# Patient Record
Sex: MALE | NOT HISPANIC OR LATINO | Employment: FULL TIME | URBAN - METROPOLITAN AREA
[De-identification: names, ages, dates, MRNs, and addresses within clinical notes are randomized per-mention and may not be internally consistent; named-entity substitution may affect disease eponyms.]

---

## 2017-01-09 ENCOUNTER — OFFICE VISIT - HEALTHEAST (OUTPATIENT)
Dept: BEHAVIORAL HEALTH | Facility: CLINIC | Age: 57
End: 2017-01-09

## 2017-01-09 DIAGNOSIS — F64.0 GENDER DYSPHORIA IN ADOLESCENT AND ADULT: ICD-10-CM

## 2017-01-20 ENCOUNTER — OFFICE VISIT - HEALTHEAST (OUTPATIENT)
Dept: BEHAVIORAL HEALTH | Facility: CLINIC | Age: 57
End: 2017-01-20

## 2017-01-20 DIAGNOSIS — F64.0 GENDER DYSPHORIA IN ADOLESCENT AND ADULT: ICD-10-CM

## 2017-01-30 ENCOUNTER — OFFICE VISIT - HEALTHEAST (OUTPATIENT)
Dept: BEHAVIORAL HEALTH | Facility: CLINIC | Age: 57
End: 2017-01-30

## 2017-01-30 DIAGNOSIS — F64.0 GENDER DYSPHORIA IN ADOLESCENT AND ADULT: ICD-10-CM

## 2017-02-15 ENCOUNTER — AMBULATORY - HEALTHEAST (OUTPATIENT)
Dept: BEHAVIORAL HEALTH | Facility: CLINIC | Age: 57
End: 2017-02-15

## 2017-02-15 ENCOUNTER — OFFICE VISIT - HEALTHEAST (OUTPATIENT)
Dept: BEHAVIORAL HEALTH | Facility: CLINIC | Age: 57
End: 2017-02-15

## 2017-02-15 DIAGNOSIS — F64.0 GENDER DYSPHORIA IN ADOLESCENT AND ADULT: ICD-10-CM

## 2017-02-23 ENCOUNTER — OFFICE VISIT - HEALTHEAST (OUTPATIENT)
Dept: BEHAVIORAL HEALTH | Facility: CLINIC | Age: 57
End: 2017-02-23

## 2017-02-23 DIAGNOSIS — F64.0 GENDER DYSPHORIA IN ADOLESCENT AND ADULT: ICD-10-CM

## 2017-03-02 ENCOUNTER — OFFICE VISIT - HEALTHEAST (OUTPATIENT)
Dept: BEHAVIORAL HEALTH | Facility: CLINIC | Age: 57
End: 2017-03-02

## 2017-03-02 DIAGNOSIS — F64.0 GENDER DYSPHORIA IN ADOLESCENT AND ADULT: ICD-10-CM

## 2017-03-21 ENCOUNTER — TRANSFERRED RECORDS (OUTPATIENT)
Dept: HEALTH INFORMATION MANAGEMENT | Facility: CLINIC | Age: 57
End: 2017-03-21

## 2017-04-03 ENCOUNTER — OFFICE VISIT - HEALTHEAST (OUTPATIENT)
Dept: BEHAVIORAL HEALTH | Facility: CLINIC | Age: 57
End: 2017-04-03

## 2017-04-03 DIAGNOSIS — F64.0 GENDER DYSPHORIA IN ADULT: ICD-10-CM

## 2017-04-12 ENCOUNTER — OFFICE VISIT - HEALTHEAST (OUTPATIENT)
Dept: BEHAVIORAL HEALTH | Facility: CLINIC | Age: 57
End: 2017-04-12

## 2017-04-12 DIAGNOSIS — F64.0 GENDER DYSPHORIA IN ADULT: ICD-10-CM

## 2017-04-20 ENCOUNTER — OFFICE VISIT - HEALTHEAST (OUTPATIENT)
Dept: BEHAVIORAL HEALTH | Facility: CLINIC | Age: 57
End: 2017-04-20

## 2017-04-20 DIAGNOSIS — F64.0 GENDER DYSPHORIA IN ADULT: ICD-10-CM

## 2017-05-01 ENCOUNTER — OFFICE VISIT - HEALTHEAST (OUTPATIENT)
Dept: BEHAVIORAL HEALTH | Facility: CLINIC | Age: 57
End: 2017-05-01

## 2017-05-01 DIAGNOSIS — F64.0 GENDER DYSPHORIA IN ADOLESCENT AND ADULT: ICD-10-CM

## 2017-05-18 ENCOUNTER — OFFICE VISIT (OUTPATIENT)
Dept: FAMILY MEDICINE | Facility: CLINIC | Age: 57
End: 2017-05-18

## 2017-05-18 VITALS
RESPIRATION RATE: 16 BRPM | OXYGEN SATURATION: 97 % | DIASTOLIC BLOOD PRESSURE: 85 MMHG | WEIGHT: 191 LBS | TEMPERATURE: 97.6 F | BODY MASS INDEX: 28.21 KG/M2 | HEART RATE: 67 BPM | SYSTOLIC BLOOD PRESSURE: 128 MMHG

## 2017-05-18 DIAGNOSIS — F64.0 GENDER DYSPHORIA IN ADULT: ICD-10-CM

## 2017-05-18 DIAGNOSIS — L65.9 HAIR LOSS: Primary | ICD-10-CM

## 2017-05-18 LAB
% GRANULOCYTES: 71.3 %G (ref 40–75)
ALBUMIN SERPL-MCNC: 4.6 MG/DL (ref 3.5–4.7)
ALP SERPL-CCNC: 56.4 U/L (ref 31.7–110.7)
ALT SERPL-CCNC: 19.5 U/L (ref 0–45)
AST SERPL-CCNC: 18.2 U/L (ref 0–55)
BILIRUB SERPL-MCNC: 0.6 MG/DL (ref 0.2–1.3)
BUN SERPL-MCNC: 22.5 MG/DL (ref 7–21)
CALCIUM SERPL-MCNC: 9.8 MG/DL (ref 8.5–10.1)
CHLORIDE SERPLBLD-SCNC: 104 MMOL/L (ref 98–110)
CHOLEST SERPL-MCNC: 192.2 MG/DL (ref 0–200)
CHOLEST/HDLC SERPL: 4.7 {RATIO} (ref 0–5)
CO2 SERPL-SCNC: 29.9 MMOL/L (ref 20–32)
CREAT SERPL-MCNC: 1 MG/DL (ref 0.7–1.3)
GFR SERPL CREATININE-BSD FRML MDRD: 81.9 ML/MIN/1.7 M2
GLUCOSE SERPL-MCNC: 108.6 MG'DL (ref 70–99)
GRANULOCYTES #: 4 K/UL (ref 1.6–8.3)
HCT VFR BLD AUTO: 50.4 % (ref 40–53)
HDLC SERPL-MCNC: 40.6 MG/DL
HEMOGLOBIN: 16.9 G/DL (ref 13.3–17.7)
LDLC SERPL CALC-MCNC: 133 MG/DL (ref 0–129)
LYMPHOCYTES # BLD AUTO: 1.2 K/UL (ref 0.8–5.3)
LYMPHOCYTES NFR BLD AUTO: 21.4 %L (ref 20–48)
MCH RBC QN AUTO: 31.2 PG (ref 26.5–35)
MCHC RBC AUTO-ENTMCNC: 33.5 G/DL (ref 32–36)
MCV RBC AUTO: 92.9 FL (ref 78–100)
MID #: 0.4 K/UL (ref 0–2.2)
MID %: 7.3 %M (ref 0–20)
PLATELET # BLD AUTO: 220 K/UL (ref 150–450)
POTASSIUM SERPL-SCNC: 4.1 MMOL/DL (ref 3.3–4.5)
PROT SERPL-MCNC: 7.3 G/DL (ref 6.8–8.8)
RBC # BLD AUTO: 5.42 M/UL (ref 4.4–5.9)
SODIUM SERPL-SCNC: 140.8 MMOL/L (ref 132.6–141.4)
TRIGL SERPL-MCNC: 91.7 MG/DL (ref 0–150)
VIT B12 SERPL-MCNC: 597 PG/ML (ref 193–986)
VLDL CHOLESTEROL: 18.3 MG/DL (ref 7–32)
WBC # BLD AUTO: 5.6 K/UL (ref 4–11)

## 2017-05-18 NOTE — LETTER
May 24, 2017      Cesar Stockton  [address] [place] [address]        Dear Cesar,    Thank you for getting your care at [facility]. Please see below for your test results.   - Normal Vitamin B12  - Slightly low Vitamin D  - Normal kidney, liver, electrolyte, blood cell count, cholesterol, and testosterone results    Resulted Orders   Vitamin B12   Result Value Ref Range    Vitamin B12 597 193 - 986 pg/mL   Vitamin D Level   Result Value Ref Range    Vitamin D Deficiency screening 26 20 - 75 ug/L      Comment:      Season, race, dietary intake, and treatment affect the concentration of   25-hydroxy-Vitamin D. Values may decrease during winter months and increase   during summer months. Values 20-29 ug/L may indicate Vitamin D insufficiency   and values <20 ug/L may indicate Vitamin D deficiency.   Vitamin D determination is routinely performed by an immunoassay specific for   25 hydroxyvitamin D3.  If an individual is on vitamin D2 (ergocalciferol)   supplementation, please specify 25 OH vitamin D2 and D3 level determination   by   LCMSMS test VITD23.     Comprehensive Metabolic Panel ([facility])   Result Value Ref Range    Albumin 4.6 3.5 - 4.7 mg/dL    Alkaline Phosphatase 56.4 31.7 - 110.7 U/L    ALT 19.5 0.0 - 45.0 U/L    AST 18.2 0.0 - 55.0 U/L    Bilirubin Total 0.6 0.2 - 1.3 mg/dL    Urea Nitrogen 22.5 (H) 7.0 - 21.0 mg/dL    Calcium 9.8 8.5 - 10.1 mg/dL    Chloride 104.0 98.0 - 110.0 mmol/L    Carbon Dioxide 29.9 20.0 - 32.0 mmol/L    Creatinine 1.0 0.7 - 1.3 mg/dL    Glucose 108.6 (H) 70.0 - 99.0 mg'dL    Potassium 4.1 3.3 - 4.5 mmol/dL    Sodium 140.8 132.6 - 141.4 mmol/L    Protein Total 7.3 6.8 - 8.8 g/dL    GFR Estimate 81.9 >60.0 mL/min/1.7 m2    GFR Estimate If Black >90 >60.0 mL/min/1.7 m2   Testosterone total   Result Value Ref Range    Testosterone Total 463 240 - 950 ng/dL      Comment:      This test was developed and its performance characteristics determined by the   [facility]  [facility],  Special Chemistry Laboratory. It has   not been cleared or approved by the FDA. The laboratory is regulated under   CLIA   as qualified to perform high-complexity testing. This test is used for   clinical   purposes. It should not be regarded as investigational or for research.     CBC with Diff Plt ([facility])   Result Value Ref Range    WBC 5.6 4.0 - 11.0 K/uL    Lymphocytes # 1.2 0.8 - 5.3 K/uL    % Lymphocytes 21.4 20.0 - 48.0 %L    Mid # 0.4 0.0 - 2.2 K/uL    Mid % 7.3 0.0 - 20.0 %M    GRANULOCYTES # 4.0 1.6 - 8.3 K/uL    % Granulocytes 71.3 40.0 - 75.0 %G    RBC 5.42 4.40 - 5.90 M/uL    Hemoglobin 16.9 13.3 - 17.7 g/dL    Hematocrit 50.4 40.0 - 53.0 %    MCV 92.9 78.0 - 100.0 fL    MCH 31.2 26.5 - 35.0 pg    MCHC 33.5 32.0 - 36.0 g/dL    Platelets 220.0 150.0 - 450.0 K/uL   Lipid Cascade ([facility])   Result Value Ref Range    Cholesterol 192.2 0.0 - 200.0 mg/dL    Cholesterol/HDL Ratio 4.7 0.0 - 5.0    HDL Cholesterol 40.6 >40.0 mg/dL    LDL Cholesterol Calculated 133 (H) 0 - 129 mg/dL    Triglycerides 91.7 0.0 - 150.0 mg/dL    VLDL Cholesterol 18.3 7.0 - 32.0 mg/dL     Sincerely,    Sury Pike MD

## 2017-05-18 NOTE — MR AVS SNAPSHOT
After Visit Summary   5/18/2017    Chris Stockton    MRN: [id number]           Patient Information     Date Of Birth          1960        Visit Information        Provider Department      5/18/2017 9:20 AM Sury Altman MD \[facility]\"" [facility]        Today's Diagnoses     Hair loss    -  1    Gender dysphoria in adult          Care Instructions    Here is the plan from today's visit    1. Hair loss  - Vitamin B12  - Vitamin D Level    2. Gender dysphoria in adult  - Comprehensive Metabolic Panel ([facility])  - Testosterone total  - CBC with Diff Plt ([facility])  - Lipid Cascade ([facility])      Please call or return to clinic if your symptoms don't go away.    Follow up plan  Please make a clinic appointment for follow up with me (SURY ALTMAN) to continue gender and hormone discussion and follow up hair loss    Thank you for coming to [facility] today.  Lab Testing:  **If you had lab testing today and your results are reassuring or normal they will be mailed to you or sent through [email or web address] within 7 days.   **If the lab tests need quick action we will call you with the results.  The phone number we will call with results is # [phone] (home) none (work). If this is not the best number please call our clinic and change the number.  Medication Refills:  If you need any refills please call your pharmacy and they will contact us.   If you need to  your refill at a new pharmacy, please contact the new pharmacy directly. The new pharmacy will help you get your medications transferred faster.   Scheduling:  If you have any concerns about today's visit or wish to schedule another appointment please call our office during normal business hours [phone] (8-5:00 M-F)  If a referral was made to a [facility] and you don't get a call from central scheduling please call [phone].  If a Mammogram was ordered for you at [facility] call  [phone] to schedule or change your appointment.  If you had an XRay/CT/Ultrasound/MRI ordered the number is [phone] to schedule or change your radiology appointment.   Medical Concerns:  If you have urgent medical concerns please call [phone] at any time of the day.          Follow-ups after your visit        Who to contact     Please call your clinic at [phone] to:    Ask questions about your health    Make or cancel appointments    Discuss your medicines    Learn about your test results    Speak to your doctor   If you have compliments or concerns about an experience at your clinic, or if you wish to file a complaint, please contact [facility] Patient Relations at [phone] or email us at [email or web address]         Additional Information About Your Visit        [employer] Information     [employer] is an electronic gateway that provides easy, online access to your medical records. With [employer], you can request a clinic appointment, read your test results, renew a prescription or communicate with your care team.     To sign up for [employer] visit the website at [email or web address]   You will be asked to enter the access code listed below, as well as some personal information. Please follow the directions to create your username and password.     Your access code is: [email or web address]  Expires: 2017 10:08 AM     Your access code will  in 90 days. If you need help or a new code, please contact your [facility] or call [phone] for assistance.        Care EveryWhere ID     This is your Care EveryWhere ID. This could be used by other organizations to access your [facility] medical records  [id number]        Your Vitals Were     Pulse Temperature Respirations Pulse Oximetry BMI (Body Mass Index)       67 97.6  F (36.4  C) (Oral) 16 97% 28.21 kg/m2        Blood Pressure from Last 3 Encounters:   17 128/85    06/03/16 (!) 133/97   11/24/14 137/88    Weight from Last 3 Encounters:   05/18/17 191 lb (86.6 kg)   06/03/16 190 lb (86.2 kg)   11/24/14 192 lb (87.1 kg)              We Performed the Following     CBC with Diff Plt ([facility])     Comprehensive Metabolic Panel ([facility])     Lipid Cascade ([facility])     Testosterone total     Vitamin B12     Vitamin D Level        Primary Care Provider Office Phone # Fax #    [facility] [phone] [address] [facility] [address]        Thank you!     Thank you for choosing [facility]  for your care. Our goal is always to provide you with excellent care. Hearing back from our patients is one way we can continue to improve our services. Please take a few minutes to complete the written survey that you may receive in the mail after your visit with us. Thank you!             Your Updated Medication List - Protect others around you: Learn how to safely use, store and throw away your medicines at www.disposemymeds.org.          This list is accurate as of: 5/18/17 10:08 AM.  Always use your most recent med list.                   Brand Name Dispense Instructions for use    ADDERALL PO      Take 10 mg by mouth daily       PRILOSEC PO      Take 20 mg by mouth every morning       WELLBUTRIN  MG 24 hr tablet   Generic drug:  buPROPion      Take 300 mg by mouth every morning

## 2017-05-18 NOTE — PATIENT INSTRUCTIONS
Here is the plan from today's visit    1. Hair loss  - Vitamin B12  - Vitamin D Level    2. Gender dysphoria in adult  - Comprehensive Metabolic Panel ([facility])  - Testosterone total  - CBC with Diff Plt ([facility])  - Lipid Cascade ([facility])      Please call or return to clinic if your symptoms don't go away.    Follow up plan  Please make a clinic appointment for follow up with me (HARRY ALTMAN) to continue gender and hormone discussion and follow up hair loss    Thank you for coming to [facility] today.  Lab Testing:  **If you had lab testing today and your results are reassuring or normal they will be mailed to you or sent through [employer] within 7 days.   **If the lab tests need quick action we will call you with the results.  The phone number we will call with results is # [phone] (home) none (work). If this is not the best number please call our clinic and change the number.  Medication Refills:  If you need any refills please call your pharmacy and they will contact us.   If you need to  your refill at a new pharmacy, please contact the new pharmacy directly. The new pharmacy will help you get your medications transferred faster.   Scheduling:  If you have any concerns about today's visit or wish to schedule another appointment please call our office during normal business hours [phone] (8-5:00 M-F)  If a referral was made to a [facility] and you don't get a call from central scheduling please call [phone].  If a Mammogram was ordered for you at [facility] call [phone] to schedule or change your appointment.  If you had an XRay/CT/Ultrasound/MRI ordered the number is [phone] to schedule or change your radiology appointment.   Medical Concerns:  If you have urgent medical concerns please call [phone] at any time of the day.

## 2017-05-18 NOTE — PROGRESS NOTES
"      HPI:       Chris Stockton is a 56 year old who presents for the following  Patient presents with:  Hair/Scalp Problem: Hair Thinning  Consult: HRT        Concern: Establish Care/ Hair Thinning   Description of the problem :Pt presents today with c/o hair thinning      When did it start?: 01/2017    Intensity: mild, not clumps falling out but during showers notices some thinner hair strands.     Progression of Symptoms:  worsening and notices that hair seems a little more thin     Therapies Tried Nothing    What has worked?  n/a               {+++++++  Additional Concerns  (FM):921614}  Problem, Medication and Allergy Lists were {Reviewed Lists:533309::\"reviewed and are current.\"}  Patient is {New/Established:410332::\"an established patient of this clinic.\"}         Review of Systems:   Review of Systems          Physical Exam:   Patient Vitals for the past 24 hrs:   BP Temp Temp src Pulse Resp SpO2 Weight   05/18/17 0923 128/85 97.6  F (36.4  C) Oral 67 16 97 % 191 lb (86.6 kg)     Body mass index is 28.21 kg/(m^2).  {SMI VITALS OPTIONS:889766::\"Vitals were reviewed and were normal\"}     Physical Exam  {  Detailed Ortho and mental status exam:994101}    Results:     {Result Choices:390394}  Assessment and Plan     {Assessment/Plan Pick List :614880}  There are no discontinued medications.  Options for treatment and follow-up care were reviewed with the patient. Chris Stockton  engaged in the decision making process and verbalized understanding of the options discussed and agreed with the final plan.    Sury Pike MD      "

## 2017-05-18 NOTE — PROGRESS NOTES
"  New Patient and Transgender History Intake:       HPI          Mookie is a 56 year old individual  who presents today for interest in feminizing hormone therapy to better align their body with their gender identity. Uses female pronouns when presenting as female, uses male pronouns \"when in disguise\".   Came to this clinic via referral from: sees therapist at [facility]    Hair thinning  - Jan 2017 hair started thinning on top and noticed hair in the shower. Knows someone with similar symptoms who was found to have a B12 deficinency and hair improved with treatment. Would be upset if they lost their \"naturally curly hair\".     - Attributes the hair loss to the recent inauguration. 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 Lots of home stress: adult children with medical problems, ex-wife going through cancer treatment and recently filed for divorce.     - No new hair products- uses standard shampoo and conditioner, no blow-dry or . Pt's father is 90 and \"has a good head of hair\". Maternal male relatives have had hair loss. Last haircut was 2013. No diet changes or medication changes.     - During neuropathy workup in June 2014: B12= 596 (nl 200-1100)    1-How do you identify? Transgender, MTF  2. What pronouns do you prefer?  She/Her/Hers/Herself   3-What age did you first feel your gender identity (internal sense of gender) did not match your physical body?      \"Trans part of me woke up in 2011 (49 yo)\"  4-Have you ever felt depressed or suicidal because your gender identity and body don't match?      YES   5-Have you legally changed your name? no   6-Have you ever seen a health care provider about being transgender?No  7-What hormones have you been on and for how long? (These can be treatments you were prescribed, that you got from a friend or bought without a prescription. Include any treatments you currently take.) None  8-If not on hormones, would you " like to be?   yes  9-Have you had any gender affirmation surgery? No  11-Do you want to have surgery now or in future? Undecided  12-Are you out at work or at school or at home?      Some people know. Out at home.  Not out at work.    ----------      Past Surgical History:   Procedure Laterality Date     APPENDECTOMY       COLONOSCOPY  12/9/2011    Procedure:COLONOSCOPY; COLONOSCOPY; Surgeon:MARILY MENA; Location: GI     ESOPHAGOSCOPY, GASTROSCOPY, DUODENOSCOPY (EGD), COMBINED  11/23/2012    Procedure: COMBINED ESOPHAGOSCOPY, GASTROSCOPY, DUODENOSCOPY (EGD), BIOPSY SINGLE OR MULTIPLE;;  Surgeon: Yehuda Tomlinson MD;  Location:  GI     VASECTOMY         Patient Active Problem List   Diagnosis     Shoulder impingement syndrome     Ulnar nerve injury     ADRIAN (generalized anxiety disorder)     Transvestic fetishism     Personality disorder     Past Medical History:   Diagnosis Date     Anxiety      Depressive disorder      Kidney stone     nov.       Current Outpatient Prescriptions   Medication Sig Dispense Refill     Amphetamine-Dextroamphetamine (ADDERALL PO) Take 10 mg by mouth daily       Omeprazole (PRILOSEC PO) Take 20 mg by mouth every morning       buPROPion (WELLBUTRIN XL) 150 MG 24 hr tablet Take 300 mg by mouth every morning          Family History   Problem Relation Age of Onset     Prostate Cancer Brother        Allergies   Allergen Reactions     Doxycycline Rash       History   Smoking Status     Never Smoker   Smokeless Tobacco     Not on file       Mental Health Assessment:  Chemical use history Unknown  Mental Health diagnosis  History: MDD, ADHD      [facility], gender therapist           Review of Systems:   CONSTITUTIONAL: NEGATIVE for fever, chills, change in weight  INTEGUMENTARY/SKIN: vitiligo, used Derm prescribed dream, avoiding using due to summer  EYES: NEGATIVE for vision changes or irritation  ENT/MOUTH: NEGATIVE for mouth and throat problems. + Tinnitus  RESP:  "NEGATIVE for significant cough or SOB  BREAST: NEGATIVE for masses, tenderness or discharge  CV: NEGATIVE for chest pain, palpitations or peripheral edema  GI: NEGATIVE for nausea, abdominal pain, + heartburn if \"poor eating\", or change in bowel habits  : NEGATIVE for frequency, dysuria, or hematuria  MUSCULOSKELETAL: NEGATIVE for significant arthralgias or myalgia  NEURO: NEGATIVE for weakness, dizziness or paresthesias  ENDOCRINE: hair thinning  HEME/ALLERGY: NEGATIVE for bleeding problems  PSYCHIATRIC: NEGATIVE for changes in mood or affect           Social History     Social History     Social History     Marital status:      Spouse name: N/A     Number of children: N/A     Years of education: N/A     Social History Main Topics     Smoking status: Never Smoker     Smokeless tobacco: None     Alcohol use Yes      Comment: rare     Drug use: No     Sexual activity: Not Asked     Other Topics Concern     None     Social History Narrative   Works at [employer], will be getting a 3 month paid leave coming up to do volunteer work for [employer]    Marital Status: In process of divorce           Physical Exam:     Vitals:    05/18/17 0923   BP: 128/85   Pulse: 67   Resp: 16   Temp: 97.6  F (36.4  C)   TempSrc: Oral   SpO2: 97%   Weight: 191 lb (86.6 kg)     BMI= Body mass index is 28.21 kg/(m^2).     Appearance: female appearance and dress  GENERAL:: healthy, alert, no distress  SKIN/HAIR: thinning dark grey hair with no bald patches  EYES: Eyes grossly normal to inspection, fundi benign and PERRL  HENT: ear canals normal, TM's normal, Nose normal, Mouth- no ulcers, no lesions  RESP: lungs clear to auscultation - no rales, no rhonchi, no wheezes  CV: regular rates and rhythm, normal S1 S2, no S3 or S4 and no murmur, no click or rub -  Affect: Appropriate/mood-congruent    Results for orders placed or performed in visit on 05/18/17   Vitamin B12   Result Value Ref Range    Vitamin B12 597 193 - 986 " pg/mL   Vitamin D Level   Result Value Ref Range    Vitamin D Deficiency screening 26 20 - 75 ug/L   Comprehensive Metabolic Panel ([facility])   Result Value Ref Range    Albumin 4.6 3.5 - 4.7 mg/dL    Alkaline Phosphatase 56.4 31.7 - 110.7 U/L    ALT 19.5 0.0 - 45.0 U/L    AST 18.2 0.0 - 55.0 U/L    Bilirubin Total 0.6 0.2 - 1.3 mg/dL    Urea Nitrogen 22.5 (H) 7.0 - 21.0 mg/dL    Calcium 9.8 8.5 - 10.1 mg/dL    Chloride 104.0 98.0 - 110.0 mmol/L    Carbon Dioxide 29.9 20.0 - 32.0 mmol/L    Creatinine 1.0 0.7 - 1.3 mg/dL    Glucose 108.6 (H) 70.0 - 99.0 mg'dL    Potassium 4.1 3.3 - 4.5 mmol/dL    Sodium 140.8 132.6 - 141.4 mmol/L    Protein Total 7.3 6.8 - 8.8 g/dL    GFR Estimate 81.9 >60.0 mL/min/1.7 m2    GFR Estimate If Black >90 >60.0 mL/min/1.7 m2   CBC with Diff Plt ([facility])   Result Value Ref Range    WBC 5.6 4.0 - 11.0 K/uL    Lymphocytes # 1.2 0.8 - 5.3 K/uL    % Lymphocytes 21.4 20.0 - 48.0 %L    Mid # 0.4 0.0 - 2.2 K/uL    Mid % 7.3 0.0 - 20.0 %M    GRANULOCYTES # 4.0 1.6 - 8.3 K/uL    % Granulocytes 71.3 40.0 - 75.0 %G    RBC 5.42 4.40 - 5.90 M/uL    Hemoglobin 16.9 13.3 - 17.7 g/dL    Hematocrit 50.4 40.0 - 53.0 %    MCV 92.9 78.0 - 100.0 fL    MCH 31.2 26.5 - 35.0 pg    MCHC 33.5 32.0 - 36.0 g/dL    Platelets 220.0 150.0 - 450.0 K/uL   Lipid Cascade ([facility])   Result Value Ref Range    Cholesterol 192.2 0.0 - 200.0 mg/dL    Cholesterol/HDL Ratio 4.7 0.0 - 5.0    HDL Cholesterol 40.6 >40.0 mg/dL    LDL Cholesterol Calculated 133 (H) 0 - 129 mg/dL    Triglycerides 91.7 0.0 - 150.0 mg/dL    VLDL Cholesterol 18.3 7.0 - 32.0 mg/dL         Assessment and Plan   Chris was seen today for hair/scalp problem and start HRT discussion    Diagnoses and all orders for this visit:    Hair loss  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 Other etiologies concerning primary follicle pathology  include alopecia areata (association with vitiligo, but  would expect to be more rapid with localized patching) or telogen effluvium (less common). Will obtain vitamin levels given patient's concern. Endorses no other thyroid symptoms, will defer checking TSH for now. May consider scalp biopsy at later point if testing yields no info and loss progresses. Finasteride may also help, especially if using as part of gender transformation regimen.   -     Vitamin B12  -     Vitamin D Level    Gender dysphoria in adult  So far Mookie seems well consolidated in her female gender identity and in a good mental state. Recommended completely separate appointment to address this issue in its entirety. Mookie was open to this and plans to transfer care to our facility. Will obtain relevant labs in anticipation of of next visit.  -     Comprehensive Metabolic Panel ([facility])  -     Testosterone total  -     CBC with Diff Plt ([facility])  -     Lipid Cascade ([facility])    I, Jefferson Cortes MS3, acted as scribe for Dr. Sury Pike MD. All aspects of the exam and documentation were approved by the attending physician.     The medical student acted as scribe and the encounter documented above was completely performed by myself and the documentation reflects the work I have performed today. Sury Pike MD     I spent 30 min face to face with the patient and >50% was spent counselling the patient about the above medical conditions, educating patient and discussing the recommendations and followup.      Sury Pike MD  [facility], [facility]

## 2017-05-19 LAB — DEPRECATED CALCIDIOL+CALCIFEROL SERPL-MC: 26 UG/L (ref 20–75)

## 2017-05-22 ENCOUNTER — OFFICE VISIT - HEALTHEAST (OUTPATIENT)
Dept: BEHAVIORAL HEALTH | Facility: CLINIC | Age: 57
End: 2017-05-22

## 2017-05-22 DIAGNOSIS — F64.0 GENDER DYSPHORIA IN ADOLESCENT AND ADULT: ICD-10-CM

## 2017-05-22 LAB — TESTOST SERPL-MCNC: 463 NG/DL (ref 240–950)

## 2017-05-25 ENCOUNTER — TELEPHONE (OUTPATIENT)
Dept: PHARMACY | Facility: CLINIC | Age: 57
End: 2017-05-25

## 2017-05-25 NOTE — TELEPHONE ENCOUNTER
[facility] phone call message- medication clarification/question:    Full Medication Name: spironolactone       Question: Patient was in recently in clinic and spoke with the provider about the above medication. He has decided that he would like to take the above medication after all and would like us to send in a prescription      Pharmacy confirmed as [facility]ACY #[id number] - [facility] [address]: Yes    OK to leave a message on voice mail? Yes    Primary language: English      needed? No    Call taken on May 25, 2017 at 2:35 PM by Julia Craig

## 2017-05-25 NOTE — TELEPHONE ENCOUNTER
Per chart review, patient saw PCP on 5/18. Message routed to PCP to prescribe medication if appropriate.    Gaby Phillips RN

## 2017-05-26 ENCOUNTER — MYC MEDICAL ADVICE (OUTPATIENT)
Dept: FAMILY MEDICINE | Facility: CLINIC | Age: 57
End: 2017-05-26

## 2017-05-26 DIAGNOSIS — L65.9 ALOPECIA: Primary | ICD-10-CM

## 2017-05-30 RX ORDER — FINASTERIDE 1 MG/1
1 TABLET, FILM COATED ORAL DAILY
Qty: 30 TABLET | Refills: 3 | Status: SHIPPED | OUTPATIENT
Start: 2017-05-30 | End: 2017-07-31

## 2017-07-06 ENCOUNTER — TRANSFERRED RECORDS (OUTPATIENT)
Dept: HEALTH INFORMATION MANAGEMENT | Facility: CLINIC | Age: 57
End: 2017-07-06

## 2017-07-20 ENCOUNTER — TRANSFERRED RECORDS (OUTPATIENT)
Dept: HEALTH INFORMATION MANAGEMENT | Facility: CLINIC | Age: 57
End: 2017-07-20

## 2017-07-31 ENCOUNTER — OFFICE VISIT (OUTPATIENT)
Dept: FAMILY MEDICINE | Facility: CLINIC | Age: 57
End: 2017-07-31

## 2017-07-31 VITALS
HEIGHT: 69 IN | BODY MASS INDEX: 27.37 KG/M2 | OXYGEN SATURATION: 98 % | SYSTOLIC BLOOD PRESSURE: 135 MMHG | HEART RATE: 55 BPM | WEIGHT: 184.8 LBS | RESPIRATION RATE: 16 BRPM | TEMPERATURE: 98.3 F | DIASTOLIC BLOOD PRESSURE: 79 MMHG

## 2017-07-31 DIAGNOSIS — L64.9 MALE PATTERN BALDNESS: ICD-10-CM

## 2017-07-31 DIAGNOSIS — F64.0 GENDER DYSPHORIA IN ADULT: ICD-10-CM

## 2017-07-31 DIAGNOSIS — K21.9 GASTROESOPHAGEAL REFLUX DISEASE WITHOUT ESOPHAGITIS: Primary | ICD-10-CM

## 2017-07-31 DIAGNOSIS — H90.0 CONDUCTIVE HEARING LOSS, BILATERAL: ICD-10-CM

## 2017-07-31 PROBLEM — F32.5 MAJOR DEPRESSIVE DISORDER IN REMISSION (H): Status: ACTIVE | Noted: 2017-07-31

## 2017-07-31 NOTE — PROGRESS NOTES
"       HPI     Mookie is a 57 year old individual that uses pronouns She/Her/Hers/Herself that presents today for follow up of:  feminizing hormone therapy. Gender identity: Female    Any special concerns today?  concerns about Hair loss    On hormones?  No    Questions about treatment and cost for hair loss.   Reading papers - better results with starting hormones v blockers first.   Saw  Dr. Maynro Baker - sees transgender patients.  She said they needed to start on hormones and possibly rogaine for now.     Wellbutrin - 1/1000 will experience hair loss on it. Had been on 150 for ever and increased to 300 at the time when hair loss seemed to start, that they recently decreased again.  Seeing a psychiatrist - Dr. Ana Cool.  Had a heart to heart on their being transgender and is quite unfamiliar.  Ok having them go elsewhere.  Seeing Dr. Plasencia for therapy but is out of network. Has decent transgender experience. So needing to find new psychiatrist.  Has done EMDR in the past and worked well. Is also expecting to divorce her wife post 30 years - has cancers.  Prior had seen Dr. Torres at [facility].    Is being treated for ADD by her psychiatrist.     Transgender - didn't figure out that they were \"a girl inside\" until 2011 and since then no longer suicidal and life is much better. 7/12/2011 - was when transitioned.     Out socially in Worship for many several years and out to family 2012 and wife 2012.  Doesn't do make up to pass.    Out to 6 people at work but not to all. Not to boss.    Out in climate group - in disguise in conservative spaces.   Not out to a conservative spiritual group she is in and really enjoys    Goes by girl pronouns.    Estrogen preference.   Thinking about bottom surgery  6 months left before wife can get insurance. Then will divorce.     Not worried about lack of erection.  Worried about lack of orgasm.     PMHx:   GERD - had stricture - ballooned. Had this twice. PPI started after.   Went " cold turkey off PPI - if eats properly and carefully is not OK but if not can have difficulty swallowing.       ---    Past Surgical History:   Procedure Laterality Date     APPENDECTOMY       COLONOSCOPY  12/9/2011    Procedure:COLONOSCOPY; COLONOSCOPY; Surgeon:MARILY MENA; Location: GI     ESOPHAGOSCOPY, GASTROSCOPY, DUODENOSCOPY (EGD), COMBINED  11/23/2012    Procedure: COMBINED ESOPHAGOSCOPY, GASTROSCOPY, DUODENOSCOPY (EGD), BIOPSY SINGLE OR MULTIPLE;;  Surgeon: Yehuda Tomlinson MD;  Location:  GI     VASECTOMY         Patient Active Problem List   Diagnosis     Shoulder impingement syndrome     Ulnar nerve injury     ADRIAN (generalized anxiety disorder)     Transvestic fetishism     Personality disorder       Current Outpatient Prescriptions   Medication Sig Dispense Refill     finasteride (PROPECIA) 1 MG tablet Take 1 tablet (1 mg) by mouth daily 30 tablet 3     Amphetamine-Dextroamphetamine (ADDERALL PO) Take 10 mg by mouth daily       Omeprazole (PRILOSEC PO) Take 20 mg by mouth every morning       buPROPion (WELLBUTRIN XL) 150 MG 24 hr tablet Take 300 mg by mouth every morning          History   Smoking Status     Unknown If Ever Smoked   Smokeless Tobacco     Not on file          Allergies   Allergen Reactions     Tetracycline Unknown     Doxycycline Rash       Problem, Medication and Allergy Lists were reviewed and are current..         Review of Systems:      General    Fat redistribution: no    Weight change: no HEENT    Voice change: no     Cardiovascular (CV)    Chest Pains: no    Shortness of breath: no Chest    Decreased exercise tolerance:  no    Breast changes/development: no     Gastrointestinal (GI)    Abdominal pain: no    Change in appetite: no Skin    Acne or oily skin: no  Change in hair: YES (hair loss)         Genitourinary ()    Abnormal vaginal bleeding: not applicable     Decreased spontaneous erections: not applicable    Change in libido: not applicable    New sexual  "partners: not applicable Musculoskeletal    Leg pain or swelling: not applicable     Psychiatric (Psych)  Depression: YES      Anxiety/Panic: YES        Mood:  \"good\"                    Physical Exam:   There were no vitals filed for this visit.  BMI= There is no height or weight on file to calculate BMI.   Wt Readings from Last 10 Encounters:   05/18/17 191 lb (86.6 kg)   06/03/16 190 lb (86.2 kg)   11/24/14 192 lb (87.1 kg)   02/14/13 194 lb (88 kg)   11/23/12 200 lb (90.7 kg)   11/15/12 201 lb (91.2 kg)   12/09/11 194 lb (88 kg)   11/23/11 200 lb (90.7 kg)     Appearance: Female appearance and dress    GENERAL:: healthy, alert and no distress  Affect: Appropriate/mood-congruent - wearing skirt and loose colorful blouse.   Scalp -typical male pattern baldness         Labs:   Results from last visit:  Office Visit on 05/18/2017   Component Date Value Ref Range Status     Vitamin B12 05/18/2017 597  193 - 986 pg/mL Final     Vitamin D Deficiency screening 05/18/2017 26  20 - 75 ug/L Final    Comment: Season, race, dietary intake, and treatment affect the concentration of   25-hydroxy-Vitamin D. Values may decrease during winter months and increase   during summer months. Values 20-29 ug/L may indicate Vitamin D insufficiency   and values <20 ug/L may indicate Vitamin D deficiency.   Vitamin D determination is routinely performed by an immunoassay specific for   25 hydroxyvitamin D3.  If an individual is on vitamin D2 (ergocalciferol)   supplementation, please specify 25 OH vitamin D2 and D3 level determination   by   LCMSMS test VITD23.       Albumin 05/18/2017 4.6  3.5 - 4.7 mg/dL Final     Alkaline Phosphatase 05/18/2017 56.4  31.7 - 110.7 U/L Final     ALT 05/18/2017 19.5  0.0 - 45.0 U/L Final     AST 05/18/2017 18.2  0.0 - 55.0 U/L Final     Bilirubin Total 05/18/2017 0.6  0.2 - 1.3 mg/dL Final     Urea Nitrogen 05/18/2017 22.5* 7.0 - 21.0 mg/dL Final     Calcium 05/18/2017 9.8  8.5 - 10.1 mg/dL Final     " Chloride 05/18/2017 104.0  98.0 - 110.0 mmol/L Final     Carbon Dioxide 05/18/2017 29.9  20.0 - 32.0 mmol/L Final     Creatinine 05/18/2017 1.0  0.7 - 1.3 mg/dL Final     Glucose 05/18/2017 108.6* 70.0 - 99.0 mg'dL Final     Potassium 05/18/2017 4.1  3.3 - 4.5 mmol/dL Final     Sodium 05/18/2017 140.8  132.6 - 141.4 mmol/L Final     Protein Total 05/18/2017 7.3  6.8 - 8.8 g/dL Final     GFR Estimate 05/18/2017 81.9  >60.0 mL/min/1.7 m2 Final     GFR Estimate If Black 05/18/2017 >90  >60.0 mL/min/1.7 m2 Final     Testosterone Total 05/18/2017 463  240 - 950 ng/dL Final    Comment: This test was developed and its performance characteristics determined by the   [facility],  [facility]. It has   not been cleared or approved by the FDA. The laboratory is regulated under   CLIA   as qualified to perform high-complexity testing. This test is used for   clinical   purposes. It should not be regarded as investigational or for research.       WBC 05/18/2017 5.6  4.0 - 11.0 K/uL Final     Lymphocytes # 05/18/2017 1.2  0.8 - 5.3 K/uL Final     % Lymphocytes 05/18/2017 21.4  20.0 - 48.0 %L Final     Mid # 05/18/2017 0.4  0.0 - 2.2 K/uL Final     Mid % 05/18/2017 7.3  0.0 - 20.0 %M Final     GRANULOCYTES # 05/18/2017 4.0  1.6 - 8.3 K/uL Final     % Granulocytes 05/18/2017 71.3  40.0 - 75.0 %G Final     RBC 05/18/2017 5.42  4.40 - 5.90 M/uL Final     Hemoglobin 05/18/2017 16.9  13.3 - 17.7 g/dL Final     Hematocrit 05/18/2017 50.4  40.0 - 53.0 % Final     MCV 05/18/2017 92.9  78.0 - 100.0 fL Final     MCH 05/18/2017 31.2  26.5 - 35.0 pg Final     MCHC 05/18/2017 33.5  32.0 - 36.0 g/dL Final     Platelets 05/18/2017 220.0  150.0 - 450.0 K/uL Final     Cholesterol 05/18/2017 192.2  0.0 - 200.0 mg/dL Final     Cholesterol/HDL Ratio 05/18/2017 4.7  0.0 - 5.0 Final     HDL Cholesterol 05/18/2017 40.6  >40.0 mg/dL Final     LDL Cholesterol Calculated 05/18/2017 133* 0 - 129 mg/dL Final      Triglycerides 05/18/2017 91.7  0.0 - 150.0 mg/dL Final     VLDL Cholesterol 05/18/2017 18.3  7.0 - 32.0 mg/dL Final       Assessment and Plan     There are no diagnoses linked to this encounter.    Chris was seen today for recheck and hair/scalp problem.    Diagnoses and all orders for this visit:    Gastroesophageal reflux disease without esophagitis - encouraged change to Ranitidine. Will do trial of as needed for now.  Aware that if has worse symptoms, will need to move to scheduled use.   -     ranitidine (ZANTAC) 150 MG tablet; Take 1 tablet (150 mg) by mouth 2 times daily    Conductive hearing loss, bilateral    Gender dysphoria in adult - very consistent.  Discovery late in life. Since then, MH has been markedly improved. Is out to many and living in identified gender.  Exploring when and how exactly to proceed.   Plan is to return in the future to start meds.  Will get back to her on Propecia and effect on orgasm and sexual functioning.  Also will get back to her on possibly psychiatrist who are more comfortable with transgender.     MDD - was seeing psychiatry and looking for new provider.  Is doing much better and now on lower dose of Wellbutrin.     Total time-  35 min with more than half spent counseling on meds, options, future psychiatric care etc.     Follow up:  Follow up in 1 month.  Results by quin  Questions were elicited and answered.     Yanna Santizo MD

## 2017-07-31 NOTE — Clinical Note
Questions!! 1. Do you know of psychiatrists who are transgender aware enough to not cause dysphoria.  She is looking for MDD and routine psych care 2. Propecia - do you know if it decreases orgasms? I didn't think of the fact that estrogen will do that... And she plans to start that too. I guess I need  3. Wanting to decrease hair loss AND have breasts so am leaning towards Propecia and Estrogen (and maybe progesterone).... Thanks! Charis

## 2017-07-31 NOTE — PATIENT INSTRUCTIONS
Here is the plan from today's visit    1. Gastroesophageal reflux disease without esophagitis  Zantac 150 mg twice a day as needed - would take this as scheduled for 3 - 4 days when having issues again.  If they keep recurring, then take this regularly.     2. Conductive hearing loss, bilateral      3. Gender dysphoria in adult  Will send you [employer] summaries of questions asked today    Please call or return to clinic if your symptoms don't go away.    Follow up plan  Physical    Thank you for coming to [facility] today.  Lab Testing:  **If you had lab testing today and your results are reassuring or normal they will be mailed to you or sent through [employer] within 7 days.   **If the lab tests need quick action we will call you with the results.  The phone number we will call with results is # [phone] (home) none (work). If this is not the best number please call our clinic and change the number.  Medication Refills:  If you need any refills please call your pharmacy and they will contact us.   If you need to  your refill at a new pharmacy, please contact the new pharmacy directly. The new pharmacy will help you get your medications transferred faster.   Scheduling:  If you have any concerns about today's visit or wish to schedule another appointment please call our office during normal business hours [phone] (8-5:00 M-F)  If a referral was made to a [facility] and you don't get a call from central scheduling please call [phone].  If a Mammogram was ordered for you at [facility] call [phone] to schedule or change your appointment.  If you had an XRay/CT/Ultrasound/MRI ordered the number is [phone] to schedule or change your radiology appointment.   Medical Concerns:  If you have urgent medical concerns please call [phone] at any time of the day.  If you have a medical emergency please call 442.

## 2017-07-31 NOTE — Clinical Note
Job Charlton Do you know of a psychiatrist who is more facile with transgender issues but not necessarily a transgender focused psychiatrist? Charis

## 2017-07-31 NOTE — MR AVS SNAPSHOT
After Visit Summary   7/31/2017    Chris Stockton    MRN: [id number]           Patient Information     Date Of Birth          1960        Visit Information        Provider Department      7/31/2017 4:00 PM Yanna Santizo MD [facility]        Today's Diagnoses     Gastroesophageal reflux disease without esophagitis    -  1    Conductive hearing loss, bilateral        Gender dysphoria in adult          Care Instructions    Here is the plan from today's visit    1. Gastroesophageal reflux disease without esophagitis  Zantac 150 mg twice a day as needed - would take this as scheduled for 3 - 4 days when having issues again.  If they keep recurring, then take this regularly.     2. Conductive hearing loss, bilateral      3. Gender dysphoria in adult  Will send you [facility] summaries of questions asked today    Please call or return to clinic if your symptoms don't go away.    Follow up plan  Physical    Thank you for coming to [facility] today.  Lab Testing:  **If you had lab testing today and your results are reassuring or normal they will be mailed to you or sent through [employer] within 7 days.   **If the lab tests need quick action we will call you with the results.  The phone number we will call with results is # [phone] (home) none (work). If this is not the best number please call our clinic and change the number.  Medication Refills:  If you need any refills please call your pharmacy and they will contact us.   If you need to  your refill at a new pharmacy, please contact the new pharmacy directly. The new pharmacy will help you get your medications transferred faster.   Scheduling:  If you have any concerns about today's visit or wish to schedule another appointment please call our office during normal business hours [phone] (8-5:00 M-F)  If a referral was made to a [facility] and you don't get a call from central scheduling  please call [phone].  If a Mammogram was ordered for you at [facility] call [phone] to schedule or change your appointment.  If you had an XRay/CT/Ultrasound/MRI ordered the number is [phone] to schedule or change your radiology appointment.   Medical Concerns:  If you have urgent medical concerns please call [phone] at any time of the day.  If you have a medical emergency please call 911.          Follow-ups after your visit        Who to contact     Please call your clinic at [phone] to:    Ask questions about your health    Make or cancel appointments    Discuss your medicines    Learn about your test results    Speak to your doctor   If you have compliments or concerns about an experience at your clinic, or if you wish to file a complaint, please contact [facility] Patient Relations at [phone] or email us at [email or web address]         Additional Information About Your Visit        [employer] Patient Care Information     [employer] gives you secure access to your electronic health record. If you see a primary care provider, you can also send messages to your care team and make appointments. If you have questions, please call your primary care clinic.  If you do not have a primary care provider, please call [phone] and they will assist you.      [employer] is an electronic gateway that provides easy, online access to your medical records. With [employer], you can request a clinic appointment, read your test results, renew a prescription or communicate with your care team.     To access your existing account, please contact your [facility] or call [phone] for assistance.        Care EveryWhere ID     This is your Care EveryWhere ID. This could be used by other organizations to access your [place] medical records  [id number]        Your Vitals Were     Pulse Temperature Respirations Height Pulse Oximetry BMI (Body Mass  "Index)    55 98.3  F (36.8  C) (Oral) 16 5' 9\" (175.3 cm) 98% 27.29 kg/m2       Blood Pressure from Last 3 Encounters:   07/31/17 135/79   05/18/17 128/85   06/03/16 (!) 133/97    Weight from Last 3 Encounters:   07/31/17 184 lb 12.8 oz (83.8 kg)   05/18/17 191 lb (86.6 kg)   06/03/16 190 lb (86.2 kg)              Today, you had the following     No orders found for display         Today's Medication Changes          These changes are accurate as of: 7/31/17  5:00 PM.  If you have any questions, ask your nurse or doctor.               Stop taking these medicines if you haven't already. Please contact your care team if you have questions.     finasteride 1 MG tablet   Commonly known as:  PROPECIA   Stopped by:  Yanna Santizo MD           PRILOSEC PO   Stopped by:  Yanna Santizo MD                    Primary Care Provider Office Phone # Fax #    Sury Billy Pike [phone]       [facility] [address] [facility] [address]        Equal Access to Services     [facility] AH: Hadii shauna hall hadasho Soyandelali, waaxda luqadaha, qaybta kaalmada adeegyada, sanjay joy ah. So [facility] [phone].    ATENCIÓN: Si habla español, tiene a tao disposición servicios gratuitos de asistencia lingüística. Emily al [phone].    We comply with applicable federal civil rights laws and [place] laws. We do not discriminate on the basis of race, color, national origin, age, disability sex, sexual orientation or gender identity.            Thank you!     Thank you for choosing [facility]  for your care. Our goal is always to provide you with excellent care. Hearing back from our patients is one way we can continue to improve our services. Please take a few minutes to complete the written survey that you may receive in the mail after your visit with us. Thank you!             Your Updated Medication List - Protect others around you: Learn how to safely use, store " and throw away your medicines at www.disposemymeds.org.          This list is accurate as of: 7/31/17  5:00 PM.  Always use your most recent med list.                   Brand Name Dispense Instructions for use Diagnosis    ADDERALL PO      Take 10 mg by mouth daily        WELLBUTRIN  MG 24 hr tablet   Generic drug:  buPROPion      Take 150 mg by mouth every morning

## 2017-09-22 ENCOUNTER — OFFICE VISIT (OUTPATIENT)
Dept: FAMILY MEDICINE | Facility: CLINIC | Age: 57
End: 2017-09-22

## 2017-09-22 VITALS
SYSTOLIC BLOOD PRESSURE: 114 MMHG | BODY MASS INDEX: 27.55 KG/M2 | DIASTOLIC BLOOD PRESSURE: 75 MMHG | RESPIRATION RATE: 18 BRPM | WEIGHT: 186 LBS | HEART RATE: 68 BPM | HEIGHT: 69 IN | TEMPERATURE: 99.2 F | OXYGEN SATURATION: 98 %

## 2017-09-22 DIAGNOSIS — F64.0 GENDER DYSPHORIA IN ADULT: Primary | ICD-10-CM

## 2017-09-22 LAB
FERRITIN SERPL-MCNC: 175 NG/ML (ref 26–388)
T4 FREE SERPL-MCNC: 0.79 NG/DL (ref 0.76–1.46)
TSH SERPL DL<=0.005 MIU/L-ACNC: 6.1 MU/L (ref 0.4–4)

## 2017-09-22 RX ORDER — FINASTERIDE 5 MG/1
5 TABLET, FILM COATED ORAL DAILY
Qty: 90 TABLET | Refills: 1 | Status: SHIPPED | OUTPATIENT
Start: 2017-09-22 | End: 2018-03-19

## 2017-09-22 NOTE — PATIENT INSTRUCTIONS
"      Informed Consent   Feminizing Medications      This form refers to the use of estrogen and/or androgen antagonists (sometimes called \"anti-androgens\" or \"androgen blockers\") by persons in the male-to-female spectrum who wish to become feminized to reduce gender dysphoria and facilitate a more feminine gender presentation. While there are risks associated with taking feminizing medications, when appropriately prescribed they can greatly improve mental health and quality of life.     Please seek another opinion if you want additional perspective on any aspect of your care.       Feminizing Effects     1. I understand that estrogen, androgen antagonists, or a combination of the two may be  prescribed to reduce male physical features and feminize my body.     2. I understand that the feminizing effects of estrogen and androgen antagonists can take several  months to a year to become noticeable, speed and degree of change is unpredictable.     3.  I understand that if I am taking estrogen I will probably develop breasts, and:        ? Breasts may take several years to develop to their full size.   ? Even if estrogen is stopped, the breast tissue that has developed will remain.   ? As soon as breasts start growing, it is recommended to have an annual breast exam.  ? There may be milky nipple discharge (galactorrhea). If you develop this, it is advised to check with a doctor to determine the cause.   ? It is not known if taking estrogen increases the risk of breast cancer.     4. I understand that the following changes are generally not permanent (that is, they will likely reverse if I stop taking feminizing medications):     ? Skin may become softer.   ? Muscle mass decreases and there may be a decrease in upper body strength.   ? Body hair growth may become less noticeable and grow more slowly,but won't stop.  ? Male pattern baldness may slow down, but will probably not stop completely, and hair that has already " "been lost will likely not grow back.   ? Fat may redistribute to a more feminine pattern (decreased in abdomen, increased on buttocks/hips/thighs - changing from \"apple shape\" to \"pear shape\").       5. I understand that taking feminizing medications will make my testicles produce less testosterone, which can affect my overall sexual function:    ? Fertility may be impaired, and I should consider sperm banking if I desire biological children.  ? Sperm may not mature, leading to reduced fertility. It is still possible to get someone pregnant however and contraception should be used if needed.  ? Testicles may shrink by 25-50%. Testicular examinations are still recommended.  ? The amount of fluid ejaculated may be reduced.   ? There is typically decrease in morning and spontaneous erections.   ? Erections may not be firm enough for penetrative sex.   ? Libido (sex drive) may decrease.     6. I understand that there are some aspects of my body that are not significantly changed by feminizing medications, though there may be other treatments that can be helpful.    ? Garner/facial hair may grow more slowly and be less noticeable, but will not go away.   ? Voice pitch will not rise and speech patterns will not become more feminine.   ? The laryngeal prominence (\"Sukhdev's apple\") will not shrink.      Risks of Feminizing Medications     7. I understand that the medical effects and safety of feminizing medications are not fully understood, and that there may be long-term risks that are not yet known.     8. I understand that I am strongly advised not to take more medication than I am prescribed, as this increases health risks. It won't help changes come faster.     9. I understand that feminizing medications can damage the liver. I have been advised that I should be monitored for possible liver damage as long as I am taking feminizing medications.     10. I understand that feminizing medications will result in changes that " will be noticeable by other people, and that some people in similar circumstances have experienced harassment, discrimination, and violence, while others have lost support of loved ones. I have been advised that referrals can be made for support/counselling if this would be helpful.     Medical Risks Associated with Estrogen     11. I understand that taking estrogen increases the risk of blood clots, which can result in:     ? Pulmonary embolism (blood clot to the lungs), which may cause death.  Stroke, which may cause permanent brain damage or death   ? Heart attack   ? Chronic leg vein problems     I understand that the risk of blood clots is much worse if I smoke cigarettes, especially over age 40. I understand that the danger is so high that I should stop smoking completely if I start taking estrogen. I can ask my doctor for advice on quitting.    12. I understand that taking estrogen can increase deposits of fat around my internal organs, which is associated with increased risk for diabetes and heart disease.     13. I understand that taking estrogen can cause increased blood pressure,  estrogen increases the risk of gallstones,  estrogen can cause headaches or migraines and can cause nausea and vomiting. I have been advised that if I develop these, it is recommended that I discuss this with my doctor.     14. I understand that it is not known if taking estrogen increases the risk of non-cancerous tumors of the pituitary gland. I have been informed that although this is typically not life-threatening, it can damage vision. I understand that this will be monitored.    15. I have been informed that I am more likely to have dangerous side effects from estrogen if I smoke, am overweight, am over 40 years old, or have a history of blood clots, high blood pressure, or a family history of breast cancer.     16. I have been informed that if I take too much estrogen, my body may convert it into testosterone, which may  slow or stop feminization.     Medical Risks Associated with Androgen Antagonists     17. I have been informed that spironolactone affects the balance of water and salts in the kidneys, and that this may:     ? Increase the amount of urine produced, and I may urinate more frequently   ? Reduce blood pressure   ? Rarely, cause high levels of potassium in the blood, and this will be monitored    18. I understand that some androgen antagonists make it more difficult to evaluate the results of PSA test. If I am over 50, I should have my prostate evaluated every year.     Prevention of Medical Complications     19. I agree to take feminizing medications as prescribed and to tell my care provider if I am not happy with the treatment or am experiencing any problems.     20. I understand that the right dose or type of medication prescribed for me may not be the same as for someone else.     21. I understand that physical examinations and blood tests are needed on a regular basis (monthly, at first) to check for negative side effects of feminizing medications.     22. I understand that feminization medications can interact with other medication (including other sources of hormones), dietary supplements, herbs, alcohol, and street drugs. I understand that being honest with my care provider about what else I am taking will help prevent medical complications that could be life-threatening. I have been informed that I will continue to get medical care no matter what information I share.     23. I understand that some medical conditions make it dangerous to take estrogen or androgen antagonists. I agree to be checked for risky conditions before the decision to start or continue feminizing medication is made.     24. I understand that I can choose to stop taking feminizing medication at any time, and that it is advised that I do this with the help of my doctor to make sure there are no negative reactions to stopping. I understand  that my doctor may suggest I reduce, switch or stop taking feminizing medication, if there are severe health risks that can't be controlled.    My signature below confirms that:     ? My doctor has talked with me about the benefits and risks of feminizing medication, the possible or likely consequences of hormone therapy, and potential treatment options.   ? I understand the risks that may be involved.   ? I understand that this form covers known effects and risks and that there may be long-term effects or risks that are not yet known.   ? I have had sufficient opportunity to discuss treatment options with my doctor. All of my questions have been answered to my satisfaction.   ? I believe I have adequate knowledge on which to base informed consent to the provision of feminizing medication.     Based on this:     _____ I wish to begin taking estrogen.     _____ I wish to begin taking androgen antagonists (e.g., Spironolactone).     _____ I do not wish to begin taking feminizing medication at this time.     Whatever your current decision is, please talk with your doctor any time you have questions, concerns, or want to re-evaluate your options.         ____________________________________ __________________   Patient Signature     Date         ____________________________________ __________________   Prescribing Clinician Signature    Date

## 2017-09-22 NOTE — PROGRESS NOTES
WERO Damico is a 57 year old  male  who presents:    Chief Complaint   Patient presents with     RECHECK     Follow up     As is older, and feeling like has lost 1/3 of hair since January. Thinks may have been precipitated by the elections. Brings gerald that have come out.  Notes that he is shedding and lots thinner hairs.     Spends time with transwomen of all ages.  Learns from them.     No difference in hair on the lower dose of wellbutrin.   Has not followed up with psychiatrists with more trangender experience. Considering getting rid of Wellbutrin.    Today is going to start the propecia - it is time to do this.  Lots of continued questions. Did look at the informed consent but did not bring. Wondered if needs endocrinologist.   Will then file for divorce.     Started the MInoxidil and using BID but finding that there was first some hair loss and so not using    Hair - oldest brother is very thin.  Parents though with a lot of hair.     Patient Active Problem List   Diagnosis     Shoulder impingement syndrome     Ulnar nerve injury     ADRIAN (generalized anxiety disorder)     Personality disorder     Gastroesophageal reflux disease without esophagitis     Major depressive disorder in remission     Conductive hearing loss, bilateral       Current Outpatient Prescriptions   Medication Sig Dispense Refill     ranitidine (ZANTAC) 150 MG tablet Take 1 tablet (150 mg) by mouth 2 times daily 60 tablet 3     Amphetamine-Dextroamphetamine (ADDERALL PO) Take 10 mg by mouth daily       buPROPion (WELLBUTRIN XL) 150 MG 24 hr tablet Take 150 mg by mouth every morning       minoxidil 5 % SOLN Apply topically twice daily. (Patient not taking: Reported on 9/22/2017) 60 mL 11          Allergies   Allergen Reactions     Tetracycline Unknown     Doxycycline Rash                Review of Systems:               Physical Exam:     Vitals:    09/22/17 1305   BP: 114/75   Pulse: 68   Resp: 18   Temp: 99.2  F (37.3  C)  "  [facility]   SpO2: 98%   Weight: 186 lb (84.4 kg)   Height: 5' 9\" (175.3 cm)     Body mass index is 27.47 kg/(m^2).  Vitals were reviewed and were normal  Shoulder length hair - thinning over the occiput, and over the temples. No scalp changes.  Pull test negative.     Office Visit on 05/18/2017   Component Date Value Ref Range Status     Vitamin B12 05/18/2017 597  193 - 986 pg/mL Final     Vitamin D Deficiency screening 05/18/2017 26  20 - 75 ug/L Final    Comment: Season, race, dietary intake, and treatment affect the concentration of   25-hydroxy-Vitamin D. Values may decrease during winter months and increase   during summer months. Values 20-29 ug/L may indicate Vitamin D insufficiency   and values <20 ug/L may indicate Vitamin D deficiency.   Vitamin D determination is routinely performed by an immunoassay specific for   25 hydroxyvitamin D3.  If an individual is on vitamin D2 (ergocalciferol)   supplementation, please specify 25 OH vitamin D2 and D3 level determination   by   LCMSMS test [id number].       Albumin 05/18/2017 4.6  3.5 - 4.7 mg/dL Final     Alkaline Phosphatase 05/18/2017 56.4  31.7 - 110.7 U/L Final     ALT 05/18/2017 19.5  0.0 - 45.0 U/L Final     AST 05/18/2017 18.2  0.0 - 55.0 U/L Final     Bilirubin Total 05/18/2017 0.6  0.2 - 1.3 mg/dL Final     Urea Nitrogen 05/18/2017 22.5* 7.0 - 21.0 mg/dL Final     Calcium 05/18/2017 9.8  8.5 - 10.1 mg/dL Final     Chloride 05/18/2017 104.0  98.0 - 110.0 mmol/L Final     Carbon Dioxide 05/18/2017 29.9  20.0 - 32.0 mmol/L Final     Creatinine 05/18/2017 1.0  0.7 - 1.3 mg/dL Final     Glucose 05/18/2017 108.6* 70.0 - 99.0 mg'dL Final     Potassium 05/18/2017 4.1  3.3 - 4.5 mmol/dL Final     Sodium 05/18/2017 140.8  132.6 - 141.4 mmol/L Final     Protein Total 05/18/2017 7.3  6.8 - 8.8 g/dL Final     GFR Estimate 05/18/2017 81.9  >60.0 mL/min/1.7 m2 Final     GFR Estimate If Black 05/18/2017 >90  >60.0 mL/min/1.7 m2 Final     Testosterone Total " 05/18/2017 463  240 - 950 ng/dL Final    Comment: This test was developed and its performance characteristics determined by the   [facility],  [facility]. It has   not been cleared or approved by the FDA. The laboratory is regulated under   CLIA   as qualified to perform high-complexity testing. This test is used for   clinical   purposes. It should not be regarded as investigational or for research.       WBC 05/18/2017 5.6  4.0 - 11.0 K/uL Final     Lymphocytes # 05/18/2017 1.2  0.8 - 5.3 K/uL Final     % Lymphocytes 05/18/2017 21.4  20.0 - 48.0 %L Final     Mid # 05/18/2017 0.4  0.0 - 2.2 K/uL Final     Mid % 05/18/2017 7.3  0.0 - 20.0 %M Final     GRANULOCYTES # 05/18/2017 4.0  1.6 - 8.3 K/uL Final     % Granulocytes 05/18/2017 71.3  40.0 - 75.0 %G Final     RBC 05/18/2017 5.42  4.40 - 5.90 M/uL Final     Hemoglobin 05/18/2017 16.9  13.3 - 17.7 g/dL Final     Hematocrit 05/18/2017 50.4  40.0 - 53.0 % Final     MCV 05/18/2017 92.9  78.0 - 100.0 fL Final     MCH 05/18/2017 31.2  26.5 - 35.0 pg Final     MCHC 05/18/2017 33.5  32.0 - 36.0 g/dL Final     Platelets 05/18/2017 220.0  150.0 - 450.0 K/uL Final     Cholesterol 05/18/2017 192.2  0.0 - 200.0 mg/dL Final     Cholesterol/HDL Ratio 05/18/2017 4.7  0.0 - 5.0 Final     HDL Cholesterol 05/18/2017 40.6  >40.0 mg/dL Final     LDL Cholesterol Calculated 05/18/2017 133* 0 - 129 mg/dL Final     Triglycerides 05/18/2017 91.7  0.0 - 150.0 mg/dL Final     VLDL Cholesterol 05/18/2017 18.3  7.0 - 32.0 mg/dL Final         Assessment and Plan     Chris was seen today for recheck.    Diagnoses and all orders for this visit:    Gender dysphoria in adult- is ready to move forward. Offered to have her see endocrine but chose to continue working with us directly.  Due to hair loss, will do TSH and also Ferritin. Start Proscar at 5 mg daily for androgen blocker and return in a month and plan to start on estrogen. Gave informed consent  packet again to bring back so can assure ok. No risk factors at this point. -     Cancel: TSH  -     finasteride (PROSCAR) 5 MG tablet; Take 1 tablet (5 mg) by mouth daily  -     TSH with free T4 reflex  -     Ferritin    TOtal time - 25 min with more than half spent counseling on med options, hair options.     There are no discontinued medications.    Options for treatment and follow-up care were reviewed with the patient . Chris Stockton  engaged in the decision making process and verbalized understanding of the options discussed and agreed with the final plan.    Yanna Santizo MD

## 2017-09-22 NOTE — MR AVS SNAPSHOT
"              After Visit Summary   9/22/2017    Chris Stockton    MRN: [id number]           Patient Information     Date Of Birth          1960        Visit Information        Provider Department      9/22/2017 12:50 PM Yanna Santizo MD [facility]        Today's Diagnoses     Gender dysphoria in adult    -  1      Care Instructions          Informed Consent   Feminizing Medications      This form refers to the use of estrogen and/or androgen antagonists (sometimes called \"anti-androgens\" or \"androgen blockers\") by persons in the male-to-female spectrum who wish to become feminized to reduce gender dysphoria and facilitate a more feminine gender presentation. While there are risks associated with taking feminizing medications, when appropriately prescribed they can greatly improve mental health and quality of life.     Please seek another opinion if you want additional perspective on any aspect of your care.       Feminizing Effects     1. I understand that estrogen, androgen antagonists, or a combination of the two may be  prescribed to reduce male physical features and feminize my body.     2. I understand that the feminizing effects of estrogen and androgen antagonists can take several  months to a year to become noticeable, speed and degree of change is unpredictable.     3.  I understand that if I am taking estrogen I will probably develop breasts, and:        ? Breasts may take several years to develop to their full size.   ? Even if estrogen is stopped, the breast tissue that has developed will remain.   ? As soon as breasts start growing, it is recommended to have an annual breast exam.  ? There may be milky nipple discharge (galactorrhea). If you develop this, it is advised to check with a doctor to determine the cause.   ? It is not known if taking estrogen increases the risk of breast cancer.     4. I understand that the following changes are generally not permanent (that is, " "they will likely reverse if I stop taking feminizing medications):     ? Skin may become softer.   ? Muscle mass decreases and there may be a decrease in upper body strength.   ? Body hair growth may become less noticeable and grow more slowly,but won't stop.  ? Male pattern baldness may slow down, but will probably not stop completely, and hair that has already been lost will likely not grow back.   ? Fat may redistribute to a more feminine pattern (decreased in abdomen, increased on buttocks/hips/thighs - changing from \"apple shape\" to \"pear shape\").       5. I understand that taking feminizing medications will make my testicles produce less testosterone, which can affect my overall sexual function:    ? Fertility may be impaired, and I should consider sperm banking if I desire biological children.  ? Sperm may not mature, leading to reduced fertility. It is still possible to get someone pregnant however and contraception should be used if needed.  ? Testicles may shrink by 25-50%. Testicular examinations are still recommended.  ? The amount of fluid ejaculated may be reduced.   ? There is typically decrease in morning and spontaneous erections.   ? Erections may not be firm enough for penetrative sex.   ? Libido (sex drive) may decrease.     6. I understand that there are some aspects of my body that are not significantly changed by feminizing medications, though there may be other treatments that can be helpful.    ? Garner/facial hair may grow more slowly and be less noticeable, but will not go away.   ? Voice pitch will not rise and speech patterns will not become more feminine.   ? The laryngeal prominence (\"Sukhdev's apple\") will not shrink.      Risks of Feminizing Medications     7. I understand that the medical effects and safety of feminizing medications are not fully understood, and that there may be long-term risks that are not yet known.     8. I understand that I am strongly advised not to take more " medication than I am prescribed, as this increases health risks. It won't help changes come faster.     9. I understand that feminizing medications can damage the liver. I have been advised that I should be monitored for possible liver damage as long as I am taking feminizing medications.     10. I understand that feminizing medications will result in changes that will be noticeable by other people, and that some people in similar circumstances have experienced harassment, discrimination, and violence, while others have lost support of loved ones. I have been advised that referrals can be made for support/counselling if this would be helpful.     Medical Risks Associated with Estrogen     11. I understand that taking estrogen increases the risk of blood clots, which can result in:     ? Pulmonary embolism (blood clot to the lungs), which may cause death.  Stroke, which may cause permanent brain damage or death   ? Heart attack   ? Chronic leg vein problems     I understand that the risk of blood clots is much worse if I smoke cigarettes, especially over age 40. I understand that the danger is so high that I should stop smoking completely if I start taking estrogen. I can ask my doctor for advice on quitting.    12. I understand that taking estrogen can increase deposits of fat around my internal organs, which is associated with increased risk for diabetes and heart disease.     13. I understand that taking estrogen can cause increased blood pressure,  estrogen increases the risk of gallstones,  estrogen can cause headaches or migraines and can cause nausea and vomiting. I have been advised that if I develop these, it is recommended that I discuss this with my doctor.     14. I understand that it is not known if taking estrogen increases the risk of non-cancerous tumors of the pituitary gland. I have been informed that although this is typically not life-threatening, it can damage vision. I understand that this will  be monitored.    15. I have been informed that I am more likely to have dangerous side effects from estrogen if I smoke, am overweight, am over 40 years old, or have a history of blood clots, high blood pressure, or a family history of breast cancer.     16. I have been informed that if I take too much estrogen, my body may convert it into testosterone, which may slow or stop feminization.     Medical Risks Associated with Androgen Antagonists     17. I have been informed that spironolactone affects the balance of water and salts in the kidneys, and that this may:     ? Increase the amount of urine produced, and I may urinate more frequently   ? Reduce blood pressure   ? Rarely, cause high levels of potassium in the blood, and this will be monitored    18. I understand that some androgen antagonists make it more difficult to evaluate the results of PSA test. If I am over 50, I should have my prostate evaluated every year.     Prevention of Medical Complications     19. I agree to take feminizing medications as prescribed and to tell my care provider if I am not happy with the treatment or am experiencing any problems.     20. I understand that the right dose or type of medication prescribed for me may not be the same as for someone else.     21. I understand that physical examinations and blood tests are needed on a regular basis (monthly, at first) to check for negative side effects of feminizing medications.     22. I understand that feminization medications can interact with other medication (including other sources of hormones), dietary supplements, herbs, alcohol, and street drugs. I understand that being honest with my care provider about what else I am taking will help prevent medical complications that could be life-threatening. I have been informed that I will continue to get medical care no matter what information I share.     23. I understand that some medical conditions make it dangerous to take estrogen  or androgen antagonists. I agree to be checked for risky conditions before the decision to start or continue feminizing medication is made.     24. I understand that I can choose to stop taking feminizing medication at any time, and that it is advised that I do this with the help of my doctor to make sure there are no negative reactions to stopping. I understand that my doctor may suggest I reduce, switch or stop taking feminizing medication, if there are severe health risks that can't be controlled.    My signature below confirms that:     ? My doctor has talked with me about the benefits and risks of feminizing medication, the possible or likely consequences of hormone therapy, and potential treatment options.   ? I understand the risks that may be involved.   ? I understand that this form covers known effects and risks and that there may be long-term effects or risks that are not yet known.   ? I have had sufficient opportunity to discuss treatment options with my doctor. All of my questions have been answered to my satisfaction.   ? I believe I have adequate knowledge on which to base informed consent to the provision of feminizing medication.     Based on this:     _____ I wish to begin taking estrogen.     _____ I wish to begin taking androgen antagonists (e.g., Spironolactone).     _____ I do not wish to begin taking feminizing medication at this time.     Whatever your current decision is, please talk with your doctor any time you have questions, concerns, or want to re-evaluate your options.         ____________________________________ __________________   Patient Signature     Date         ____________________________________ __________________   Prescribing Clinician Signature    Date              Follow-ups after your visit        Who to contact     Please call your clinic at [phone] to:    Ask questions about your health    Make or cancel appointments    Discuss your medicines    Learn about your  "test results    Speak to your doctor   If you have compliments or concerns about an experience at your clinic, or if you wish to file a complaint, please contact [facility] Patient Relations at [phone] or email us at [email or web address]         Additional Information About Your Visit        [employer] Information     [employer] gives you secure access to your electronic health record. If you see a primary care provider, you can also send messages to your care team and make appointments. If you have questions, please call your primary care clinic.  If you do not have a primary care provider, please call [phone] and they will assist you.      [employer] is an electronic gateway that provides easy, online access to your medical records. With [employer], you can request a clinic appointment, read your test results, renew a prescription or communicate with your care team.     To access your existing account, please contact your [facility] or call [phone] for assistance.        Care EveryWhere ID     This is your Care EveryWhere ID. This could be used by other organizations to access your [facility] medical records  [id number]        Your Vitals Were     Pulse Temperature Respirations Height Pulse Oximetry BMI (Body Mass Index)    68 99.2  F (37.3  C) (Oral) 18 5' 9\" (175.3 cm) 98% 27.47 kg/m2       Blood Pressure from Last 3 Encounters:   09/22/17 114/75   07/31/17 135/79   05/18/17 128/85    Weight from Last 3 Encounters:   09/22/17 186 lb (84.4 kg)   07/31/17 184 lb 12.8 oz (83.8 kg)   05/18/17 191 lb (86.6 kg)              We Performed the Following     TSH with free T4 reflex          Today's Medication Changes          These changes are accurate as of: 9/22/17  1:32 PM.  If you have any questions, ask your nurse or doctor.               Start taking these medicines.        Dose/Directions    finasteride 5 MG tablet   Commonly known as:  PROSCAR "   Used for:  Gender dysphoria in adult   Started by:  Yanna Santizo MD        Dose:  5 mg   Take 1 tablet (5 mg) by mouth daily   Quantity:  90 tablet   Refills:  1            Where to get your medicines      These medications were sent to [facility]/pharmacy #[id number] - [place], [place] - [address]  [address] [address]     Phone:  [phone]     finasteride 5 MG tablet                Primary Care Provider Office Phone # Fax #    Sury Billy Pike [phone]       [address] [facility] [address]        Equal Access to Services     [facility]: Hadii aad ku hadasho Soomaali, waaxda luqadaha, qaybta kaalmada adegangayada, sanjay joy . So [facility] [phone].    ATENCIÓN: Si habla español, tiene a tao disposición servicios gratuitos de asistencia lingüística. [facility] [phone].    We comply with applicable federal civil rights laws and [place] laws. We do not discriminate on the basis of race, color, national origin, age, disability sex, sexual orientation or gender identity.            Thank you!     Thank you for choosing [facility]  for your care. Our goal is always to provide you with excellent care. Hearing back from our patients is one way we can continue to improve our services. Please take a few minutes to complete the written survey that you may receive in the mail after your visit with us. Thank you!             Your Updated Medication List - Protect others around you: Learn how to safely use, store and throw away your medicines at www.disposemymeds.org.          This list is accurate as of: 9/22/17  1:32 PM.  Always use your most recent med list.                   Brand Name Dispense Instructions for use Diagnosis    ADDERALL PO      Take 10 mg by mouth daily        finasteride 5 MG tablet    PROSCAR    90 tablet    Take 1 tablet (5 mg) by mouth daily    Gender dysphoria in adult       minoxidil 5 % Soln     60 mL    Apply  topically twice daily.    Male pattern baldness       ranitidine 150 MG tablet    ZANTAC    60 tablet    Take 1 tablet (150 mg) by mouth 2 times daily    Gastroesophageal reflux disease without esophagitis       WELLBUTRIN  MG 24 hr tablet   Generic drug:  buPROPion      Take 150 mg by mouth every morning

## 2017-11-08 ENCOUNTER — OFFICE VISIT (OUTPATIENT)
Dept: FAMILY MEDICINE | Facility: CLINIC | Age: 57
End: 2017-11-08

## 2017-11-08 VITALS
DIASTOLIC BLOOD PRESSURE: 83 MMHG | TEMPERATURE: 98.5 F | RESPIRATION RATE: 18 BRPM | SYSTOLIC BLOOD PRESSURE: 120 MMHG | BODY MASS INDEX: 27.26 KG/M2 | HEART RATE: 62 BPM | WEIGHT: 184.6 LBS | OXYGEN SATURATION: 98 %

## 2017-11-08 DIAGNOSIS — R79.89 ELEVATED TSH: ICD-10-CM

## 2017-11-08 DIAGNOSIS — Z00.00 ROUTINE GENERAL MEDICAL EXAMINATION AT A HEALTH CARE FACILITY: ICD-10-CM

## 2017-11-08 DIAGNOSIS — F64.0 GENDER DYSPHORIA IN ADULT: Primary | ICD-10-CM

## 2017-11-08 PROBLEM — H90.3 SENSORINEURAL HEARING LOSS, ASYMMETRICAL: Status: ACTIVE | Noted: 2017-03-21

## 2017-11-08 RX ORDER — ESTRADIOL 0.05 MG/D
1 PATCH, EXTENDED RELEASE TRANSDERMAL
Qty: 24 PATCH | Refills: 1 | Status: SHIPPED | OUTPATIENT
Start: 2017-11-09 | End: 2018-02-01

## 2017-11-08 NOTE — PATIENT INSTRUCTIONS
Here is the plan from today's visit    1. Gender dysphoria in adult  Come back no later than 3 months from now.  Sooner is OK.    - estradiol (VIVELLE-DOT) 0.05 MG/24HR BIW patch; Place 1 patch onto the skin twice a week  Dispense: 24 patch; Refill: 1  - DERMATOLOGY REFERRAL - INTERNAL  [facility]: Dermatology [facility] [phone]   [email or web address]        Please call or return to clinic if your symptoms don't go away.    Follow up plan      Thank you for coming to [facility] today.  Lab Testing:  **If you had lab testing today and your results are reassuring or normal they will be mailed to you or sent through [employer] within 7 days.   **If the lab tests need quick action we will call you with the results.  The phone number we will call with results is # [phone] (home) none (work). If this is not the best number please call our clinic and change the number.  Medication Refills:  If you need any refills please call your pharmacy and they will contact us.   If you need to  your refill at a new pharmacy, please contact the new pharmacy directly. The new pharmacy will help you get your medications transferred faster.   Scheduling:  If you have any concerns about today's visit or wish to schedule another appointment please call our office during normal business hours [phone] (8-5:00 M-F)  If a referral was made to a [facility] and you don't get a call from central scheduling please call [phone].  If a Mammogram was ordered for you at [facility] call [phone] to schedule or change your appointment.  If you had an XRay/CT/Ultrasound/MRI ordered the number is [phone] to schedule or change your radiology appointment.   Medical Concerns:  If you have urgent medical concerns please call [phone] at any time of the day.  If you have a medical emergency please call [phone].      [facility]: Dermatology [facility]  [phone]   [email or web address]

## 2017-11-08 NOTE — PROGRESS NOTES
HPI:       Chris Stockton is a 57 year old who presents for the following  Patient presents with:  RECHECK: Follow up         Concern: Follow up,    1.HRT-   Things are all right   First day that took the Finasteride has a few hours of spaciness.  After that was fine.    Been more easily prone to crying (always has emotions close to the surface).   Ready for estrogen     Brought estrogen consent and questions about:  Liver - what to do there  Blood Clots - what about this.     Hair keeps thinning more.  Took the Rogaine more and then stopped.  Very anxious about her hair and wanting expert counseling.       Still not .  Working through those issues.    Fhx: not of blood clots or strokes.  + Gallstones. Prostate cancer for older brother (was 57 when diagnosed).   Mother on heart medication (90)    Has a urologist and has had kidney stones.     Has talked to a friend about telling her boss and has now done this.  They will support her with coming out and the communication plan.     Adherence and Exercise  Medication side effects: no  How often is a medication missed? Never      Problem, Medication and Allergy Lists were reviewed and are current.  Patient is an established patient of this clinic.         Review of Systems:   Review of Systems          Physical Exam:   Patient Vitals for the past 24 hrs:   BP Temp Temp src Pulse Resp SpO2 Weight   11/08/17 1430 120/83 98.5  F (36.9  C) Oral 62 18 98 % 184 lb 9.6 oz (83.7 kg)     Body mass index is 27.26 kg/(m^2).  Vitals were reviewed and were normal     Physical Exam   Dressed in skirt and blouse.        Results:       Assessment and Plan     Chris was seen today for recheck.    Diagnoses and all orders for this visit:    Gender dysphoria in adult - is excited to start Estrogen. Is aware of the risks and what to look for. Will try patch since is older. Travels a lot and aware that needs to be up and about every 2 hours and at higher risk for clots on the  meds.  To return to see me in 1 month to see how it is going.  REferral to derm since very worried about hair loss and is afraid that will lose more hair with minoxidil.  Wanting very specific directions on how to maximize hair retention.  -     estradiol (VIVELLE-DOT) 0.05 MG/24HR BIW patch; Place 1 patch onto the skin twice a week  -     DERMATOLOGY REFERRAL - INTERNAL    Routine general medical examination at a health care facility - TSH since had higher level last visit. PSA since family history and now on Propecia for 2 months.   -     Prostate spec antigen screen  -     TSH  TOtal time - 30 minutes with more than half spent counseling on options for HRT and follow up.     There are no discontinued medications.  Options for treatment and follow-up care were reviewed with the patient. Chris Stockton  engaged in the decision making process and verbalized understanding of the options discussed and agreed with the final plan.    Yanna Santizo MD

## 2017-11-08 NOTE — MR AVS SNAPSHOT
After Visit Summary   11/8/2017    Chris Stockton    MRN: [id number]           Patient Information     Date Of Birth          1960        Visit Information        Provider Department      11/8/2017 2:20 PM Yanna Santizo MD [facility]        Today's Diagnoses     Gender dysphoria in adult    -  1      Care Instructions    Here is the plan from today's visit    1. Gender dysphoria in adult  Come back no later than 3 months from now.  Sooner is OK.    - estradiol (VIVELLE-DOT) 0.05 MG/24HR BIW patch; Place 1 patch onto the skin twice a week  Dispense: 24 patch; Refill: 1  - DERMATOLOGY REFERRAL - INTERNAL  [facility]: Dermatology Clinic [facility] [phone]   [email or web address]        Please call or return to clinic if your symptoms don't go away.    Follow up plan      Thank you for coming to [facility] today.  Lab Testing:  **If you had lab testing today and your results are reassuring or normal they will be mailed to you or sent through nDreams within 7 days.   **If the lab tests need quick action we will call you with the results.  The phone number we will call with results is # [phone] (home) none (work). If this is not the best number please call our clinic and change the number.  Medication Refills:  If you need any refills please call your pharmacy and they will contact us.   If you need to  your refill at a new pharmacy, please contact the new pharmacy directly. The new pharmacy will help you get your medications transferred faster.   Scheduling:  If you have any concerns about today's visit or wish to schedule another appointment please call our office during normal business hours [phone] (8-5:00 M-F)  If a referral was made to a [facility] and you don't get a call from central scheduling please call [phone].  If a Mammogram was ordered for you at [facility] call  [phone] to schedule or change your appointment.  If you had an XRay/CT/Ultrasound/MRI ordered the number is [phone] to schedule or change your radiology appointment.   Medical Concerns:  If you have urgent medical concerns please call [phone] at any time of the day.  If you have a medical emergency please call 911.      [facility]: Dermatology [facility] [phone]   [email or web address]            Follow-ups after your visit        Additional Services     DERMATOLOGY REFERRAL - INTERNAL       Your provider has referred you to: [facility]: Dermatology [facility] [phone]   [email or web address]    Please be aware that coverage of these services is subject to the terms and limitations of your health insurance plan.  Call member services at your health plan with any benefit or coverage questions.      Please bring the following with you to your appointment:    (1) Any X-Rays, CTs or MRIs which have been performed.  Contact the facility where they were done to arrange for  prior to your scheduled appointment.    (2) List of current medications  (3) This referral request   (4) Any documents/labs given to you for this referral                  Who to contact     Please call your clinic at [phone] to:    Ask questions about your health    Make or cancel appointments    Discuss your medicines    Learn about your test results    Speak to your doctor   If you have compliments or concerns about an experience at your clinic, or if you wish to file a complaint, please contact [facility] Patient Relations at [phone] or email us at [email or web address]         Additional Information About Your Visit        Qirehart Information     [employer] gives you secure access to your electronic health record. If you see a primary care provider, you can also send messages to your care team and  make appointments. If you have questions, please call your primary care clinic.  If you do not have a primary care provider, please call [phone] and they will assist you.      [employer] is an electronic gateway that provides easy, online access to your medical records. With [employer], you can request a clinic appointment, read your test results, renew a prescription or communicate with your care team.     To access your existing account, please contact your [facility] or call [phone] for assistance.        Care EveryWhere ID     This is your Care EveryWhere ID. This could be used by other organizations to access your [place] medical records  [id number]        Your Vitals Were     Pulse Temperature Respirations Pulse Oximetry BMI (Body Mass Index)       62 98.5  F (36.9  C) (Oral) 18 98% 27.26 kg/m2        Blood Pressure from Last 3 Encounters:   11/08/17 120/83   09/22/17 114/75   07/31/17 135/79    Weight from Last 3 Encounters:   11/08/17 184 lb 9.6 oz (83.7 kg)   09/22/17 186 lb (84.4 kg)   07/31/17 184 lb 12.8 oz (83.8 kg)              We Performed the Following     DERMATOLOGY REFERRAL - INTERNAL          Today's Medication Changes          These changes are accurate as of: 11/8/17  3:26 PM.  If you have any questions, ask your nurse or doctor.               Start taking these medicines.        Dose/Directions    estradiol 0.05 MG/24HR BIW patch   Commonly known as:  VIVELLE-DOT   Used for:  Gender dysphoria in adult   Started by:  Yanna Santizo MD        Dose:  1 patch   Start taking on:  11/9/2017   Place 1 patch onto the skin twice a week   Quantity:  24 patch   Refills:  1            Where to get your medicines      These medications were sent to [facility]/pharmacy #[id number] - [place], [place] - [address]  [address] [address]     Phone:  [phone]     estradiol 0.05 MG/24HR BIW patch                Primary Care Provider Office Phone # Fax #    Suryeze Cervantes  MD Shahzad [phone]       [address] [facility] [address]        Equal Access to Services     JANNETAJ ASHUTOSH : Hadii shauna hall andre Leone, terrance hurley, radha kakj aranda, sanjay hampton. So [facility] [phone].    ATENCIÓN: Si habla español, tiene a tao disposición servicios gratuitos de asistencia lingüística. Llame al [phone].    We comply with applicable federal civil rights laws and [place] laws. We do not discriminate on the basis of race, color, national origin, age, disability, sex, sexual orientation, or gender identity.            Thank you!     Thank you for choosing [facility]  for your care. Our goal is always to provide you with excellent care. Hearing back from our patients is one way we can continue to improve our services. Please take a few minutes to complete the written survey that you may receive in the mail after your visit with us. Thank you!             Your Updated Medication List - Protect others around you: Learn how to safely use, store and throw away your medicines at www.disposemymeds.org.          This list is accurate as of: 11/8/17  3:26 PM.  Always use your most recent med list.                   Brand Name Dispense Instructions for use Diagnosis    ADDERALL PO      Take 10 mg by mouth daily        estradiol 0.05 MG/24HR BIW patch   Start taking on:  11/9/2017    VIVELLE-DOT    24 patch    Place 1 patch onto the skin twice a week    Gender dysphoria in adult       finasteride 5 MG tablet    PROSCAR    90 tablet    Take 1 tablet (5 mg) by mouth daily    Gender dysphoria in adult       minoxidil 5 % Soln     60 mL    Apply topically twice daily.    Male pattern baldness       ranitidine 150 MG tablet    ZANTAC    60 tablet    Take 1 tablet (150 mg) by mouth 2 times daily    Gastroesophageal reflux disease without esophagitis       WELLBUTRIN  MG 24 hr tablet   Generic drug:  buPROPion       Take 150 mg by mouth every morning

## 2017-11-09 LAB
PSA SERPL-ACNC: 0.68 UG/L (ref 0–4)
T4 FREE SERPL-MCNC: 0.78 NG/DL (ref 0.76–1.46)
TSH SERPL DL<=0.005 MIU/L-ACNC: 6.05 MU/L (ref 0.4–4)

## 2018-01-12 ENCOUNTER — MEDICAL CORRESPONDENCE (OUTPATIENT)
Dept: HEALTH INFORMATION MANAGEMENT | Facility: CLINIC | Age: 58
End: 2018-01-12

## 2018-01-24 ENCOUNTER — MEDICAL CORRESPONDENCE (OUTPATIENT)
Dept: HEALTH INFORMATION MANAGEMENT | Facility: CLINIC | Age: 58
End: 2018-01-24

## 2018-02-01 ENCOUNTER — MYC MEDICAL ADVICE (OUTPATIENT)
Dept: FAMILY MEDICINE | Facility: CLINIC | Age: 58
End: 2018-02-01

## 2018-02-01 DIAGNOSIS — F64.0 GENDER DYSPHORIA IN ADULT: ICD-10-CM

## 2018-02-01 RX ORDER — ESTRADIOL 0.05 MG/D
1 PATCH, EXTENDED RELEASE TRANSDERMAL
Qty: 24 PATCH | Refills: 1 | Status: SHIPPED | OUTPATIENT
Start: 2018-02-01 | End: 2018-02-13 | Stop reason: DRUGHIGH

## 2018-02-08 ENCOUNTER — OFFICE VISIT (OUTPATIENT)
Dept: FAMILY MEDICINE | Facility: CLINIC | Age: 58
End: 2018-02-08
Payer: COMMERCIAL

## 2018-02-08 VITALS
BODY MASS INDEX: 28.32 KG/M2 | RESPIRATION RATE: 18 BRPM | DIASTOLIC BLOOD PRESSURE: 88 MMHG | SYSTOLIC BLOOD PRESSURE: 131 MMHG | TEMPERATURE: 97.8 F | WEIGHT: 191.8 LBS | HEART RATE: 58 BPM | OXYGEN SATURATION: 98 %

## 2018-02-08 DIAGNOSIS — Z00.00 ROUTINE GENERAL MEDICAL EXAMINATION AT A HEALTH CARE FACILITY: ICD-10-CM

## 2018-02-08 DIAGNOSIS — F64.0 GENDER DYSPHORIA IN ADULT: Primary | ICD-10-CM

## 2018-02-08 LAB
ALBUMIN SERPL-MCNC: 4.7 MG/DL (ref 3.5–4.7)
ALP SERPL-CCNC: 51.6 U/L (ref 31.7–110.7)
ALT SERPL-CCNC: 23.1 U/L (ref 0–45)
AST SERPL-CCNC: 14.8 U/L (ref 0–55)
BILIRUB SERPL-MCNC: 0.9 MG/DL (ref 0.2–1.3)
BUN SERPL-MCNC: 19.4 MG/DL (ref 7–21)
CALCIUM SERPL-MCNC: 10 MG/DL (ref 8.5–10.1)
CHLORIDE SERPLBLD-SCNC: 101.8 MMOL/L (ref 98–110)
CHOLEST SERPL-MCNC: 222.6 MG/DL (ref 0–200)
CHOLEST/HDLC SERPL: 5.8 {RATIO} (ref 0–5)
CO2 SERPL-SCNC: 26.1 MMOL/L (ref 20–32)
CREAT SERPL-MCNC: 0.9 MG/DL (ref 0.7–1.3)
GFR SERPL CREATININE-BSD FRML MDRD: >90 ML/MIN/1.7 M2
GLUCOSE SERPL-MCNC: 107.3 MG'DL (ref 70–99)
GLUCOSE SERPL-MCNC: 95 MG'DL (ref 70–99)
HCV AB SERPL QL IA: NONREACTIVE
HDLC SERPL-MCNC: 38.7 MG/DL
HEMOGLOBIN: 16.8 G/DL (ref 13.3–17.7)
LDLC SERPL CALC-MCNC: 150 MG/DL (ref 0–129)
POTASSIUM SERPL-SCNC: 3.6 MMOL/DL (ref 3.3–4.5)
PROT SERPL-MCNC: 7.6 G/DL (ref 6.8–8.8)
SODIUM SERPL-SCNC: 139.1 MMOL/L (ref 132.6–141.4)
TRIGL SERPL-MCNC: 167.6 MG/DL (ref 0–150)
TSH SERPL DL<=0.005 MIU/L-ACNC: 9.81 MU/L (ref 0.4–4)
VLDL CHOLESTEROL: 33.5 MG/DL (ref 7–32)

## 2018-02-08 NOTE — PROGRESS NOTES
WERO Damico is a 57 year old individual that uses pronouns She/Her/Hers/Herself that presents today for follow up of:  feminizing hormone therapy. Gender identity: Female    Any special concerns today? Had severe SI a few weeks after starting estrogen  - actually heard a voice in her head. For 3 days. Never had this happen before. Has not had this recur. Describes using a variety coping skills learned in therapy to acknowledge the change in SI. She continues activities that give her pleasure (music) and fulfillment (climate change). Concerning divorce and selling house,  now that knows what wants is harder to watch things not go as well as hoping. Is seeing Dr. Welch at [facility]. Has seen the psychiatrist several times and that is working well and have not recommended med changes at this time.     She has also read a article about how biotin can affect thyroid lab test.     Derm appointment for hair loss.     Would like more information about checking estrogen levels for individuals on the patch.     On hormones?  YES, 1 patch twice a week.     Due for labs?  Yes      +++ Refills of meds needed?  Non  ---    Past Surgical History:   Procedure Laterality Date     APPENDECTOMY       COLONOSCOPY  12/9/2011    Procedure:COLONOSCOPY; COLONOSCOPY; Surgeon:MARILY MENA; Location: GI     ESOPHAGOSCOPY, GASTROSCOPY, DUODENOSCOPY (EGD), COMBINED  11/23/2012    Procedure: COMBINED ESOPHAGOSCOPY, GASTROSCOPY, DUODENOSCOPY (EGD), BIOPSY SINGLE OR MULTIPLE;;  Surgeon: Yehuda Tomlinson MD;  Location:  GI     VASECTOMY         Patient Active Problem List   Diagnosis     Shoulder impingement syndrome     Ulnar nerve injury     ADRIAN (generalized anxiety disorder)     Personality disorder     Gastroesophageal reflux disease without esophagitis     Major depressive disorder in remission     Conductive hearing loss, bilateral     Gender dysphoria in adult     Family history of prostate cancer     Kidney stone     Esophageal  stricture     Sensorineural hearing loss, asymmetrical     Tinnitus     Vitiligo       Current Outpatient Prescriptions   Medication Sig Dispense Refill     estradiol (VIVELLE-DOT) 0.05 MG/24HR BIW patch Place 1 patch onto the skin twice a week 24 patch 1     finasteride (PROSCAR) 5 MG tablet Take 1 tablet (5 mg) by mouth daily 90 tablet 1     ranitidine (ZANTAC) 150 MG tablet Take 1 tablet (150 mg) by mouth 2 times daily 60 tablet 3     Amphetamine-Dextroamphetamine (ADDERALL PO) Take 10 mg by mouth daily       buPROPion (WELLBUTRIN XL) 150 MG 24 hr tablet Take 150 mg by mouth every morning       omeprazole (PRILOSEC) 20 MG CR capsule Take 20 mg by mouth daily  4     minoxidil 5 % SOLN Apply topically twice daily. (Patient not taking: Reported on 9/22/2017) 60 mL 11       History   Smoking Status     Unknown If Ever Smoked   Smokeless Tobacco     Never Used          Allergies   Allergen Reactions     Tetracycline Unknown     Doxycycline Rash       Problem, Medication and Allergy Lists were reviewed and are current..         Review of Systems:      General    Fat redistribution: Non noticed    Weight change: YES HEENT    Voice change: no     Cardiovascular (CV)    Chest Pains: no    Shortness of breath: no Chest    Decreased exercise tolerance:  no    Breast changes/development: no     Gastrointestinal (GI)    Abdominal pain: no    Change in appetite: no Skin    Acne or oily skin: no    Change in hair: no     Genitourinary ()    Abnormal vaginal bleeding: not applicable     Decreased spontaneous erections: no    Change in libido: no    New sexual partners: no Musculoskeletal    Leg pain or swelling: no     Psychiatric (Psych)    Depression: YES    Anxiety/Panic: YES    Mood:  ambitious                    Physical Exam:     Vitals:    02/08/18 0833   BP: 131/88   Pulse: 58   Resp: 18   Temp: 97.8  F (36.6  C)   TempSrc: Oral   SpO2: 98%   Weight: 191 lb 12.8 oz (87 kg)     BMI= Body mass index is 28.32 kg/(m^2).    Wt Readings from Last 10 Encounters:   02/08/18 191 lb 12.8 oz (87 kg)   11/08/17 184 lb 9.6 oz (83.7 kg)   09/22/17 186 lb (84.4 kg)   07/31/17 184 lb 12.8 oz (83.8 kg)   05/18/17 191 lb (86.6 kg)   06/03/16 190 lb (86.2 kg)   11/24/14 192 lb (87.1 kg)   02/14/13 194 lb (88 kg)   11/23/12 200 lb (90.7 kg)   11/15/12 201 lb (91.2 kg)     Appearance: Male appearance and dress    GENERAL:: healthy, alert and no distress  RESP: lungs clear to auscultation - no rales, no rhonchi, no wheezes  CV: regular rates and rhythm, normal S1 S2, no S3 or S4 and no murmur, no click or rub -  MS: extremities normal- no gross deformities noted, no edema  Dressed in dress, hair long  Affect: Appropriate/mood-congruent           Labs:       Assessment and Plan   Cesar is a 57 year individual seen today for HRT.     1. Gender dysphoria in adult - going well.  Primary questions were about the rapidity of change due type of estrogen and also whether she can do estrogen testing - has friends that have done so. At return visit will review that the values for estrogen testing are so wide and variable that only useful to not give too much.   - Testosterone Total  - Comprehensive Metabolic Panel  - Hemoglobin (HGB)  - Lipid Cascade  - Glucose     2. Routine general medical examination at a health care facility - will return for a CPE  - TSH  - Hepatitis C antibody       Contraception:  abstinence    Follow up:  Follow up in 3 months.      [facility]  Questions were elicited and answered.     Yanna Santizo MD

## 2018-02-08 NOTE — PATIENT INSTRUCTIONS
Your provider has referred you to: [facility]: Dermatology Clinic - [place] [phone]   [email or web address]    Here is the plan from today's visit    1. Gender dysphoria in adult  - Testosterone Total  - Comprehensive Metabolic Panel  - Hemoglobin (HGB)  - Lipid Cascade  - Glucose    2. Routine general medical examination at a health care facility  - TSH  - Hepatitis C antibody      Please call or return to clinic if your symptoms don't go away.    Follow up plan  In 3 months    Thank you for coming to [facility] today.  Lab Testing:  **If you had lab testing today and your results are reassuring or normal they will be mailed to you or sent through Veotag within 7 days.   **If the lab tests need quick action we will call you with the results.  The phone number we will call with results is # [phone] (home) none (work). If this is not the best number please call our clinic and change the number.  Medication Refills:  If you need any refills please call your pharmacy and they will contact us.   If you need to  your refill at a new pharmacy, please contact the new pharmacy directly. The new pharmacy will help you get your medications transferred faster.   Scheduling:  If you have any concerns about today's visit or wish to schedule another appointment please call our office during normal business hours [phone] (8-5:00 M-F)  If a referral was made to a [facility] and you don't get a call from central scheduling please call [phone].  If a Mammogram was ordered for you at [facility] call [phone] to schedule or change your appointment.  If you had an XRay/CT/Ultrasound/MRI ordered the number is [phone] to schedule or change your radiology appointment.   Medical Concerns:  If you have urgent medical concerns please call [phone] at any time of the day.  If you have a medical emergency please call [phone].

## 2018-02-08 NOTE — NURSING NOTE
"Injectable Influenza Immunization Documentation    1.  Has the patient received the information for the injectable influenza vaccine? YES     2. Is the patient 6 months of age or older? YES     3. Does the patient have any of the following contraindications?         Severe allergy to eggs?  No     Severe allergic reaction to previous influenza vaccines? No   Severe allergy to latex?  No       History of Guillain-Resaca syndrome? No     Currently have a temperature greater than 100.4F? No        4.  Severely egg allergic patients should have flu vaccine eligibility assessed by an MD, RN, or pharmacist, and those who received flu vaccine should be observed for 15 min by an MD, RN, Pharmacist, Medical Technician, or member of clinic staff.\": YES    5. Latex-allergic patients should be given latex-free influenza vaccine Yes. Please reference the Vaccine latex table to determine if your clinic s product is latex-containing.       Vaccination given by SMA Tobin          "

## 2018-02-08 NOTE — MR AVS SNAPSHOT
After Visit Summary   2/8/2018    Chris Stockton    MRN: [id number]           Patient Information     Date Of Birth          1960        Visit Information        Provider Department      2/8/2018 8:20 AM Yanna Santizo MD [facility]        Today's Diagnoses     Gender dysphoria in adult    -  1    Routine general medical examination at a health care facility          Care Instructions    Your provider has referred you to: [facility]: Dermatology Clinic - [place] [phone]   [email or web address]    Here is the plan from today's visit    1. Gender dysphoria in adult  - Testosterone Total  - Comprehensive Metabolic Panel  - Hemoglobin (HGB)  - Lipid Cascade  - Glucose    2. Routine general medical examination at a health care facility  - TSH  - Hepatitis C antibody      Please call or return to clinic if your symptoms don't go away.    Follow up plan  In 3 months    Thank you for coming to [facility] today.  Lab Testing:  **If you had lab testing today and your results are reassuring or normal they will be mailed to you or sent through QPD within 7 days.   **If the lab tests need quick action we will call you with the results.  The phone number we will call with results is # [phone] (home) none (work). If this is not the best number please call our clinic and change the number.  Medication Refills:  If you need any refills please call your pharmacy and they will contact us.   If you need to  your refill at a new pharmacy, please contact the new pharmacy directly. The new pharmacy will help you get your medications transferred faster.   Scheduling:  If you have any concerns about today's visit or wish to schedule another appointment please call our office during normal business hours [phone] (8-5:00 M-F)  If a referral was made to a [facility] and you don't get a call from central scheduling  please call [phone].  If a Mammogram was ordered for you at [facility] call [phone] to schedule or change your appointment.  If you had an XRay/CT/Ultrasound/MRI ordered the number is [phone] to schedule or change your radiology appointment.   Medical Concerns:  If you have urgent medical concerns please call [phone] at any time of the day.  If you have a medical emergency please call [phone].          Follow-ups after your visit        Who to contact     Please call your clinic at [phone] to:    Ask questions about your health    Make or cancel appointments    Discuss your medicines    Learn about your test results    Speak to your doctor   If you have compliments or concerns about an experience at your clinic, or if you wish to file a complaint, please contact [facility] Patient Relations at [phone] or email us at [email or web address]         Additional Information About Your Visit        UPR-[employer] Information     [employer] gives you secure access to your electronic health record. If you see a primary care provider, you can also send messages to your care team and make appointments. If you have questions, please call your primary care clinic.  If you do not have a primary care provider, please call [phone] and they will assist you.      [employer] is an electronic gateway that provides easy, online access to your medical records. With [employer], you can request a clinic appointment, read your test results, renew a prescription or communicate with your care team.     To access your existing account, please contact your [facility] or call [phone] for assistance.        Care EveryWhere ID     This is your Care EveryWhere ID. This could be used by other organizations to access your [place] medical records  [id number]        Your Vitals Were     Pulse Temperature Respirations Pulse Oximetry BMI (Body Mass Index)        58 97.8  F (36.6  C) (Oral) 18 98% 28.32 kg/m2        Blood Pressure from Last 3 Encounters:   02/08/18 131/88   11/08/17 120/83   09/22/17 114/75    Weight from Last 3 Encounters:   02/08/18 191 lb 12.8 oz (87 kg)   11/08/17 184 lb 9.6 oz (83.7 kg)   09/22/17 186 lb (84.4 kg)              We Performed the Following     Comprehensive Metabolic Panel     Glucose     Hemoglobin (HGB)     Hepatitis C antibody     Lipid Cascade     Testosterone Total     TSH        Primary Care Provider Office Phone # Fax #    Yanna Santizo [phone]       [address] [address]        Equal Access to Services     CLAUDIA BOYKIN : Hadii shauna baileyo Sulema, waaxda luqadaha, qaybta kaalmada adeegyaleonardo, sanjay joy . So [facility] [phone].    ATENCIÓN: Si habla español, tiene a tao disposición servicios gratuitos de asistencia lingüística. [facility] [phone].    We comply with applicable federal civil rights laws and [place] laws. We do not discriminate on the basis of race, color, national origin, age, disability, sex, sexual orientation, or gender identity.            Thank you!     Thank you for choosing \[facility]\"" [facility]  for your care. Our goal is always to provide you with excellent care. Hearing back from our patients is one way we can continue to improve our services. Please take a few minutes to complete the written survey that you may receive in the mail after your visit with us. Thank you!             Your Updated Medication List - Protect others around you: Learn how to safely use, store and throw away your medicines at www.disposemymeds.org.          This list is accurate as of 2/8/18  9:25 AM.  Always use your most recent med list.                   Brand Name Dispense Instructions for use Diagnosis    ADDERALL PO      Take 10 mg by mouth daily        estradiol 0.05 MG/24HR BIW patch    VIVELLE-DOT    24 patch    Place 1 patch  onto the skin twice a week    Gender dysphoria in adult       finasteride 5 MG tablet    PROSCAR    90 tablet    Take 1 tablet (5 mg) by mouth daily    Gender dysphoria in adult       minoxidil 5 % Soln     60 mL    Apply topically twice daily.    Male pattern baldness       omeprazole 20 MG CR capsule    priLOSEC     Take 20 mg by mouth daily        ranitidine 150 MG tablet    ZANTAC    60 tablet    Take 1 tablet (150 mg) by mouth 2 times daily    Gastroesophageal reflux disease without esophagitis       WELLBUTRIN  MG 24 hr tablet   Generic drug:  buPROPion      Take 150 mg by mouth every morning

## 2018-02-10 LAB — TESTOST SERPL-MCNC: 417 NG/DL (ref 240–950)

## 2018-02-12 ENCOUNTER — MYC MEDICAL ADVICE (OUTPATIENT)
Dept: FAMILY MEDICINE | Facility: CLINIC | Age: 58
End: 2018-02-12

## 2018-02-12 DIAGNOSIS — E03.9 ACQUIRED HYPOTHYROIDISM: Primary | ICD-10-CM

## 2018-02-13 DIAGNOSIS — F64.0 GENDER DYSPHORIA IN ADULT: Primary | ICD-10-CM

## 2018-02-13 RX ORDER — ESTRADIOL 0.1 MG/D
1 FILM, EXTENDED RELEASE TRANSDERMAL
Qty: 24 PATCH | Refills: 1 | Status: SHIPPED | OUTPATIENT
Start: 2018-02-15 | End: 2019-11-11

## 2018-02-16 RX ORDER — LEVOTHYROXINE SODIUM 50 UG/1
50 TABLET ORAL DAILY
Qty: 90 TABLET | Refills: 1 | Status: SHIPPED | OUTPATIENT
Start: 2018-02-16 | End: 2018-04-01 | Stop reason: DRUGHIGH

## 2018-03-18 ENCOUNTER — TRANSFERRED RECORDS (OUTPATIENT)
Dept: HEALTH INFORMATION MANAGEMENT | Facility: CLINIC | Age: 58
End: 2018-03-18

## 2018-03-19 DIAGNOSIS — F64.0 GENDER DYSPHORIA IN ADULT: ICD-10-CM

## 2018-03-19 RX ORDER — FINASTERIDE 5 MG/1
5 TABLET, FILM COATED ORAL DAILY
Qty: 90 TABLET | Refills: 1 | Status: SHIPPED | OUTPATIENT
Start: 2018-03-19 | End: 2018-03-23

## 2018-03-19 NOTE — TELEPHONE ENCOUNTER
Request for medication refill:    Date of last visit at clinic: 02/08/2018    Please complete refill if appropriate and CLOSE ENCOUNTER.    Closing the encounter signifies the refill is complete.    If refill has been denied, please complete the smart phrase .smirefuse and route it to the [facility] RN TRIAGE pool to inform the patient and the pharmacy.    Sushila Guerrero MA

## 2018-03-22 ENCOUNTER — MYC MEDICAL ADVICE (OUTPATIENT)
Dept: FAMILY MEDICINE | Facility: CLINIC | Age: 58
End: 2018-03-22

## 2018-03-22 DIAGNOSIS — F64.0 GENDER DYSPHORIA IN ADULT: ICD-10-CM

## 2018-03-23 ENCOUNTER — TELEPHONE (OUTPATIENT)
Dept: FAMILY MEDICINE | Facility: CLINIC | Age: 58
End: 2018-03-23

## 2018-03-23 DIAGNOSIS — R79.89 ELEVATED TSH: Primary | ICD-10-CM

## 2018-03-23 PROBLEM — E03.9 HYPOTHYROIDISM: Status: ACTIVE | Noted: 2018-03-23

## 2018-03-23 RX ORDER — FINASTERIDE 5 MG/1
5 TABLET, FILM COATED ORAL DAILY
Qty: 90 TABLET | Refills: 1 | Status: SHIPPED | OUTPATIENT
Start: 2018-03-23 | End: 2018-03-23

## 2018-03-23 NOTE — TELEPHONE ENCOUNTER
[facility] phone call message- patient requesting a refill:    Full Medication Name: finasteride (PROSCAR) 5 MG tablet    Dose:     Pharmacy confirmed as   [id number] - [place], [place] - [id number] [facility] [address]  Phone: [phone] Fax: [phone]  : Yes    Additional Comments: None     OK to leave a message on voice mail? Yes    Primary language: English      needed? No    Call taken on March 23, 2018 at 9:06 AM by Isabel Shaikh

## 2018-03-30 DIAGNOSIS — R79.89 ELEVATED TSH: ICD-10-CM

## 2018-03-30 LAB — TSH SERPL DL<=0.005 MIU/L-ACNC: 5.75 MU/L (ref 0.4–4)

## 2018-04-01 DIAGNOSIS — E03.9 ACQUIRED HYPOTHYROIDISM: Primary | ICD-10-CM

## 2018-04-01 RX ORDER — LEVOTHYROXINE SODIUM 75 UG/1
75 TABLET ORAL DAILY
Qty: 90 TABLET | Refills: 3 | Status: SHIPPED | OUTPATIENT
Start: 2018-04-01 | End: 2019-05-15

## 2018-04-09 ENCOUNTER — OFFICE VISIT (OUTPATIENT)
Dept: FAMILY MEDICINE | Facility: CLINIC | Age: 58
End: 2018-04-09
Payer: COMMERCIAL

## 2018-04-09 VITALS
DIASTOLIC BLOOD PRESSURE: 72 MMHG | OXYGEN SATURATION: 99 % | WEIGHT: 192 LBS | SYSTOLIC BLOOD PRESSURE: 131 MMHG | HEIGHT: 69 IN | HEART RATE: 67 BPM | RESPIRATION RATE: 16 BRPM | TEMPERATURE: 98.1 F | BODY MASS INDEX: 28.44 KG/M2

## 2018-04-09 DIAGNOSIS — Z00.00 ROUTINE GENERAL MEDICAL EXAMINATION AT A HEALTH CARE FACILITY: Primary | ICD-10-CM

## 2018-04-09 DIAGNOSIS — E78.5 HYPERLIPIDEMIA, UNSPECIFIED HYPERLIPIDEMIA TYPE: ICD-10-CM

## 2018-04-09 DIAGNOSIS — E03.4 HYPOTHYROIDISM DUE TO ACQUIRED ATROPHY OF THYROID: ICD-10-CM

## 2018-04-09 RX ORDER — DEXTROAMPHETAMINE SACCHARATE, AMPHETAMINE ASPARTATE MONOHYDRATE, DEXTROAMPHETAMINE SULFATE AND AMPHETAMINE SULFATE 2.5; 2.5; 2.5; 2.5 MG/1; MG/1; MG/1; MG/1
CAPSULE, EXTENDED RELEASE ORAL
Refills: 0 | COMMUNITY
Start: 2018-03-28 | End: 2020-01-03

## 2018-04-09 RX ORDER — SPIRONOLACTONE 25 MG/1
25 TABLET ORAL DAILY
COMMUNITY
Start: 2018-02-20 | End: 2023-01-31

## 2018-04-09 NOTE — MR AVS SNAPSHOT
After Visit Summary   4/9/2018    Chris Stockton    MRN: [id number]           Patient Information     Date Of Birth          1960        Visit Information        Provider Department      4/9/2018 1:40 PM Yanna Santizo MD [facility]        Today's Diagnoses     Routine general medical examination at a health care facility    -  1    Hypothyroidism due to acquired atrophy of thyroid        Hyperlipidemia, unspecified hyperlipidemia type          Care Instructions    Here is the plan from today's visit    1. Routine general medical examination at a health care facility  Meditation   Non-aerobic exercise, weights, pilates - 5 minutes every day  Aerobic - 20 minutes three times a week - nordic track.  Or bicycle.   Increase salads - lunches or dinners that are salads.  3 per week.   End of vending machines.    - Lipid [place] ([facility]); Future  - Glucose Fasting ([facility]); Future    2. Hypothyroidism due to acquired atrophy of thyroid  Return for this fasting  - TSH; Future    3. Hyperlipidemia, unspecified hyperlipidemia type  Return for this fasting    Form - will fax.     Please call or return to clinic if your symptoms don't go away.    Follow up plan  As needed     Thank you for coming to [facility] today.  Lab Testing:  **If you had lab testing today and your results are reassuring or normal they will be mailed to you or sent through [employer] within 7 days.   **If the lab tests need quick action we will call you with the results.  The phone number we will call with results is # [phone] (home) none (work). If this is not the best number please call our clinic and change the number.  Medication Refills:  If you need any refills please call your pharmacy and they will contact us.   If you need to  your refill at a new pharmacy, please contact the new pharmacy directly. The new pharmacy will help you get your medications transferred faster.   Scheduling:  If  you have any concerns about today's visit or wish to schedule another appointment please call our office during normal business hours [phone] (8-5:00 M-F)  If a referral was made to a [facility] and you don't get a call from central scheduling please call [phone].  If a Mammogram was ordered for you at [facility] call [phone] to schedule or change your appointment.  If you had an XRay/CT/Ultrasound/MRI ordered the number is [phone] to schedule or change your radiology appointment.   Medical Concerns:  If you have urgent medical concerns please call [phone] at any time of the day.  If you have a medical emergency please call 112.          Preventive Health Recommendations  Male Ages 50 - 64    Yearly exam:             See your health care provider every year in order to  o   Review health changes.   o   Discuss preventive care.    o   Review your medicines if your doctor has prescribed any.     Have a cholesterol test every 5 years, or more frequently if you are at risk for high cholesterol/heart disease.     Have a diabetes test (fasting glucose) every three years. If you are at risk for diabetes, you should have this test more often.     Have a colonoscopy at age 50, or have a yearly FIT test (stool test). These exams will check for colon cancer.      Talk with your health care provider about whether or not a prostate cancer screening test (PSA) is right for you.    You should be tested each year for STDs (sexually transmitted diseases), if you re at risk.     Shots: Get a flu shot each year. Get a tetanus shot every 10 years.     Nutrition:    Eat at least 5 servings of fruits and vegetables daily.     Eat whole-grain bread, whole-wheat pasta and brown rice instead of white grains and rice.     Talk to your provider about Calcium and Vitamin D.     Lifestyle    Exercise for at least 150 minutes a week (30 minutes a day, 5 days a week). This will help you  control your weight and prevent disease.     Limit alcohol to one drink per day.     No smoking.     Wear sunscreen to prevent skin cancer.     See your dentist every six months for an exam and cleaning.     See your eye doctor every 1 to 2 years.            Follow-ups after your visit        Your next 10 appointments already scheduled     Jun 26, 2018  3:30 PM CDT   (Arrive by 3:15 PM)   JANET AGUDELO with Marti Lopez MD   [facility] ([facility])    [address] [place] [id number] [phone]              Future tests that were ordered for you today     Open Future Orders        Priority Expected Expires Ordered    Lipid [place] ([facility]) Routine  4/9/2019 4/9/2018    Glucose Fasting ([facility]) Routine  4/9/2019 4/9/2018    TSH Routine  4/9/2019 4/9/2018            Who to contact     Please call your clinic at [phone] to:    Ask questions about your health    Make or cancel appointments    Discuss your medicines    Learn about your test results    Speak to your doctor            Additional Information About Your Visit        [employer] Information     [employer] gives you secure access to your electronic health record. If you see a primary care provider, you can also send messages to your care team and make appointments. If you have questions, please call your primary care clinic.  If you do not have a primary care provider, please call [phone] and they will assist you.      [employer] is an electronic gateway that provides easy, online access to your medical records. With [employer], you can request a clinic appointment, read your test results, renew a prescription or communicate with your care team.     To access your existing account, please contact your [facility] or call [phone] for assistance.        Care EveryWhere ID     This is your Care EveryWhere ID. This could be used by other organizations  "to access your [facility] medical records  [id number]        Your Vitals Were     Pulse Temperature Respirations Height Pulse Oximetry BMI (Body Mass Index)    67 98.1  F (36.7  C) (Oral) 16 5' 9\" (175.3 cm) 99% 28.35 kg/m2       Blood Pressure from Last 3 Encounters:   04/09/18 131/72   02/08/18 131/88   11/08/17 120/83    Weight from Last 3 Encounters:   04/09/18 192 lb (87.1 kg)   02/08/18 191 lb 12.8 oz (87 kg)   11/08/17 184 lb 9.6 oz (83.7 kg)                 Today's Medication Changes          These changes are accurate as of 4/9/18  2:41 PM.  If you have any questions, ask your nurse or doctor.               These medicines have changed or have updated prescriptions.        Dose/Directions    amphetamine-dextroamphetamine 10 MG per 24 hr capsule   Commonly known as:  ADDERALL XR   This may have changed:  Another medication with the same name was removed. Continue taking this medication, and follow the directions you see here.   Changed by:  Yanna Santizo MD        TAKE 1 CAPSULE BY MOUTH ONCE DAILY.   Refills:  0         Stop taking these medicines if you haven't already. Please contact your care team if you have questions.     omeprazole 20 MG CR capsule   Commonly known as:  priLOSEC   Stopped by:  Yanna Santizo MD                    Primary Care Provider Office Phone # Fax #    Yanna Santizo [phone]       [address] [address]        Equal Access to Services     [facility] Hospital[facility]MAN[facility]LE : Hadii shauna ku hadasho Soomaali, waaxda luqadaha, qaybta kaalmada adeegyada, waxay han joy . So [facility] [phone].    ATENCIÓN: Si habla español, tiene a tao disposición servicios gratuitos de asistencia lingüística. Llame al [phone].    We comply with applicable federal civil rights laws and [place] laws. We do not discriminate on the basis of race, color, national origin, age, disability, sex, sexual orientation, or gender identity.          "   Thank you!     Thank you for choosing [facility]  for your care. Our goal is always to provide you with excellent care. Hearing back from our patients is one way we can continue to improve our services. Please take a few minutes to complete the written survey that you may receive in the mail after your visit with us. Thank you!             Your Updated Medication List - Protect others around you: Learn how to safely use, store and throw away your medicines at www.disposemymeds.org.          This list is accurate as of 4/9/18  2:41 PM.  Always use your most recent med list.                   Brand Name Dispense Instructions for use Diagnosis    amphetamine-dextroamphetamine 10 MG per 24 hr capsule    ADDERALL XR     TAKE 1 CAPSULE BY MOUTH ONCE DAILY.        estradiol 0.1 MG/24HR BIW patch    VIVELLE-DOT    24 patch    Place 1 patch onto the skin twice a week    Gender dysphoria in adult       finasteride 5 MG tablet    PROSCAR    90 tablet    Take 1 tablet (5 mg) by mouth daily    Gender dysphoria in adult       levothyroxine 75 MCG tablet    SYNTHROID/LEVOTHROID    90 tablet    Take 1 tablet (75 mcg) by mouth daily    Acquired hypothyroidism       minoxidil 5 % Soln     60 mL    Apply topically twice daily.    Male pattern baldness       ranitidine 150 MG tablet    ZANTAC    60 tablet    Take 1 tablet (150 mg) by mouth 2 times daily    Gastroesophageal reflux disease without esophagitis       spironolactone 25 MG tablet    ALDACTONE     Take 25 mg by mouth        WELLBUTRIN  MG 24 hr tablet   Generic drug:  buPROPion      Take 150 mg by mouth every morning

## 2018-04-09 NOTE — PATIENT INSTRUCTIONS
Here is the plan from today's visit    1. Routine general medical examination at a health care facility  Meditation   Non-aerobic exercise, weights, pilates - 5 minutes every day  Aerobic - 20 minutes three times a week - nordic track.  Or bicycle.   Increase salads - lunches or dinners that are salads.  3 per week.   End of vending machines.    - Lipid [place] ([facility]); Future  - Glucose Fasting ([facility] [facility]); Future    2. Hypothyroidism due to acquired atrophy of thyroid  Return for this fasting  - TSH; Future    3. Hyperlipidemia, unspecified hyperlipidemia type  Return for this fasting    Form - will fax.     Please call or return to clinic if your symptoms don't go away.    Follow up plan  As needed     Thank you for coming to [facility] today.  Lab Testing:  **If you had lab testing today and your results are reassuring or normal they will be mailed to you or sent through [employer] within 7 days.   **If the lab tests need quick action we will call you with the results.  The phone number we will call with results is # [phone] (home) none (work). If this is not the best number please call our clinic and change the number.  Medication Refills:  If you need any refills please call your pharmacy and they will contact us.   If you need to  your refill at a new pharmacy, please contact the new pharmacy directly. The new pharmacy will help you get your medications transferred faster.   Scheduling:  If you have any concerns about today's visit or wish to schedule another appointment please call our office during normal business hours [phone] (8-5:00 M-F)  If a referral was made to a [facility] and you don't get a call from central scheduling please call [phone].  If a Mammogram was ordered for you at [facility] call [phone] to schedule or change your appointment.  If you had an XRay/CT/Ultrasound/MRI ordered the number is [phone] to schedule or change  your radiology appointment.   Medical Concerns:  If you have urgent medical concerns please call [phone] at any time of the day.  If you have a medical emergency please call [phone].          Preventive Health Recommendations  Male Ages 50 - 64    Yearly exam:             See your health care provider every year in order to  o   Review health changes.   o   Discuss preventive care.    o   Review your medicines if your doctor has prescribed any.     Have a cholesterol test every 5 years, or more frequently if you are at risk for high cholesterol/heart disease.     Have a diabetes test (fasting glucose) every three years. If you are at risk for diabetes, you should have this test more often.     Have a colonoscopy at age 50, or have a yearly FIT test (stool test). These exams will check for colon cancer.      Talk with your health care provider about whether or not a prostate cancer screening test (PSA) is right for you.    You should be tested each year for STDs (sexually transmitted diseases), if you re at risk.     Shots: Get a flu shot each year. Get a tetanus shot every 10 years.     Nutrition:    Eat at least 5 servings of fruits and vegetables daily.     Eat whole-grain bread, whole-wheat pasta and brown rice instead of white grains and rice.     Talk to your provider about Calcium and Vitamin D.     Lifestyle    Exercise for at least 150 minutes a week (30 minutes a day, 5 days a week). This will help you control your weight and prevent disease.     Limit alcohol to one drink per day.     No smoking.     Wear sunscreen to prevent skin cancer.     See your dentist every six months for an exam and cleaning.     See your eye doctor every 1 to 2 years.

## 2018-04-09 NOTE — PROGRESS NOTES
Male Physical Note          HPI         Concerns today: Ongoing Issues    Needing to get to bed sooner.  Not been a priority.   Looking forward to bicycle season - exercise is lower than usual.   HRT making it harder to take weight off  Stress has been making it easier to eat more.      Synthroid - on 75 mcg.  The MDD is a bit less. Was doing Biotin at the time of the first TSH test which was the highest. Biotin - had not had any for 3 weeks.  The more recent levles of TSH was not on BIotin.     Zantac - prn.  Better when not eating too much.     No substance use   Non smoker  Exercise - almost nothing - sometimes does free weights and sometimes yoga.  Feeling overwhelmed with everyting else.   Bike - 2 - 3 times a week in the summer only   Divorce by 8/2018 - has been planning a divorce now for awhile but is struggling to actually follow through on this.     Pursuing care around ADD.  Started wait list for Dr. Gleason -   Sees Michael Dia at [facility] for MH.  Working well.  Connected right away.     [facility] - not interested in figuring it out right now.  Spouse at this point. After divorce would like to think about this.     Saw Derm more than 5 years ago.      The 10-year ASCVD risk score (Thomas DC Jr, et al., 2013) is: 10.3%    Values used to calculate the score:      Age: 57 years      Sex: Male      Is Non- : No      Diabetic: No      Tobacco smoker: No      Systolic Blood Pressure: 131 mmHg      Is BP treated: No      HDL Cholesterol: 38.7 mg/dL      Total Cholesterol: 222.6 mg/dL    Weight was 185 and now up to 192    Fhx: brother with melanoma. Has more but will send me note.  Brother with prostate cancer - oldest brother.         Patient Active Problem List   Diagnosis     Shoulder impingement syndrome     Ulnar nerve injury     ADRIAN (generalized anxiety disorder)     Personality disorder     Gastroesophageal reflux disease without esophagitis     Major depressive disorder in  remission     Conductive hearing loss, bilateral     Gender dysphoria in adult     Family history of prostate cancer     Kidney stone     Esophageal stricture     Sensorineural hearing loss, asymmetrical     Tinnitus     Vitiligo     Hypothyroidism       Past Medical History:   Diagnosis Date     Anxiety      Depressive disorder      Esophageal stricture      Kidney stone     nov.       Previous Medical Care        Family History   Problem Relation Age of Onset     Prostate Cancer Brother      Heart Surgery Father      Prostate Cancer Father      Neuropathy Father      HEART DISEASE Mother               Review of Systems:     Review of Systems:  CONSTITUTIONAL: NEGATIVE for fever, chills, change in weight  INTEGUMENTARY/SKIN: NEGATIVE for worrisome rashes, moles or lesions  EYES: NEGATIVE for vision changes or irritation  ENT/MOUTH: NEGATIVE for ear, mouth and throat problems  RESP: NEGATIVE for significant cough or SOB  BREAST: NEGATIVE for masses, tenderness or discharge  CV: NEGATIVE for chest pain, palpitations or peripheral edema  GI: NEGATIVE for nausea, abdominal pain, heartburn, or change in bowel habits  : NEGATIVE for frequency, dysuria, or hematuria  MUSCULOSKELETAL: NEGATIVE for significant arthralgias or myalgia  NEURO: NEGATIVE for weakness, dizziness or paresthesias  ENDOCRINE: NEGATIVE for temperature intolerance, skin/hair changes  HEME/ALLERGY: NEGATIVE for bleeding problems  PSYCHIATRIC: NEGATIVE for changes in mood or affect  Sleep:   Do you snore most or the night (as reported by a family member)? ?  Do you feel sleepy or extremely tired during most of the day? Yes             Social History     Social History     Social History     Marital status:      Spouse name: N/A     Number of children: 2     Years of education: N/A     Occupational History     Computer Planner      Social History Main Topics     Smoking status: Unknown If Ever Smoked     Smokeless tobacco: Never Used     Alcohol  "use Yes      Comment: rare     Drug use: No     Sexual activity: Not on file     Other Topics Concern     Not on file     Social History Narrative       Marital Status:  Who lives in your household? Myself, My Wife and oldest son    Has anyone hurt you physically, for example by pushing, hitting, slapping or kicking you or forcing you to have sex? Denies  Do you feel threatened or controlled by a partner, ex-partner or anyone in your life? Denies    Sexual Health     Sexual concerns: No   STI History: Neg      Recommended Screening     Cholesterol Level (>46 yo or at risk):  Recommended and patient accepted testing.  Colon CA Screening (>50-75 ):  Testing not indicated              Physical Exam:     Vitals: /72  Pulse 67  Temp 98.1  F (36.7  C) (Oral)  Resp 16  Ht 5' 9\" (175.3 cm)  Wt 192 lb (87.1 kg)  SpO2 99%  BMI 28.35 kg/m2  BMI= Body mass index is 28.35 kg/(m^2).  GENERAL: healthy, alert and no distress  EYES: Eyes grossly normal to inspection, extraocular movements - intact, and PERRL  HENT: ear canals- normal; TMs- normal; bilateral hearing aides. Nose- normal; Mouth- no ulcers, no lesions  NECK: no tenderness, no adenopathy, no asymmetry, no masses, no stiffness; thyroid- normal to palpation  RESP: lungs clear to auscultation - no rales, no rhonchi, no wheezes  BREAST: no masses, no tenderness, no nipple discharge, no palpable axillary masses or adenopathy  CV: regular rates and rhythm, normal S1 S2, no S3 or S4 and no murmur, no click or rub -  ABDOMEN: soft, no tenderness, no  hepatosplenomegaly, no masses, normal bowel sounds  MS: extremities- no gross deformities noted, no edema  SKIN: has several darker nevi on her back.  Middle of the back is one that is a bit larger and recommended it be removed.  BACK: no CVA tenderness, no paralumbar tenderness  PSYCH: Alert and oriented times 3; speech- coherent , normal rate and volume; able to articulate logical thoughts, able to abstract " reason, no tangential thoughts, no hallucinations or delusions, affect- normal  LYMPHATICS: ant. cervical- normal, post. cervical- normal, axillary- normal, supraclavicular- normal, inguinal- normal    Assessment and Plan      Chris was seen today for physical and forms.    Diagnoses and all orders for this visit:    Routine general medical examination at a health care facility - is up to date on general preventive testing. PSA done in the last year.  On Finasteride.  Reviewed that ASCVD risk is up and should consider statin. Rather try to work on diet and exercise. Committed (see AVS) to several interventions. To see me back in a few months to recheck lipids  -     Lipid [place] ([facility]); Future  -     Glucose Fasting ([facility]); Future  - tsh     Melanocytic nevus - on mid back with +FHx.  Recommended she have us remove this or see derm.  Will get back to me on this.       HRT - is now seeing Dr. Pierre for this care.  Not addressed today.       Options for treatment and follow-up care were reviewed with the patient. Chris Stockton and/or guardian engaged in the decision making process and verbalized understanding of the options discussed and agreed with the final plan.    YOU Cortez MD

## 2018-04-10 PROBLEM — D22.5 MELANOCYTIC NEVUS OF TRUNK: Status: ACTIVE | Noted: 2018-04-10

## 2018-04-13 DIAGNOSIS — E03.4 HYPOTHYROIDISM DUE TO ACQUIRED ATROPHY OF THYROID: ICD-10-CM

## 2018-04-13 DIAGNOSIS — F64.0 GENDER DYSPHORIA IN ADULT: ICD-10-CM

## 2018-04-13 DIAGNOSIS — Z00.00 ROUTINE GENERAL MEDICAL EXAMINATION AT A HEALTH CARE FACILITY: ICD-10-CM

## 2018-04-13 LAB
CHOLEST SERPL-MCNC: 242.1 MG/DL (ref 0–200)
CHOLEST/HDLC SERPL: 5.1 {RATIO} (ref 0–5)
GLUCOSE P FAST SERPL-MCNC: 100 MG/DL (ref 51–110)
HDLC SERPL-MCNC: 47.5 MG/DL
LDLC SERPL CALC-MCNC: 163 MG/DL (ref 0–129)
TRIGL SERPL-MCNC: 160 MG/DL (ref 0–150)
TSH SERPL DL<=0.005 MIU/L-ACNC: 2.55 MU/L (ref 0.4–4)
VLDL CHOLESTEROL: 32 MG/DL (ref 7–32)

## 2018-04-13 RX ORDER — FINASTERIDE 5 MG/1
5 TABLET, FILM COATED ORAL DAILY
Qty: 90 TABLET | Refills: 1 | Status: SHIPPED | OUTPATIENT
Start: 2018-04-13 | End: 2019-11-11

## 2018-04-13 NOTE — TELEPHONE ENCOUNTER
Request for medication refill:    Date of last visit at clinic: 4/9/18    Please complete refill if appropriate and CLOSE ENCOUNTER.    Closing the encounter signifies the refill is complete.    If refill has been denied, please complete the smart phrase .smirefuse and route it to the [facility] RN TRIAGE pool to inform the patient and the pharmacy.    Isabel Shaikh

## 2018-04-16 ENCOUNTER — MYC MEDICAL ADVICE (OUTPATIENT)
Dept: FAMILY MEDICINE | Facility: CLINIC | Age: 58
End: 2018-04-16

## 2018-06-19 ENCOUNTER — PRE VISIT (OUTPATIENT)
Dept: DERMATOLOGY | Facility: CLINIC | Age: 58
End: 2018-06-19

## 2018-06-19 NOTE — TELEPHONE ENCOUNTER
Dermatology Pre-visit Call:    Reason for visit : Hair loss/ full body skin check     Any personal history of skin cancers: No    Was the patient referred: Yes    If the patient was referred, are records obtained: No. (If no, then obtain records).    Has the patient seen a dermatologist in the past: Yes. (If yes, obtain records)    Patient Reminders Given:  --Please, make sure you bring an updated list of your medications.   --Plan on being in our facility for approximately one hour, this includes the registration process, office visit, education and check-out process.  If you are having a procedure, more time may be required.     --If you are having a procedure, please, present 15 minutes early.  --Location reviewed.   --If you need to cancel or reschedule, call XXXX  --We look forward to seeing you in Dermatology Clinic.

## 2018-06-26 ENCOUNTER — OFFICE VISIT (OUTPATIENT)
Dept: DERMATOLOGY | Facility: CLINIC | Age: 58
End: 2018-06-26
Payer: COMMERCIAL

## 2018-06-26 VITALS — SYSTOLIC BLOOD PRESSURE: 126 MMHG | DIASTOLIC BLOOD PRESSURE: 74 MMHG | HEART RATE: 72 BPM

## 2018-06-26 DIAGNOSIS — L21.9 DERMATITIS, SEBORRHEIC: ICD-10-CM

## 2018-06-26 DIAGNOSIS — L65.9 LOSS OF HAIR: ICD-10-CM

## 2018-06-26 DIAGNOSIS — L80 VITILIGO: ICD-10-CM

## 2018-06-26 DIAGNOSIS — L21.9 DERMATITIS, SEBORRHEIC: Primary | ICD-10-CM

## 2018-06-26 LAB
BASOPHILS # BLD AUTO: 0.1 10E9/L (ref 0–0.2)
BASOPHILS NFR BLD AUTO: 0.7 %
DIFFERENTIAL METHOD BLD: NORMAL
EOSINOPHIL # BLD AUTO: 0.1 10E9/L (ref 0–0.7)
EOSINOPHIL NFR BLD AUTO: 0.7 %
ERYTHROCYTE [DISTWIDTH] IN BLOOD BY AUTOMATED COUNT: 12.4 % (ref 10–15)
FERRITIN SERPL-MCNC: 182 NG/ML (ref 26–388)
HCT VFR BLD AUTO: 50 % (ref 40–53)
HGB BLD-MCNC: 17.2 G/DL (ref 13.3–17.7)
IMM GRANULOCYTES # BLD: 0.1 10E9/L (ref 0–0.4)
IMM GRANULOCYTES NFR BLD: 0.9 %
IRON SATN MFR SERPL: 25 % (ref 15–46)
IRON SERPL-MCNC: 88 UG/DL (ref 35–180)
LYMPHOCYTES # BLD AUTO: 2 10E9/L (ref 0.8–5.3)
LYMPHOCYTES NFR BLD AUTO: 24.1 %
MCH RBC QN AUTO: 29.9 PG (ref 26.5–33)
MCHC RBC AUTO-ENTMCNC: 34.4 G/DL (ref 31.5–36.5)
MCV RBC AUTO: 87 FL (ref 78–100)
MONOCYTES # BLD AUTO: 0.5 10E9/L (ref 0–1.3)
MONOCYTES NFR BLD AUTO: 5.7 %
NEUTROPHILS # BLD AUTO: 5.5 10E9/L (ref 1.6–8.3)
NEUTROPHILS NFR BLD AUTO: 67.9 %
NRBC # BLD AUTO: 0 10*3/UL
NRBC BLD AUTO-RTO: 0 /100
PLATELET # BLD AUTO: 242 10E9/L (ref 150–450)
RBC # BLD AUTO: 5.76 10E12/L (ref 4.4–5.9)
TIBC SERPL-MCNC: 359 UG/DL (ref 240–430)
TSH SERPL DL<=0.005 MIU/L-ACNC: 1.27 MU/L (ref 0.4–4)
WBC # BLD AUTO: 8.1 10E9/L (ref 4–11)

## 2018-06-26 RX ORDER — KETOCONAZOLE 20 MG/ML
SHAMPOO TOPICAL
Qty: 120 ML | Refills: 11 | Status: SHIPPED | OUTPATIENT
Start: 2018-06-26 | End: 2019-11-11

## 2018-06-26 ASSESSMENT — PAIN SCALES - GENERAL: PAINLEVEL: NO PAIN (0)

## 2018-06-26 NOTE — NURSING NOTE
Dermatology Rooming Note    Chris Stockton's goals for this visit include:   Chief Complaint   Patient presents with     Hair Loss     Chris is visiting to discuss hair loss.     Mindy Scherer LPN

## 2018-06-26 NOTE — LETTER
6/26/2018       RE: Chris Stockton  [address] [place] [address]     Dear Colleague,    Thank you for referring your patient, Chris Stockton, to the [facility] DERMATOLOGY at [facility]. Please see a copy of my visit note below.    [facility] Dermatology Note      Dermatology Problem List:  1.Nonscarring alopecia - Clinically c/w male pattern hair loss with resolving TE   -Labs 6/25: CBC with diff, iron studies, vitamin D, testosterone, DHEA-S, TSH   -Finasteride 5mg QD, Spironolactone 25mg QD, Estradiol patch 0.1mg/24h  -Rogaine 5% foam QD   -LLLT discussed 6/25, handout provided  -Vitamin D 3000IU QD    Encounter Date: Jun 26, 2018    CC:  Chief Complaint   Patient presents with     Hair Loss     Chris is visiting to discuss hair loss.         History of Present Illness:  Mookie Stockton is a 57 year old transgender female who presents in consultation from Dr. 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 Mookie reports that the hair loss first really became noticeable in January 2017 with a rapid increase in hair shedding and increased thinness on the top of the scalp. Dr. Pierre at [facility] is helping her transition from male to female, and is who started her on finasteride 5mg QD in 9/2017. Also tried using minoxidil 5% foam for 6 weeks in fall 2017 but stopped due to increased shedding. She was also started on estradiol patches in fall 2017, and has most recently started on spironolactone 25mg QD. Currently is shampooing with a natural OTC shampoo and occasionally conditioner every other day, not using any other products on the scalp. Denies itching, pain, burning, or tingling of the scalp.     She does report that hair loss has improved somewhat in the past few months and is also noting decreased shedding. Eyebrows, eyelashes, and nails have been stable without changes. Body hair has  increased slightly in the past few months.     She also has vitiligo on the face, hands, feet, extremities that has been present for many years. Currently is not using any treatments although tried protopic briefly but stopped due to a burning sensation with use. Recently started synthroid (~4 mo ago) for hypothyroidism, is not sure if this is related to autoimmune. Denies every having small patches of hair loss on the scalp or body.     Past Medical History:   Patient Active Problem List   Diagnosis     Shoulder impingement syndrome     Ulnar nerve injury     ADRIAN (generalized anxiety disorder)     Personality disorder     Gastroesophageal reflux disease without esophagitis     Major depressive disorder in remission     Conductive hearing loss, bilateral     Gender dysphoria in adult     Family history of prostate cancer     Kidney stone     Esophageal stricture     Sensorineural hearing loss, asymmetrical     Tinnitus     Vitiligo     Hypothyroidism     Melanocytic nevus of trunk     Past Medical History:   Diagnosis Date     Anxiety      Depressive disorder      Esophageal stricture      Kidney stone     nov.     Past Surgical History:   Procedure Laterality Date     APPENDECTOMY       COLONOSCOPY  12/9/2011    Procedure:COLONOSCOPY; COLONOSCOPY; Surgeon:MARILY MENA; Location: GI     ESOPHAGOSCOPY, GASTROSCOPY, DUODENOSCOPY (EGD), COMBINED  11/23/2012    Procedure: COMBINED ESOPHAGOSCOPY, GASTROSCOPY, DUODENOSCOPY (EGD), BIOPSY SINGLE OR MULTIPLE;;  Surgeon: Yehuda Tomlinson MD;  Location:  GI     VASECTOMY         Social History:  The patient is a  at [employer] and is also very involved in climate change advocacy.     Family History:  No hair loss in mother or father, older brother has MPHL.     Medications:  Current Outpatient Prescriptions   Medication Sig Dispense Refill     amphetamine-dextroamphetamine (ADDERALL XR) 10 MG per 24 hr capsule TAKE 1 CAPSULE BY MOUTH ONCE DAILY.  0      buPROPion (WELLBUTRIN XL) 150 MG 24 hr tablet Take 150 mg by mouth every morning       estradiol (VIVELLE-DOT) 0.1 MG/24HR BIW patch Place 1 patch onto the skin twice a week 24 patch 1     finasteride (PROSCAR) 5 MG tablet Take 1 tablet (5 mg) by mouth daily 90 tablet 1     levothyroxine (SYNTHROID/LEVOTHROID) 75 MCG tablet Take 1 tablet (75 mcg) by mouth daily 90 tablet 3     ranitidine (ZANTAC) 150 MG tablet Take 1 tablet (150 mg) by mouth 2 times daily 60 tablet 3     spironolactone (ALDACTONE) 25 MG tablet Take 25 mg by mouth       minoxidil 5 % SOLN Apply topically twice daily. (Patient not taking: Reported on 9/22/2017) 60 mL 11     Allergies   Allergen Reactions     Tetracycline Unknown     Doxycycline Rash         Review of Systems:  -As per HPI  -Skin: As above in HPI. No additional skin concerns.    Physical exam:  Vitals: /74  Pulse 72  GEN: This is a well developed, well-nourished female in no acute distress, in a pleasant mood.    SKIN: Focused examination of the scalp, face, extremities was performed.  -Mild diffuse thinning of scalp hair with decreased density in vertex region and temporal recession   -Anterior part width 1.1  -Robust regrowth layers on part width examination:   1st: 1-2cm   2nd: 3-4cm   3rd: 8cm   -Mild diffuse scalp scale and patchy pinkness   -Eyebrows and eyelashes are WNL   -Fingernails without pitting, ridging, or discoloration   -Hypopigmented patches involving bilateral hands, feet, and face   -3x2.5cm pink to tan lichenified plaque on L anterior ankle   -No other lesions of concern on areas examined.     Impression/Plan:  1. Nonscarring alopecia - Clinically c/w resolving telogen effluvium with component of male pattern hair loss. Discussed that hormonal therapies that we would recommend are already being utilized. There is great regrowth on exam today, discussed adding back rogaine vs low level laser therapy, which she expresses interest in. Will check labs today  to r/o nutritional component, as well as adrenal hormones and vitamin D. Discussed starting ketoconazole shampoo to improve scalp health, as scalp scale is seen today.     Continue finasteride and spironolactone per Dr. Pierre    Start ketoconazole 2% shampoo three times per week     Resume rogaine 5% foam QD, alternatively may start PBM instead    LLLT discussed, handout provided    Labs today: CBC with diff, iron studies, vitamin D, DHEA-S, testosterone    Photographs taken for future reference     2. Vitiligo - Previously tried protopic, may be interested in pursuing this again vs discussion of other therapies however not extensively discussed today. We will refer her to Dr. Won Ramirez today, as Dr. Ramirez is very interested in and excellent with caring for transitioning patients, she is interested in being scheduled with Dr. Ramirez. Faculty in continuity clinic will be Marti Lopez MD      CC Keith Pierre, Lamonte Baker and Won Yañez on close of this encounter.  Follow-up in 3 months, earlier for new or changing lesions.     Staff Involved:  Scribed by Judy Larios, MS4 for Dr. Lopez.        I agree with the PFSH and ROS as completed by the Medical Student. The remainder of the encounter was performed by me and scribed by the Medical Student. The scribed note accurately reflects my personal services and the medical decisions made by me.      Marti Lopez MD  Professor and Chair  Department of Dermatology  [facility]

## 2018-06-26 NOTE — MR AVS SNAPSHOT
After Visit Summary   6/26/2018    Chris Stockton    MRN: [id number]           Patient Information     Date Of Birth          1960        Visit Information        Provider Department      6/26/2018 3:30 PM Marti Lopez MD [facility] Dermatology        Today's Diagnoses     Dermatitis, seborrheic    -  1    Loss of hair          Care Instructions    Photobiomodulation (Low Level Laser (Light) Therapy)      What is Photobiomodulation?     Photobiomodulation, also referred to as low level laser therapy,  is an emerging treatment for hair thinning in men and women, also known as pattern hair loss. The devices use light to stimulate the hair follicle.  The exact mechanism is still unknown but there is evidence for improvement with hair growth and density.      What types of devices are available?    Each patient has many different options devices depending on preference for shape, frequency of use and price point.      There is no study comparing these devices. However, most research available is on the Hairmax devices.    Below is a sample of some devices currently available. All are FDA cleared for androgenetic alopecia.    In general, the more laser lights the more expensive the device. However, this does not necessarily mean the device works better.      HairMax Lasercomb and Band   Shape Comb or Band   Escalera Comb ($199-$399), Band ($499-$799)   Contact Information [email or web address]  [phone]  [phone]              LaserCap Pro Capillus 82 iGrow Theradome   Shape Hat Baseball Cap Helmet Helmet   Escalera $3,000 $799 $549 $895   Contact Information [place]  [phone]  [email or web address]  [phone]  [email or web address]  [phone]  [email or web address]   [phone]         If you plan to buy a hair max device, please consider using the following coupon code as this will give you a discount and also result in a donation  from hair max to our medical students.     e      Last update:4/2/2018      -Start ketoconazole shampoo three times per week, massage this into the scalp and leave in for 2-3 minutes before lathering and rinsing  -Consider low level laser therapy vs rogaine, either of these may be helpful  -If you would like to start the rogaine foam again, use on the entire top of the scalp and try to get through a canister per month   -Continue finasteride and spironolactone per Dr. Pierre  -We will check labs today and follow up with the results via a letter in the mail           Follow-ups after your visit        Follow-up notes from your care team     Return in about 3 months (around 9/26/2018).      Your next 10 appointments already scheduled     Jun 26, 2018  6:15 PM CDT   LAB with  LAB   [facility] ([facility])    [address] [place] [id number] [phone]           Please do not eat 10-12 hours before your appointment if you are coming in fasting for labs on lipids, cholesterol, or glucose (sugar). This does not apply to pregnant women. Water, hot tea and black coffee (with nothing added) are okay. Do not drink other fluids, diet soda or chew gum.            Sep 20, 2018  9:40 AM CDT   (Arrive by 9:25 AM)   RETURN HAIRLOSS with Alex Ramirez MD   [facility] ([facility])    [address] [place] [id number] [phone]              Future tests that were ordered for you today     Open Future Orders        Priority Expected Expires Ordered    Iron and iron binding capacity Routine  6/26/2019 6/26/2018    Vitamin D Deficiency Routine  6/26/2019 6/26/2018    Testosterone Free and Total Routine  6/26/2019 6/26/2018    TSH with free T4 reflex Routine  6/26/2019 6/26/2018    CBC with platelets differential Routine  6/26/2019 6/26/2018    DHEA sulfate Routine  6/26/2019 6/26/2018    Ferritin Routine   6/26/2019 6/26/2018            Who to contact     Please call your clinic at [phone] to:    Ask questions about your health    Make or cancel appointments    Discuss your medicines    Learn about your test results    Speak to your doctor            Additional Information About Your Visit        [employer] Information     [employer] gives you secure access to your electronic health record. If you see a primary care provider, you can also send messages to your care team and make appointments. If you have questions, please call your primary care clinic.  If you do not have a primary care provider, please call [phone] and they will assist you.      [employer] is an electronic gateway that provides easy, online access to your medical records. With [employer], you can request a clinic appointment, read your test results, renew a prescription or communicate with your care team.     To access your existing account, please contact your [facility] or call [phone] for assistance.        Care EveryWhere ID     This is your Care EveryWhere ID. This could be used by other organizations to access your [facility] medical records  [id number]        Your Vitals Were     Pulse                   72            Blood Pressure from Last 3 Encounters:   06/26/18 126/74   04/09/18 131/72   02/08/18 131/88    Weight from Last 3 Encounters:   04/09/18 87.1 kg (192 lb)   02/08/18 87 kg (191 lb 12.8 oz)   11/08/17 83.7 kg (184 lb 9.6 oz)                 Today's Medication Changes          These changes are accurate as of 6/26/18  5:57 PM.  If you have any questions, ask your nurse or doctor.               Start taking these medicines.        Dose/Directions    ketoconazole 2 % shampoo   Commonly known as:  NIZORAL   Used for:  Dermatitis, seborrheic   Started by:  Marti Lopez MD        Use three times per week   Quantity:  120 mL   Refills:  11            Where to get your medicines      These  medications were sent to [facility] [id number] [facility] - [place], [place] - [address]  [address], [place] [address]     Phone:  [phone]     ketoconazole 2 % shampoo                Primary Care Provider Office Phone # Fax #    Yanna Santizo [phone]       [address]   [facility] [address]        Equal Access to Services     CLAUDIA BOYKIN : Hadii aad ku hadasho Soomaali, waaxda luqadaha, qaybta kaalmada adeegyada, waxay idiin hayaan adeeg khcharly laprem . So [facility] [phone].    ATENCIÓN: Si habla espav, tiene a tao disposición servicios gratuitos de asistencia lingüística. [facility] [phone].    We comply with applicable federal civil rights laws and [place] laws. We do not discriminate on the basis of race, color, national origin, age, disability, sex, sexual orientation, or gender identity.            Thank you!     Thank you for choosing [facility] DERMATOLOGY  for your care. Our goal is always to provide you with excellent care. Hearing back from our patients is one way we can continue to improve our services. Please take a few minutes to complete the written survey that you may receive in the mail after your visit with us. Thank you!             Your Updated Medication List - Protect others around you: Learn how to safely use, store and throw away your medicines at www.disposemymeds.org.          This list is accurate as of 6/26/18  5:57 PM.  Always use your most recent med list.                   Brand Name Dispense Instructions for use Diagnosis    amphetamine-dextroamphetamine 10 MG per 24 hr capsule    ADDERALL XR     TAKE 1 CAPSULE BY MOUTH ONCE DAILY.        estradiol 0.1 MG/24HR BIW patch    VIVELLE-DOT    24 patch    Place 1 patch onto the skin twice a week    Gender dysphoria in adult       finasteride 5 MG tablet    PROSCAR    90 tablet    Take 1 tablet (5 mg) by mouth daily    Gender dysphoria in adult       ketoconazole 2 % shampoo    NIZORAL    120 mL    Use  three times per week    Dermatitis, seborrheic       levothyroxine 75 MCG tablet    SYNTHROID/LEVOTHROID    90 tablet    Take 1 tablet (75 mcg) by mouth daily    Acquired hypothyroidism       minoxidil 5 % Soln     60 mL    Apply topically twice daily.    Male pattern baldness       ranitidine 150 MG tablet    ZANTAC    60 tablet    Take 1 tablet (150 mg) by mouth 2 times daily    Gastroesophageal reflux disease without esophagitis       spironolactone 25 MG tablet    ALDACTONE     Take 25 mg by mouth        WELLBUTRIN  MG 24 hr tablet   Generic drug:  buPROPion      Take 150 mg by mouth every morning

## 2018-06-26 NOTE — PROGRESS NOTES
[facility] Dermatology Note      Dermatology Problem List:  1.Nonscarring alopecia - Clinically c/w male pattern hair loss with resolving TE   -Labs 6/25: CBC with diff, iron studies, vitamin D, testosterone, DHEA-S, TSH   -Finasteride 5mg QD, Spironolactone 25mg QD, Estradiol patch 0.1mg/24h  -Rogaine 5% foam QD   -LLLT discussed 6/25, handout provided  -Vitamin D 3000IU QD    Encounter Date: Jun 26, 2018    CC:  Chief Complaint   Patient presents with     Hair Loss     Chris is visiting to discuss hair loss.         History of Present Illness:  Mookie Stockton is a 57 year old transgender female who presents in consultation from Dr. 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 Mookie reports that the hair loss first really became noticeable in January 2017 with a rapid increase in hair shedding and increased thinness on the top of the scalp. Dr. Pierre at [facility] is helping her transition from male to female, and is who started her on finasteride 5mg QD in 9/2017. Also tried using minoxidil 5% foam for 6 weeks in fall 2017 but stopped due to increased shedding. She was also started on estradiol patches in fall 2017, and has most recently started on spironolactone 25mg QD. Currently is shampooing with a natural OTC shampoo and occasionally conditioner every other day, not using any other products on the scalp. Denies itching, pain, burning, or tingling of the scalp.     She does report that hair loss has improved somewhat in the past few months and is also noting decreased shedding. Eyebrows, eyelashes, and nails have been stable without changes. Body hair has increased slightly in the past few months.     She also has vitiligo on the face, hands, feet, extremities that has been present for many years. Currently is not using any treatments although tried protopic briefly but stopped due to a burning sensation with use. Recently  started synthroid (~4 mo ago) for hypothyroidism, is not sure if this is related to autoimmune. Denies every having small patches of hair loss on the scalp or body.     Past Medical History:   Patient Active Problem List   Diagnosis     Shoulder impingement syndrome     Ulnar nerve injury     ADRIAN (generalized anxiety disorder)     Personality disorder     Gastroesophageal reflux disease without esophagitis     Major depressive disorder in remission     Conductive hearing loss, bilateral     Gender dysphoria in adult     Family history of prostate cancer     Kidney stone     Esophageal stricture     Sensorineural hearing loss, asymmetrical     Tinnitus     Vitiligo     Hypothyroidism     Melanocytic nevus of trunk     Past Medical History:   Diagnosis Date     Anxiety      Depressive disorder      Esophageal stricture      Kidney stone     nov.     Past Surgical History:   Procedure Laterality Date     APPENDECTOMY       COLONOSCOPY  12/9/2011    Procedure:COLONOSCOPY; COLONOSCOPY; Surgeon:MARILY MENA; Location: GI     ESOPHAGOSCOPY, GASTROSCOPY, DUODENOSCOPY (EGD), COMBINED  11/23/2012    Procedure: COMBINED ESOPHAGOSCOPY, GASTROSCOPY, DUODENOSCOPY (EGD), BIOPSY SINGLE OR MULTIPLE;;  Surgeon: Yehuda Tomlinson MD;  Location:  GI     VASECTOMY         Social History:  The patient is a  at [employer] and is also very involved in climate change advocacy.     Family History:  No hair loss in mother or father, older brother has MPHL.     Medications:  Current Outpatient Prescriptions   Medication Sig Dispense Refill     amphetamine-dextroamphetamine (ADDERALL XR) 10 MG per 24 hr capsule TAKE 1 CAPSULE BY MOUTH ONCE DAILY.  0     buPROPion (WELLBUTRIN XL) 150 MG 24 hr tablet Take 150 mg by mouth every morning       estradiol (VIVELLE-DOT) 0.1 MG/24HR BIW patch Place 1 patch onto the skin twice a week 24 patch 1     finasteride (PROSCAR) 5 MG tablet Take 1 tablet (5 mg) by mouth daily 90 tablet 1      levothyroxine (SYNTHROID/LEVOTHROID) 75 MCG tablet Take 1 tablet (75 mcg) by mouth daily 90 tablet 3     ranitidine (ZANTAC) 150 MG tablet Take 1 tablet (150 mg) by mouth 2 times daily 60 tablet 3     spironolactone (ALDACTONE) 25 MG tablet Take 25 mg by mouth       minoxidil 5 % SOLN Apply topically twice daily. (Patient not taking: Reported on 9/22/2017) 60 mL 11     Allergies   Allergen Reactions     Tetracycline Unknown     Doxycycline Rash         Review of Systems:  -As per HPI  -Skin: As above in HPI. No additional skin concerns.    Physical exam:  Vitals: /74  Pulse 72  GEN: This is a well developed, well-nourished female in no acute distress, in a pleasant mood.    SKIN: Focused examination of the scalp, face, extremities was performed.  -Mild diffuse thinning of scalp hair with decreased density in vertex region and temporal recession   -Anterior part width 1.1  -Robust regrowth layers on part width examination:   1st: 1-2cm   2nd: 3-4cm   3rd: 8cm   -Mild diffuse scalp scale and patchy pinkness   -Eyebrows and eyelashes are WNL   -Fingernails without pitting, ridging, or discoloration   -Hypopigmented patches involving bilateral hands, feet, and face   -3x2.5cm pink to tan lichenified plaque on L anterior ankle   -No other lesions of concern on areas examined.     Impression/Plan:  1. Nonscarring alopecia - Clinically c/w resolving telogen effluvium with component of male pattern hair loss. Discussed that hormonal therapies that we would recommend are already being utilized. There is great regrowth on exam today, discussed adding back rogaine vs low level laser therapy, which she expresses interest in. Will check labs today to r/o nutritional component, as well as adrenal hormones and vitamin D. Discussed starting ketoconazole shampoo to improve scalp health, as scalp scale is seen today.     Continue finasteride and spironolactone per Dr. Pierre    Start ketoconazole 2% shampoo three times per  week     Resume rogaine 5% foam QD, alternatively may start PBM instead    LLLT discussed, handout provided    Labs today: CBC with diff, iron studies, vitamin D, DHEA-S, testosterone    Photographs taken for future reference     2. Vitiligo - Previously tried protopic, may be interested in pursuing this again vs discussion of other therapies however not extensively discussed today. We will refer her to Dr. Won Ramirez today, as Dr. Ramirez is very interested in and excellent with caring for transitioning patients, she is interested in being scheduled with Dr. Ramirez. Faculty in continuity clinic will be Marti Lopez MD      CC Lamonte Estevez and Won Yañez on close of this encounter.  Follow-up in 3 months, earlier for new or changing lesions.     Staff Involved:  Scribed by Judy Larios, MS4 for Dr. Lopez.        I agree with the PFSH and ROS as completed by the Medical Student. The remainder of the encounter was performed by me and scribed by the Medical Student. The scribed note accurately reflects my personal services and the medical decisions made by me.      Marti Lopez MD  Professor and Chair  Department of Dermatology  [facility]

## 2018-06-26 NOTE — PATIENT INSTRUCTIONS
Photobiomodulation (Low Level Laser (Light) Therapy)      What is Photobiomodulation?     Photobiomodulation, also referred to as low level laser therapy,  is an emerging treatment for hair thinning in men and women, also known as pattern hair loss. The devices use light to stimulate the hair follicle.  The exact mechanism is still unknown but there is evidence for improvement with hair growth and density.      What types of devices are available?    Each patient has many different options devices depending on preference for shape, frequency of use and price point.      There is no study comparing these devices. However, most research available is on the Hairmax devices.    Below is a sample of some devices currently available. All are FDA cleared for androgenetic alopecia.    In general, the more laser lights the more expensive the device. However, this does not necessarily mean the device works better.      [employer] Lasercomb and Band   Shape Comb or Band   Escalera Comb ($199-$399), Band ($499-$799)   Contact Information [email or web address]  [phone]  [phone]              LaserZairge Pro Capillus 82 iGrow Theradome   Shape Hat Baseball Cap Helmet Helmet   Escalera $3,000 $799 $549 $895   Contact Information [employer]  [phone]  [email or web address]  [phone]  [email or web address]  [phone]  [email or web address]   [phone]         If you plan to buy a hair max device, please consider using the following coupon code as this will give you a discount and also result in a donation from [employer] to our medical students.     e      Last update:4/2/2018      -Start ketoconazole shampoo three times per week, massage this into the scalp and leave in for 2-3 minutes before lathering and rinsing  -Consider low level laser therapy vs rogaine, either of these may be helpful  -If you would like to start the rogaine foam again, use on the entire top of the scalp and try  to get through a canister per month   -Continue finasteride and spironolactone per Dr. Pierre  -We will check labs today and follow up with the results via a letter in the mail

## 2018-06-27 LAB
DEPRECATED CALCIDIOL+CALCIFEROL SERPL-MC: 34 UG/L (ref 20–75)
DHEA-S SERPL-MCNC: 235 UG/DL (ref 80–560)

## 2018-06-28 LAB
SHBG SERPL-SCNC: 66 NMOL/L (ref 11–80)
TESTOST FREE SERPL-MCNC: 4.21 NG/DL (ref 4.7–24.4)
TESTOST SERPL-MCNC: 342 NG/DL (ref 240–950)

## 2019-05-14 DIAGNOSIS — E03.9 ACQUIRED HYPOTHYROIDISM: ICD-10-CM

## 2019-05-14 NOTE — TELEPHONE ENCOUNTER

## 2019-05-15 RX ORDER — LEVOTHYROXINE SODIUM 75 UG/1
75 TABLET ORAL DAILY
Qty: 90 TABLET | Refills: 3 | Status: SHIPPED | OUTPATIENT
Start: 2019-05-15 | End: 2019-11-11

## 2019-09-30 ENCOUNTER — HEALTH MAINTENANCE LETTER (OUTPATIENT)
Age: 59
End: 2019-09-30

## 2019-11-11 ENCOUNTER — OFFICE VISIT (OUTPATIENT)
Dept: FAMILY MEDICINE | Facility: CLINIC | Age: 59
End: 2019-11-11
Payer: COMMERCIAL

## 2019-11-11 VITALS
BODY MASS INDEX: 31.37 KG/M2 | RESPIRATION RATE: 16 BRPM | SYSTOLIC BLOOD PRESSURE: 127 MMHG | HEART RATE: 55 BPM | HEIGHT: 68 IN | OXYGEN SATURATION: 97 % | DIASTOLIC BLOOD PRESSURE: 82 MMHG | WEIGHT: 207 LBS | TEMPERATURE: 98.2 F

## 2019-11-11 DIAGNOSIS — E03.9 ACQUIRED HYPOTHYROIDISM: ICD-10-CM

## 2019-11-11 DIAGNOSIS — L60.0 INGROWN TOENAIL OF RIGHT FOOT: ICD-10-CM

## 2019-11-11 DIAGNOSIS — E03.4 HYPOTHYROIDISM DUE TO ACQUIRED ATROPHY OF THYROID: Primary | ICD-10-CM

## 2019-11-11 DIAGNOSIS — R06.83 SNORING: ICD-10-CM

## 2019-11-11 DIAGNOSIS — F64.0 GENDER DYSPHORIA IN ADULT: ICD-10-CM

## 2019-11-11 DIAGNOSIS — R53.83 FATIGUE, UNSPECIFIED TYPE: ICD-10-CM

## 2019-11-11 DIAGNOSIS — Z00.00 ENCOUNTER FOR PREVENTIVE CARE: ICD-10-CM

## 2019-11-11 LAB — PSA SERPL-ACNC: 0.84 UG/L (ref 0–4)

## 2019-11-11 RX ORDER — LITHIUM CARBONATE 300 MG/1
600 TABLET, FILM COATED, EXTENDED RELEASE ORAL 2 TIMES DAILY
COMMUNITY
End: 2020-01-03

## 2019-11-11 RX ORDER — LEVOTHYROXINE SODIUM 75 UG/1
75 TABLET ORAL DAILY
Qty: 90 TABLET | Refills: 3 | Status: SHIPPED | OUTPATIENT
Start: 2019-11-11 | End: 2020-12-16

## 2019-11-11 RX ORDER — ESTRADIOL 0.1 MG/D
FILM, EXTENDED RELEASE TRANSDERMAL
Qty: 24 PATCH | Refills: 1 | COMMUNITY
Start: 2019-11-11 | End: 2023-01-31

## 2019-11-11 ASSESSMENT — PATIENT HEALTH QUESTIONNAIRE - PHQ9: SUM OF ALL RESPONSES TO PHQ QUESTIONS 1-9: 8

## 2019-11-11 ASSESSMENT — MIFFLIN-ST. JEOR: SCORE: 1723.45

## 2019-11-11 NOTE — PROGRESS NOTES
"       HPI       Mookie is a 59 year old  male  who presents for the new concern(s) of:    Chief Complaint   Patient presents with     RECHECK     thyroid- no concerns      Sleep Study     discuss starting a sleep study. Unable to sleep through the night. Mouth breathing per pt. Per pt his dentist recommended this.      Refill Request     levothyroxine 75mcg tab     Very happy with her transition: now \"7 years old and 2 years of puberty\".   Cared by Dr. Pierre  Is now on 3 patches two times a week and siddhartha 25 mg once a day.  Dosing has her numbers where need to be.  Is talking about facial feminization surgery and will be getting nasal septal work.      MH: is now on lithium.  Helping the MDD and suicidal thoughts which have been nasty.  Notes that he is safe. Fantastic therapist and getting psych care at [facility].  Will need new psychiatrist there.     Sleep - having issues, suggested that needs a sleep study.  Used to snore a lot.  Tired during the day , will fall asleep reading and watching a machine. Also waking up and trouble sleeping. Also some incontinence at times.     Toenail that was an issue and saw podiatry.  The nail was cut off at an angle and not growing back well.      Last TSH was nl 2/2019 at 2.69.    Prevention:   Father had bladder cancer and brother had prostate cancer, other brother with melanoma. Mother is Ok except some cardiac issues (both are alive and in their early 90s).  Non -smoker.     The 10-year ASCVD risk score (Thomas PAULO Jr., et al., 2013) is: 10.5%    Values used to calculate the score:      Age: 59 years      Sex: Male      Is Non- : No      Diabetic: No      Tobacco smoker: No      Systolic Blood Pressure: 127 mmHg      Is BP treated: No      HDL Cholesterol: 47.5 mg/dL      Total Cholesterol: 242.1 mg/dL      SHx: still  and still living together. Leading to a lot of mental health issues. Are working on finding new houses.       Patient Active Problem " List   Diagnosis     Shoulder impingement syndrome     Ulnar nerve injury     ADRIAN (generalized anxiety disorder)     Personality disorder (H)     Gastroesophageal reflux disease without esophagitis     Major depressive disorder in remission (H)     Conductive hearing loss, bilateral     Gender dysphoria in adult     Family history of prostate cancer     Kidney stone     Esophageal stricture     Sensorineural hearing loss, asymmetrical     Tinnitus     Vitiligo     Hypothyroidism     Melanocytic nevus of trunk       Current Outpatient Medications   Medication Sig Dispense Refill     amphetamine-dextroamphetamine (ADDERALL XR) 10 MG per 24 hr capsule TAKE 1 CAPSULE BY MOUTH ONCE DAILY.  0     buPROPion (WELLBUTRIN XL) 150 MG 24 hr tablet Take 150 mg by mouth every morning       estradiol (VIVELLE-DOT) 0.1 MG/24HR BIW patch Place 1 patch onto the skin twice a week 24 patch 1     levothyroxine (SYNTHROID/LEVOTHROID) 75 MCG tablet Take 1 tablet (75 mcg) by mouth daily 90 tablet 3     lithium ER (LITHOBID/ESKALITH CR) 300 MG CR tablet Take 600 mg by mouth 2 times daily       ranitidine (ZANTAC) 150 MG tablet Take 1 tablet (150 mg) by mouth 2 times daily 60 tablet 3     finasteride (PROSCAR) 5 MG tablet Take 1 tablet (5 mg) by mouth daily (Patient not taking: Reported on 11/11/2019) 90 tablet 1     ketoconazole (NIZORAL) 2 % shampoo Use three times per week (Patient not taking: Reported on 11/11/2019) 120 mL 11     minoxidil 5 % SOLN Apply topically twice daily. (Patient not taking: Reported on 9/22/2017) 60 mL 11     spironolactone (ALDACTONE) 25 MG tablet Take 25 mg by mouth            Allergies   Allergen Reactions     Finasteride      Mental health problems     Tetracycline Unknown     Doxycycline Rash                Review of Systems:     Denies CP, SOB or edema            Physical Exam:     Vitals:    11/11/19 1016   BP: 127/82   Pulse: 55   Resp: 16   Temp: 98.2  F (36.8  C)   TempSrc: Oral   SpO2: 97%   Weight:  "93.9 kg (207 lb)   Height: 1.727 m (5' 8\")     Body mass index is 31.47 kg/m .  Vitals were reviewed and were normal  GENERAL: healthy, alert, well nourished, well hydrated, no distress  MS: extremities- no gross deformities noted, no edema  Both feet - first toe with some medial nail changes, whiteness. On the right has some slight ingrown component but no paronychia.   No results found for any visits on 11/11/19.       Assessment and Plan     Chris was seen today for recheck, sleep study and refill request.    Diagnoses and all orders for this visit:    Hypothyroidism due to acquired atrophy of thyroid - no TSH recently. No need for testing    Toeneail changes - will try to grow out the nail - to use taping or dental floss to help.     Gender dysphoria in adult  - cared for by Dr. Pierre and doing well. Labs OK    Encounter for preventive care - reviewed preventive testing - family history and longlived parents. Odds are low with the Estrogen that it is going to be high.    Has had some urinary incontinence that we will discuss at next visit.   Will return for prevention visit too - need to discuss statin since at 10% risk.   -     Prostate spec antigen screen    Acquired hypothyroidism  -     levothyroxine (SYNTHROID/LEVOTHROID) 75 MCG tablet; Take 1 tablet (75 mcg) by mouth daily    Snoring - reviewed options and decided that he would speak to the sleep specialist first to determine what work up he might need.   -     SLEEP EVALUATION & MANAGEMENT REFERRAL - [facility] [phone] (Age 15 and up); Future    Fatigue, unspecified type  -     SLEEP EVALUATION & MANAGEMENT REFERRAL - [facility] [phone] (Age 15 and up); Future        Medications Discontinued During This Encounter   Medication Reason     finasteride (PROSCAR) 5 MG tablet      ketoconazole (NIZORAL) 2 % shampoo      estradiol (VIVELLE-DOT) 0.1 MG/24HR BIW patch      levothyroxine " (SYNTHROID/LEVOTHROID) 75 MCG tablet Reorder       Total time: 30 minutes with more than half spent counseling on his toenails, his sleep and some prevention.          Options for treatment and follow-up care were reviewed with the patient . Chris Stockton  engaged in the decision making process and verbalized understanding of the options discussed and agreed with the final plan.    Yanna Santizo MD

## 2019-11-11 NOTE — PATIENT INSTRUCTIONS
Here is the plan from today's visit    1. Hypothyroidism due to acquired atrophy of thyroid  Refilled and doing well    2. Gender dysphoria in adult  I just updated our meds  - estradiol (VIVELLE-DOT) 0.1 MG/24HR bi-weekly patch; Place 3 patches onto the skin twice a week  Dispense: 24 patch; Refill: 1    3. Encounter for preventive care  today  - Prostate spec antigen screen    4. Acquired hypothyroidism  Refilled the thyroids  - levothyroxine (SYNTHROID/LEVOTHROID) 75 MCG tablet; Take 1 tablet (75 mcg) by mouth daily  Dispense: 90 tablet; Refill: 3    5. Snoring  Have written for sleep study  - SLEEP EVALUATION & MANAGEMENT REFERRAL - [facility] [phone] (Age 15 and up); Future    6. Fatigue, unspecified type    - SLEEP EVALUATION & MANAGEMENT REFERRAL - [facility] [phone] (Age 15 and up); Future      Please call or return to clinic if your symptoms don't go away.    Follow up plan  See me for the other issues.     Thank you for coming to [facility] today.  Lab Testing:  **If you had lab testing today and your results are reassuring or normal they will be mailed to you or sent through [employer] within 7 days.   **If the lab tests need quick action we will call you with the results.  The phone number we will call with results is # [phone] (home) none (work). If this is not the best number please call our clinic and change the number.  Medication Refills:  If you need any refills please call your pharmacy and they will contact us.   If you need to  your refill at a new pharmacy, please contact the new pharmacy directly. The new pharmacy will help you get your medications transferred faster.   Scheduling:  If you have any concerns about today's visit or wish to schedule another appointment please call our office during normal business hours [phone] (8-5:00 M-F)  If a referral was made to a [facility]  and you don't get a call from central scheduling please call [phone].  If a Mammogram was ordered for you at [facility] call [phone] to schedule or change your appointment.  If you had an XRay/CT/Ultrasound/MRI ordered the number is [phone] to schedule or change your radiology appointment.   Medical Concerns:  If you have urgent medical concerns please call [phone] at any time of the day.    Yanna Santizo MD

## 2019-11-12 ENCOUNTER — PRE VISIT (OUTPATIENT)
Dept: SLEEP MEDICINE | Facility: CLINIC | Age: 59
End: 2019-11-12

## 2019-11-12 NOTE — TELEPHONE ENCOUNTER
"  1.  Reason for the visit:  Sleep - having issues, suggested that needs a sleep study.  Used to snore a lot.  Tired during the day , will fall asleep reading and watching a machine. Also waking up and trouble sleeping. Also some incontinence at times  2.  Referring provider and clinic name:  [facility]  3.  Previous Sleep Doctor or Pulmonlogist (clinic name)?  None noted  4.  Records, Procedures, Imaging, and Labs (see below)  No records to obtain        All NOTES from previous office visits that pertain to why they are being seen in the Sleep Center    Previous Sleep Studies, Chest CT, Echos and reports that pertain to why they are seeing Sleep Center    All Sleep records that have been done in the last 2 years that pertain to why they are seeing Sleep Center            Are they being seen for continuation of care for Cpap/Bipap/Avap/Trilogy/Dental Device? none    If yes to above Who and Where was Device issued/currently getting supplies from? na    Are you currently on \"Supplemental Oxygen\" during the day or night?   na                                                                                                                                                      Please remind pt to bring Cpap machine and ask to arrive 15 minutes early to appointment due traffic and congestion                                                 5. Pt Sleep Center Packet received Message left asking pt to arrive 30 minutes early to appointment if no packet received.        Yes: \"please make sure that you bring this to your appointment completed, either the doctor will not see you until this completed or you may be asked to reschedule your appointment.\"     No: mail or email to the pt and explain, \"please make sure that you bring this to your appointment completed, either the doctor will not see you until this completed or you may be asked to reschedule your appointment.\"     ~If pt coming early to " "fill packet out, ask that they come 30 minutes prior to their appointment~     6. Has the pt's medication list been updated and preferred pharmacy added?     7. Has the allergy list been reviewed?    \"Thank you for choosing [facility] and we look forward to seeing you at your upcoming appointment\"     "

## 2019-11-14 ENCOUNTER — OFFICE VISIT (OUTPATIENT)
Dept: SLEEP MEDICINE | Facility: CLINIC | Age: 59
End: 2019-11-14
Payer: COMMERCIAL

## 2019-11-14 VITALS
WEIGHT: 209.1 LBS | OXYGEN SATURATION: 97 % | HEIGHT: 69 IN | DIASTOLIC BLOOD PRESSURE: 85 MMHG | SYSTOLIC BLOOD PRESSURE: 124 MMHG | BODY MASS INDEX: 30.97 KG/M2 | RESPIRATION RATE: 16 BRPM | HEART RATE: 62 BPM

## 2019-11-14 DIAGNOSIS — R53.83 FATIGUE, UNSPECIFIED TYPE: ICD-10-CM

## 2019-11-14 DIAGNOSIS — F45.8 BRUXISM: ICD-10-CM

## 2019-11-14 DIAGNOSIS — R06.83 SNORING: Primary | ICD-10-CM

## 2019-11-14 DIAGNOSIS — G47.21 CIRCADIAN RHYTHM SLEEP DISORDER, DELAYED SLEEP PHASE TYPE: ICD-10-CM

## 2019-11-14 DIAGNOSIS — E66.811 OBESITY (BMI 30.0-34.9): ICD-10-CM

## 2019-11-14 DIAGNOSIS — G47.00 INSOMNIA, UNSPECIFIED TYPE: ICD-10-CM

## 2019-11-14 PROCEDURE — 99202 OFFICE O/P NEW SF 15 MIN: CPT | Performed by: NURSE PRACTITIONER

## 2019-11-14 RX ORDER — ZOLPIDEM TARTRATE 10 MG/1
TABLET ORAL
Qty: 1 TABLET | Refills: 0 | Status: SHIPPED | OUTPATIENT
Start: 2019-11-14 | End: 2019-12-23

## 2019-11-14 ASSESSMENT — MIFFLIN-ST. JEOR: SCORE: 1740.91

## 2019-11-14 NOTE — PATIENT INSTRUCTIONS
"MY TREATMENT INFORMATION FOR SLEEP APNEA-  Chris Stockton    DOCTOR : SAMMIE Tamayo CNP  SLEEP CENTER :      MY CONTACT NUMBER: [phone]  Day of study: 8A rise  If you need to cancel your sleep study, please call as soon as possible.  If you were prescribed a sleep aid, bring that to the sleep lab.    No driving if sleepy    Use saline spray product (ocean complete or arm and hammer are easy to use) in shower where nose naturally opens up. Use another time of day with continue congestion over the sink.    Am I having a sleep study at a sleep center?  Make sure you have an appointment for the study before you leave!    Am I having a home sleep study?  Watch this video:  [email or web address]  Please verify your insurance coverage with your insurance carrier    Frequently asked questions:  1. What is Obstructive Sleep Apnea (LEILA)? LEILA is the most common type of sleep apnea. Apnea means, \"without breath.\"  Apnea is most often caused by narrowing or collapse of the upper airway as muscles relax during sleep.   Almost everyone has occasional apneas. Most people with sleep apnea have had brief interruptions at night frequently for many years.  The severity of sleep apnea is related to how frequent and severe the events are.   2. What are the consequences of LEILA? Symptoms include: feeling sleepy during the day, snoring loudly, gasping or stopping of breathing, trouble sleeping, and occasionally morning headaches or heartburn at night.  Sleepiness can be serious and even increase the risk of falling asleep while driving. Other health consequences may include development of high blood pressure and other cardiovascular disease in persons who are susceptible. Untreated LEILA  can contribute to heart disease, stroke and diabetes.   3. What are the treatment options? In most situations, sleep apnea is a lifelong disease that must be managed with daily " therapy. Medications are not effective for sleep apnea and surgery is generally not considered until other therapies have been tried. Your treatment is your choice . Continuous Positive Airway (CPAP) works right away and is the therapy that is effective in nearly everyone. An oral device to hold your jaw forward is usually the next most reliable option. Other options include postioning devices (to keep you off your back), weight loss, and surgery including a tongue pacing device. There is more detail about some of these options below.    Important tips for using CPAP and similar devices   Know your equipment:  CPAP is continuous positive airway pressure that prevents obstructive sleep apnea by keeping the throat from collapsing while you are sleeping. In most cases, the device is  smart  and can slowly self-adjusts if your throat collapses and keeps a record every day of how well you are treated-this information is available to you and your care team.  BPAP is bilevel positive airway pressure that keeps your throat open and also assists each breath with a pressure boost to maintain adequate breathing.  Special kinds of BPAP are used in patients who have inadequate breathing from lung or heart disease. In most cases, the device is  smart  and can slowly self-adjusts to assist breathing. Like CPAP, the device keeps a record of how well you are treated.  Your mask is your connection to the device. You get to choose what feels most comfortable and the staff will help to make sure if fits. Here: are some examples of the different masks that are available:       Key points to remember on your journey with sleep apnea:  1. Sleep study.  PAP devices often need to be adjusted during a sleep study to show that they are effective and adjusted right.  2. Good tips to remember: Try wearing just the mask during a quiet time during the day so your body adapts to wearing it. A humidifier is recommended for comfort in most cases to  prevent drying of your nose and throat. Allergy medication from your provider may help you if you are having nasal congestion.  3. Getting settled-in. It takes more than one night for most of us to get used to wearing a mask. Try wearing just the mask during a quiet time during the day so your body adapts to wearing it. A humidifier is recommended for comfort in most cases. Our team will work with you carefully on the first day and will be in contact within 4 days and again at 2 and 4 weeks for advice and remote device adjustments. Your therapy is evaluated by the device each day.   4. Use it every night. The more you are able to sleep naturally for 7-8 hours, the more likely you will have good sleep and to prevent health risks or symptoms from sleep apnea. Even if you use it 4 hours it helps. Occasionally all of us are unable to use a medical therapy, in sleep apnea, it is not dangerous to miss one night.   5. Communicate. Call our skilled team on the number provided on the first day if your visit for problems that make it difficult to wear the device. Over 2 out of 3 patients can learn to wear the device long-term with help from our team. Remember to call our team or your sleep providers if you are unable to wear the device as we may have other solutions for those who cannot adapt to mask CPAP therapy. It is recommended that you sleep your sleep provider within the first 3 months and yearly after that if you are not having problems.   6. Use it for your health. We encourage use of CPAP masks during daytime quiet periods to allow your face and brain to adapt to the sensation of CPAP so that it will be a more natural sensation to awaken to at night or during naps. This can be very useful during the first few weeks or months of adapting to CPAP though it does not help medically to wear CPAP during wakefulness and  should not be used as a strategy just to meet guidelines.  7. Take care of your equipment. Make sure you  clean your mask and tubing using directions every day and that your filter and mask are replaced as recommended or if they are not working.     BESIDES CPAP, WHAT OTHER THERAPIES ARE THERE?  Positioning Device  Positioning devices are generally used when sleep apnea is mild and only occurs on your back.This example shows a pillow that straps around the waist. It may be appropriate for those whose sleep study shows milder sleep apnea that occurs primarily when lying flat on one's back. Preliminary studies have shown benefit but effectiveness at home may need to be verified by a home sleep test. These devices are generally not covered by medical insurance.    For less expensive options:           [employer] or [employer] for slumber bump pillow, or backpack w/ chest strap stuffed w/ pillow;  or t shirt w/2 pockets, sew in tennis balls  Positioning Device  Positioning devices are generally used when sleep apnea is mild and only occurs on your back.This example shows a pillow that straps around the waist. It may be appropriate for those whose sleep study shows milder sleep apnea that occurs primarily when lying flat on one's back. Preliminary studies have shown benefit but effectiveness at home may need to be verified by a home sleep test. These devices are generally not covered by medical insurance.  Examples of devices that maintain sleeping on the back to prevent snoring and mild sleep apnea.    Belt type body positioner  Http://[employer]/    Electronic reminder  Http://nightshifttherapy.com/  Http://[email or web address].com.au/      Oral Appliance  What is oral appliance therapy?  An oral appliance device fits on your teeth at night like a retainer used after having braces. The device is made by a specialized dentist and requires several visits over 1-2 months before a manufactured device is made to fit your teeth and is adjusted to prevent your sleep apnea. Once an oral device is working properly, snoring should be improved. A  home sleep test may be recommended at that time if to determine whether the sleep apnea is adequately treated.       Some things to remember:  -Oral devices are often, but not always, covered by your medical insurance. Be sure to check with your insurance provider.   -If you are referred for oral therapy, you will be given a list of specialized dentists to consider or you may choose to visit the Web site of the American Academy of Dental Sleep Medicine  -Oral devices are less likely to work if you have severe sleep apnea or are extremely overweight.     More detailed information  An oral appliance is a small acrylic device that fits over the upper and lower teeth  (similar to a retainer or a mouth guard). This device slightly moves jaw forward, which moves the base of the tongue forward, opens the airway, improves breathing for effective treat snoring and obstructive sleep apnea in perhaps 7 out of 10 people .  The best working devices are custom-made by a dental device  after a mold is made of the teeth 1, 2, 3.  When is an oral appliance indicated?  Oral appliance therapy is recommended as a first-line treatment for patients with primary snoring, mild sleep apnea, and for patients with moderate sleep apnea who prefer appliance therapy to use of CPAP4, 5. Severity of sleep apnea is determined by sleep testing and is based on the number of respiratory events per hour of sleep.   How successful is oral appliance therapy?  The success rate of oral appliance therapy in patients with mild sleep apnea is 75-80% while in patients with moderate sleep apnea it is 50-70%. The chance of success in patients with severe sleep apnea is 40-50%. The research also shows that oral appliances have a beneficial effect on the cardiovascular health of LEILA patients at the same magnitude as CPAP therapy7.  Oral appliances should be a second-line treatment in cases of severe sleep apnea, but if not completely successful then a  combination therapy utilizing CPAP plus oral appliance therapy may be effective. Oral appliances tend to be effective in a broad range of patients although studies show that the patients who have the highest success are females, younger patients, those with milder disease, and less severe obesity. 3, 6.   Finding a dentist that practices dental sleep medicine  Specific training is available through the American Academy of Dental Sleep Medicine for dentists interested in working in the field of sleep. To find a dentist who is educated in the field of sleep and the use of oral appliances, near you, visit the Web site of the American Academy of Dental Sleep Medicine.    References  1. Asa et al. Objectively measured vs self-reported compliance during oral appliance therapy for sleep-disordered breathing. Chest 2013; 144(5): 0132-6352.  2. Estephania et al. Objective measurement of compliance during oral appliance therapy for sleep-disordered breathing. Thorax 2013; 68(1): 91-96.  3. Saritha, et al. Mandibular advancement devices in 620 men and women with LEILA and snoring: tolerability and predictors of treatment success. Chest 2004; 125: 7805-3072.  4. Taylor, et al. Oral appliances for snoring and LEILA: a review. Sleep 2006; 29: 244-262.  5. Justin, et al. Oral appliance treatment for LEILA: an update. J Clin Sleep Med 2014; 10(2): 215-227.  6. Goldie et al. Predictors of OSAH treatment outcome. J Dent Res 2007; 86: 7406-2895.      Weight Loss:15-30#    Weight loss is a long-term strategy that may improve sleep apnea in some patients.    Weight management is a personal decision and the decision should be based on your interest and the potential benefits.  If you are interested in exploring weight loss strategies, the following discussion covers the impact on weight loss on sleep apnea and the approaches that may be successful.    Being overweight does not necessarily mean you will have health  consequences.  Those who have BMI over 35 or over 27 with existing medical conditions carries greater risk.   Weight loss decreases severity of sleep apnea in most people with obesity. For those with mild obesity who have developed snoring with weight gain, even 15-30 pound weight loss can improve and occasionally eliminate sleep apnea.  Structured and life-long dietary and health habits are necessary to lose weight and keep healthier weight levels.     Though there may be significant health benefits from weight loss, long-term weight loss is very difficult to achieve- studies show success with dietary management in less than 10% of people. In addition, substantial weight loss may require years of dietary control and may be difficult if patients have severe obesity. In these cases, surgical management may be considered.  Finally, older individuals who have tolerated obesity without health complications may be less likely to benefit from weight loss strategies.      [unfilled]    Surgery:    Surgery for obstructive sleep apnea is considered generally only when other therapies fail to work. Surgery may be discussed with you if you are having a difficult time tolerating CPAP and or when there is an abnormal structure that requires surgical correction.  Nose and throat surgeries often enlarge the airway to prevent collapse.  Most of these surgeries create pain for 1-2 weeks and up to half of the most common surgeries are not effective throughout life.  You should carefully discuss the benefits and drawbacks to surgery with your sleep provider and surgeon to determine if it is the best solution for you.   More information  Surgery for LEILA is directed at areas that are responsible for narrowing or complete obstruction of the airway during sleep.  There are a wide range of procedures available to enlarge and/or stabilize the airway to prevent blockage of breathing in the three major areas where it can occur: the palate,  tongue, and nasal regions.  Successful surgical treatment depends on the accurate identification of the factors responsible for obstructive sleep apnea in each person.  A personalized approach is required because there is no single treatment that works well for everyone.  Because of anatomic variation, consultation with an examination by a sleep surgeon is a critical first step in determining what surgical options are best for each patient.  In some cases, examination during sedation may be recommended in order to guide the selection of procedures.  Patients will be counseled about risks and benefits as well as the typical recovery course after surgery. Surgery is typically not a cure for a person s LEILA.  However, surgery will often significantly improve one s LEILA severity (termed  success rate ).  Even in the absence of a cure, surgery will decrease the cardiovascular risk associated with OSA7; improve overall quality of life8 (sleepiness, functionality, sleep quality, etc).      Palate Procedures:  Patients with LEILA often have narrowing of their airway in the region of their tonsils and uvula.  The goals of palate procedures are to widen the airway in this region as well as to help the tissues resist collapse.  Modern palate procedure techniques focus on tissue conservation and soft tissue rearrangement, rather than tissue removal.  Often the uvula is preserved in this procedure. Residual sleep apnea is common in patient after pharyngoplasty with an average reduction in sleep apnea events of 33%2.      Tongue Procedures:  ExamWhile patients are awake, the muscles that surround the throat are active and keep this region open for breathing. These muscles relax during sleep, allowing the tongue and other structures to collapse and block breathing.  There are several different tongue procedures available.  Selection of a tongue base procedure depends on characteristics seen on physical exam.  Generally, procedures are  aimed at removing bulky tissues in this area or preventing the back of the tongue from falling back during sleep.  Success rates for tongue surgery range from 50-62%3.    Hypoglossal Nerve Stimulation:  Hypoglossal nerve stimulation has recently received approval from the [place] Food and Drug Administration for the treatment of obstructive sleep apnea.  This is based on research showing that the system was safe and effective in treating sleep apnea6.  Results showed that the median AHI score decreased 68%, from 29.3 to 9.0. This therapy uses an implant system that senses breathing patterns and delivers mild stimulation to airway muscles, which keeps the airway open during sleep.  The system consists of three fully implanted components: a small generator (similar in size to a pacemaker), a breathing sensor, and a stimulation lead.  Using a small handheld remote, a patient turns the therapy on before bed and off upon awakening.    Candidates for this device must be greater than 22 years of age, have moderate to severe LEILA (AHI between 20-65), BMI less than 32, have tried CPAP/oral appliance without success, and have appropriate upper airway anatomy (determined by a sleep endoscopy performed by Dr. Bella).    Hypoglossal Nerve Stimulation Pathway:    The sleep surgeon s office will work with the patient through the insurance prior-authorization process (including communications and appeals).    Nasal Procedures:  Nasal obstruction can interfere with nasal breathing during the day and night.  Studies have shown that relief of nasal obstruction can improve the ability of some patients to tolerate positive airway pressure therapy for obstructive sleep apnea1.  Treatment options include medications such as nasal saline, topical corticosteroid and antihistamine sprays, and oral medications such as antihistamines or decongestants. Non-surgical treatments can include external nasal dilators for selected patients. If  these are not successful by themselves, surgery can improve the nasal airway either alone or in combination with these other options.      Combination Procedures:  Combination of surgical procedures and other treatments may be recommended, particularly if patients have more than one area of narrowing or persistent positional disease.  The success rate of combination surgery ranges from 66-80%2,3.    References  1. Kirt NIETO. The Role of the Nose in Snoring and Obstructive Sleep Apnoea: An Update.  Eur Arch Otorhinolaryngol. 2011; 268: 1365-73.  2.  Fermín SM; Jeramy JA; Cam JR; Pallanch JF; Cristi MB; Devang SG; Apollo ALMONTE. Surgical modifications of the upper airway for obstructive sleep apnea in adults: a systematic review and meta-analysis. SLEEP 2010;33(10):0276-1459. Dipak KANG. Hypopharyngeal surgery in obstructive sleep apnea: an evidence-based medicine review.  Arch Otolaryngol Head Neck Surg. 2006 Feb;132(2):206-13.  3. Albin YH1, Bertrand Y, Kain LUCY. The efficacy of anatomically based multilevel surgery for obstructive sleep apnea. Otolaryngol Head Neck Surg. 2003 Oct;129(4):327-35.  4. Dipak KANG, Goldberg A. Hypopharyngeal Surgery in Obstructive Sleep Apnea: An Evidence-Based Medicine Review. Arch Otolaryngol Head Neck Surg. 2006 Feb;132(2):206-13.  5. Jordan PJ et al. Upper-Airway Stimulation for Obstructive Sleep Apnea.  N Engl J Med. 2014 Jan 9;370(2):139-49.  6. Priyanka Y et al. Increased Incidence of Cardiovascular Disease in Middle-aged Men with Obstructive Sleep Apnea. Am J Respir Crit Care Med; 2002 166: 159-165  7. Plaza EM et al. Studying Life Effects and Effectiveness of Palatopharyngoplasty (SLEEP) study: Subjective Outcomes of Isolated Uvulopalatopharyngoplasty. Otolaryngol Head Neck Surg. 2011; 144: 623-631.              Your BMI is Body mass index is 31.33 kg/m .  Weight management is a personal decision.  If you are interested in exploring weight loss strategies, the following discussion  covers the approaches that may be successful. Body mass index (BMI) is one way to tell whether you are at a healthy weight, overweight, or obese. It measures your weight in relation to your height.  A BMI of 18.5 to 24.9 is in the healthy range. A person with a BMI of 25 to 29.9 is considered overweight, and someone with a BMI of 30 or greater is considered obese. More than two-thirds of American adults are considered overweight or obese.  Being overweight or obese increases the risk for further weight gain. Excess weight may lead to heart disease and diabetes.  Creating and following plans for healthy eating and physical activity may help you improve your health.  Weight control is part of healthy lifestyle and includes exercise, emotional health, and healthy eating habits. Careful eating habits lifelong are the mainstay of weight control. Though there are significant health benefits from weight loss, long-term weight loss with diet alone may be very difficult to achieve- studies show long-term success with dietary management in less than 10% of people. Attaining a healthy weight may be especially difficult to achieve in those with severe obesity. In some cases, medications, devices and surgical management might be considered.  What can you do?  If you are overweight or obese and are interested in methods for weight loss, you should discuss this with your provider.     Consider reducing daily calorie intake by 500 calories.     Keep a food journal.     Avoiding skipping meals, consider cutting portions instead.    Diet combined with exercise helps maintain muscle while optimizing fat loss. Strength training is particularly important for building and maintaining muscle mass. Exercise helps reduce stress, increase energy, and improves fitness. Increasing exercise without diet control, however, may not burn enough calories to loose weight.       Start walking three days a week 10-20 minutes at a time    Work towards  walking thirty minutes five days a week     Eventually, increase the speed of your walking for 1-2 minutes at time    In addition, we recommend that you review healthy lifestyles and methods for weight loss available through the National Institutes of Health patient information sites:  http://win.niddk.nih.gov/publications/index.htm    And look into health and wellness programs that may be available through your health insurance provider, employer, local community center, or whit club.    Weight management plan: Patient was referred to their PCP to discuss a diet and exercise plan.
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
## 2019-11-20 ENCOUNTER — OFFICE VISIT (OUTPATIENT)
Dept: FAMILY MEDICINE | Facility: CLINIC | Age: 59
End: 2019-11-20
Payer: COMMERCIAL

## 2019-11-20 VITALS
RESPIRATION RATE: 16 BRPM | TEMPERATURE: 98.2 F | HEART RATE: 59 BPM | WEIGHT: 211 LBS | DIASTOLIC BLOOD PRESSURE: 80 MMHG | OXYGEN SATURATION: 95 % | SYSTOLIC BLOOD PRESSURE: 119 MMHG | BODY MASS INDEX: 31.98 KG/M2 | HEIGHT: 68 IN

## 2019-11-20 DIAGNOSIS — R30.0 DYSURIA: Primary | ICD-10-CM

## 2019-11-20 DIAGNOSIS — Z00.00 ENCOUNTER FOR PREVENTIVE CARE: ICD-10-CM

## 2019-11-20 DIAGNOSIS — N39.498 OTHER URINARY INCONTINENCE: ICD-10-CM

## 2019-11-20 LAB
BILIRUBIN UR: NEGATIVE MG/DL
BLOOD UR: NEGATIVE MG/DL
GLUCOSE URINE: NEGATIVE
KETONES UR QL: NEGATIVE MG/DL
LEUKOCYTE ESTERASE UR: NEGATIVE
NITRITE UR QL STRIP: NEGATIVE MG/DL
PH UR STRIP: 7 [PH] (ref 4.5–8)
PROTEIN UR: NEGATIVE MG/DL
SP GR UR STRIP: 1.02 (ref 1–1.03)
UROBILINOGEN UR STRIP-ACNC: NORMAL E.U./DL

## 2019-11-20 ASSESSMENT — MIFFLIN-ST. JEOR: SCORE: 1744.91

## 2019-11-20 NOTE — PROGRESS NOTES
WERO Damico is a 59 year old  male  who presents for follow up of concern(s) listed below:    Chief Complaint   Patient presents with     Incontinence     Been going on for the last year, happens off and on       The 10-year ASCVD risk score (Thomas BATES Jr., et al., 2013) is: 9.4%    Values used to calculate the score:      Age: 59 years      Sex: Male      Is Non- : No      Diabetic: No      Tobacco smoker: No      Systolic Blood Pressure: 119 mmHg      Is BP treated: No      HDL Cholesterol: 47.5 mg/dL      Total Cholesterol: 242.1 mg/dL      Urinary: For the last year, throughout the day, she will notice a moment when she looses a bit of urine. Has to urinate post 6 hours at night.  Couple days a week and several times a day. Not all the time.  Sometimes feels urgency and sometimes feels like there might be some burning, or an infection.   Denies numbness in the genital area, no back pain. Can start and stop her stream. No back pain or radicular symptoms.  Recent PSA nl.     Bowel - finds that she is not as clean. Post BM thinks wipes well and then later in the day finds that there is some slight soiling - for awhile now.  Are generally well formed. Does not alternate between diarrhea and constipation.     A week ago found that had what felt like a bladder infection. Had a sharp penile pain, kept her up at night. Similar to the pain had when had the kidney stone.  Took about 3 days to get better.  No frequency.  No blood.  No fevers. No back pain.  No N/V.       2013: had two kidney stone. At that time had a scope into her bladder that was negative.  Thinks happened due to eating tons of almonds.  Turned out to have oxalate stones.     Has gained weight back. Has had lots os stress and limited exercise.         Patient Active Problem List   Diagnosis     Shoulder impingement syndrome     Ulnar nerve injury     ADRIAN (generalized anxiety disorder)     Personality disorder (H)      "Gastroesophageal reflux disease without esophagitis     Major depressive disorder in remission (H)     Conductive hearing loss, bilateral     Gender dysphoria in adult     Family history of prostate cancer     Kidney stone     Esophageal stricture     Sensorineural hearing loss, asymmetrical     Tinnitus     Vitiligo     Hypothyroidism     Melanocytic nevus of trunk       Current Outpatient Medications   Medication Sig Dispense Refill     amphetamine-dextroamphetamine (ADDERALL XR) 10 MG per 24 hr capsule TAKE 1 CAPSULE BY MOUTH ONCE DAILY.  0     buPROPion (WELLBUTRIN XL) 150 MG 24 hr tablet Take 150 mg by mouth every morning       estradiol (VIVELLE-DOT) 0.1 MG/24HR bi-weekly patch Place 3 patches onto the skin twice a week 24 patch 1     levothyroxine (SYNTHROID/LEVOTHROID) 75 MCG tablet Take 1 tablet (75 mcg) by mouth daily 90 tablet 3     lithium ER (LITHOBID/ESKALITH CR) 300 MG CR tablet Take 600 mg by mouth 2 times daily       ranitidine (ZANTAC) 150 MG tablet Take 1 tablet (150 mg) by mouth 2 times daily 60 tablet 3     zolpidem (AMBIEN) 10 MG tablet Take tablet by mouth 15 minutes prior to sleep, for Sleep Study 1 tablet 0     minoxidil 5 % SOLN Apply topically twice daily. (Patient not taking: Reported on 9/22/2017) 60 mL 11     spironolactone (ALDACTONE) 25 MG tablet Take 25 mg by mouth            Allergies   Allergen Reactions     Finasteride      Mental health problems     Tetracycline Unknown     Doxycycline Rash     No results found for any visits on 11/20/19.             Review of Systems:                 Physical Exam:     Vitals:    11/20/19 1616   BP: 119/80   BP Location: Left arm   Patient Position: Sitting   Cuff Size: Adult Regular   Pulse: 59   Resp: 16   Temp: 98.2  F (36.8  C)   TempSrc: Oral   SpO2: 95%   Weight: 95.7 kg (211 lb)   Height: 1.733 m (5' 8.21\")     Body mass index is 31.89 kg/m .  Vitals were reviewed and were normal  Rectal - nl perianal sensation, nl tone.     No results " found for any visits on 11/20/19.       Assessment and Plan     Chris was seen today for incontinence.    Diagnoses and all orders for this visit:    Dysuria - symptoms resolved but checked UA just in case (negative).  No hematuria.  ? If passed a small stone.   -     Urinalysis, Micro If (UA) ([facility])  -     Urine Culture Aerobic Bacterial    Encounter for preventive care - reviewed her ASCVD risk and the recommendation to be on a statin. She will think about it and is working on losing weight and getting back in shape. Plan to revisit at the next visit.     Other urinary incontinence - no concern for neurologic compromise.  Will work on Kegel's and get back to me if any worsening.         There are no discontinued medications.    Total time: 25 minutes with more than half spent counseling on her ASCVD risk and plans for intermittent urinary leaking.        Options for treatment and follow-up care were reviewed with the patient . Chris Stockton  engaged in the decision making process and verbalized understanding of the options discussed and agreed with the final plan.    Yanna Santizo MD

## 2019-11-20 NOTE — PATIENT INSTRUCTIONS
Here is the plan from today's visit    1. Dysuria - your urine looks normal so not worried about infection.   - Urinalysis, Micro If (UA) ([facility])  - Urine Culture Aerobic Bacterial    2. Leakage - likely from some loss of function. Work on Kegels all the time.      3. Heart disease risk - keep working on exercise, eating right and losing the extra pounds.     Please call or return to clinic if your symptoms don't go away.    Follow up plan    Thank you for coming to [facility] today.  Lab Testing:  **If you had lab testing today and your results are reassuring or normal they will be mailed to you or sent through [facility] within 7 days.   **If the lab tests need quick action we will call you with the results.  The phone number we will call with results is # [phone] (home) none (work). If this is not the best number please call our clinic and change the number.  Medication Refills:  If you need any refills please call your pharmacy and they will contact us.   If you need to  your refill at a new pharmacy, please contact the new pharmacy directly. The new pharmacy will help you get your medications transferred faster.   Scheduling:  If you have any concerns about today's visit or wish to schedule another appointment please call our office during normal business hours [phone] (8-5:00 M-F)  If a referral was made to a [facility] and you don't get a call from central scheduling please call [phone].  If a Mammogram was ordered for you at [facility] call [phone] to schedule or change your appointment.  If you had an XRay/CT/Ultrasound/MRI ordered the number is [phone] to schedule or change your radiology appointment.   Medical Concerns:  If you have urgent medical concerns please call [phone] at any time of the day.    Yanna Santizo MD

## 2019-11-21 LAB
BACTERIA SPEC CULT: NORMAL
Lab: NORMAL
SPECIMEN SOURCE: NORMAL

## 2019-12-04 ENCOUNTER — DOCUMENTATION ONLY (OUTPATIENT)
Dept: SLEEP MEDICINE | Facility: CLINIC | Age: 59
End: 2019-12-04

## 2019-12-04 NOTE — PROGRESS NOTES
"Received following email. Peer to Peer or HST. Patient currently scheduled for in lab on Sunday December 8    Good Afternoon,  For the sleep study scheduled on 12/04/19 for Pt:   Chris Stockton \"Mookie\"  Male, 59 yrs, 1960 MRN:   [id number]     The PA has been denied due to lack of co-morbid conditions. A P2P can be completed by calling [phone] ref # [id number]  OR HST completion  Thank you,    Raquel Mcnamara  Financial Securing Representative-Sleep Medicine  "

## 2019-12-05 ENCOUNTER — TELEPHONE (OUTPATIENT)
Dept: SLEEP MEDICINE | Facility: CLINIC | Age: 59
End: 2019-12-05

## 2019-12-05 NOTE — TELEPHONE ENCOUNTER
Signs and symptoms: Snoring, waking up gasping arousals, nocturia, with difficulty returning to sleep.  Comorbidities include major depression, personality disorder, general anxiety disorder, gender dysphoria.    Reviewed sensitivity of hst with pt, and current frequency of difficulty with prolonged periods of no sleep.  Recent difficulties of prolonged periods of time without sleep either at the onset of the sleep or with a wake-up giving a significantly irregular total sleep time.  This may make an HST less sensitive, in his case depending on the night.  Nights of difficulty are not reliable.    Preference would be polysomnogram to give us an accurate depiction of severity of underlying disease    Will work towards getting a peer to peer review.

## 2019-12-09 ENCOUNTER — TELEPHONE (OUTPATIENT)
Dept: SLEEP MEDICINE | Facility: CLINIC | Age: 59
End: 2019-12-09

## 2019-12-11 ENCOUNTER — THERAPY VISIT (OUTPATIENT)
Dept: SLEEP MEDICINE | Facility: CLINIC | Age: 59
End: 2019-12-11
Payer: COMMERCIAL

## 2019-12-11 DIAGNOSIS — R06.83 SNORING: ICD-10-CM

## 2019-12-11 DIAGNOSIS — G47.00 INSOMNIA, UNSPECIFIED TYPE: ICD-10-CM

## 2019-12-11 DIAGNOSIS — R53.83 FATIGUE, UNSPECIFIED TYPE: ICD-10-CM

## 2019-12-11 DIAGNOSIS — F45.8 BRUXISM: ICD-10-CM

## 2019-12-11 PROCEDURE — 95810 POLYSOM 6/> YRS 4/> PARAM: CPT | Performed by: INTERNAL MEDICINE

## 2019-12-13 NOTE — PROCEDURES
" SLEEP STUDY INTERPRETATION  DIAGNOSTIC POLYSOMNOGRAPHY REPORT      Patient: MATTHIEU ETIENNE  YOB: 1960  Study Date: 12/11/2019  MRN: [id number]  Referring Provider: ERICA Verde MD  Ordering Provider: SAMMIE Harman CNP    Indications for Polysomnography: The patient is a 59 y old Male who is 5' 9\" and weighs 209.0 lbs. His BMI is 31.3, Charlton sleepiness scale 8 and neck circumference is 42 cm. Relevant medical history includes depression and anxiety. A diagnostic polysomnogram was performed to evaluate for sleep apnea.    Polysomnogram Data: A full night polysomnogram recorded the standard physiologic parameters including EEG, EOG, EMG, ECG, nasal and oral airflow. Respiratory parameters of chest and abdominal movements were recorded with respiratory inductance plethysmography. Oxygen saturation was recorded by pulse oximetry. Hypopnea scoring rule used: 1B 4%.    Sleep Architecture: Decreased sleep efficiency with increased wake after sleep onset and increased arousal index.  The total recording time of the polysomnogram was 405.8 minutes. The total sleep time was 338.5 minutes. Sleep latency was normal at 12.0 minutes with the use of a sleep aid (zolpidem 10 mg). REM latency was 101.5 minutes. Arousal index was increased at 37.9 arousals per hour. Sleep efficiency was decreased at 83.4%. Wake after sleep onset was 54.0 minutes. The patient spent 11.2% of total sleep time in Stage N1, 57.9% in Stage N2, 16.1% in Stage N3, and 14.8% in REM. Time in REM supine was 17.5 minutes.    Respiration: Overall mild, supine predominant, obstructive sleep apnea.    Events ? The polysomnogram revealed a presence of 16 obstructive, 4 central, and 0 mixed apneas resulting in an apnea index of 3.5 events per hour. There were 25 obstructive hypopneas and 0 central hypopneas resulting in an obstructive hypopnea index of 4.4 and central hypopnea index of 0 events per hour. The combined apnea/hypopnea index was 8.0 events " per hour (central apnea/hypopnea index was 0.7 events per hour). The REM AHI was 19.2 events per hour. The supine AHI was 33.5 events per hour. The RERA index was 11.7 events per hour.  The RDI was 19.7 events per hour.    Snoring - was reported as loud supine.    Respiratory rate and pattern - was notable for normal respiratory rate and pattern.    Sustained Sleep Associated Hypoventilation - Transcutaneous carbon dioxide monitoring was not used, however significant hypoventilation was not suggested by oximetry..    Sleep Associated Hypoxemia - (Greater than 5 minutes O2 sat at or below 88%) was not present. Baseline oxygen saturation was 93.6%. Lowest oxygen saturation was 83.2%. Time spent less than or equal to 88% was 1.0 minutes. Time spent less than or equal to 89% was 2.2 minutes.    Movement Activity: Frequent periodic limb movements most of which were not associated with arousals.    Periodic Limb Activity - There were 145 PLMs during the entire study. The PLM index was 25.7 movements per hour. The PLM Arousal Index was 2.5 per hour.    REM EMG Activity - Excessive transient/sustained muscle activity was not present.    Nocturnal Behavior - Abnormal sleep related behaviors were not noted.    Bruxism - None apparent.      Cardiac Summary: Normal sinus rhythm and rates.  The average pulse rate was 66.5 bpm. The minimum pulse rate was 52.9 bpm while the maximum pulse rate was 91.0 bpm.  Arrhythmias were not noted.        Assessment:     Overall mild, supine predominant, obstructive sleep apnea with AHI 8.0 and RDI 19.7 events per hour.    Supine AHI 33.5 RDI 64.7; lateral AHI 0.2 RDI 6.2    Decreased sleep efficiency with increased wake after sleep onset and increased arousal index.    Frequent periodic limb movements most of which were not associated with arousals.    Normal sinus rhythm and rates.    Recommendations:    Treatment options for mild sleep apnea if clinically indicated include:  o Restrict sleep  to lateral sleep positions.   o Empirically initiated with Auto?titrating PAP therapy with a range of 5 to 15 cmH2O. Recommend clinical follow up with sleep management team.  o Patient may be a candidate for dental appliance through referral to Sleep Dentistry for the treatment of obstructive sleep apnea.     Advice regarding the risks of drowsy driving.    Suggest optimizing sleep schedule and avoiding sleep deprivation.    Weight management (BMI > 30).    Pharmacologic therapy should be used for management of restless legs syndrome only if present and clinically indicated and not based on the presence of periodic limb movements alone.    Diagnostic Codes:   Obstructive Sleep Apnea G47.33       _____________________________________   Electronically Signed By: Flor Cedillo MD 12/13/2019

## 2019-12-16 LAB — SLPCOMP: NORMAL

## 2019-12-23 ENCOUNTER — VIRTUAL VISIT (OUTPATIENT)
Dept: SLEEP MEDICINE | Facility: CLINIC | Age: 59
End: 2019-12-23
Payer: COMMERCIAL

## 2019-12-23 DIAGNOSIS — G47.33 OSA (OBSTRUCTIVE SLEEP APNEA): Primary | ICD-10-CM

## 2019-12-23 DIAGNOSIS — E66.811 OBESITY (BMI 30.0-34.9): ICD-10-CM

## 2019-12-23 DIAGNOSIS — J31.0 RHINITIS, UNSPECIFIED TYPE: ICD-10-CM

## 2019-12-23 DIAGNOSIS — F45.8 BRUXISM: ICD-10-CM

## 2019-12-23 PROCEDURE — 98967 PH1 ASSMT&MGMT NQHP 11-20: CPT | Performed by: NURSE PRACTITIONER

## 2019-12-23 NOTE — PROGRESS NOTES
"CC:    This is a virtual visit to discuss treatment options.                                                                                                                                                                                                              HPI: It is my impression that Mookie, a 59-year old genetically identified male with gender dysphoria and recently placed on estradiol, continues to report sleep fragmentation at the end of Greg' nights rest despite better control of depression/anxiety. Sleep has been difficult to initiate at times although that seems to be improved but sleep maintenance especially towards the end of the nights rest continues to be difficult for Mookie.  Reports bruxism.   Patient: MATTHIEU ETIENNE  YOB: 1960  Study Date: 12/11/2019  Indications for Polysomnography: The patient is a 59 y old Male who is 5' 9\" and weighs 209.0 lbs. His BMI is 31.3, Tippo sleepiness scale 8 and neck circumference is 42 cm. Relevant medical history includes depression and anxiety. A diagnostic polysomnogram was performed to evaluate for sleep apnea.    Polysomnogram Data: A full night polysomnogram recorded the standard physiologic parameters including EEG, EOG, EMG, ECG, nasal and oral airflow. Respiratory parameters of chest and abdominal movements were recorded with respiratory inductance plethysmography. Oxygen saturation was recorded by pulse oximetry. Hypopnea scoring rule used: 1B 4%.    Sleep Architecture: Decreased sleep efficiency with increased wake after sleep onset and increased arousal index.  The total recording time of the polysomnogram was 405.8 minutes. The total sleep time was 338.5 minutes. Sleep latency was normal at 12.0 minutes with the use of a sleep aid (zolpidem 10 mg). REM latency was 101.5 minutes. Arousal index was increased at 37.9 arousals per hour. Sleep efficiency was decreased at 83.4%. Wake after sleep onset was 54.0 minutes. The patient spent " 11.2% of total sleep time in Stage N1, 57.9% in Stage N2, 16.1% in Stage N3, and 14.8% in REM. Time in REM supine was 17.5 minutes.    Respiration: Overall mild, supine predominant, obstructive sleep apnea.    Events ? The polysomnogram revealed a presence of 16 obstructive, 4 central, and 0 mixed apneas resulting in an apnea index of 3.5 events per hour. There were 25 obstructive hypopneas and 0 central hypopneas resulting in an obstructive hypopnea index of 4.4 and central hypopnea index of 0 events per hour. The combined apnea/hypopnea index was 8.0 events per hour (central apnea/hypopnea index was 0.7 events per hour). The REM AHI was 19.2 events per hour. The supine AHI was 33.5 events per hour. The RERA index was 11.7 events per hour.  The RDI was 19.7 events per hour.    Snoring - was reported as loud supine.    Respiratory rate and pattern - was notable for normal respiratory rate and pattern.    Sustained Sleep Associated Hypoventilation - Transcutaneous carbon dioxide monitoring was not used, however significant hypoventilation was not suggested by oximetry..    Sleep Associated Hypoxemia - (Greater than 5 minutes O2 sat at or below 88%) was not present. Baseline oxygen saturation was 93.6%. Lowest oxygen saturation was 83.2%. Time spent less than or equal to 88% was 1.0 minutes. Time spent less than or equal to 89% was 2.2 minutes.    Movement Activity: Frequent periodic limb movements most of which were not associated with arousals.    Periodic Limb Activity - There were 145 PLMs during the entire study. The PLM index was 25.7 movements per hour. The PLM Arousal Index was 2.5 per hour.    REM EMG Activity - Excessive transient/sustained muscle activity was not present.    Nocturnal Behavior - Abnormal sleep related behaviors were not noted.    Bruxism - None apparent.  Cardiac Summary: Normal sinus rhythm and rates.    Assessment:     Overall mild, supine predominant, obstructive sleep apnea with AHI 8.0  and RDI 19.7 events per hour.    Supine AHI 33.5 RDI 64.7; lateral AHI 0.2 RDI 6.2    Decreased sleep efficiency with increased wake after sleep onset and increased arousal index.    Frequent periodic limb movements most of which were not associated with arousals.    Normal sinus rhythm and rates.    Recommendations:    Treatment options for mild sleep apnea if clinically indicated include:  o Restrict sleep to lateral sleep positions.   o Empirically initiated with Auto?titrating PAP therapy with a range of 5 to 15 cmH2O. Recommend clinical follow up with sleep management team.  o Patient may be a candidate for dental appliance through referral to Sleep Dentistry for the treatment of obstructive sleep apnea.     Advice regarding the risks of drowsy driving.    Suggest optimizing sleep schedule and avoiding sleep deprivation.    Weight management (BMI > 30).    Pharmacologic therapy should be used for management of restless legs syndrome only if present and clinically indicated and not based on the presence of periodic limb movements alone.    Recommendations discussed: Positional therapy, CPAP therapy, dental appliance, weight loss.  Treating rhinitis as she has a number of snoring events    Preferences: Dental referral, patient most interested in female providers.  Is undergoing some facial surgery to feminize neck and face.  Suggest he talk with his surgeon as well and be thorough with his first consultation for possible dental appliance.  In the meantime he should use a positional device to reduce the amount of time he sleeps on his back.  I have also recommended that he use saline for his congested nose, and work on weight loss.      Assessment plan: Mild obstructive sleep apnea primarily positional, bruxism, obesity,        1.  LEILA: Prefers a dental referral as above.  Will suggest perhaps consultation only with first visit.  I will see him back in clinic in about 3 months time.  Prior to a dental appliance he  is to use positional therapy.  2.  Rhinitis: Saline  3.  Obesity: Work on weight loss measures  4.  Bruxism: Dental referral    Time spent with patient is 15 minutes with a scheduled virtual visit, of which >50% was spent in counseling, education and coordination of care.    The above note was dictated using voice recognition software. Although reviewed after completion, some word and grammatical error may remain . Please contact the author for any clarifications.

## 2019-12-26 NOTE — PATIENT INSTRUCTIONS
Mingo: Dental referral coming your way.  The group with Valery Dempsey, I believe called [facility] was 1 of the 2 I was speaking with you about.  I will put the referral in for you to have consultation only on first visit.  We spoke of another female however I do not have that person's name, you should be able to find the individual we spoke of by looking through the referral list.  Pick out who you wish to see and call back our  at [phone] and we will send the appropriate information to that sleep dentist.    In the meantime     Use saline spray product (ocean complete or arm and hammer are easy to use) in shower where nose naturally opens up. Use another time of day with continue congestion over the sink.    Mingo, consider an experiment of developing and utilizing a positional device.  Initially you will need to get use to changing positions from 1 side to the next by rolling over the belly.  After this becomes more natural which can take a couple of weeks then be aware of whether your sleep seems more continuous, less wake ups for bathroom use, and less snoring.  I will place the dental appliance referral as well, this may take some time for you to have your initial appointment, before an appliance is available.    Positioning Device  Positioning devices are generally used when sleep apnea is mild and only occurs on your back.This example shows a pillow that straps around the waist. It may be appropriate for those whose sleep study shows milder sleep apnea that occurs primarily when lying flat on one's back. Preliminary studies have shown benefit but effectiveness at home may need to be verified by a home sleep test. These devices are generally not covered by medical insurance.    For less expensive items consider the following:           LTG Exam Prep Platform or [employer] for slumber bump pillow, or backpack w/ chest strap stuffed with a pillow;  or t shirt (not too loose) with 2 pockets,  sew in tennis balls and wear the tennis ball side on your back.  Here is the positional device info:             Go online at [employer] or [employer] for the slumber bump pillow, runs about 70-$80, some patients had told me that it runs small in size.    Or: these items are more expensive, but also more durable (copy web site into your browser to see and learn more about each device:    Belt type body positioner  Http://[employer]Ecochlor/    Electronic reminder  Http://nightshifttherapy.com/  Http://www.buzzpod.com.au/    Feel free to ask me any questions you may have.    SAMMIE Wetzel, CNP-BC  Sleep Medicine   Reach me via my chart, or [phone]

## 2020-01-03 ENCOUNTER — OFFICE VISIT (OUTPATIENT)
Dept: FAMILY MEDICINE | Facility: CLINIC | Age: 60
End: 2020-01-03
Payer: COMMERCIAL

## 2020-01-03 VITALS
HEIGHT: 68 IN | HEART RATE: 51 BPM | DIASTOLIC BLOOD PRESSURE: 89 MMHG | SYSTOLIC BLOOD PRESSURE: 130 MMHG | BODY MASS INDEX: 31.95 KG/M2 | TEMPERATURE: 97.3 F | RESPIRATION RATE: 16 BRPM | WEIGHT: 210.8 LBS | OXYGEN SATURATION: 96 %

## 2020-01-03 DIAGNOSIS — R42 VERTIGO: ICD-10-CM

## 2020-01-03 DIAGNOSIS — Z23 NEED FOR PROPHYLACTIC VACCINATION AND INOCULATION AGAINST INFLUENZA: ICD-10-CM

## 2020-01-03 DIAGNOSIS — F90.9 ATTENTION DEFICIT HYPERACTIVITY DISORDER (ADHD), UNSPECIFIED ADHD TYPE: ICD-10-CM

## 2020-01-03 DIAGNOSIS — H55.00 NYSTAGMUS: ICD-10-CM

## 2020-01-03 DIAGNOSIS — G47.33 MILD OBSTRUCTIVE SLEEP APNEA: ICD-10-CM

## 2020-01-03 DIAGNOSIS — H81.13 BENIGN PAROXYSMAL POSITIONAL VERTIGO, BILATERAL: Primary | ICD-10-CM

## 2020-01-03 RX ORDER — GUANFACINE 1 MG/1
1 TABLET ORAL AT BEDTIME
Qty: 30 TABLET | Refills: 1 | Status: CANCELLED | OUTPATIENT
Start: 2020-01-03

## 2020-01-03 ASSESSMENT — MIFFLIN-ST. JEOR: SCORE: 1744

## 2020-01-03 NOTE — PROGRESS NOTES
"       WERO       Mookie is a 59 year old transgender female  who presents for the new concern(s) of:    Chief Complaint   Patient presents with     Dizziness     1 episode of vertigo, on 12-26-19 and would also like to discuss medications     Nystagmus Evaluation     11-3-19, 11-19-19 and 11-27-19     Statin - are you ok taking? ASCVD risk at 11/20/19 visit was 9.4% and statin recommended at that time, she said she would consider. Diet and exercise has not been going super well, combined with estrogen patches. Will consider.    Follow up on sleep: did sleep study, which showed apneic events while on her back, none on her side. Plans on using dental appliance once the referrals start happening. Wants to talk with that provider with facial feminization surgery and how that would change the apnea/management.     Vertigo: history of bilateral sensorineural hearing loss, tinnitus. Had one episode of vertigo while she was lifting boxes. Lasted for about a few seconds then resolved and felt like \"sense of up and down had shifted\". Afterwards had a longer episode of dizziness that felt the world felt like after you spin around fast. This ended up resolving as well. Later that evening, she had a headache that lasted a couple of hours. Has had dizziness since, for a couple of seconds at a time about 2-6 times a day, nearly daily, but not necessarily every day. Describes episodes as mild.     Nystagmus events: notices 3-4 beats of horizontal nystagmus. Happened on Nov 3, 11, 27th and some in October. Had started Lithium prior to nystagmus, but also had some episodes in February/March of last year. Have since stopped from stopping Lithium. No vertigo, headaches, or other symptoms associated with this. Is concerned about the risk of these episodes continuing after lithium.     Mental health: end of November started tapering lithium, is no longer taking at this time. Circumstances are changing and she is moving into a new room " "with some pets. Feels good today. Bonilla was tough, and work is stressful. Wishes to discuss guanfacine today as Dr. Antonio started it. Discussed that it is a BP medication, so may lower her BP.    Not taking lithium or adderall.      Patient Active Problem List   Diagnosis     Shoulder impingement syndrome     Ulnar nerve injury     ADRIAN (generalized anxiety disorder)     Personality disorder (H)     Gastroesophageal reflux disease without esophagitis     Major depressive disorder in remission (H)     Conductive hearing loss, bilateral     Gender dysphoria in adult     Family history of prostate cancer     Kidney stone     Esophageal stricture     Sensorineural hearing loss, asymmetrical     Tinnitus     Vitiligo     Hypothyroidism     Melanocytic nevus of trunk     Mild obstructive sleep apnea       Current Outpatient Medications   Medication Sig Dispense Refill     buPROPion (WELLBUTRIN XL) 150 MG 24 hr tablet Take 150 mg by mouth every morning       estradiol (VIVELLE-DOT) 0.1 MG/24HR bi-weekly patch Place 3 patches onto the skin twice a week 24 patch 1     levothyroxine (SYNTHROID/LEVOTHROID) 75 MCG tablet Take 1 tablet (75 mcg) by mouth daily 90 tablet 3     amphetamine-dextroamphetamine (ADDERALL XR) 10 MG per 24 hr capsule TAKE 1 CAPSULE BY MOUTH ONCE DAILY.  0     lithium ER (LITHOBID/ESKALITH CR) 300 MG CR tablet Take 600 mg by mouth 2 times daily       minoxidil 5 % SOLN Apply topically twice daily. (Patient not taking: Reported on 9/22/2017) 60 mL 11     ranitidine (ZANTAC) 150 MG tablet Take 1 tablet (150 mg) by mouth 2 times daily (Patient not taking: Reported on 1/3/2020) 60 tablet 3     spironolactone (ALDACTONE) 25 MG tablet Take 25 mg by mouth            Allergies   Allergen Reactions     Finasteride      Mental health problems     Tetracycline Unknown     Doxycycline Rash       {Result Choices:723859}         Review of Systems:     {ROS normal:822310::\"CONSTITUTIONAL: no fatigue, no unexpected " "change in weight\",\"SKIN: no worrisome rashes, no worrisome moles, no worrisome lesions\",\"EYES: no acute vision problems or changes\",\"ENT: no ear problems, no mouth problems, no throat problems\",\"RESP: no significant cough, no shortness of breath\",\"CV: no chest pain, no palpitations, no new or worsening peripheral edema\",\"GI: no nausea, no vomiting, no constipation, no diarrhea\"}            Physical Exam:     Vitals:    01/03/20 1010   BP: 130/89   BP Location: Left arm   Patient Position: Sitting   Cuff Size: Adult Regular   Pulse: 51   Resp: 16   Temp: 97.3  F (36.3  C)   TempSrc: Oral   SpO2: 96%   Weight: 95.6 kg (210 lb 12.8 oz)   Height: 1.733 m (5' 8.21\")     Body mass index is 31.86 kg/m .  Vitals were reviewed and were normal  GENERAL: healthy, alert, well nourished, well hydrated, no distress  RESP: lungs clear to auscultation - no rales, no rhonchi, no wheezes  CV: regular rates and rhythm, normal S1 S2, no S3 or S4 and no murmur, no click or rub -  PSYCH: Alert and oriented times 3; speech- coherent , normal rate and volume; able to articulate logical thoughts, able to abstract reason, no tangential thoughts, no hallucinations or delusions, affect- normal    No results found for any visits on 01/03/20.       Assessment and Plan   Mookie is a pleasant 59 year old transgender female who presents with new episodes of nystagmus and vertigo for the past couple of months.     {Diag Picklist:739595}    There are no discontinued medications.    Total time: *** minutes with more than half spent counseling on ***     Preceptor Attestation:  {Attestation choices 2:[id number]::\" Patient seen, evaluated and discussed with the resident.\"} I have verified the content of the note, which accurately reflects my assessment of the patient and the plan of care.   Supervising Physician:  Yanna Santizo MD.         Options for treatment and follow-up care were reviewed with the patient . Chris Stockton  engaged in the decision " making process and verbalized understanding of the options discussed and agreed with the final plan.    Yanna Santizo MD

## 2020-01-03 NOTE — PATIENT INSTRUCTIONS
Here is the plan from today's visit    1. Attention deficit hyperactivity disorder (ADHD), unspecified ADHD type  Ok to try the new med    2. Vertigo and nystagmus - recommend you see   - OTOLARYNGOLOGY REFERRAL - INTERNAL    5. Nystagmus  - OTOLARYNGOLOGY REFERRAL - INTERNAL      Please call or return to clinic if your symptoms don't go away.    Follow up plan  3 - 4 months.     Thank you for coming to [facility] today.  Lab Testing:  **If you had lab testing today and your results are reassuring or normal they will be mailed to you or sent through [employer] within 7 days.   **If the lab tests need quick action we will call you with the results.  The phone number we will call with results is # [phone] (home) none (work). If this is not the best number please call our clinic and change the number.  Medication Refills:  If you need any refills please call your pharmacy and they will contact us.   If you need to  your refill at a new pharmacy, please contact the new pharmacy directly. The new pharmacy will help you get your medications transferred faster.   Scheduling:  If you have any concerns about today's visit or wish to schedule another appointment please call our office during normal business hours [phone] (8-5:00 M-F)  If a referral was made to a [facility] and you don't get a call from central scheduling please call [phone].  If a Mammogram was ordered for you at [facility] call [phone] to schedule or change your appointment.  If you had an XRay/CT/Ultrasound/MRI ordered the number is [phone] to schedule or change your radiology appointment.   Medical Concerns:  If you have urgent medical concerns please call [phone] at any time of the day.    Yanna Santizo MD

## 2020-01-05 NOTE — PROGRESS NOTES
"       WERO Damico is a 59 year old transgender female  who presents for the new concern(s) of:    Chief Complaint   Patient presents with     Dizziness     1 episode of vertigo, on 12-26-19 and would also like to discuss medications     Nystagmus Evaluation     11-3-19, 11-19-19 and 11-27-19     Vertigo: history of bilateral sensorineural hearing loss, tinnitus. She recently had one episode of vertigo while she was lifting boxes that lasted for about a few seconds then resolved and felt like \"sense of up and down had shifted\". Afterwards had a longer episode of dizziness that felt the world felt like after you spin around fast. This ended up resolving as well. Later that evening, she had a headache that lasted a couple of hours that was poorly localized and dull. Has had dizziness since, for a couple of seconds at a time about 2-6 times a day, nearly daily, but not necessarily every day. Describes episodes as mild. No dysarthria, no dysmetria, no continued headaches.    Nystagmus events: Occasionally notices 3-4 beats of horizontal nystagmus. Happened on Nov 3, 11, 27th and some in October. She had some episodes in February/March of last year, but they seem to have occurred more frequently since starting Lithium. The episodes stopped when she stopped taking Lithium. No vertigo, headaches, or other symptoms associated with this. Is concerned about the risk of these episodes continuing after lithium.     Statin: we discussed taking a statin at the last visit, she is not ready to start at this point. ASCVD risk at 11/20/19 visit was 9.4% and statin recommended at that time, she said she would consider. Diet and exercise has not been going super well, combined with estrogen patches has been gaining a little more weight.    Follow up on sleep: did sleep study, which showed apneic events while on her back, none on her side. Plans on using dental appliance once the referrals start happening. Wants to talk with that " provider regarding future facial feminization surgery and how that would change the apnea/management.     Mental health: She started tapering lithium at the end of November, is no longer taking at this time. Circumstances are changing and she is moving into a new room with some pets and out of her former spouse's home. Feels good today. [place] was tough, and work is stressful. Wishes to discuss guanfacine today as Dr. Antonio started it. Discussed that it is a BP medication, so may lower her BP.    Medications: Not taking lithium or adderall. She plans on starting the guanfacine soon.      Patient Active Problem List   Diagnosis     Shoulder impingement syndrome     Ulnar nerve injury     ADRIAN (generalized anxiety disorder)     Personality disorder (H)     Gastroesophageal reflux disease without esophagitis     Major depressive disorder in remission (H)     Conductive hearing loss, bilateral     Gender dysphoria in adult     Family history of prostate cancer     Kidney stone     Esophageal stricture     Sensorineural hearing loss, asymmetrical     Tinnitus     Vitiligo     Hypothyroidism     Melanocytic nevus of trunk     Mild obstructive sleep apnea       Current Outpatient Medications   Medication Sig Dispense Refill     buPROPion (WELLBUTRIN XL) 150 MG 24 hr tablet Take 150 mg by mouth every morning       estradiol (VIVELLE-DOT) 0.1 MG/24HR bi-weekly patch Place 3 patches onto the skin twice a week 24 patch 1     levothyroxine (SYNTHROID/LEVOTHROID) 75 MCG tablet Take 1 tablet (75 mcg) by mouth daily 90 tablet 3     amphetamine-dextroamphetamine (ADDERALL XR) 10 MG per 24 hr capsule TAKE 1 CAPSULE BY MOUTH ONCE DAILY.  0     lithium ER (LITHOBID/ESKALITH CR) 300 MG CR tablet Take 600 mg by mouth 2 times daily       minoxidil 5 % SOLN Apply topically twice daily. (Patient not taking: Reported on 9/22/2017) 60 mL 11     ranitidine (ZANTAC) 150 MG tablet Take 1 tablet (150 mg) by mouth 2 times daily (Patient not  "taking: Reported on 1/3/2020) 60 tablet 3     spironolactone (ALDACTONE) 25 MG tablet Take 25 mg by mouth            Allergies   Allergen Reactions     Finasteride      Mental health problems     Tetracycline Unknown     Doxycycline Rash        Results from the last 24 hoursNo results found for this or any previous visit (from the past 24 hour(s)).         Review of Systems:     CONSTITUTIONAL: no fatigue, no unexpected change in weight  SKIN: no worrisome rashes, no worrisome moles, no worrisome lesions, concern about scalp irritation  EYES: no acute vision problems or changes  ENT: no ear problems, no mouth problems, no throat problems  RESP: no significant cough, no shortness of breath  CV: no chest pain, no palpitations, no new or worsening peripheral edema  GI: no nausea, no vomiting, no constipation, no diarrhea            Physical Exam:     Vitals:    01/03/20 1010   BP: 130/89   BP Location: Left arm   Patient Position: Sitting   Cuff Size: Adult Regular   Pulse: 51   Resp: 16   Temp: 97.3  F (36.3  C)   TempSrc: Oral   SpO2: 96%   Weight: 95.6 kg (210 lb 12.8 oz)   Height: 1.733 m (5' 8.21\")     Body mass index is 31.86 kg/m .  Vitals were reviewed and were normal  GENERAL: healthy, alert, well nourished, well hydrated, no distress  RESP: lungs clear to auscultation - no rales, no rhonchi, no wheezes  CV: regular rates and rhythm, normal S1 S2, no S3 or S4 and no murmur, no click or rub  PSYCH: Alert and oriented times 3; speech- coherent , normal rate and volume; able to articulate logical thoughts, able to abstract reason, no tangential thoughts, no hallucinations or delusions, affect- normal  NEURO: [place] Manuveur does not elicit nystagmus or vertigo. No notable nystagmus with horizontal smooth pursuit. EOM intact and PERRL.    No results found for any visits on 01/03/20.       Assessment and Plan   Mookie is a pleasant 59 year old transgender female who presents with new episodes of nystagmus and " vertigo for the past couple of months.     1. Vertigo  2. Nystagmus  It is very likely that Mookie's vertigo episode and nystagmus are related. Differential for vertigo includes stroke, benign paroxysmal positional vertigo, or central cause for vertigo. As the headache following the vertigo was poorly localized, dull, mild, and she had no other episodes of vertigo or vision changes, is healthy, and has limited risk factors, a stroke is very unlikely. Although [place] is negative in the office, it is not out of the question that this may be BPPV or another peripheral vertigo. Especially likely due to history of bilateral sensorineural hearing loss as well as PMH of tinnitus. Plan to refer to ENT to evaluate anatomic explanation for vertigo and nystagmus. Unlikely the nystagmus was solely a drug reaction to the lithium as these episodes have happened prior to starting lithium. Likely, this is connected with the source of vertigo, and an ENT referral may also help find a reason for this as well.   - OTOLARYNGOLOGY REFERRAL - INTERNAL    3. Mild obstructive sleep apnea  F/U with referrals for mouth guard as needed.    4. Attention deficit hyperactivity disorder (ADHD), unspecified ADHD type   - Plan to start guanfacine, look for signs of low BP like orthostatic hypotension, etc.    5. Need for prophylactic vaccination and inoculation against influenza  - Fluzone quad, preserve-free/prefilled  0.5ml, 6+ months [55794]    There are no discontinued medications.    Total time: 25 minutes with more than half spent counseling on his vertigo, ADD and sleep apnea     I have reviewed this patient with the attending physician, Dr. Santizo.  Ese Vanessa, MS3  [employer]    Preceptor Attestation:  I was present with the medical student who participated in the service and in the documentation of this note. I have verified the history and personally performed the physical exam and medical decision making.  I have verified the content of the note, which accurately reflects my assessment of the patient and the plan of care.   Supervising Physician:  Yanna Santizo MD.    Options for treatment and follow-up care were reviewed with the patient . Chris Stockton  engaged in the decision making process and verbalized understanding of the options discussed and agreed with the final plan.    Yanna Santizo MD

## 2020-01-10 ENCOUNTER — TELEPHONE (OUTPATIENT)
Dept: OTOLARYNGOLOGY | Facility: CLINIC | Age: 60
End: 2020-01-10

## 2020-01-10 NOTE — TELEPHONE ENCOUNTER
1. Have you noticed any changes in hearing? Yes  2. Do you have ringing, buzzing, or other sounds in your ears or head, this is also referred to as Tinnitus? Yes  3. When and where was your last hearing test? [facility] 2018?  4. Do you feel lightheaded or foggy? Sometimes: sometimes  5. Do you have a spinning sensation? Yes  6. Is there any specific position that can bring on dizziness? Laying down and moving head back and forth  7. Does looking up cause dizziness? No  8. Does getting in and our of bed cause dizziness? Yes  9. Does turning over in bed increase or cause dizziness? Yes  10. Does bending over cause dizziness? Yes  11. Is there anything that you can do to prevent the dizziness? Moving slowly   12. Has the dizziness gotten better with time? No  13. Have you seen Physical Therapy for dizziness? (Please indicate clinic and as much of the location as possible): No  14. Are you being referred to a specific physician? No  15. Have you been evaluated/treated for your dizziness at any other location?  (If yes,obtian as much clinic/provider/locaiton as possible) No. (If yes answer the following questions:)   Have you seen any ENT, Neurology, or other providers for these symptoms?             No   Have you had any balance or Audiology testing? No Have you had an MRI or CT scan of your head or neck? Yes, If yes, where? MRI- [facility]?  if yes, who?     Would you like to receive your Release of Information by mail or e-mail?  e-mail

## 2020-01-13 NOTE — TELEPHONE ENCOUNTER
FUTURE VISIT INFORMATION      FUTURE VISIT INFORMATION:    Date: 3/24/2020    Time: 1PM    Location: [facility]  REFERRAL INFORMATION:    Referring provider:  Yanna Santizo MD    Referring providers clinic:  [facility]     Reason for visit/diagnosis  Vertigo     RECORDS REQUESTED FROM:       Clinic name Comments Records Status Imaging Status   [facility]  1/3/2020 notes with Dr Sukhdev Abdi Audiology   4/15/19 - 10/23/14 notes  3/21/17 audiogram sent to scan 1/15/2020 [facility]     [facility] (noted in 3/21/17 audio notes)    Need ANGELA - emailed to patient on 1/13/2020 1/13/2020 5:42PM sent a fax to [facility] for audiograms, email sent to patient for ANGELA- Amay   *most recent evaluation which was conducted on September 26, 2014  1/15/2020 11:18AM received only 1 audiogram from [facility] (3/21/17 audiogram) and sent to scan. Went through all the audiology notes in [facility], missing 11/29/18 audiogram, sent a fax back to [facility] for audiogram  - Amay   1/24/20 11:59AM have not received email back from patient, called patient, she declined signed a release and is upset with the process, wanting to cancel appt - Amay

## 2020-01-24 NOTE — TELEPHONE ENCOUNTER
"I called patient to check on the release form that was emailed to her. We have not heard back form her and would need patient to sign the release so that we can obtain her records, notified patient that the process was addressed to her at the time of scheduling. Patient stated she received the release but did not want to sign it blank, I then responded to the patient that he can fill out the form with [facility] info and send that back to me, whatever works for her that she is most comfortable with. Patient became very upset because he did not want us to review any of her records or request any of her records. I did let patient know that it is ok if she does not want us to collect her records, and that we can review what we already have (Lizeth maguire). Patient became more upset and wanted her appointment canceled. I notified patient that we will have someone cancel her appointment, so patient is aware. Patient then proceeded to tell me about a study he found that might link to her Vertigo and wanted me to relay to the team that the \"process\" we have it not something patients should have to go through and then hung up on me. A message will be sent to the team about canceling her appointment - Amay   "

## 2020-03-24 ENCOUNTER — PRE VISIT (OUTPATIENT)
Dept: OTOLARYNGOLOGY | Facility: CLINIC | Age: 60
End: 2020-03-24

## 2020-06-24 ENCOUNTER — HOSPITAL ENCOUNTER (INPATIENT)
Facility: CLINIC | Age: 60
LOS: 12 days | Discharge: HOME | DRG: 885 | End: 2020-07-07
Attending: EMERGENCY MEDICINE | Admitting: PSYCHIATRY & NEUROLOGY
Payer: COMMERCIAL

## 2020-06-24 DIAGNOSIS — F31.81 BIPOLAR 2 DISORDER (H): Primary | ICD-10-CM

## 2020-06-24 DIAGNOSIS — F64.9 GENDER IDENTITY DISORDER: ICD-10-CM

## 2020-06-24 DIAGNOSIS — E56.9 VITAMIN DEFICIENCY: ICD-10-CM

## 2020-06-24 DIAGNOSIS — Z03.818 ENCNTR FOR OBS FOR SUSP EXPSR TO OTH BIOLG AGENTS RULED OUT: ICD-10-CM

## 2020-06-24 DIAGNOSIS — R45.851 SUICIDE IDEATION: ICD-10-CM

## 2020-06-24 DIAGNOSIS — F33.9 RECURRENT MAJOR DEPRESSIVE DISORDER, REMISSION STATUS UNSPECIFIED (H): ICD-10-CM

## 2020-06-24 DIAGNOSIS — F90.8 ATTENTION DEFICIT HYPERACTIVITY DISORDER (ADHD), OTHER TYPE: ICD-10-CM

## 2020-06-24 DIAGNOSIS — F32.A DEPRESSION, UNSPECIFIED DEPRESSION TYPE: ICD-10-CM

## 2020-06-24 PROCEDURE — 99285 EMERGENCY DEPT VISIT HI MDM: CPT | Mod: 25 | Performed by: EMERGENCY MEDICINE

## 2020-06-24 PROCEDURE — 90791 PSYCH DIAGNOSTIC EVALUATION: CPT

## 2020-06-24 PROCEDURE — 99285 EMERGENCY DEPT VISIT HI MDM: CPT | Mod: Z6 | Performed by: EMERGENCY MEDICINE

## 2020-06-25 PROBLEM — R45.851 SUICIDAL IDEATIONS: Status: ACTIVE | Noted: 2020-06-25

## 2020-06-25 LAB
ALBUMIN SERPL-MCNC: 4.1 G/DL (ref 3.4–5)
ALBUMIN UR-MCNC: 10 MG/DL
ALP SERPL-CCNC: 51 U/L (ref 40–150)
ALT SERPL W P-5'-P-CCNC: 20 U/L (ref 0–70)
AMPHETAMINES UR QL SCN: NEGATIVE
ANION GAP SERPL CALCULATED.3IONS-SCNC: 8 MMOL/L (ref 3–14)
APPEARANCE UR: CLEAR
AST SERPL W P-5'-P-CCNC: 12 U/L (ref 0–45)
BARBITURATES UR QL: NEGATIVE
BASOPHILS # BLD AUTO: 0.1 10E9/L (ref 0–0.2)
BASOPHILS NFR BLD AUTO: 0.7 %
BENZODIAZ UR QL: NEGATIVE
BILIRUB SERPL-MCNC: 0.8 MG/DL (ref 0.2–1.3)
BILIRUB UR QL STRIP: NEGATIVE
BUN SERPL-MCNC: 15 MG/DL (ref 7–30)
CALCIUM SERPL-MCNC: 8.5 MG/DL (ref 8.5–10.1)
CANNABINOIDS UR QL SCN: NEGATIVE
CHLORIDE SERPL-SCNC: 107 MMOL/L (ref 94–109)
CO2 SERPL-SCNC: 23 MMOL/L (ref 20–32)
COCAINE UR QL: NEGATIVE
COLOR UR AUTO: YELLOW
CREAT SERPL-MCNC: 0.76 MG/DL (ref 0.66–1.25)
DIFFERENTIAL METHOD BLD: ABNORMAL
EOSINOPHIL # BLD AUTO: 0 10E9/L (ref 0–0.7)
EOSINOPHIL NFR BLD AUTO: 0.1 %
ERYTHROCYTE [DISTWIDTH] IN BLOOD BY AUTOMATED COUNT: 12.6 % (ref 10–15)
ERYTHROCYTE [DISTWIDTH] IN BLOOD BY AUTOMATED COUNT: 12.7 % (ref 10–15)
ETHANOL UR QL SCN: NEGATIVE
GFR SERPL CREATININE-BSD FRML MDRD: >90 ML/MIN/{1.73_M2}
GLUCOSE SERPL-MCNC: 117 MG/DL (ref 70–99)
GLUCOSE UR STRIP-MCNC: NEGATIVE MG/DL
HCT VFR BLD AUTO: 48.1 % (ref 40–53)
HCT VFR BLD AUTO: 48.6 % (ref 40–53)
HGB BLD-MCNC: 16.4 G/DL (ref 13.3–17.7)
HGB BLD-MCNC: 16.5 G/DL (ref 13.3–17.7)
HGB UR QL STRIP: NEGATIVE
HYALINE CASTS #/AREA URNS LPF: 4 /LPF (ref 0–2)
IMM GRANULOCYTES # BLD: 0.1 10E9/L (ref 0–0.4)
IMM GRANULOCYTES NFR BLD: 0.8 %
KETONES UR STRIP-MCNC: 5 MG/DL
LEUKOCYTE ESTERASE UR QL STRIP: NEGATIVE
LYMPHOCYTES # BLD AUTO: 1.4 10E9/L (ref 0.8–5.3)
LYMPHOCYTES NFR BLD AUTO: 9.1 %
MCH RBC QN AUTO: 30.2 PG (ref 26.5–33)
MCH RBC QN AUTO: 30.3 PG (ref 26.5–33)
MCHC RBC AUTO-ENTMCNC: 34 G/DL (ref 31.5–36.5)
MCHC RBC AUTO-ENTMCNC: 34.1 G/DL (ref 31.5–36.5)
MCV RBC AUTO: 89 FL (ref 78–100)
MCV RBC AUTO: 89 FL (ref 78–100)
MONOCYTES # BLD AUTO: 0.8 10E9/L (ref 0–1.3)
MONOCYTES NFR BLD AUTO: 5.1 %
MUCOUS THREADS #/AREA URNS LPF: PRESENT /LPF
NEUTROPHILS # BLD AUTO: 13 10E9/L (ref 1.6–8.3)
NEUTROPHILS NFR BLD AUTO: 84.2 %
NITRATE UR QL: NEGATIVE
NRBC # BLD AUTO: 0 10*3/UL
NRBC BLD AUTO-RTO: 0 /100
OPIATES UR QL SCN: NEGATIVE
PH UR STRIP: 5 PH (ref 5–7)
PLATELET # BLD AUTO: 246 10E9/L (ref 150–450)
PLATELET # BLD AUTO: 263 10E9/L (ref 150–450)
POTASSIUM SERPL-SCNC: 3.7 MMOL/L (ref 3.4–5.3)
PROT SERPL-MCNC: 7.6 G/DL (ref 6.8–8.8)
RBC # BLD AUTO: 5.42 10E12/L (ref 4.4–5.9)
RBC # BLD AUTO: 5.46 10E12/L (ref 4.4–5.9)
RBC #/AREA URNS AUTO: 1 /HPF (ref 0–2)
SARS-COV-2 PCR COMMENT: NORMAL
SARS-COV-2 RNA SPEC QL NAA+PROBE: NEGATIVE
SARS-COV-2 RNA SPEC QL NAA+PROBE: NORMAL
SODIUM SERPL-SCNC: 138 MMOL/L (ref 133–144)
SOURCE: ABNORMAL
SP GR UR STRIP: 1.02 (ref 1–1.03)
SPECIMEN SOURCE: NORMAL
SPECIMEN SOURCE: NORMAL
SQUAMOUS #/AREA URNS AUTO: 1 /HPF (ref 0–1)
UROBILINOGEN UR STRIP-MCNC: 2 MG/DL (ref 0–2)
WBC # BLD AUTO: 13.1 10E9/L (ref 4–11)
WBC # BLD AUTO: 15.4 10E9/L (ref 4–11)
WBC #/AREA URNS AUTO: <1 /HPF (ref 0–5)

## 2020-06-25 PROCEDURE — 25000132 ZZH RX MED GY IP 250 OP 250 PS 637: Performed by: NURSE PRACTITIONER

## 2020-06-25 PROCEDURE — 25000132 ZZH RX MED GY IP 250 OP 250 PS 637: Performed by: EMERGENCY MEDICINE

## 2020-06-25 PROCEDURE — C9803 HOPD COVID-19 SPEC COLLECT: HCPCS | Performed by: EMERGENCY MEDICINE

## 2020-06-25 PROCEDURE — 99207 ZZC CONSULT E&M CHANGED TO SUBSEQUENT LEVEL: CPT | Performed by: PHYSICIAN ASSISTANT

## 2020-06-25 PROCEDURE — 81001 URINALYSIS AUTO W/SCOPE: CPT | Performed by: NURSE PRACTITIONER

## 2020-06-25 PROCEDURE — 36415 COLL VENOUS BLD VENIPUNCTURE: CPT | Performed by: PHYSICIAN ASSISTANT

## 2020-06-25 PROCEDURE — 80320 DRUG SCREEN QUANTALCOHOLS: CPT | Performed by: FAMILY MEDICINE

## 2020-06-25 PROCEDURE — 85027 COMPLETE CBC AUTOMATED: CPT | Performed by: PHYSICIAN ASSISTANT

## 2020-06-25 PROCEDURE — 85025 COMPLETE CBC W/AUTO DIFF WBC: CPT | Performed by: EMERGENCY MEDICINE

## 2020-06-25 PROCEDURE — 12400002 ZZH R&B MH SENIOR/ADOLESCENT

## 2020-06-25 PROCEDURE — H2032 ACTIVITY THERAPY, PER 15 MIN: HCPCS

## 2020-06-25 PROCEDURE — 99223 1ST HOSP IP/OBS HIGH 75: CPT | Mod: AI | Performed by: NURSE PRACTITIONER

## 2020-06-25 PROCEDURE — 36415 COLL VENOUS BLD VENIPUNCTURE: CPT | Performed by: EMERGENCY MEDICINE

## 2020-06-25 PROCEDURE — 80307 DRUG TEST PRSMV CHEM ANLYZR: CPT | Performed by: FAMILY MEDICINE

## 2020-06-25 PROCEDURE — 25000132 ZZH RX MED GY IP 250 OP 250 PS 637: Performed by: PHYSICIAN ASSISTANT

## 2020-06-25 PROCEDURE — 99232 SBSQ HOSP IP/OBS MODERATE 35: CPT | Performed by: PHYSICIAN ASSISTANT

## 2020-06-25 PROCEDURE — 80053 COMPREHEN METABOLIC PANEL: CPT | Performed by: EMERGENCY MEDICINE

## 2020-06-25 PROCEDURE — 25000132 ZZH RX MED GY IP 250 OP 250 PS 637: Performed by: PSYCHIATRY & NEUROLOGY

## 2020-06-25 RX ORDER — SPIRONOLACTONE 25 MG/1
25 TABLET ORAL DAILY
Status: DISCONTINUED | OUTPATIENT
Start: 2020-06-26 | End: 2020-07-07 | Stop reason: HOSPADM

## 2020-06-25 RX ORDER — DULOXETIN HYDROCHLORIDE 20 MG/1
20 CAPSULE, DELAYED RELEASE ORAL DAILY
Status: DISCONTINUED | OUTPATIENT
Start: 2020-06-26 | End: 2020-06-29

## 2020-06-25 RX ORDER — GUANFACINE 2 MG/1
2 TABLET, EXTENDED RELEASE ORAL AT BEDTIME
Status: DISCONTINUED | OUTPATIENT
Start: 2020-06-25 | End: 2020-07-07 | Stop reason: HOSPADM

## 2020-06-25 RX ORDER — TRAZODONE HYDROCHLORIDE 50 MG/1
50 TABLET, FILM COATED ORAL
Status: DISCONTINUED | OUTPATIENT
Start: 2020-06-25 | End: 2020-07-07 | Stop reason: HOSPADM

## 2020-06-25 RX ORDER — ESTRADIOL 0.1 MG/D
1 FILM, EXTENDED RELEASE TRANSDERMAL
Status: DISCONTINUED | OUTPATIENT
Start: 2020-06-28 | End: 2020-06-25

## 2020-06-25 RX ORDER — CALCIUM CARBONATE/VITAMIN D3 500-10/5ML
LIQUID (ML) ORAL AT BEDTIME
COMMUNITY
End: 2020-12-22

## 2020-06-25 RX ORDER — ALUMINA, MAGNESIA, AND SIMETHICONE 2400; 2400; 240 MG/30ML; MG/30ML; MG/30ML
30 SUSPENSION ORAL EVERY 4 HOURS PRN
Status: DISCONTINUED | OUTPATIENT
Start: 2020-06-25 | End: 2020-07-07 | Stop reason: HOSPADM

## 2020-06-25 RX ORDER — SPIRONOLACTONE 25 MG/1
25 TABLET ORAL
Status: DISCONTINUED | OUTPATIENT
Start: 2020-06-25 | End: 2020-06-25

## 2020-06-25 RX ORDER — ESTRADIOL 0.1 MG/D
2 FILM, EXTENDED RELEASE TRANSDERMAL
Status: DISCONTINUED | OUTPATIENT
Start: 2020-06-26 | End: 2020-06-25

## 2020-06-25 RX ORDER — BUPROPION HYDROCHLORIDE 150 MG/1
150 TABLET ORAL EVERY MORNING
Status: DISCONTINUED | OUTPATIENT
Start: 2020-06-25 | End: 2020-07-07 | Stop reason: HOSPADM

## 2020-06-25 RX ORDER — OLANZAPINE 10 MG/2ML
10 INJECTION, POWDER, FOR SOLUTION INTRAMUSCULAR
Status: DISCONTINUED | OUTPATIENT
Start: 2020-06-25 | End: 2020-07-01

## 2020-06-25 RX ORDER — ACETAMINOPHEN 325 MG/1
650 TABLET ORAL EVERY 4 HOURS PRN
Status: DISCONTINUED | OUTPATIENT
Start: 2020-06-25 | End: 2020-07-07 | Stop reason: HOSPADM

## 2020-06-25 RX ORDER — GABAPENTIN 100 MG/1
200-300 CAPSULE ORAL 3 TIMES DAILY PRN
Status: DISCONTINUED | OUTPATIENT
Start: 2020-06-25 | End: 2020-06-29

## 2020-06-25 RX ORDER — ESTRADIOL 0.1 MG/D
2 FILM, EXTENDED RELEASE TRANSDERMAL
Status: DISCONTINUED | OUTPATIENT
Start: 2020-06-26 | End: 2020-07-07 | Stop reason: HOSPADM

## 2020-06-25 RX ORDER — ESTRADIOL 0.1 MG/D
1 FILM, EXTENDED RELEASE TRANSDERMAL
Status: DISCONTINUED | OUTPATIENT
Start: 2020-06-25 | End: 2020-06-25

## 2020-06-25 RX ORDER — ESTRADIOL 0.1 MG/D
3 FILM, EXTENDED RELEASE TRANSDERMAL
Status: DISCONTINUED | OUTPATIENT
Start: 2020-06-25 | End: 2020-06-25

## 2020-06-25 RX ORDER — HYDROXYZINE HYDROCHLORIDE 25 MG/1
25 TABLET, FILM COATED ORAL EVERY 4 HOURS PRN
Status: DISCONTINUED | OUTPATIENT
Start: 2020-06-25 | End: 2020-07-07 | Stop reason: HOSPADM

## 2020-06-25 RX ORDER — OLANZAPINE 10 MG/1
10 TABLET ORAL
Status: DISCONTINUED | OUTPATIENT
Start: 2020-06-25 | End: 2020-07-01

## 2020-06-25 RX ORDER — ESTRADIOL 0.1 MG/D
1 FILM, EXTENDED RELEASE TRANSDERMAL
Status: DISCONTINUED | OUTPATIENT
Start: 2020-06-25 | End: 2020-07-07 | Stop reason: HOSPADM

## 2020-06-25 RX ORDER — IBUPROFEN 600 MG/1
600 TABLET, FILM COATED ORAL ONCE
Status: COMPLETED | OUTPATIENT
Start: 2020-06-25 | End: 2020-06-25

## 2020-06-25 RX ORDER — BISACODYL 10 MG
10 SUPPOSITORY, RECTAL RECTAL DAILY PRN
Status: DISCONTINUED | OUTPATIENT
Start: 2020-06-25 | End: 2020-07-07 | Stop reason: HOSPADM

## 2020-06-25 RX ORDER — MAGNESIUM OXIDE 400 MG/1
400 TABLET ORAL AT BEDTIME
Status: DISCONTINUED | OUTPATIENT
Start: 2020-06-25 | End: 2020-06-30

## 2020-06-25 RX ORDER — GUANFACINE 1 MG/1
2 TABLET, EXTENDED RELEASE ORAL AT BEDTIME
Status: ON HOLD | COMMUNITY
End: 2020-07-07

## 2020-06-25 RX ORDER — LEVOTHYROXINE SODIUM 75 UG/1
75 TABLET ORAL DAILY
Status: DISCONTINUED | OUTPATIENT
Start: 2020-06-25 | End: 2020-07-07 | Stop reason: HOSPADM

## 2020-06-25 RX ADMIN — TRAZODONE HYDROCHLORIDE 50 MG: 50 TABLET ORAL at 21:32

## 2020-06-25 RX ADMIN — BUPROPION HYDROCHLORIDE 150 MG: 150 TABLET, EXTENDED RELEASE ORAL at 08:17

## 2020-06-25 RX ADMIN — SPIRONOLACTONE 25 MG: 25 TABLET ORAL at 08:17

## 2020-06-25 RX ADMIN — LEVOTHYROXINE SODIUM 75 MCG: 75 TABLET ORAL at 08:17

## 2020-06-25 RX ADMIN — GUANFACINE 2 MG: 2 TABLET, EXTENDED RELEASE ORAL at 21:33

## 2020-06-25 RX ADMIN — IBUPROFEN 600 MG: 600 TABLET ORAL at 02:37

## 2020-06-25 RX ADMIN — ESTRADIOL 1 PATCH: 0.1 PATCH, EXTENDED RELEASE TRANSDERMAL at 11:18

## 2020-06-25 RX ADMIN — Medication 400 MG: at 21:33

## 2020-06-25 ASSESSMENT — ACTIVITIES OF DAILY LIVING (ADL)
COGNITION: 0 - NO COGNITION ISSUES REPORTED
ORAL_HYGIENE: INDEPENDENT
HYGIENE/GROOMING: INDEPENDENT
BATHING: 0-->INDEPENDENT
SWALLOWING: 0-->SWALLOWS FOODS/LIQUIDS WITHOUT DIFFICULTY
FALL_HISTORY_WITHIN_LAST_SIX_MONTHS: NO
TRANSFERRING: 0-->INDEPENDENT
LAUNDRY: UNABLE TO COMPLETE
RETIRED_EATING: 0-->INDEPENDENT
HYGIENE/GROOMING: INDEPENDENT
LAUNDRY: WITH SUPERVISION
ORAL_HYGIENE: INDEPENDENT
HYGIENE/GROOMING: INDEPENDENT
DRESS: 0-->INDEPENDENT
RETIRED_COMMUNICATION: 0-->UNDERSTANDS/COMMUNICATES WITHOUT DIFFICULTY
DRESS: SCRUBS (BEHAVIORAL HEALTH);INDEPENDENT
DRESS: INDEPENDENT
TOILETING: 0-->INDEPENDENT
DRESS: INDEPENDENT
AMBULATION: 0-->INDEPENDENT
ORAL_HYGIENE: INDEPENDENT

## 2020-06-25 ASSESSMENT — MIFFLIN-ST. JEOR: SCORE: 1734.79

## 2020-06-25 NOTE — PROGRESS NOTES
Pt has estradiol patches that dagoberto Sloan instructions for the frequency. See IM's note 6/25/2020.     Pt also knows how to apply and rotate his 3 patches as well.

## 2020-06-25 NOTE — PLAN OF CARE
Problem: General Plan of Care (Inpatient Behavioral)  Goal: Individualization/Patient Specific Goal (IP Behavioral)  Description: The patient and/or their representative will achieve their patient-specific goals related to the plan of care.    The patient-specific goals include:  Outcome: No Change  Flowsheets (Taken 6/25/2020 4743)  Areas of Vulnerability: ADD, divorce process, debt  Patient Strengths:   Steady employment   Adherent to medication regime   Has vocational interests i.e., hobbies    The patient completed the reasons for admit and goals for discharge in the personal plan of care.   Reasons for admit:  1)  Depression  2)  Anxiety  3)  Suicidal ideation  4)  ADHD    Goals for discharge:  1)  Coping skills  2)  Mood stability  3)  Medication management
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
## 2020-06-25 NOTE — PLAN OF CARE
Problem: Depressive Symptoms  Goal: Depressive Symptoms  Description: Signs and symptoms of listed problems will be absent or manageable.  Outcome: No Change  Flowsheets (Taken 6/25/2020 1122)  Depressive Symptoms Assessed: all  NURSING ASSESSMENT    MENTAL HEALTH  Pt denies hallucinations/HI.  Pt does not have suicidal dreams anymore since ceasing Effexor, but the SI thoughts are present with no plan or intent.  Pt endorses anxiety and feelings of depression.    Appetite = eating and tolerating meals    Sleep = <2 hrs; pt admitted to unit around 0430am    ADLs = independent    Medication side effects  Pt reported taking bupropion for 10 years until he had a recent medication change. Pt started effexor last Friday 6/19/20 and stopped on Sunday 6/21/20 due to having vivid dreams of killing self and feeling suicidal.     Pt is also concerned about the drug interaction between effexor and ibuprofen, stating he took ibuprofen this morning. Pt thinks effexor may still be in his body and interact with ibuprofen. RN reviewed drug interactions between effexor and ibuprofen from [employer] and found it to increase risk of bleeding. Pt does not have any bleeding concerns. Pt felt reassured.    Pt concerned with writing date on transdermal estrogen patch, RN reassured that based on evidence-based research, there is zero to minimal effect from using a Sharpie marker onto the patch. Pt requested using a fine-point Sharpie next time.    VITAL SIGNS     06/25/20 0808   Vital Signs   Temp 98  F (36.7  C)   Temp src Tympanic   Resp 18   Pulse 66   Pulse/Heart Rate Source Monitor   BP (!) 148/97   Sitting Orthostatic BP   Sitting Orthostatic /97   Sitting Orthostatic Pulse 66 bpm   Standing Orthostatic BP   Standing Orthostatic /102   Standing Orthostatic Pulse 65 bpm   Oxygen Therapy   SpO2 99 %   O2 Device None (Room air)     RECOMMENDATION  Continue with plan of care. RN requested  per pt's request.
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
## 2020-06-25 NOTE — PROGRESS NOTES
20 [id number]   Patient Belongings   Did you bring any home meds/supplements to the hospital?  No   Patient Belongings remains with patient;locker   Patient Belongings Remaining with Patient clothing;hearing aid(s);glasses   Patient Belongings Put in Hospital Secure Location (Security or Locker, etc.) cell phone/electronics;clothing;shoes   Belongings Search Yes   Clothing Search Yes   Second Staff Angelica A     Kept with Pt:  Hearing Aids (2), bra, underwear, pair of glasses    In Locker:  Mask, pairs of socks, pair of shoes, pair of black pants, pink button up shirt    Green Bag containin cell phones with cases, pens, assorted change, lanyard, pink journal, bubbles (2), 's license, insurance card, USB, phone  with two cables, hearing aid remote, hearing aid batteries, stickers, chain   o Pink bag containing: electric razor    A               Admission:  I am responsible for any personal items that are not sent to the safe or pharmacy.  [facility] is not responsible for loss, theft or damage of any property in my possession.    Signature:  _________________________________ Date: _______  Time: _____                                              Staff Signature:  ____________________________ Date: ________  Time: _____      2nd Staff person, if patient is unable/unwilling to sign:    Signature: ________________________________ Date: ________  Time: _____     Discharge:  [facility] has returned all of my personal belongings:    Signature: _________________________________ Date: ________  Time: _____                                          Staff Signature:  ____________________________ Date: _____  Time: _____
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
## 2020-06-25 NOTE — PHARMACY-ADMISSION MEDICATION HISTORY
"  Admission Medication History Completed by Pharmacy    See [facility] Admission Navigator for allergy information, preferred outpatient pharmacy, prior to admission medications and immunization status.     Medication History Sources:     Patient, SureScripts fill history, CareEverywhere    Changes made to PTA medication list (reason):    Added: Intuniv, magnesium    Deleted: minoxidil topical solution (old record from 2017)    Changed: Spironolactone BID changed to daily, per patient report                    Estradiol patch, clarified sig to match use    Additional Information:    Liz recently trialled Effexor XR mid-June 2020 but experienced severe suicidal feelings after 1 dose.    Patient was taking a \"serotonin support mix\" vitamin in the past, but has not taken \"for a while.\"    Prior to Admission medications    Medication Sig Last Dose Taking? Auth Provider   buPROPion (WELLBUTRIN XL) 150 MG 24 hr tablet Take 150 mg by mouth every morning 6/24/2020 at Unknown time Yes Reported, Patient   estradiol (VIVELLE-DOT) 0.1 MG/24HR bi-weekly patch Place onto the skin every 72 hours Patch placement rotation every 3 days: 1 patch, 2 patch, no patch, then repeat 6/23/2020 Yes Yanna Santizo MD   guanFACINE (INTUNIV) 1 MG TB24 24 hr tablet Take 2 mg by mouth At Bedtime 6/23/2020 at Unknown time Yes Unknown, Entered By History   levothyroxine (SYNTHROID/LEVOTHROID) 75 MCG tablet Take 1 tablet (75 mcg) by mouth daily 6/24/2020 at Unknown time Yes Yanna Santizo MD   magnesium oxide 400 MG CAPS Take by mouth At Bedtime 6/23/2020 Yes Unknown, Entered By History   spironolactone (ALDACTONE) 25 MG tablet Take 25 mg by mouth daily  6/24/2020 at Unknown time Yes Reported, Patient       Date completed: 06/25/20    Medication history completed by: Zachariah Quinn [facility]            "

## 2020-06-25 NOTE — PROGRESS NOTES
"   06/25/20 1500   Music Therapy   Type of Participation Music therapy group   Response Participates independently   Hours 2   Liz participated in 2/2 Music Therapy groups offered today.  The theme of the first session was \"Accentuating the Positive\".  Mary sheets were handed out to patients while a classic song with the same title was played.  Patients discussed what they liked about the song, and what is currently positive in their lives. Then other songs were chosen with the same theme of an upbeat outlook.  Liz chose music with themes of love, peace, and hope for the earth and the world.      For the second session, patients were given the choice of forming a Amazing [employer] choir (instrumental music performance), or continuing the therapeutic music listening intervention.  The patients all asked to continue therapeutic group music listening in the afternoon session.  Goals of self-expression, validation, social cohesion and reduction of depressive symptoms were met.     Liz enjoyed sitting by the window during groups and getting some sunlight.  She also was concerned about the levels of high fructose corn syrup in the food, and brought this up to Music Therapist who validated her concerns which appeared to calm Liz.  At the end of group Liz requested when MT would be happening again.  "
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
## 2020-06-25 NOTE — ED NOTES
"ED to Behavioral Floor Handoff    SITUATION  Chris Stockton is a 59 year old adult who speaks English and lives in a home with other. The patient arrived in the ED by private car from work with a complaint of Mental Health Problem and Suicidal (No plan, thoughts only. Talked with therapist today. )  .The patient's current symptoms started/worsened- Pt stated \"I have suicidal thoughts off and on for a long time.\"  In the ED, pt was diagnosed with   Final diagnoses:   Suicide ideation   Depression, unspecified depression type        Initial vitals were: BP: (!) 150/92  Pulse: 82  Temp: 97.5  F (36.4  C)  Resp: 16  SpO2: 97 %   --------  Is the patient diabetic? No   If yes, last blood glucose? --     If yes, was this treated in the ED? --  --------  Is the patient inebriated (ETOH) No or Impaired on other substances? No  MSSA done? N/A  Last MSSA score: --    Were withdrawal symptoms treated? No  Does the patient have a seizure history? No. If yes, date of most recent seizure--  --------  Is the patient patient experiencing suicidal ideation? reports occasional suicidal thoughts representing feeling that life is not worth feeling    Homicidal ideation? denies current or recent homicidal ideation or behaviors.    Self-injurious behavior/urges? denies current or recent self injurious behavior or ideation.  ------  Was pt aggressive in the ED No  Was a code called No  Is the pt now cooperative? Yes  -------  Meds given in ED: Medications - No data to display   Family present during ED course? No  Family currently present? No    BACKGROUND  Does the patient have a cognitive impairment or developmental disability? No  Allergies:   Allergies   Allergen Reactions     Finasteride      Mental health problems     Tetracycline Unknown     Doxycycline Rash   .   Social demographics are   Social History     Socioeconomic History     Marital status:      Spouse name: None     Number of children: 2     Years of education: " None     Highest education level: None   Occupational History     Occupation: Computer Planner   Social Needs     Financial resource strain: None     Food insecurity     Worry: None     Inability: None     Transportation needs     Medical: None     Non-medical: None   Tobacco Use     Smoking status: Never Smoker     Smokeless tobacco: Never Used   Substance and Sexual Activity     Alcohol use: Yes     Comment: rare     Drug use: No     Sexual activity: Not Currently   Lifestyle     Physical activity     Days per week: None     Minutes per session: None     Stress: None   Relationships     Social connections     Talks on phone: None     Gets together: None     Attends Anglican service: None     Active member of club or organization: None     Attends meetings of clubs or organizations: None     Relationship status: None     Intimate partner violence     Fear of current or ex partner: None     Emotionally abused: None     Physically abused: None     Forced sexual activity: None   Other Topics Concern     Parent/sibling w/ CABG, MI or angioplasty before 65F 55M? Not Asked   Social History Narrative     None        ASSESSMENT  Labs results Labs Ordered and Resulted from Time of ED Arrival Up to the Time of Departure from the ED - No data to display   Imaging Studies: No results found for this or any previous visit (from the past 24 hour(s)).   Most recent vital signs BP (!) 150/92   Pulse 82   Temp 97.5  F (36.4  C) (Oral)   Resp 16   SpO2 97%    Abnormal labs/tests/findings requiring intervention:---   Pain control: pt had none  Nausea control: pt had none    RECOMMENDATION  Are any infection precautions needed (MRSA, VRE, etc.)? No If yes, what infection? --  ---  Does the patient have mobility issues? independently. If yes, what device does the pt use? ---  ---  Is patient on 72 hour hold or commitment? No If on 72 hour hold, have hold and rights been given to patient? N/A  Are admitting orders written if after  10 p.m. ?No  Tasks needing to be completed:---     Gillian Guan RN   [facility]--    [phone]   [phone]

## 2020-06-25 NOTE — PLAN OF CARE
Acosta COCHRAN CNP  Date: 06/25/20  Time: 10:54 AM      Problem: General Plan of Care (Inpatient Behavioral)  Goal: Team Discussion  Description: Team Plan:  Outcome: No Change  Note: BEHAVIORAL TEAM DISCUSSION    Participants: Acosta COCHRAN DNP, Nadine Wray CTC, Vero Bajwa RN  Progress: New admit  Anticipated length of stay: 5-7 days  Continued Stay Criteria/Rationale: Suicidal ideation with a plan  Medical/Physical: No acute medical concerns  Precautions:   Behavioral Orders   Procedures     Code 1 - Restrict to Unit     Routine Programming     As clinically indicated     Single Room     Status 15     Every 15 minutes.     Suicide precautions     Patients on Suicide Precautions should have a Combination Diet ordered that includes a Diet selection(s) AND a Behavioral Tray selection for Safe Tray - with utensils, or Safe Tray - NO utensils       Plan: Psychiatric Assessment. Medication Management. Therapeutic Mileu. Individual and group support.   Rationale for change in precautions or plan: Initial plan

## 2020-06-25 NOTE — ED PROVIDER NOTES
"ED Provider Note  [facility]      History     Chief Complaint   Patient presents with     Mental Health Problem     Suicidal     No plan, thoughts only. Talked with therapist today.      HPI  \"Liz\" Chris Stockton is a 59 year old adult who has a past medical history of anxiety, depression who presents to the emergency department for mental health evaluation.  Patient here with suicidal ideation and a plan to end her life.  Patient biologically male however transgendered to female, currently receiving hormonal treatment at [facility].  Also follows with a psychiatrist and therapist who she meets with 1-2 times per week.  Patient reports main stressors include moving out of her home which she shared with her wife of many years and currently in the process of a divorce since January.  Patient currently lives near  in [place] close to where Akil Magallanes was killed.  Patient reports increased anxiety, worsening depression, and not feeling safe at home.  Patient with suicide ideation and thoughts of taking [place] and [place].  No history of hospitalizations in the past.  Denies any delusions, hallucinations, no homicidal ideations.  Denies any tobacco, drug, substance abuse.  Patient with no acute medical complaints.  Patient does report an episode of vomiting earlier today when she was extremely aches upset and crying.  No recent illnesses.    Past Medical History  Past Medical History:   Diagnosis Date     Anxiety      Depressive disorder      Esophageal stricture      Kidney stone     nov.     Past Surgical History:   Procedure Laterality Date     APPENDECTOMY       COLONOSCOPY  12/9/2011    Procedure:COLONOSCOPY; COLONOSCOPY; Surgeon:MARILY MENA; Location: GI     ESOPHAGOSCOPY, GASTROSCOPY, DUODENOSCOPY (EGD), COMBINED  11/23/2012    Procedure: COMBINED ESOPHAGOSCOPY, GASTROSCOPY, DUODENOSCOPY (EGD), BIOPSY SINGLE OR MULTIPLE;;  Surgeon: Yehuda Tomlinson MD; "  Location: [facility]     VASECTOMY       buPROPion (WELLBUTRIN XL) 150 MG 24 hr tablet  levothyroxine (SYNTHROID/LEVOTHROID) 75 MCG tablet  spironolactone (ALDACTONE) 25 MG tablet  estradiol (VIVELLE-DOT) 0.1 MG/24HR bi-weekly patch  minoxidil 5 % SOLN      Allergies   Allergen Reactions     Finasteride      Mental health problems     Tetracycline Unknown     Doxycycline Rash     Past medical history, past surgical history, medications, and allergies were reviewed with the patient. Additional pertinent items: None    Family History  Family History   Problem Relation Age of Onset     Prostate Cancer Brother      Melanoma Brother      Heart Surgery Father      Prostate Cancer Father      Neuropathy Father      Heart Disease Mother      Family history was reviewed with the patient. Additional pertinent items: None    Social History  Social History     Tobacco Use     Smoking status: Never Smoker     Smokeless tobacco: Never Used   Substance Use Topics     Alcohol use: Yes     Comment: rare     Drug use: No      Social history was reviewed with the patient. Additional pertinent items: None    Review of Systems  A complete review of systems was performed with pertinent positives and negatives noted in the HPI, and all other systems negative.    Physical Exam   BP: (!) 150/92  Pulse: 82  Temp: 97.5  F (36.4  C)  Resp: 16  SpO2: 97 %  Physical Exam  General: Afebrile, no acute distress   HEENT: Normocephalic, atraumatic, conjunctivae normal. MMM  Neck: non-tender, supple  Cardio: regular rate.   Resp: Normal work of breathing, no respiratory distress  Chest/Back: no visual signs of trauma  Abdomen: soft, no tenderness  Neuro: alert and fully oriented. CN II-XII grossly intact. Grossly normal strength and sensation in all extremities.   MSK: no deformities. Normal range of motion  Integumentary/Skin: no rash visualized, normal color  Psych: normal affect, normal behavior    ED Course      Procedures  Results for orders placed  "or performed during the hospital encounter of 06/24/20   CBC with platelets differential     Status: Abnormal   Result Value Ref Range    WBC 15.4 (H) 4.0 - 11.0 10e9/L    RBC Count 5.42 4.4 - 5.9 10e12/L    Hemoglobin 16.4 13.3 - 17.7 g/dL    Hematocrit 48.1 40.0 - 53.0 %    MCV 89 78 - 100 fl    MCH 30.3 26.5 - 33.0 pg    MCHC 34.1 31.5 - 36.5 g/dL    RDW 12.6 10.0 - 15.0 %    Platelet Count 246 150 - 450 10e9/L    Diff Method Automated Method     % Neutrophils 84.2 %    % Lymphocytes 9.1 %    % Monocytes 5.1 %    % Eosinophils 0.1 %    % Basophils 0.7 %    % Immature Granulocytes 0.8 %    Nucleated RBCs 0 0 /100    Absolute Neutrophil 13.0 (H) 1.6 - 8.3 10e9/L    Absolute Lymphocytes 1.4 0.8 - 5.3 10e9/L    Absolute Monocytes 0.8 0.0 - 1.3 10e9/L    Absolute Eosinophils 0.0 0.0 - 0.7 10e9/L    Absolute Basophils 0.1 0.0 - 0.2 10e9/L    Abs Immature Granulocytes 0.1 0 - 0.4 10e9/L    Absolute Nucleated RBC 0.0    Comprehensive metabolic panel     Status: Abnormal   Result Value Ref Range    Sodium 138 133 - 144 mmol/L    Potassium 3.7 3.4 - 5.3 mmol/L    Chloride 107 94 - 109 mmol/L    Carbon Dioxide 23 20 - 32 mmol/L    Anion Gap 8 3 - 14 mmol/L    Glucose 117 (H) 70 - 99 mg/dL    Urea Nitrogen 15 7 - 30 mg/dL    Creatinine 0.76 0.66 - 1.25 mg/dL    GFR Estimate >90 >60 mL/min/[1.73_m2]    GFR Estimate If Black >90 >60 mL/min/[1.73_m2]    Calcium 8.5 8.5 - 10.1 mg/dL    Bilirubin Total 0.8 0.2 - 1.3 mg/dL    Albumin 4.1 3.4 - 5.0 g/dL    Protein Total 7.6 6.8 - 8.8 g/dL    Alkaline Phosphatase 51 40 - 150 U/L    ALT 20 0 - 70 U/L    AST 12 0 - 45 U/L     Medications   ibuprofen (ADVIL/MOTRIN) tablet 600 mg (600 mg Oral Given 6/25/20 0237)        Assessments & Plan (with Medical Decision Making)   \"Liz\" Chris Stockton is a 59 year old adult who has a past medical history of anxiety, depression who presents to the emergency department for mental health evaluation.  Upon arrival patient is well-appearing, " afebrile, no distress.  Patient here with increasing anxiety, worsening depression, suicidal ideation with a plan.  Behavioral health  eval the patient as well as myself at this time recommend inpatient hospitalization for stabilization.  Plan for voluntary admission.  Patient is agreeable to the plan.    I have reviewed the nursing notes. I have reviewed the findings, diagnosis, plan and need for follow up with the patient.    New Prescriptions    No medications on file       Final diagnoses:   Suicide ideation   Depression, unspecified depression type       --  Adilene Hilton MD   Emergency Medicine   [facility], EMERGENCY DEPARTMENT  6/24/2020     Adilene Hilton MD  06/25/20 0252

## 2020-06-26 LAB
DEPRECATED CALCIDIOL+CALCIFEROL SERPL-MC: 26 UG/L (ref 20–75)
ERYTHROCYTE [DISTWIDTH] IN BLOOD BY AUTOMATED COUNT: 13 % (ref 10–15)
FOLATE SERPL-MCNC: 15.6 NG/ML
HCT VFR BLD AUTO: 48.3 % (ref 40–53)
HGB BLD-MCNC: 16.7 G/DL (ref 13.3–17.7)
MCH RBC QN AUTO: 31.5 PG (ref 26.5–33)
MCHC RBC AUTO-ENTMCNC: 34.6 G/DL (ref 31.5–36.5)
MCV RBC AUTO: 91 FL (ref 78–100)
PLATELET # BLD AUTO: 240 10E9/L (ref 150–450)
RBC # BLD AUTO: 5.31 10E12/L (ref 4.4–5.9)
T4 FREE SERPL-MCNC: 1.09 NG/DL (ref 0.76–1.46)
TSH SERPL DL<=0.005 MIU/L-ACNC: 4.55 MU/L (ref 0.4–4)
VIT B12 SERPL-MCNC: 1214 PG/ML (ref 193–986)
WBC # BLD AUTO: 8.1 10E9/L (ref 4–11)

## 2020-06-26 PROCEDURE — 25000132 ZZH RX MED GY IP 250 OP 250 PS 637: Performed by: NURSE PRACTITIONER

## 2020-06-26 PROCEDURE — 36415 COLL VENOUS BLD VENIPUNCTURE: CPT | Performed by: PSYCHIATRY & NEUROLOGY

## 2020-06-26 PROCEDURE — 85027 COMPLETE CBC AUTOMATED: CPT | Performed by: PSYCHIATRY & NEUROLOGY

## 2020-06-26 PROCEDURE — 99233 SBSQ HOSP IP/OBS HIGH 50: CPT | Mod: 95 | Performed by: NURSE PRACTITIONER

## 2020-06-26 PROCEDURE — 83036 HEMOGLOBIN GLYCOSYLATED A1C: CPT | Performed by: PSYCHIATRY & NEUROLOGY

## 2020-06-26 PROCEDURE — 82746 ASSAY OF FOLIC ACID SERUM: CPT | Performed by: PSYCHIATRY & NEUROLOGY

## 2020-06-26 PROCEDURE — 25000132 ZZH RX MED GY IP 250 OP 250 PS 637: Performed by: PHYSICIAN ASSISTANT

## 2020-06-26 PROCEDURE — 82306 VITAMIN D 25 HYDROXY: CPT | Performed by: PSYCHIATRY & NEUROLOGY

## 2020-06-26 PROCEDURE — 84443 ASSAY THYROID STIM HORMONE: CPT | Performed by: PSYCHIATRY & NEUROLOGY

## 2020-06-26 PROCEDURE — 25000132 ZZH RX MED GY IP 250 OP 250 PS 637: Performed by: PSYCHIATRY & NEUROLOGY

## 2020-06-26 PROCEDURE — 82607 VITAMIN B-12: CPT | Performed by: PSYCHIATRY & NEUROLOGY

## 2020-06-26 PROCEDURE — H2032 ACTIVITY THERAPY, PER 15 MIN: HCPCS

## 2020-06-26 PROCEDURE — 84439 ASSAY OF FREE THYROXINE: CPT | Performed by: PSYCHIATRY & NEUROLOGY

## 2020-06-26 PROCEDURE — G0177 OPPS/PHP; TRAIN & EDUC SERV: HCPCS

## 2020-06-26 PROCEDURE — 12400002 ZZH R&B MH SENIOR/ADOLESCENT

## 2020-06-26 RX ADMIN — DULOXETINE 20 MG: 20 CAPSULE, DELAYED RELEASE ORAL at 08:46

## 2020-06-26 RX ADMIN — SPIRONOLACTONE 25 MG: 25 TABLET ORAL at 08:46

## 2020-06-26 RX ADMIN — Medication 400 MG: at 21:22

## 2020-06-26 RX ADMIN — ESTRADIOL 2 PATCH: 0.1 PATCH TRANSDERMAL at 08:47

## 2020-06-26 RX ADMIN — BUPROPION HYDROCHLORIDE 150 MG: 150 TABLET, EXTENDED RELEASE ORAL at 08:46

## 2020-06-26 RX ADMIN — TRAZODONE HYDROCHLORIDE 50 MG: 50 TABLET ORAL at 21:22

## 2020-06-26 RX ADMIN — GUANFACINE 2 MG: 2 TABLET, EXTENDED RELEASE ORAL at 21:22

## 2020-06-26 RX ADMIN — LEVOTHYROXINE SODIUM 75 MCG: 75 TABLET ORAL at 08:46

## 2020-06-26 NOTE — PROGRESS NOTES
Patient attended a 60 minute psychoeducation group on the Cognitive Model. The relationship between thoughts, emotions and behaviors was discussed. Participants discussed several scenarios and ways to change irrational thoughts into positive and rational thoughts. They also discussed the new emotion and behavior that came from positive, rational thoughts. Patient was an active and insightful participant.

## 2020-06-26 NOTE — PROGRESS NOTES
"INITIAL OT ASSESSMENT       06/26/20 1500   General Information   Date Initially Attended OT 06/26/20   Clinical Impression   Affect Appropriate to situation   Orientation Oriented to person, place and time   Appearance and ADLs General cleanliness observed in most areas   Attention to Internal Stimuli No observed signs   Interaction Skills Initiates appropriately with staff;Initiates appropriately with peers   Ability to Communicate Needs Independent   Verbal Content Articulate;Clear;Appropriate to topic   Ability to Maintain Boundaries Maintains appropriate physical boundaries;Maintains appropriate verbal boundaries   Participation Initiates participation   Concentration Concentrates 50 minutes   Ability to Concentrate Without difficulty   Follows and Comprehends Directions Independently follows multi-step directions   Memory Delayed and immediate recall intact   Organization Independently organizes all tasks   Decision Making Independent   Planning and Problem Solving Independently plans ahead   Ability to Apply and Learn Concepts Applies within group structure   Frustrations / Stress Tolerance Independently identifies sources of frustration/stress;Independently identifies skills    Level of Insight Insightful into needs, issues, goals   Self Esteem Can identify positives;Takes risks with support and encouragement;Accepts positive feedback   Social Supports Identifies utilizing supports     Pt attended 2 out of 2 OT groups offered. Pt actively participated in occupational therapy clinic. Pt was able to ask for assistance as needed, and independently initiated a multi-step creative expression task. Pt demonstrated excellent focus, sequencing, planning ahead, and problem solving. Very organized, thorough, and detail-oriented in her task approach. She shared that she \"came out as transgender 9 years ago, so I feel like a 8 year old at summer camp!\" Enthusiastic and appreciative of group. Social with peers and writer " throughout. Calm, pleasant, cooperative, and engaged. Affect appeared to brighten in interactions. She was given a self-assessment form, though was pulled out of group before completing it, and requested to finish filling it out on Monday.

## 2020-06-26 NOTE — PLAN OF CARE
Pt is very expressive with her feelings.  Pt was tearful when reading some e-mails she has received in response to one she sent giving her a lot of support.  Pt feels like she has a good support group.  Pt states she had an episode of nystagmus last evening but this was not the first time.  She has had episodes of this when on lithium and also when not on lithium but it has been a year since last episode.  Pt states she was up 48 hours straight and may be caused from being tired and trying to color and focus.  Pt talked a lot about transitioning from male to female over the last 9 years and how she didn't know she was silva until about 9 years ago.  Pt states some responsibility to the stress she may have caused on her wife of 40 years that lead to her having cancer.  Pt attended all OT groups.

## 2020-06-26 NOTE — PROGRESS NOTES
Work Completed: Met with pt for one to one therapy for 45 minutes.    Discharge plan or goal: Discharge when mental health stabilizes                Barriers to discharge: None identified at this time.

## 2020-06-26 NOTE — PROGRESS NOTES
"[facility], [place]   Psychiatric Progress Note        Interim History:   From H&P: Admitted with depression, anxiety, and suicidal ideation with a plan to jump off a bridge.    The patient's care was discussed with the treatment team during the daily team meeting and/or staff's chart notes were reviewed.  Staff report patient has been calm, pleasant, cooperative. Patient is attending groups and participating appropriately. Patient is eating well and taking medications as prescribed.  She is bright and social.  Slept all night.    Met with patient.  She is smiling.  States that she slept really well last night.  Feels tired today.  Reports improvement in anxiety and depression because \"the things that trigger me are not near me\".  The patient is referring to her housing and problems with her roommates.  The patient's goal for today is to call her wife and help her with her taxes.  She is not looking forward to it stating that that she is not the most pleasant person to talk to.  I discussed that the medications.  Patient is aware that she is starting Cymbalta today and is hoping that this will not cause the nightmares she experienced with the Effexor.  If this occurs over the weekend, will discontinue the medications and likely start topiramate sometimes next week.    Telemedicine Visit: The patient's condition can be safely assessed and treated via synchronous audio and visual telemedicine encounter.       Start time: 1125  Stop time: 1135     Reason for Telemedicine Visit: Covid-19     Originating Site (Patient Location): Station [facility]     Distant Site (Provider Location) home office     Consent:  The patient/guardian has verbally consented to: the potential risks and benefits of telemedicine (video visit) versus in person care; bill my insurance or make self-payment for services provided; and responsibility for payment of non-covered services.      Mode of Communication:  Video " Conference via Skype     As the provider I attest to compliance with applicable laws and regulations related to telemedicine.          Medications:       buPROPion  150 mg Oral QAM     DULoxetine  20 mg Oral Daily     estradiol  1 patch Transdermal Q3 Days     estradiol  2 patch Transdermal Q3 Days     estradiol biweekly   Transdermal Q8H     guanFACINE  2 mg Oral At Bedtime     levothyroxine  75 mcg Oral Daily     magnesium oxide  400 mg Oral At Bedtime     spironolactone  25 mg Oral Daily          Allergies:     Allergies   Allergen Reactions     Finasteride      Mental health problems     Tetracycline Unknown     Doxycycline Rash          Labs:     Recent Results (from the past 672 hour(s))   Asymptomatic COVID-19 Virus (Coronavirus) by PCR    Collection Time: 06/25/20 12:51 AM    Specimen: Nasopharyngeal   Result Value Ref Range    COVID-19 Virus PCR to [facility] - Source Nasopharyngeal     COVID-19 Virus PCR to [facility] - Result       Test received-See reflex to IDDL test SARS CoV2 (COVID-19) Virus RT-PCR   SARS-CoV-2 COVID-19 Virus (Coronavirus) RT-PCR Nasopharyngeal    Collection Time: 06/25/20 12:51 AM    Specimen: Nasopharyngeal   Result Value Ref Range    SARS-CoV-2 Virus Specimen Source Nasopharyngeal     SARS-CoV-2 PCR Result NEGATIVE     SARS-CoV-2 PCR Comment       Testing was performed using the Simplexa COVID-19 Direct Assay on the [employer] Liaison MDX   instrument. Additional information about this Emergency Use Authorization (EUA) assay can   be found via the Lab Guide.     CBC with platelets differential    Collection Time: 06/25/20  1:17 AM   Result Value Ref Range    WBC 15.4 (H) 4.0 - 11.0 10e9/L    RBC Count 5.42 4.4 - 5.9 10e12/L    Hemoglobin 16.4 13.3 - 17.7 g/dL    Hematocrit 48.1 40.0 - 53.0 %    MCV 89 78 - 100 fl    MCH 30.3 26.5 - 33.0 pg    MCHC 34.1 31.5 - 36.5 g/dL    RDW 12.6 10.0 - 15.0 %    Platelet Count 246 150 - 450 10e9/L    Diff Method Automated Method     % Neutrophils 84.2 %     % Lymphocytes 9.1 %    % Monocytes 5.1 %    % Eosinophils 0.1 %    % Basophils 0.7 %    % Immature Granulocytes 0.8 %    Nucleated RBCs 0 0 /100    Absolute Neutrophil 13.0 (H) 1.6 - 8.3 10e9/L    Absolute Lymphocytes 1.4 0.8 - 5.3 10e9/L    Absolute Monocytes 0.8 0.0 - 1.3 10e9/L    Absolute Eosinophils 0.0 0.0 - 0.7 10e9/L    Absolute Basophils 0.1 0.0 - 0.2 10e9/L    Abs Immature Granulocytes 0.1 0 - 0.4 10e9/L    Absolute Nucleated RBC 0.0    Comprehensive metabolic panel    Collection Time: 06/25/20  1:17 AM   Result Value Ref Range    Sodium 138 133 - 144 mmol/L    Potassium 3.7 3.4 - 5.3 mmol/L    Chloride 107 94 - 109 mmol/L    Carbon Dioxide 23 20 - 32 mmol/L    Anion Gap 8 3 - 14 mmol/L    Glucose 117 (H) 70 - 99 mg/dL    Urea Nitrogen 15 7 - 30 mg/dL    Creatinine 0.76 0.66 - 1.25 mg/dL    GFR Estimate >90 >60 mL/min/[1.73_m2]    GFR Estimate If Black >90 >60 mL/min/[1.73_m2]    Calcium 8.5 8.5 - 10.1 mg/dL    Bilirubin Total 0.8 0.2 - 1.3 mg/dL    Albumin 4.1 3.4 - 5.0 g/dL    Protein Total 7.6 6.8 - 8.8 g/dL    Alkaline Phosphatase 51 40 - 150 U/L    ALT 20 0 - 70 U/L    AST 12 0 - 45 U/L   Drug abuse screen 6 urine (tox)    Collection Time: 06/25/20  1:46 AM   Result Value Ref Range    Amphetamine Qual Urine Negative NEG^Negative    Barbiturates Qual Urine Negative NEG^Negative    Benzodiazepine Qual Urine Negative NEG^Negative    Cannabinoids Qual Urine Negative NEG^Negative    Cocaine Qual Urine Negative NEG^Negative    Ethanol Qual Urine Negative NEG^Negative    Opiates Qualitative Urine Negative NEG^Negative   CBC with platelets    Collection Time: 06/25/20  8:19 AM   Result Value Ref Range    WBC 13.1 (H) 4.0 - 11.0 10e9/L    RBC Count 5.46 4.4 - 5.9 10e12/L    Hemoglobin 16.5 13.3 - 17.7 g/dL    Hematocrit 48.6 40.0 - 53.0 %    MCV 89 78 - 100 fl    MCH 30.2 26.5 - 33.0 pg    MCHC 34.0 31.5 - 36.5 g/dL    RDW 12.7 10.0 - 15.0 %    Platelet Count 263 150 - 450 10e9/L   UA with Microscopic     Collection Time: 06/25/20  8:15 PM   Result Value Ref Range    Color Urine Yellow     Appearance Urine Clear     Glucose Urine Negative NEG^Negative mg/dL    Bilirubin Urine Negative NEG^Negative    Ketones Urine 5 (A) NEG^Negative mg/dL    Specific Gravity Urine 1.025 1.003 - 1.035    Blood Urine Negative NEG^Negative    pH Urine 5.0 5.0 - 7.0 pH    Protein Albumin Urine 10 (A) NEG^Negative mg/dL    Urobilinogen mg/dL 2.0 0.0 - 2.0 mg/dL    Nitrite Urine Negative NEG^Negative    Leukocyte Esterase Urine Negative NEG^Negative    Source Urine     WBC Urine <1 0 - 5 /HPF    RBC Urine 1 0 - 2 /HPF    Squamous Epithelial /HPF Urine 1 0 - 1 /HPF    Mucous Urine Present (A) NEG^Negative /LPF    Hyaline Casts 4 (H) 0 - 2 /LPF   TSH with free T4 reflex and/or T3 as indicated    Collection Time: 06/26/20  7:32 AM   Result Value Ref Range    TSH 4.55 (H) 0.40 - 4.00 mU/L   CBC with platelets    Collection Time: 06/26/20  7:32 AM   Result Value Ref Range    WBC 8.1 4.0 - 11.0 10e9/L    RBC Count 5.31 4.4 - 5.9 10e12/L    Hemoglobin 16.7 13.3 - 17.7 g/dL    Hematocrit 48.3 40.0 - 53.0 %    MCV 91 78 - 100 fl    MCH 31.5 26.5 - 33.0 pg    MCHC 34.6 31.5 - 36.5 g/dL    RDW 13.0 10.0 - 15.0 %    Platelet Count 240 150 - 450 10e9/L   T4 free    Collection Time: 06/26/20  7:32 AM   Result Value Ref Range    T4 Free 1.09 0.76 - 1.46 ng/dL            Psychiatric Examination:   Temp: 98.1  F (36.7  C) Temp src: Oral BP: 133/85 Pulse: 59   Resp: 16        Weight is 206 lbs 0 oz  Body mass index is 30.42 kg/m .    The patient is a very pleasant,  female who is clean, and dressed in hospital scrubs.  She is calm, pleasant, cooperative.  Eye contact is good, mood is depressed and anxious, affect is full range, speech is clear and coherent, psychomotor behavior is negative for agitation retardation, thought process is logical and goal oriented, no loose associations, thought content is negative for suicidal homicidal ideation,  "paranoia, delusions, auditory visual hallucinations, insight and judgment are intact, she is oriented to self, date, place, situation, attention span and concentration are intact, recent remote memory are intact, she has no problems expressing herself, fund of knowledge is adequate for the level of education and training.         Precautions:     Behavioral Orders   Procedures     Code 1 - Restrict to Unit     Routine Programming     As clinically indicated     Single Room     Status 15     Every 15 minutes.     Suicide precautions     Patients on Suicide Precautions should have a Combination Diet ordered that includes a Diet selection(s) AND a Behavioral Tray selection for Safe Tray - with utensils, or Safe Tray - NO utensils            DIagnoses:   1.  Major depressive disorder, recurrent, severe, without psychosis, with anxious distress  2.  Cluster B traits  3.  Gender dysphoria  4.  ADHD, per history  5.  Medical problems include obstructive sleep apnea, hypothyroidism, leukocytosis, hearing loss, hyperlipidemia, hypertension.         Plan:   The patient is a very pleasant,  transgender male to female individual who was admitted with increased depression, anxiety, and suicidal ideation with a drive her car off a bridge.  Currently denies suicidal ideation.  The goal for the hospitalization is \"to go back to the real world\".  Discussed medication changes.  The patient does not want to retry lithium as she worries about visual problems that she had in the past, she does not want to increase the dose of the Wellbutrin.  Considering the suicidal ideation with Effexor, antidepressants needs to be prescribed at lower doses and increase slowly.  Medication changes will include the following:     --Start Cymbalta 20 mg every morning.    --Continue Wellbutrin 150 mg every morning.    --Continue guanfacine 2 mg at bedtime.    --Continue magnesium 400 mg at bedtime.  -- Medications will include hydroxyzine, " Zyprexa, gabapentin, and trazodone.    --Blood work was reviewed.    --Internal medicine follow-up for medical problems.    --The patient was consulted on nature of illness and treatment options.   --Care was coordinated with the treatment      Disposition Plan   Reason for ongoing admission: is unable to care for self due to depression and anxiety  Disposition: TBD  Estimated length of stay: 5 to 7 days  Legal Status: Voluntary  Discharge will be granted once symptoms improved.    Acosta COCHRAN CNP  Date: 06/26/20  Time: 4:05 PM    More than 30 minutes were spent for assessment, documentation, and coordination of care.       This note was created with the help of Dragon dictation system. All grammatical/typing errors or context distortion are unintentional and inherent to software.

## 2020-06-26 NOTE — PROGRESS NOTES
SPIRITUAL HEALTH SERVICES    [facility] ([facility] Unit 3BW      REFERRAL SOURCE: patient/family request at admission for chaplaincy support    Brief consultation with pt's nurse who agreed patient would like a visit but tomorrow/Sunday would be better due to dinner hour on the unit.     PLAN: Refer to on-call  for follow up tomorrow or I will follow up on Sunday.                                                                                                                            Dixie Russo MDiv, [facility]  Lead , Adult Mental Health and Addiction  Pager [phone]

## 2020-06-26 NOTE — PROGRESS NOTES
Brief Medicine Note    Contacted by nursing regarding nystagmus that occurred last night. Patient had reportedly been awake for 48 hours and trying to focus on coloring. As of today this has resolved with no complaints including headache, dizziness, weakness, vision changes. Patient has reportedly had nystagmus in the past that was attributed to lithium use (no longer on this). Please call IM to eval if recurrent nystagmus, and/or if new headache, dizziness, or other neurological symptoms.     Britni Mcghee PA-C  Hospitalist Service  Pager [phone]

## 2020-06-26 NOTE — PROGRESS NOTES
"Patient has been visible in the milieu all shift. She denies SI and SIB. She rates depression a 3-4 and anxiety 7. Her affect is full range. Her mood is calm. She denies hallucinations and racing thoughts. She describes her mood as \"really distressed and in full despair to hopeful\". She endorses feeling safe and \"greatful\" to be here. She states her coping skills are biking, swimming, building sand castles and being kind and happy. She states she did not sleep well last night due to crying and coming here at 5 am. Her appetite is \"too good\". She attended and participated in group. She took a shower and stated \"it was amazing\". She states she is looking forward to getting better and moving on. UA was collected. See results.  "

## 2020-06-26 NOTE — PLAN OF CARE
Acosta COCHRAN CNP  Date: 06/30/20  Time: 3:17 PM      Problem: General Plan of Care (Inpatient Behavioral)  Goal: Team Discussion  Description: Team Plan:  Note: BEHAVIORAL TEAM DISCUSSION    Participants: SAMMIE Osorio, SHANNON, Kusum RN, Yanelis Tay, [facility]  Progress: Some improvement  Anticipated length of stay: 5-7 days  Continued Stay Criteria/Rationale:Depression  Medical/Physical: See medical notes  Precautions:   Behavioral Orders   Procedures     Code 1 - Restrict to Unit     Routine Programming     As clinically indicated     Single Room     Status 15     Every 15 minutes.     Suicide precautions     Patients on Suicide Precautions should have a Combination Diet ordered that includes a Diet selection(s) AND a Behavioral Tray selection for Safe Tray - with utensils, or Safe Tray - NO utensils       Plan: Pt will discharge home once her mental health stabilizes.  Rationale for change in precautions or plan: N/A

## 2020-06-27 PROCEDURE — 25000132 ZZH RX MED GY IP 250 OP 250 PS 637: Performed by: PHYSICIAN ASSISTANT

## 2020-06-27 PROCEDURE — 12400002 ZZH R&B MH SENIOR/ADOLESCENT

## 2020-06-27 PROCEDURE — 25000132 ZZH RX MED GY IP 250 OP 250 PS 637: Performed by: PSYCHIATRY & NEUROLOGY

## 2020-06-27 PROCEDURE — H2032 ACTIVITY THERAPY, PER 15 MIN: HCPCS

## 2020-06-27 PROCEDURE — 25000132 ZZH RX MED GY IP 250 OP 250 PS 637: Performed by: NURSE PRACTITIONER

## 2020-06-27 RX ADMIN — TRAZODONE HYDROCHLORIDE 50 MG: 50 TABLET ORAL at 21:33

## 2020-06-27 RX ADMIN — DULOXETINE 20 MG: 20 CAPSULE, DELAYED RELEASE ORAL at 08:36

## 2020-06-27 RX ADMIN — TRAZODONE HYDROCHLORIDE 50 MG: 50 TABLET ORAL at 03:21

## 2020-06-27 RX ADMIN — BUPROPION HYDROCHLORIDE 150 MG: 150 TABLET, EXTENDED RELEASE ORAL at 08:36

## 2020-06-27 RX ADMIN — LEVOTHYROXINE SODIUM 75 MCG: 75 TABLET ORAL at 08:36

## 2020-06-27 RX ADMIN — GUANFACINE 2 MG: 2 TABLET, EXTENDED RELEASE ORAL at 21:33

## 2020-06-27 RX ADMIN — SPIRONOLACTONE 25 MG: 25 TABLET ORAL at 08:36

## 2020-06-27 ASSESSMENT — ACTIVITIES OF DAILY LIVING (ADL)
DRESS: INDEPENDENT
HYGIENE/GROOMING: INDEPENDENT
ORAL_HYGIENE: INDEPENDENT
HYGIENE/GROOMING: INDEPENDENT
DRESS: INDEPENDENT
LAUNDRY: WITH SUPERVISION
ORAL_HYGIENE: INDEPENDENT
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
## 2020-06-27 NOTE — PROGRESS NOTES
06/26/20 2200   Art Therapy   Type of Intervention structured groups   Response participates with  encouragement   Hours 1   Treatment Detail    (Art Therapy)   Art Therapy Goal-to cope, express, contribute, regulate and sublimate emotions through the creative arts process and Art Therapy directives within a group setting.     Outcome- pt was focused on a colored pencil very detailed landscape. It was a metaphor for growth through challenges. She was pleasant, social, cooperative and engaged. She was open about her work, he transitioning 9 years ago etc.

## 2020-06-27 NOTE — PROGRESS NOTES
Pt has been out in the milieu and social with peers. Pt has been journaling and lets staff know needs. Pt would like to have her Name on her menu and dietary was notified. Pt talked about the change in medications and trouble with diarrhea, wondering about the Magnesium dosage. Encouraged pt to refuse the magnesium tonight and writer would notify IM in am. Pt sitting in lounge putting together puzzle. Denies suicidal ideation or self injury. Medication compliant.

## 2020-06-27 NOTE — PROGRESS NOTES
Patient reporting difficulty sleeping at 0315.  PRN trazodone 50 mg administered.  Patient returned to sleep. Patient slept for 7 hrs.

## 2020-06-27 NOTE — PLAN OF CARE
Pt had a good evening and was out on unit most of the evening engaging with peers and coloring.  Pt is very pleasant and expressive.  Pt showered this evening and feels like she is improving.

## 2020-06-28 PROCEDURE — 12400002 ZZH R&B MH SENIOR/ADOLESCENT

## 2020-06-28 PROCEDURE — 25000132 ZZH RX MED GY IP 250 OP 250 PS 637: Performed by: NURSE PRACTITIONER

## 2020-06-28 PROCEDURE — 25000132 ZZH RX MED GY IP 250 OP 250 PS 637: Performed by: PHYSICIAN ASSISTANT

## 2020-06-28 PROCEDURE — 25000132 ZZH RX MED GY IP 250 OP 250 PS 637: Performed by: PSYCHIATRY & NEUROLOGY

## 2020-06-28 RX ORDER — LOPERAMIDE HCL 2 MG
2 CAPSULE ORAL 4 TIMES DAILY PRN
Status: DISCONTINUED | OUTPATIENT
Start: 2020-06-28 | End: 2020-07-07 | Stop reason: HOSPADM

## 2020-06-28 RX ADMIN — DULOXETINE 20 MG: 20 CAPSULE, DELAYED RELEASE ORAL at 08:55

## 2020-06-28 RX ADMIN — GUANFACINE 2 MG: 2 TABLET, EXTENDED RELEASE ORAL at 21:22

## 2020-06-28 RX ADMIN — BUPROPION HYDROCHLORIDE 150 MG: 150 TABLET, EXTENDED RELEASE ORAL at 08:33

## 2020-06-28 RX ADMIN — ESTRADIOL 1 PATCH: 0.1 PATCH, EXTENDED RELEASE TRANSDERMAL at 08:38

## 2020-06-28 RX ADMIN — SPIRONOLACTONE 25 MG: 25 TABLET ORAL at 08:39

## 2020-06-28 RX ADMIN — Medication 400 MG: at 21:22

## 2020-06-28 RX ADMIN — TRAZODONE HYDROCHLORIDE 50 MG: 50 TABLET ORAL at 21:22

## 2020-06-28 RX ADMIN — LEVOTHYROXINE SODIUM 75 MCG: 75 TABLET ORAL at 08:33

## 2020-06-28 ASSESSMENT — MIFFLIN-ST. JEOR: SCORE: 1733.88

## 2020-06-28 NOTE — PROGRESS NOTES
Pt has had many questions answered today that she was concerned about 2 involving dietary. Writer spent 1 hour talking with pt about medications and emotions. Pleasant and social with peers. Talked about making a college involving pride. IM called in response to pt's concern about the magnesium and diarrhea. IM does not want to discontinue at this time. Pt was encouraged to keep diet in mind. Pt also mentioned that her feet were cold and that she needed to wear 2 pair of socks. Writer encouraged pt to let staff know if she had any numbness or change in color, or other concerns. Pt attended group. Medication complaint.
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
## 2020-06-28 NOTE — PROGRESS NOTES
"   06/27/20 2000   Behavioral Health   Hallucinations denies / not responding to hallucinations   Thinking distractable;intact   Orientation person: oriented;place: oriented;date: oriented;time: oriented   Memory baseline memory   Insight admits / accepts   Judgement impaired   Eye Contact at examiner   Affect full range affect   Mood mood is calm   Physical Appearance/Attire attire appropriate to age and situation   Hygiene well groomed   Suicidality   (denies)   1. Wish to be Dead (Recent) No   2. Non-Specific Active Suicidal Thoughts (Recent) No   Self Injury   (no signs)   Elopement   (no signs)   Activity   (present in milieu and groups)   Speech clear;coherent   Psycho Education   Type of Intervention 1:1 intervention   Response participates, initiates socially appropriate   Hours 0.5   Treatment Detail   (check in)   Activities of Daily Living   Hygiene/Grooming independent   Oral Hygiene independent   Dress independent   Laundry with supervision   Room Organization independent     Pt had a good shift. Pt was present in the milieu and groups. Pt was pleasant with staff and peers. Pt stated he \"cried less today\". Pt rated anxiety at a 3.5/10 unless he starts \"thinking about the outside world. Then it's about a 5.5/10\". Pt likes to use art as a distraction. Pt rated depression at 4/10 but says he's feeling better than he has the past couple of days. Pt said he's had a little trouble staying asleep but otherwise his sleeping habits are normal. Pt denies SI/SIB. Pt denies AH/VH but says he's heard \"boxes falling\" around the time he's falling asleep. Pt reported that this has only happened a few times and he isn't sure if it's real or not.   "

## 2020-06-28 NOTE — PROGRESS NOTES
06/27/20 2200   Art Therapy   Type of Intervention structured groups   Response participates with  encouragement   Hours 1   Treatment Detail  Watercolor exploration  (Art Therapy)   Art Therapy Goal-to cope, express, contribute, regulate and sublimate emotions through the creative arts process and Art Therapy directives within a group setting.     Outcome- pt was focused on watercolor exploration of the pride colors.  Pt was a bit more subdued and affect was flatter today than yesterday. Otherwise pt was pleasant, engaged and cooperative.

## 2020-06-29 LAB — HBA1C MFR BLD: 5.1 % (ref 0–5.6)

## 2020-06-29 PROCEDURE — 25000132 ZZH RX MED GY IP 250 OP 250 PS 637: Performed by: PSYCHIATRY & NEUROLOGY

## 2020-06-29 PROCEDURE — 12400002 ZZH R&B MH SENIOR/ADOLESCENT

## 2020-06-29 PROCEDURE — 25000132 ZZH RX MED GY IP 250 OP 250 PS 637: Performed by: PHYSICIAN ASSISTANT

## 2020-06-29 PROCEDURE — 99223 1ST HOSP IP/OBS HIGH 75: CPT | Mod: 95 | Performed by: NURSE PRACTITIONER

## 2020-06-29 PROCEDURE — 99231 SBSQ HOSP IP/OBS SF/LOW 25: CPT | Performed by: PHYSICIAN ASSISTANT

## 2020-06-29 PROCEDURE — 25000132 ZZH RX MED GY IP 250 OP 250 PS 637: Performed by: NURSE PRACTITIONER

## 2020-06-29 PROCEDURE — H2032 ACTIVITY THERAPY, PER 15 MIN: HCPCS

## 2020-06-29 PROCEDURE — G0177 OPPS/PHP; TRAIN & EDUC SERV: HCPCS

## 2020-06-29 RX ORDER — DULOXETIN HYDROCHLORIDE 20 MG/1
20 CAPSULE, DELAYED RELEASE ORAL ONCE
Status: COMPLETED | OUTPATIENT
Start: 2020-06-29 | End: 2020-06-29

## 2020-06-29 RX ORDER — GABAPENTIN 100 MG/1
200-300 CAPSULE ORAL 3 TIMES DAILY PRN
Status: DISCONTINUED | OUTPATIENT
Start: 2020-06-29 | End: 2020-07-07 | Stop reason: HOSPADM

## 2020-06-29 RX ORDER — DULOXETIN HYDROCHLORIDE 20 MG/1
40 CAPSULE, DELAYED RELEASE ORAL DAILY
Status: DISCONTINUED | OUTPATIENT
Start: 2020-06-29 | End: 2020-06-29

## 2020-06-29 RX ORDER — DULOXETIN HYDROCHLORIDE 20 MG/1
40 CAPSULE, DELAYED RELEASE ORAL ONCE
Status: DISCONTINUED | OUTPATIENT
Start: 2020-06-29 | End: 2020-06-29

## 2020-06-29 RX ADMIN — BUPROPION HYDROCHLORIDE 150 MG: 150 TABLET, EXTENDED RELEASE ORAL at 07:13

## 2020-06-29 RX ADMIN — DULOXETINE 20 MG: 20 CAPSULE, DELAYED RELEASE ORAL at 07:13

## 2020-06-29 RX ADMIN — DULOXETINE HYDROCHLORIDE 20 MG: 20 CAPSULE, DELAYED RELEASE ORAL at 12:41

## 2020-06-29 RX ADMIN — GUANFACINE 2 MG: 2 TABLET, EXTENDED RELEASE ORAL at 21:23

## 2020-06-29 RX ADMIN — LEVOTHYROXINE SODIUM 75 MCG: 75 TABLET ORAL at 07:13

## 2020-06-29 RX ADMIN — ESTRADIOL 2 PATCH: 0.1 PATCH TRANSDERMAL at 07:14

## 2020-06-29 RX ADMIN — TRAZODONE HYDROCHLORIDE 50 MG: 50 TABLET ORAL at 21:23

## 2020-06-29 RX ADMIN — GABAPENTIN 200 MG: 100 CAPSULE ORAL at 12:43

## 2020-06-29 RX ADMIN — SPIRONOLACTONE 25 MG: 25 TABLET ORAL at 07:13

## 2020-06-29 ASSESSMENT — ACTIVITIES OF DAILY LIVING (ADL)
ORAL_HYGIENE: INDEPENDENT
ORAL_HYGIENE: INDEPENDENT
HYGIENE/GROOMING: INDEPENDENT
HYGIENE/GROOMING: INDEPENDENT
DRESS: INDEPENDENT
DRESS: INDEPENDENT
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
## 2020-06-29 NOTE — PROGRESS NOTES
"[facility], [place]   Psychiatric Progress Note        Interim History:   From H&P: Admitted with depression, anxiety, and suicidal ideation with a plan to jump off a bridge.    The patient's care was discussed with the treatment team during the daily team meeting and/or staff's chart notes were reviewed.  Staff report patient has been calm, pleasant, cooperative. She was noted as anxious yesterday. Patient is attending groups and participating appropriately. She is eating well and taking medications as prescribed.  She is bright and social.   She utilized PRN trazodone x1 nightly over the weekend and slept 6.5 hours last night.    Met with patient.  She describes her weekend as mostly \"okay\" stating she enjoyed the groups, and the art is great. She reported she had a spike in her anxiety and depression yesterday due to thinking about current stressors and changes; wife moving out, and planned conference call with wife and her kids. She was extremely emotional during our interview, as she burst into tears a couple time while narrating these stressors but was able to regain composure and continue with the interview. She reported  that she slept better yesterday, anxiety is a 6 today and she had some fleeting SI thoughts yesterday. 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 She denies any nightmares over the weekend. She complained of some burning and tingling sensation to bilateral feet. Plan is for IM to see patient. Pt reports improvement in anxiety and depression while at the hospital because \"I don't have to deal with some of my stressors\" which she identified as one of her roommates who is nice one minute and mean the next. The patient provided a number for her out patient therapist; Cece Bellamy(Private practice) [phone]. Patient would like a care coordination between inpatient and outpatient providers " as outpatient provider had mentioned concerns of a possible  bipolar component to patient's diagnosis. Apart from the tingling and burning sensation to feet which was being addressed by IM at the end of our interview, the patient denies other concerns.    The nursing staff contacted me later in the day and reported that the patient is being quite agitated and having multiple requests.  On the order for a top of the hours request was placed.  Will discontinue Cymbalta as it may be causing the agitation.  Will consider Lamictal or Topamax as a mood stabilizer.    Telemedicine Visit: The patient's condition can be safely assessed and treated via synchronous audio and visual telemedicine encounter.       Start time: 1000  Stop time: 1012     Reason for Telemedicine Visit: Covid-19     Originating Site (Patient Location): [facility]     Distant Site (Provider Location) home office     Consent:  The patient/guardian has verbally consented to: the potential risks and benefits of telemedicine (video visit) versus in person care; bill my insurance or make self-payment for services provided; and responsibility for payment of non-covered services.      Mode of Communication:  Video Conference via Skype     As the provider I attest to compliance with applicable laws and regulations related to telemedicine.          Medications:       buPROPion  150 mg Oral QAM     estradiol  1 patch Transdermal Q3 Days     estradiol  2 patch Transdermal Q3 Days     estradiol biweekly   Transdermal Q8H     guanFACINE  2 mg Oral At Bedtime     levothyroxine  75 mcg Oral Daily     magnesium oxide  400 mg Oral At Bedtime     spironolactone  25 mg Oral Daily          Allergies:     Allergies   Allergen Reactions     Finasteride      Mental health problems     Tetracycline Unknown     Doxycycline Rash          Labs:     Recent Results (from the past 672 hour(s))   Asymptomatic COVID-19 Virus (Coronavirus) by PCR    Collection Time: 06/25/20 12:51 AM     Specimen: Nasopharyngeal   Result Value Ref Range    COVID-19 Virus PCR to [facility] - Source Nasopharyngeal     COVID-19 Virus PCR to [facility] - Result       Test received-See reflex to IDDL test SARS CoV2 (COVID-19) Virus RT-PCR   SARS-CoV-2 COVID-19 Virus (Coronavirus) RT-PCR Nasopharyngeal    Collection Time: 06/25/20 12:51 AM    Specimen: Nasopharyngeal   Result Value Ref Range    SARS-CoV-2 Virus Specimen Source Nasopharyngeal     SARS-CoV-2 PCR Result NEGATIVE     SARS-CoV-2 PCR Comment       Testing was performed using the [employer]plex[employer] COVID-19 Direct Assay on the [email or web address]   instrument. Additional information about this Emergency Use Authorization (EUA) assay can   be found via the Lab Guide.     CBC with platelets differential    Collection Time: 06/25/20  1:17 AM   Result Value Ref Range    WBC 15.4 (H) 4.0 - 11.0 10e9/L    RBC Count 5.42 4.4 - 5.9 10e12/L    Hemoglobin 16.4 13.3 - 17.7 g/dL    Hematocrit 48.1 40.0 - 53.0 %    MCV 89 78 - 100 fl    MCH 30.3 26.5 - 33.0 pg    MCHC 34.1 31.5 - 36.5 g/dL    RDW 12.6 10.0 - 15.0 %    Platelet Count 246 150 - 450 10e9/L    Diff Method Automated Method     % Neutrophils 84.2 %    % Lymphocytes 9.1 %    % Monocytes 5.1 %    % Eosinophils 0.1 %    % Basophils 0.7 %    % Immature Granulocytes 0.8 %    Nucleated RBCs 0 0 /100    Absolute Neutrophil 13.0 (H) 1.6 - 8.3 10e9/L    Absolute Lymphocytes 1.4 0.8 - 5.3 10e9/L    Absolute Monocytes 0.8 0.0 - 1.3 10e9/L    Absolute Eosinophils 0.0 0.0 - 0.7 10e9/L    Absolute Basophils 0.1 0.0 - 0.2 10e9/L    Abs Immature Granulocytes 0.1 0 - 0.4 10e9/L    Absolute Nucleated RBC 0.0    Comprehensive metabolic panel    Collection Time: 06/25/20  1:17 AM   Result Value Ref Range    Sodium 138 133 - 144 mmol/L    Potassium 3.7 3.4 - 5.3 mmol/L    Chloride 107 94 - 109 mmol/L    Carbon Dioxide 23 20 - 32 mmol/L    Anion Gap 8 3 - 14 mmol/L    Glucose 117 (H) 70 - 99 mg/dL    Urea Nitrogen 15 7 - 30 mg/dL    Creatinine  0.76 0.66 - 1.25 mg/dL    GFR Estimate >90 >60 mL/min/[1.73_m2]    GFR Estimate If Black >90 >60 mL/min/[1.73_m2]    Calcium 8.5 8.5 - 10.1 mg/dL    Bilirubin Total 0.8 0.2 - 1.3 mg/dL    Albumin 4.1 3.4 - 5.0 g/dL    Protein Total 7.6 6.8 - 8.8 g/dL    Alkaline Phosphatase 51 40 - 150 U/L    ALT 20 0 - 70 U/L    AST 12 0 - 45 U/L   Drug abuse screen 6 urine (tox)    Collection Time: 06/25/20  1:46 AM   Result Value Ref Range    Amphetamine Qual Urine Negative NEG^Negative    Barbiturates Qual Urine Negative NEG^Negative    Benzodiazepine Qual Urine Negative NEG^Negative    Cannabinoids Qual Urine Negative NEG^Negative    Cocaine Qual Urine Negative NEG^Negative    Ethanol Qual Urine Negative NEG^Negative    Opiates Qualitative Urine Negative NEG^Negative   CBC with platelets    Collection Time: 06/25/20  8:19 AM   Result Value Ref Range    WBC 13.1 (H) 4.0 - 11.0 10e9/L    RBC Count 5.46 4.4 - 5.9 10e12/L    Hemoglobin 16.5 13.3 - 17.7 g/dL    Hematocrit 48.6 40.0 - 53.0 %    MCV 89 78 - 100 fl    MCH 30.2 26.5 - 33.0 pg    MCHC 34.0 31.5 - 36.5 g/dL    RDW 12.7 10.0 - 15.0 %    Platelet Count 263 150 - 450 10e9/L   UA with Microscopic    Collection Time: 06/25/20  8:15 PM   Result Value Ref Range    Color Urine Yellow     Appearance Urine Clear     Glucose Urine Negative NEG^Negative mg/dL    Bilirubin Urine Negative NEG^Negative    Ketones Urine 5 (A) NEG^Negative mg/dL    Specific Gravity Urine 1.025 1.003 - 1.035    Blood Urine Negative NEG^Negative    pH Urine 5.0 5.0 - 7.0 pH    Protein Albumin Urine 10 (A) NEG^Negative mg/dL    Urobilinogen mg/dL 2.0 0.0 - 2.0 mg/dL    Nitrite Urine Negative NEG^Negative    Leukocyte Esterase Urine Negative NEG^Negative    Source Urine     WBC Urine <1 0 - 5 /HPF    RBC Urine 1 0 - 2 /HPF    Squamous Epithelial /HPF Urine 1 0 - 1 /HPF    Mucous Urine Present (A) NEG^Negative /LPF    Hyaline Casts 4 (H) 0 - 2 /LPF   TSH with free T4 reflex and/or T3 as indicated    Collection  Time: 06/26/20  7:32 AM   Result Value Ref Range    TSH 4.55 (H) 0.40 - 4.00 mU/L   Folate    Collection Time: 06/26/20  7:32 AM   Result Value Ref Range    Folate 15.6 >5.4 ng/mL   Vitamin B12    Collection Time: 06/26/20  7:32 AM   Result Value Ref Range    Vitamin B12 1,214 (H) 193 - 986 pg/mL   Vitamin D    Collection Time: 06/26/20  7:32 AM   Result Value Ref Range    Vitamin D Deficiency screening 26 20 - 75 ug/L   CBC with platelets    Collection Time: 06/26/20  7:32 AM   Result Value Ref Range    WBC 8.1 4.0 - 11.0 10e9/L    RBC Count 5.31 4.4 - 5.9 10e12/L    Hemoglobin 16.7 13.3 - 17.7 g/dL    Hematocrit 48.3 40.0 - 53.0 %    MCV 91 78 - 100 fl    MCH 31.5 26.5 - 33.0 pg    MCHC 34.6 31.5 - 36.5 g/dL    RDW 13.0 10.0 - 15.0 %    Platelet Count 240 150 - 450 10e9/L   T4 free    Collection Time: 06/26/20  7:32 AM   Result Value Ref Range    T4 Free 1.09 0.76 - 1.46 ng/dL   Hemoglobin A1c    Collection Time: 06/26/20  7:32 AM   Result Value Ref Range    Hemoglobin A1C 5.1 0 - 5.6 %            Psychiatric Examination:   Temp: 97.6  F (36.4  C) Temp src: Oral BP: 106/71 Pulse: 56   Resp: 16 SpO2: 98 % O2 Device: None (Room air)    Weight is 205 lbs 12.8 oz  Body mass index is 30.39 kg/m .    Mental Status Exam  Appearance:  awake, alert, neatly groomed and dressed in scrubs  Attitude:  pleasant, cooperative  Eye Contact: Normal intensity  Mood:  labile,depressed  Affect:  mid range  Speech:  clear, coherent,   Psychomotor Behavior:  no evidence of tardive dyskinesia, dystonia, or tics and intact gait and muscle tone  Thought Process: Linear and goal directed, much organized  Associations:  no looseness of associations  Thought Content:  no evidence of suicidal ideation or homicidal ideation, no evidence of psychotic thought  Insight: fair  Judgment:  Fair  Oriented to:  time, person, and place  Attention Span and Concentration:  intact  Recent and Remote Memory:  intact  Language: Intact  Fund of Knowledge:  "Normal  Muscle Strength and Tone: normal            Precautions:     Behavioral Orders   Procedures     Code 1 - Restrict to Unit     Routine Programming     As clinically indicated     Single Room     Status 15     Every 15 minutes.     Suicide precautions     Patients on Suicide Precautions should have a Combination Diet ordered that includes a Diet selection(s) AND a Behavioral Tray selection for Safe Tray - with utensils, or Safe Tray - NO utensils            DIagnoses:   1.  Major depressive disorder, recurrent, severe, without psychosis, with anxious distress, vs bipolar II disorder currently depressed.   2.  Cluster B traits  3.  Gender dysphoria  4.  ADHD, per history  5.  Medical problems include obstructive sleep apnea, hypothyroidism, leukocytosis, hearing loss, hyperlipidemia, hypertension.         Plan:   The patient is a very pleasant,  transgender male to female individual who was admitted with increased depression, anxiety, and suicidal ideation with a drive her car off a bridge.  Currently denies suicidal ideation.  The goal for the hospitalization is \"to go back to the real world\".  Discussed medication changes.  The patient does not want to retry lithium as she worries about visual problems that she had in the past, she does not want to increase the dose of the Wellbutrin.  Considering the suicidal ideation with Effexor, antidepressants needs to be prescribed at lower doses and increase slowly.  Medication changes will include the following:     --Discontinue Cymbalta. The patient is more agitated with it.   --Consider lamictal or Topamax.     --Start Gabapentin 200-300 mg TID PRN  --Continue Wellbutrin 150 mg every morning.    --Continue guanfacine 2 mg at bedtime.    --Continue magnesium 400 mg at bedtime.  -- Medications will include hydroxyzine, Zyprexa, gabapentin, and trazodone.    --Blood work was reviewed.    --Internal medicine follow-up for medical problems.    --The patient was " consulted on nature of illness and treatment options.   --Care was coordinated with the treatment      Disposition Plan   Reason for ongoing admission: is unable to care for self due to depression and anxiety  Disposition: TBD  Estimated length of stay: 5 to 7 days  Legal Status: Voluntary  Discharge will be granted once symptoms improved.    Tonie Barakat, SHANNON Student    Acosta COCHRAN CNP  Date: 06/29/20  Time: 3:23 PM        More than 30 minutes were spent for assessment, documentation, and coordination of care.       This note was created with the help of Dragon dictation system. All grammatical/typing errors or context distortion are unintentional and inherent to software.
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
## 2020-06-30 PROCEDURE — 25000132 ZZH RX MED GY IP 250 OP 250 PS 637: Performed by: PHYSICIAN ASSISTANT

## 2020-06-30 PROCEDURE — 25000132 ZZH RX MED GY IP 250 OP 250 PS 637: Performed by: NURSE PRACTITIONER

## 2020-06-30 PROCEDURE — G0177 OPPS/PHP; TRAIN & EDUC SERV: HCPCS

## 2020-06-30 PROCEDURE — 99233 SBSQ HOSP IP/OBS HIGH 50: CPT | Performed by: NURSE PRACTITIONER

## 2020-06-30 PROCEDURE — 12400002 ZZH R&B MH SENIOR/ADOLESCENT

## 2020-06-30 PROCEDURE — 25000132 ZZH RX MED GY IP 250 OP 250 PS 637: Performed by: PSYCHIATRY & NEUROLOGY

## 2020-06-30 PROCEDURE — H2032 ACTIVITY THERAPY, PER 15 MIN: HCPCS

## 2020-06-30 RX ORDER — MAGNESIUM OXIDE 400 MG/1
400 TABLET ORAL
Status: DISCONTINUED | OUTPATIENT
Start: 2020-06-30 | End: 2020-07-07 | Stop reason: HOSPADM

## 2020-06-30 RX ORDER — VITAMIN B COMPLEX
50 TABLET ORAL DAILY
Status: DISCONTINUED | OUTPATIENT
Start: 2020-06-30 | End: 2020-07-07 | Stop reason: HOSPADM

## 2020-06-30 RX ORDER — LAMOTRIGINE 25 MG/1
25 TABLET ORAL DAILY
Status: DISCONTINUED | OUTPATIENT
Start: 2020-06-30 | End: 2020-07-07 | Stop reason: HOSPADM

## 2020-06-30 RX ORDER — LITHIUM CARBONATE 300 MG/1
300 TABLET, FILM COATED, EXTENDED RELEASE ORAL AT BEDTIME
Status: DISCONTINUED | OUTPATIENT
Start: 2020-06-30 | End: 2020-07-07 | Stop reason: HOSPADM

## 2020-06-30 RX ADMIN — LEVOTHYROXINE SODIUM 75 MCG: 75 TABLET ORAL at 08:35

## 2020-06-30 RX ADMIN — LAMOTRIGINE 25 MG: 25 TABLET ORAL at 12:51

## 2020-06-30 RX ADMIN — GUANFACINE 2 MG: 2 TABLET, EXTENDED RELEASE ORAL at 21:28

## 2020-06-30 RX ADMIN — MELATONIN 50 MCG: at 12:51

## 2020-06-30 RX ADMIN — SPIRONOLACTONE 25 MG: 25 TABLET ORAL at 08:35

## 2020-06-30 RX ADMIN — TRAZODONE HYDROCHLORIDE 50 MG: 50 TABLET ORAL at 21:41

## 2020-06-30 RX ADMIN — LITHIUM CARBONATE 300 MG: 300 TABLET, EXTENDED RELEASE ORAL at 21:28

## 2020-06-30 RX ADMIN — BUPROPION HYDROCHLORIDE 150 MG: 150 TABLET, EXTENDED RELEASE ORAL at 08:35

## 2020-06-30 ASSESSMENT — MIFFLIN-ST. JEOR: SCORE: 1721.64

## 2020-06-30 ASSESSMENT — ACTIVITIES OF DAILY LIVING (ADL)
ORAL_HYGIENE: INDEPENDENT
HYGIENE/GROOMING: INDEPENDENT
HYGIENE/GROOMING: INDEPENDENT
LAUNDRY: WITH SUPERVISION
DRESS: SCRUBS (BEHAVIORAL HEALTH)
LAUNDRY: WITH SUPERVISION
DRESS: INDEPENDENT
ORAL_HYGIENE: INDEPENDENT

## 2020-06-30 NOTE — PROGRESS NOTES
[facility], [place]   Psychiatric Progress Note        Interim History:   From H&P: Admitted with depression, anxiety, and suicidal ideation with a plan to jump off a bridge.    The patient's care was discussed with the treatment team during the daily team meeting and/or staff's chart notes were reviewed.  Staff report patient has been calm, pleasant, cooperative. She was noted as anxious yesterday. Patient is attending groups and participating appropriately. She is eating well and taking medications as prescribed.  She is bright and social.   She utilized PRN trazodone x1 nightly over the weekend and slept 6.5 hours last night.    Met with patient.  Patient was pleasant and cooperative.  She was tearful at times.  Reports she only slept for 3 or 4 hours last night.  Was not able to tell me what prevented her from sleeping.  States she was very agitated especially in the early afternoon.  Discussed the possibility of Cymbalta causing the agitation and insomnia.  Discussed medication changes.  Patient was agreeable to restart a small dose of lithium at bedtime.  According to her chart, she had nystagmus with 900 mg.  The patient was agreeable to start Lamictal.  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  Patient will let nursing staff know if the rash appear.  Magnesium was changed to as needed.  We will start vitamin D.      Telemedicine Visit: The patient's condition can be safely assessed and treated via synchronous audio and visual telemedicine encounter.       Start time: 1100  Stop time: 1110     Reason for Telemedicine Visit: Covid-19     Originating Site (Patient Location): [facility]     Distant Site (Provider Location) [place]  private office     Consent:  The patient/guardian has verbally consented to: the potential risks and benefits of telemedicine (video visit) versus in person care; bill my insurance or make self-payment for services provided; and responsibility for payment  of non-covered services.      Mode of Communication:  Video Conference via Skype     As the provider I attest to compliance with applicable laws and regulations related to telemedicine.          Medications:       buPROPion  150 mg Oral QAM     estradiol  1 patch Transdermal Q3 Days     estradiol  2 patch Transdermal Q3 Days     estradiol biweekly   Transdermal Q8H     guanFACINE  2 mg Oral At Bedtime     levothyroxine  75 mcg Oral Daily     magnesium oxide  400 mg Oral At Bedtime     spironolactone  25 mg Oral Daily          Allergies:     Allergies   Allergen Reactions     Finasteride      Mental health problems     Tetracycline Unknown     Doxycycline Rash          Labs:     Recent Results (from the past 672 hour(s))   Asymptomatic COVID-19 Virus (Coronavirus) by PCR    Collection Time: 06/25/20 12:51 AM    Specimen: Nasopharyngeal   Result Value Ref Range    COVID-19 Virus PCR to [facility] - Source Nasopharyngeal     COVID-19 Virus PCR to [facility] - Result       Test received-See reflex to IDDL test SARS CoV2 (COVID-19) Virus RT-PCR   SARS-CoV-2 COVID-19 Virus (Coronavirus) RT-PCR Nasopharyngeal    Collection Time: 06/25/20 12:51 AM    Specimen: Nasopharyngeal   Result Value Ref Range    SARS-CoV-2 Virus Specimen Source Nasopharyngeal     SARS-CoV-2 PCR Result NEGATIVE     SARS-CoV-2 PCR Comment       Testing was performed using the Simplexa COVID-19 Direct Assay on the [employer] MDX   instrument. Additional information about this Emergency Use Authorization (EUA) assay can   be found via the Lab Guide.     CBC with platelets differential    Collection Time: 06/25/20  1:17 AM   Result Value Ref Range    WBC 15.4 (H) 4.0 - 11.0 10e9/L    RBC Count 5.42 4.4 - 5.9 10e12/L    Hemoglobin 16.4 13.3 - 17.7 g/dL    Hematocrit 48.1 40.0 - 53.0 %    MCV 89 78 - 100 fl    MCH 30.3 26.5 - 33.0 pg    MCHC 34.1 31.5 - 36.5 g/dL    RDW 12.6 10.0 - 15.0 %    Platelet Count 246 150 - 450 10e9/L    Diff Method Automated  Method     % Neutrophils 84.2 %    % Lymphocytes 9.1 %    % Monocytes 5.1 %    % Eosinophils 0.1 %    % Basophils 0.7 %    % Immature Granulocytes 0.8 %    Nucleated RBCs 0 0 /100    Absolute Neutrophil 13.0 (H) 1.6 - 8.3 10e9/L    Absolute Lymphocytes 1.4 0.8 - 5.3 10e9/L    Absolute Monocytes 0.8 0.0 - 1.3 10e9/L    Absolute Eosinophils 0.0 0.0 - 0.7 10e9/L    Absolute Basophils 0.1 0.0 - 0.2 10e9/L    Abs Immature Granulocytes 0.1 0 - 0.4 10e9/L    Absolute Nucleated RBC 0.0    Comprehensive metabolic panel    Collection Time: 06/25/20  1:17 AM   Result Value Ref Range    Sodium 138 133 - 144 mmol/L    Potassium 3.7 3.4 - 5.3 mmol/L    Chloride 107 94 - 109 mmol/L    Carbon Dioxide 23 20 - 32 mmol/L    Anion Gap 8 3 - 14 mmol/L    Glucose 117 (H) 70 - 99 mg/dL    Urea Nitrogen 15 7 - 30 mg/dL    Creatinine 0.76 0.66 - 1.25 mg/dL    GFR Estimate >90 >60 mL/min/[1.73_m2]    GFR Estimate If Black >90 >60 mL/min/[1.73_m2]    Calcium 8.5 8.5 - 10.1 mg/dL    Bilirubin Total 0.8 0.2 - 1.3 mg/dL    Albumin 4.1 3.4 - 5.0 g/dL    Protein Total 7.6 6.8 - 8.8 g/dL    Alkaline Phosphatase 51 40 - 150 U/L    ALT 20 0 - 70 U/L    AST 12 0 - 45 U/L   Drug abuse screen 6 urine (tox)    Collection Time: 06/25/20  1:46 AM   Result Value Ref Range    Amphetamine Qual Urine Negative NEG^Negative    Barbiturates Qual Urine Negative NEG^Negative    Benzodiazepine Qual Urine Negative NEG^Negative    Cannabinoids Qual Urine Negative NEG^Negative    Cocaine Qual Urine Negative NEG^Negative    Ethanol Qual Urine Negative NEG^Negative    Opiates Qualitative Urine Negative NEG^Negative   CBC with platelets    Collection Time: 06/25/20  8:19 AM   Result Value Ref Range    WBC 13.1 (H) 4.0 - 11.0 10e9/L    RBC Count 5.46 4.4 - 5.9 10e12/L    Hemoglobin 16.5 13.3 - 17.7 g/dL    Hematocrit 48.6 40.0 - 53.0 %    MCV 89 78 - 100 fl    MCH 30.2 26.5 - 33.0 pg    MCHC 34.0 31.5 - 36.5 g/dL    RDW 12.7 10.0 - 15.0 %    Platelet Count 263 150 - 450  10e9/L   UA with Microscopic    Collection Time: 06/25/20  8:15 PM   Result Value Ref Range    Color Urine Yellow     Appearance Urine Clear     Glucose Urine Negative NEG^Negative mg/dL    Bilirubin Urine Negative NEG^Negative    Ketones Urine 5 (A) NEG^Negative mg/dL    Specific Gravity Urine 1.025 1.003 - 1.035    Blood Urine Negative NEG^Negative    pH Urine 5.0 5.0 - 7.0 pH    Protein Albumin Urine 10 (A) NEG^Negative mg/dL    Urobilinogen mg/dL 2.0 0.0 - 2.0 mg/dL    Nitrite Urine Negative NEG^Negative    Leukocyte Esterase Urine Negative NEG^Negative    Source Urine     WBC Urine <1 0 - 5 /HPF    RBC Urine 1 0 - 2 /HPF    Squamous Epithelial /HPF Urine 1 0 - 1 /HPF    Mucous Urine Present (A) NEG^Negative /LPF    Hyaline Casts 4 (H) 0 - 2 /LPF   TSH with free T4 reflex and/or T3 as indicated    Collection Time: 06/26/20  7:32 AM   Result Value Ref Range    TSH 4.55 (H) 0.40 - 4.00 mU/L   Folate    Collection Time: 06/26/20  7:32 AM   Result Value Ref Range    Folate 15.6 >5.4 ng/mL   Vitamin B12    Collection Time: 06/26/20  7:32 AM   Result Value Ref Range    Vitamin B12 1,214 (H) 193 - 986 pg/mL   Vitamin D    Collection Time: 06/26/20  7:32 AM   Result Value Ref Range    Vitamin D Deficiency screening 26 20 - 75 ug/L   CBC with platelets    Collection Time: 06/26/20  7:32 AM   Result Value Ref Range    WBC 8.1 4.0 - 11.0 10e9/L    RBC Count 5.31 4.4 - 5.9 10e12/L    Hemoglobin 16.7 13.3 - 17.7 g/dL    Hematocrit 48.3 40.0 - 53.0 %    MCV 91 78 - 100 fl    MCH 31.5 26.5 - 33.0 pg    MCHC 34.6 31.5 - 36.5 g/dL    RDW 13.0 10.0 - 15.0 %    Platelet Count 240 150 - 450 10e9/L   T4 free    Collection Time: 06/26/20  7:32 AM   Result Value Ref Range    T4 Free 1.09 0.76 - 1.46 ng/dL   Hemoglobin A1c    Collection Time: 06/26/20  7:32 AM   Result Value Ref Range    Hemoglobin A1C 5.1 0 - 5.6 %            Psychiatric Examination:   Temp: 98.3  F (36.8  C) Temp src: Tympanic BP: 117/73 Pulse: 52     SpO2: 96 % O2  "Device: None (Room air)    Weight is 205 lbs 12.8 oz  Body mass index is 30.39 kg/m .    Mental Status Exam  Appearance:  awake, alert, neatly groomed and dressed in scrubs  Attitude:  pleasant, cooperative  Eye Contact: Normal intensity  Mood:  labile,depressed  Affect:  mid range  Speech:  clear, coherent,   Psychomotor Behavior:  no evidence of tardive dyskinesia, dystonia, or tics and intact gait and muscle tone  Thought Process: Linear and goal directed, much organized  Associations:  no looseness of associations  Thought Content:  no evidence of suicidal ideation or homicidal ideation, no evidence of psychotic thought  Insight: fair  Judgment:  Fair  Oriented to:  time, person, and place  Attention Span and Concentration:  intact  Recent and Remote Memory:  intact  Language: Intact  Fund of Knowledge: Normal  Muscle Strength and Tone: normal            Precautions:     Behavioral Orders   Procedures     Code 1 - Restrict to Unit     Routine Programming     As clinically indicated     Single Room     Status 15     Every 15 minutes.     Suicide precautions     Patients on Suicide Precautions should have a Combination Diet ordered that includes a Diet selection(s) AND a Behavioral Tray selection for Safe Tray - with utensils, or Safe Tray - NO utensils            DIagnoses:   1.  Bipolar II disorder currently depressed, severe, without psychosis.   2.  Cluster B traits  3.  Gender dysphoria  4.  ADHD  5.  Medical problems include obstructive sleep apnea, hypothyroidism, leukocytosis, hearing loss, hyperlipidemia, hypertension.         Plan:   The patient is a very pleasant,  transgender male to female individual who was admitted with increased depression, anxiety, and suicidal ideation with a drive her car off a bridge.  Currently denies suicidal ideation.  The goal for the hospitalization is \"to go back to the real world\".  Discussed medication changes.  The patient does not want to retry lithium as she " worries about visual problems that she had in the past, she does not want to increase the dose of the Wellbutrin.  Considering the suicidal ideation with Effexor, antidepressants needs to be prescribed at lower doses and increase slowly.  Medication changes will include the following:     --Discontinue Cymbalta. The patient is more agitated with it.   --Start Lamictal 25 mg every morning.  Patient is aware of Santos-Gilles syndrome.  She will let the staff know if rash appear.  --Start Lithium 300 mg at bedtime.  The patient reported nystagmus with 900 mg.  --Change magnesium to PRN   --Start Gabapentin 200-300 mg TID PRN  --Continue Wellbutrin 150 mg every morning.    --Continue Guanfacine 2 mg at bedtime.    -- Medications will include hydroxyzine, Zyprexa, gabapentin, and trazodone.    --Blood work was reviewed.    --Internal medicine follow-up for medical problems.    --The patient was consulted on nature of illness and treatment options.   --Care was coordinated with the treatment      Disposition Plan   Reason for ongoing admission: is unable to care for self due to depression and anxiety  Disposition: TBD  Estimated length of stay: 5 to 7 days  Legal Status: Voluntary  Discharge will be granted once symptoms improved.    Acosta COCHRAN CNP  Date: 06/30/20  Time: 12:01 PM      More than 30 minutes were spent for assessment, documentation, and coordination of care.       This note was created with the help of Dragon dictation system. All grammatical/typing errors or context distortion are unintentional and inherent to software.

## 2020-06-30 NOTE — PROGRESS NOTES
Patient reported that she was not able to sleep well last night. She just slept a total of three hours.She said that since she was not able to sleep well last night, she does not want to be awakened for breakfast. She would rather sleep till 9-9:30 a.m.     Patient was noted to be laying in bed the last hour of the shift and kept staring on the wall.

## 2020-06-30 NOTE — PROGRESS NOTES
06/29/20 [facility]   Therapeutic Recreation   Type of Intervention structured groups   Activity exercise   Response Participates, initiates socially appropriate   Hours 1     Pt actively participated in a structured Therapeutic Recreation group with a focus on leisure participation, relaxation, and exercise. Pt participated in the guided exercise for the full duration of the group. Pt followed along, engaged in the guided chair exercise routine. Pt also contributed to the discussion prior to the exercise as well.

## 2020-06-30 NOTE — PLAN OF CARE
Problem: OT General Care Plan  Goal: OT Goal 1  Description: Will develop insightful ideas for coping strategies and identify symptoms of when using them would be beneficial.      Note: Attended 2 of 2 OT groups. She worked on a multi step task, took time to design and plan more complex ideas and followed through with them. She initiated comments, was social and appeared interested in being actively involved.  She took an active role in an activity focused on the Sensory Intervention supplies available here, how to utilize them and how to incorporate this type of supply for calming and grounding the nervous system. She offered ideas, answers ans appeared attentive. She requested to work with the Leg[employer] kits and was informed to wash hands thoroughly before beginning and use them only in the lounge, returning them to OT when finished. They need to be kept behind the desk when not using them in the lounge. RN was notified.

## 2020-06-30 NOTE — PROGRESS NOTES
"Patient talked at length about her hyper sensitivity disorder that was exacerbated when she was asked to do on the hour requests. Became tearful saying it reflected larger issues of gender acceptance. Patient stated her boss and career are very important to her and she put all of that on hold to, \"get help\". Patient denies SI and SIB but does have periods when it surfaces. Patient is concerned about the bipolar diagnosis conversation between her and her doctor. When asked if she has any plan of self harm, patient stated, \"my plan was to come here\".  "

## 2020-06-30 NOTE — PLAN OF CARE
"48 Hour Assessment    /68   Pulse 99   Temp 98.3  F (36.8  C) (Oral)   Resp 16   Ht 1.753 m (5' 9\")   Wt 92.1 kg (203 lb 1.6 oz)   SpO2 100%   BMI 29.99 kg/m      Pt visible in the milieu for the majority of the day. She is engaged with fellow patients and attends most groups when offered. Pt ate breakfast and lunch meals in the lounge. She has been pleasant and respectful throughout the shift. Pt communicating appropriately.    SI/SIB: Currently denies; no indicators present.    Provided pt with educational printout on Lithium. Pt verbalizes understanding, but has concerns regarding a starting dose of 300 mg, as she reports having nystagmus with a higher dose.     No additional concerns at this time. Will continue to assess and offer support.   "

## 2020-07-01 PROCEDURE — 12400002 ZZH R&B MH SENIOR/ADOLESCENT

## 2020-07-01 PROCEDURE — 99233 SBSQ HOSP IP/OBS HIGH 50: CPT | Performed by: NURSE PRACTITIONER

## 2020-07-01 PROCEDURE — G0177 OPPS/PHP; TRAIN & EDUC SERV: HCPCS

## 2020-07-01 PROCEDURE — 25000132 ZZH RX MED GY IP 250 OP 250 PS 637: Performed by: NURSE PRACTITIONER

## 2020-07-01 PROCEDURE — H2032 ACTIVITY THERAPY, PER 15 MIN: HCPCS

## 2020-07-01 PROCEDURE — 25000132 ZZH RX MED GY IP 250 OP 250 PS 637: Performed by: PHYSICIAN ASSISTANT

## 2020-07-01 PROCEDURE — 25000132 ZZH RX MED GY IP 250 OP 250 PS 637: Performed by: PSYCHIATRY & NEUROLOGY

## 2020-07-01 RX ORDER — OLANZAPINE 10 MG/2ML
10 INJECTION, POWDER, FOR SOLUTION INTRAMUSCULAR
Status: DISCONTINUED | OUTPATIENT
Start: 2020-07-01 | End: 2020-07-07 | Stop reason: HOSPADM

## 2020-07-01 RX ORDER — OLANZAPINE 5 MG/1
5 TABLET ORAL
Status: DISCONTINUED | OUTPATIENT
Start: 2020-07-01 | End: 2020-07-07 | Stop reason: HOSPADM

## 2020-07-01 RX ADMIN — GUANFACINE 2 MG: 2 TABLET, EXTENDED RELEASE ORAL at 22:08

## 2020-07-01 RX ADMIN — SPIRONOLACTONE 25 MG: 25 TABLET ORAL at 08:43

## 2020-07-01 RX ADMIN — LAMOTRIGINE 25 MG: 25 TABLET ORAL at 08:43

## 2020-07-01 RX ADMIN — BUPROPION HYDROCHLORIDE 150 MG: 150 TABLET, EXTENDED RELEASE ORAL at 08:43

## 2020-07-01 RX ADMIN — LEVOTHYROXINE SODIUM 75 MCG: 75 TABLET ORAL at 08:43

## 2020-07-01 RX ADMIN — LITHIUM CARBONATE 300 MG: 300 TABLET, EXTENDED RELEASE ORAL at 22:08

## 2020-07-01 RX ADMIN — MELATONIN 50 MCG: at 08:43

## 2020-07-01 RX ADMIN — ESTRADIOL 1 PATCH: 0.1 PATCH, EXTENDED RELEASE TRANSDERMAL at 10:17

## 2020-07-01 ASSESSMENT — ACTIVITIES OF DAILY LIVING (ADL)
LAUNDRY: WITH SUPERVISION
ORAL_HYGIENE: INDEPENDENT
HYGIENE/GROOMING: INDEPENDENT
DRESS: SCRUBS (BEHAVIORAL HEALTH)

## 2020-07-01 NOTE — PROGRESS NOTES
"[facility], [place]   Psychiatric Progress Note        Interim History:   From H&P: Admitted with depression, anxiety, and suicidal ideation with a plan to jump off a bridge.    The patient's care was discussed with the treatment team during the daily team meeting and/or staff's chart notes were reviewed.  Staff report patient has been calm, pleasant, cooperative. Overall she is calmer. Glad she is off of the Cymbalta as she felt this medication was casing a lot of mood swings and agitation. Feels rejected due to \"top of the hour request\".   Patient is attending groups and participating appropriately. She is eating well and taking medications as prescribed.  She is bright and social. Slept 4.5 hours.     Met with patient.  Did not sleep well last night.  States that with a flashlight the use at night wakes her up.  She feels less irritable.  Mood is still very sad.  Anxiety varies.  Overall she feels better.  Daughter tearful at 1 point but was able to compose herself quickly.  She is eating well.  She is attending groups.  Discussed her new diagnosis of bipolar disorder.  Patient does not remember having manic episode however, believes that she might have have hypomanic episodes.  \"Most of my life I have been depressed\".  Discussed was next for her.  The patient is grateful that she will be in the hospital over the weekend.  She is hoping to feel well enough to go home next week.  Pleasant and cooperative.  So far no side effects of the medications.  Discussed the starting Zyprexa at bedtime instead of lithium if she starts having some issues with lithium or she is not able to sleep again.    Telemedicine Visit: The patient's condition can be safely assessed and treated via synchronous audio and visual telemedicine encounter.       Start time: 1112  Stop time: 1124     Reason for Telemedicine Visit: Covid-19     Originating Site (Patient Location): [facility]     Distant Site (Provider " Location) [place]  private office     Consent:  The patient/guardian has verbally consented to: the potential risks and benefits of telemedicine (video visit) versus in person care; bill my insurance or make self-payment for services provided; and responsibility for payment of non-covered services.      Mode of Communication:  Video Conference via Skype     As the provider I attest to compliance with applicable laws and regulations related to telemedicine.          Medications:       buPROPion  150 mg Oral QAM     estradiol  1 patch Transdermal Q3 Days     estradiol  2 patch Transdermal Q3 Days     estradiol biweekly   Transdermal Q8H     guanFACINE  2 mg Oral At Bedtime     lamoTRIgine  25 mg Oral Daily     levothyroxine  75 mcg Oral Daily     lithium ER  300 mg Oral At Bedtime     spironolactone  25 mg Oral Daily     cholecalciferol  50 mcg Oral Daily          Allergies:     Allergies   Allergen Reactions     Finasteride      Mental health problems     Tetracycline Unknown     Doxycycline Rash          Labs:     Recent Results (from the past 672 hour(s))   Asymptomatic COVID-19 Virus (Coronavirus) by PCR    Collection Time: 06/25/20 12:51 AM    Specimen: Nasopharyngeal   Result Value Ref Range    COVID-19 Virus PCR to [facility] - Source Nasopharyngeal     COVID-19 Virus PCR to [facility] - Result       Test received-See reflex to IDDL test SARS CoV2 (COVID-19) Virus RT-PCR   SARS-CoV-2 COVID-19 Virus (Coronavirus) RT-PCR Nasopharyngeal    Collection Time: 06/25/20 12:51 AM    Specimen: Nasopharyngeal   Result Value Ref Range    SARS-CoV-2 Virus Specimen Source Nasopharyngeal     SARS-CoV-2 PCR Result NEGATIVE     SARS-CoV-2 PCR Comment       Testing was performed using the [employer]plex[employer] COVID-19 Direct Assay on the [employer] MDX   instrument. Additional information about this Emergency Use Authorization (EUA) assay can   be found via the Lab Guide.     CBC with platelets differential    Collection Time: 06/25/20   1:17 AM   Result Value Ref Range    WBC 15.4 (H) 4.0 - 11.0 10e9/L    RBC Count 5.42 4.4 - 5.9 10e12/L    Hemoglobin 16.4 13.3 - 17.7 g/dL    Hematocrit 48.1 40.0 - 53.0 %    MCV 89 78 - 100 fl    MCH 30.3 26.5 - 33.0 pg    MCHC 34.1 31.5 - 36.5 g/dL    RDW 12.6 10.0 - 15.0 %    Platelet Count 246 150 - 450 10e9/L    Diff Method Automated Method     % Neutrophils 84.2 %    % Lymphocytes 9.1 %    % Monocytes 5.1 %    % Eosinophils 0.1 %    % Basophils 0.7 %    % Immature Granulocytes 0.8 %    Nucleated RBCs 0 0 /100    Absolute Neutrophil 13.0 (H) 1.6 - 8.3 10e9/L    Absolute Lymphocytes 1.4 0.8 - 5.3 10e9/L    Absolute Monocytes 0.8 0.0 - 1.3 10e9/L    Absolute Eosinophils 0.0 0.0 - 0.7 10e9/L    Absolute Basophils 0.1 0.0 - 0.2 10e9/L    Abs Immature Granulocytes 0.1 0 - 0.4 10e9/L    Absolute Nucleated RBC 0.0    Comprehensive metabolic panel    Collection Time: 06/25/20  1:17 AM   Result Value Ref Range    Sodium 138 133 - 144 mmol/L    Potassium 3.7 3.4 - 5.3 mmol/L    Chloride 107 94 - 109 mmol/L    Carbon Dioxide 23 20 - 32 mmol/L    Anion Gap 8 3 - 14 mmol/L    Glucose 117 (H) 70 - 99 mg/dL    Urea Nitrogen 15 7 - 30 mg/dL    Creatinine 0.76 0.66 - 1.25 mg/dL    GFR Estimate >90 >60 mL/min/[1.73_m2]    GFR Estimate If Black >90 >60 mL/min/[1.73_m2]    Calcium 8.5 8.5 - 10.1 mg/dL    Bilirubin Total 0.8 0.2 - 1.3 mg/dL    Albumin 4.1 3.4 - 5.0 g/dL    Protein Total 7.6 6.8 - 8.8 g/dL    Alkaline Phosphatase 51 40 - 150 U/L    ALT 20 0 - 70 U/L    AST 12 0 - 45 U/L   Drug abuse screen 6 urine (tox)    Collection Time: 06/25/20  1:46 AM   Result Value Ref Range    Amphetamine Qual Urine Negative NEG^Negative    Barbiturates Qual Urine Negative NEG^Negative    Benzodiazepine Qual Urine Negative NEG^Negative    Cannabinoids Qual Urine Negative NEG^Negative    Cocaine Qual Urine Negative NEG^Negative    Ethanol Qual Urine Negative NEG^Negative    Opiates Qualitative Urine Negative NEG^Negative   CBC with platelets     Collection Time: 06/25/20  8:19 AM   Result Value Ref Range    WBC 13.1 (H) 4.0 - 11.0 10e9/L    RBC Count 5.46 4.4 - 5.9 10e12/L    Hemoglobin 16.5 13.3 - 17.7 g/dL    Hematocrit 48.6 40.0 - 53.0 %    MCV 89 78 - 100 fl    MCH 30.2 26.5 - 33.0 pg    MCHC 34.0 31.5 - 36.5 g/dL    RDW 12.7 10.0 - 15.0 %    Platelet Count 263 150 - 450 10e9/L   UA with Microscopic    Collection Time: 06/25/20  8:15 PM   Result Value Ref Range    Color Urine Yellow     Appearance Urine Clear     Glucose Urine Negative NEG^Negative mg/dL    Bilirubin Urine Negative NEG^Negative    Ketones Urine 5 (A) NEG^Negative mg/dL    Specific Gravity Urine 1.025 1.003 - 1.035    Blood Urine Negative NEG^Negative    pH Urine 5.0 5.0 - 7.0 pH    Protein Albumin Urine 10 (A) NEG^Negative mg/dL    Urobilinogen mg/dL 2.0 0.0 - 2.0 mg/dL    Nitrite Urine Negative NEG^Negative    Leukocyte Esterase Urine Negative NEG^Negative    Source Urine     WBC Urine <1 0 - 5 /HPF    RBC Urine 1 0 - 2 /HPF    Squamous Epithelial /HPF Urine 1 0 - 1 /HPF    Mucous Urine Present (A) NEG^Negative /LPF    Hyaline Casts 4 (H) 0 - 2 /LPF   TSH with free T4 reflex and/or T3 as indicated    Collection Time: 06/26/20  7:32 AM   Result Value Ref Range    TSH 4.55 (H) 0.40 - 4.00 mU/L   Folate    Collection Time: 06/26/20  7:32 AM   Result Value Ref Range    Folate 15.6 >5.4 ng/mL   Vitamin B12    Collection Time: 06/26/20  7:32 AM   Result Value Ref Range    Vitamin B12 1,214 (H) 193 - 986 pg/mL   Vitamin D    Collection Time: 06/26/20  7:32 AM   Result Value Ref Range    Vitamin D Deficiency screening 26 20 - 75 ug/L   CBC with platelets    Collection Time: 06/26/20  7:32 AM   Result Value Ref Range    WBC 8.1 4.0 - 11.0 10e9/L    RBC Count 5.31 4.4 - 5.9 10e12/L    Hemoglobin 16.7 13.3 - 17.7 g/dL    Hematocrit 48.3 40.0 - 53.0 %    MCV 91 78 - 100 fl    MCH 31.5 26.5 - 33.0 pg    MCHC 34.6 31.5 - 36.5 g/dL    RDW 13.0 10.0 - 15.0 %    Platelet Count 240 150 - 450 10e9/L    T4 free    Collection Time: 06/26/20  7:32 AM   Result Value Ref Range    T4 Free 1.09 0.76 - 1.46 ng/dL   Hemoglobin A1c    Collection Time: 06/26/20  7:32 AM   Result Value Ref Range    Hemoglobin A1C 5.1 0 - 5.6 %            Psychiatric Examination:   Temp: 99.1  F (37.3  C) Temp src: Oral BP: 103/68 Pulse: 99     SpO2: 97 % O2 Device: None (Room air)    Weight is 203 lbs 1.6 oz  Body mass index is 29.99 kg/m .    Mental Status Exam  Appearance:  awake, alert, neatly groomed and dressed in scrubs  Attitude:  pleasant, cooperative  Eye Contact: Normal intensity  Mood:  labile,depressed  Affect:  mid range  Speech:  clear, coherent,   Psychomotor Behavior:  no evidence of tardive dyskinesia, dystonia, or tics and intact gait and muscle tone  Thought Process: Linear and goal directed, much organized  Associations:  no looseness of associations  Thought Content:  no evidence of suicidal ideation or homicidal ideation, no evidence of psychotic thought  Insight: fair  Judgment:  Fair  Oriented to:  time, person, and place  Attention Span and Concentration:  intact  Recent and Remote Memory:  intact  Language: Intact  Fund of Knowledge: Normal  Muscle Strength and Tone: normal            Precautions:     Behavioral Orders   Procedures     Code 1 - Restrict to Unit     Routine Programming     As clinically indicated     Single Room     Status 15     Every 15 minutes.     Suicide precautions     Patients on Suicide Precautions should have a Combination Diet ordered that includes a Diet selection(s) AND a Behavioral Tray selection for Safe Tray - with utensils, or Safe Tray - NO utensils            DIagnoses:   1.  Bipolar II disorder currently depressed, severe, without psychosis.   2.  Cluster B traits  3.  Gender dysphoria  4.  ADHD  5.  Medical problems include obstructive sleep apnea, hypothyroidism, leukocytosis, hearing loss, hyperlipidemia, hypertension.         Plan:   The patient is a very pleasant,   "transgender male to female individual who was admitted with increased depression, anxiety, and suicidal ideation with a drive her car off a bridge.  Currently denies suicidal ideation.  The goal for the hospitalization is \"to go back to the real world\".  Discussed medication changes.  The patient does not want to retry lithium as she worries about visual problems that she had in the past, she does not want to increase the dose of the Wellbutrin.  Considering the suicidal ideation with Effexor, antidepressants needs to be prescribed at lower doses and increase slowly.  Medication changes will include the following:     --Discontinue Cymbalta. The patient is more agitated with it.   --Start Lamictal 25 mg every morning.  Patient is aware of Santos-Gilles syndrome.  She will let the staff know if rash appear.  --Start Lithium 300 mg at bedtime.  The patient reported nystagmus with 900 mg.  --Change magnesium to PRN   --Start Gabapentin 200-300 mg TID PRN  --Continue Wellbutrin 150 mg every morning.    --Continue Guanfacine 2 mg at bedtime.    -- Medications will include hydroxyzine, Zyprexa, gabapentin, and trazodone.    --Blood work was reviewed.    --Internal medicine follow-up for medical problems.    --The patient was consulted on nature of illness and treatment options.   --Care was coordinated with the treatment      Disposition Plan   Reason for ongoing admission: is unable to care for self due to depression and anxiety  Disposition: TBD  Estimated length of stay: 5 to 7 days  Legal Status: Voluntary  Discharge will be granted once symptoms improved.    Acosta COCHRAN CNP  Date: 07/01/20  Time: 4:11 PM        More than 30 minutes were spent for assessment, documentation, and coordination of care.       This note was created with the help of Dragon dictation system. All grammatical/typing errors or context distortion are unintentional and inherent to software.      "

## 2020-07-01 NOTE — PLAN OF CARE
Problem: OT General Care Plan  Goal: OT Goal 1  Description: Will develop insightful ideas for coping strategies and identify symptoms of when using them would be beneficial.      Note: Attended 2 of 2 OT groups. She worked on a multi step task requiring a great deal of creativity and attention to detail. She joked about not needing perfection and having the self compassion to understand this. She was social, initiated offering support in a kind manner. She participated in an activity focused on Stress management, identifying areas on one's life that are balanced and areas to chose to focus on by setting helpful goals. She offered insightful ideas and talked about self compassion she has worked on and presented ideas to a peer when asked if she could offer that insightful idea in the context of the discussion of reaching out to talk about feelings with someone. She appears attentive, interested, organized and involved.

## 2020-07-01 NOTE — PROGRESS NOTES
"/68   Pulse 99   Temp 98.3  F (36.8  C) (Oral)   Resp 16   Ht 1.753 m (5' 9\")   Wt 92.1 kg (203 lb 1.6 oz)   SpO2 100%   BMI 29.99 kg/m      Pt visible in the milieu for the majority of the day, attending groups when offered. She took all medications without issue. Pt is pleasant and communicates appropriately with staff and peers. Displays a mostly blunted affect, but brightens when interacting with others. Pt outside clinic called to inform pt that she does not need her hormone levels drawn before her upcoming check-up in late July, 2020. Will pass this along to pt. No additional concerns at this time. Will continue to assess and offer support.   "

## 2020-07-01 NOTE — PROGRESS NOTES
06/30/20 9116   Therapeutic Recreation   Type of Intervention structured groups   Activity game   Response Participates, initiates socially appropriate   Hours 1     Pt actively participated in a structured Therapeutic Recreation group with a focus on leisure participation, communication skills, and social engagement via a group game. Pt remained focused and engaged throughout full duration of group.  Pt mood was calm and was appropriate with interactions.  Appropriately shared sense of humor with peers during the group and appeared to brighten with social interaction. Pt shared a few jokes, while participating in the activity. Pt also shared with this writer, she enjoyed working with [employer], building her own creations. Pt also shared a project, a couple banners for Pride and the 4th of July, that she was involved in making.

## 2020-07-01 NOTE — PROGRESS NOTES
Patient attended a 60 minute psycho-education group on the topic of boundaries. Patients learned the definition of a boundary, the areas of personal responsibility, how to identify healthy and unhealthy boundaries and how to change unhealthy boundaries into healthy boundaries. Patient was an active participant. Patient shared personal struggles with boundaries and ways she is changing. She shared how she is learning to ask for help in areas she needs it.

## 2020-07-01 NOTE — PLAN OF CARE
Pt presents with full range affect and calm mood. Denies SI/SIB and hallucinations. Pt spent most of the evening in the lounge working on a Lego set. Pt did attend evening group. Pt expressed that she was glad she is off of the Cymbalta as it made her mood very bad, she states that she cried for 3 hours yesterday. She is willing to try the Lithium this evening but is concerned about potential side effects. She expressed concern about having the top of the hour requests because it makes her feel rejected. She also went over her plan of discussing the future with her wife and two sons. She reports that she is not in a rush to leave the hospital and wants to take the time to get better while she is here. VSS. Med compliant. No other concerns of complaints.     Spoke with pt OP therapist Priscila this evening. She states that she hopes that pt can be placed in IOP or partial hospitalization after discharge because she feels that Liz needs more frequent check ins than the once a week she is currently having. She reports that she would like to be in touch regarding her discharge plan because she will be on vacation from July 11th to the 25th and does not want Liz to be without a support system during this time. This information was relayed to the pt and she was appreciative.

## 2020-07-01 NOTE — PROGRESS NOTES
Patient woke up @ 0215 and  stayed in bed, awake the next hour. She came out to the unge later and reported that she could not stay asleep. She asked if she can sit on the recliner where she might feel more relaxed. She requested for a cup of chamomile tea and warm blankets and stayed seated on the recliner the next two hours. She was quiet the whole time and after two hours, decided to return to her room and laid down in bed. She stayed awake the rest of the shift and slept a total of 4.5 hours.

## 2020-07-02 PROCEDURE — 99232 SBSQ HOSP IP/OBS MODERATE 35: CPT | Performed by: NURSE PRACTITIONER

## 2020-07-02 PROCEDURE — 25000132 ZZH RX MED GY IP 250 OP 250 PS 637: Performed by: NURSE PRACTITIONER

## 2020-07-02 PROCEDURE — 25000132 ZZH RX MED GY IP 250 OP 250 PS 637: Performed by: PHYSICIAN ASSISTANT

## 2020-07-02 PROCEDURE — 12400002 ZZH R&B MH SENIOR/ADOLESCENT

## 2020-07-02 PROCEDURE — G0177 OPPS/PHP; TRAIN & EDUC SERV: HCPCS

## 2020-07-02 PROCEDURE — H2032 ACTIVITY THERAPY, PER 15 MIN: HCPCS

## 2020-07-02 PROCEDURE — 25000132 ZZH RX MED GY IP 250 OP 250 PS 637: Performed by: PSYCHIATRY & NEUROLOGY

## 2020-07-02 PROCEDURE — 99207 ZZC CDG-MDM COMPONENT: MEETS LOW - DOWN CODED: CPT | Performed by: NURSE PRACTITIONER

## 2020-07-02 RX ADMIN — LEVOTHYROXINE SODIUM 75 MCG: 75 TABLET ORAL at 08:22

## 2020-07-02 RX ADMIN — SPIRONOLACTONE 25 MG: 25 TABLET ORAL at 08:22

## 2020-07-02 RX ADMIN — BUPROPION HYDROCHLORIDE 150 MG: 150 TABLET, EXTENDED RELEASE ORAL at 09:39

## 2020-07-02 RX ADMIN — MELATONIN 50 MCG: at 08:22

## 2020-07-02 RX ADMIN — ESTRADIOL 2 PATCH: 0.1 PATCH TRANSDERMAL at 12:33

## 2020-07-02 RX ADMIN — LITHIUM CARBONATE 300 MG: 300 TABLET, EXTENDED RELEASE ORAL at 22:09

## 2020-07-02 RX ADMIN — LAMOTRIGINE 25 MG: 25 TABLET ORAL at 08:22

## 2020-07-02 RX ADMIN — GUANFACINE 2 MG: 2 TABLET, EXTENDED RELEASE ORAL at 22:09

## 2020-07-02 ASSESSMENT — MIFFLIN-ST. JEOR: SCORE: 1716.19

## 2020-07-02 ASSESSMENT — ACTIVITIES OF DAILY LIVING (ADL)
HYGIENE/GROOMING: INDEPENDENT
DRESS: INDEPENDENT;SCRUBS (BEHAVIORAL HEALTH)
LAUNDRY: UNABLE TO COMPLETE
HYGIENE/GROOMING: INDEPENDENT
DRESS: INDEPENDENT
ORAL_HYGIENE: INDEPENDENT
ORAL_HYGIENE: INDEPENDENT
LAUNDRY: WITH SUPERVISION

## 2020-07-02 NOTE — PROGRESS NOTES
Work Completed: Faxed progress notes to [facility] for IOP at [phone]    Discharge plan or goal: Discharge to IOP                Barriers to discharge: None identified at this time.

## 2020-07-02 NOTE — PLAN OF CARE
Acosta COCHRAN CNP  Date: 07/07/20  Time: 8:11 AM      Problem: General Plan of Care (Inpatient Behavioral)  Goal: Team Discussion  Description: Team Plan:  Outcome: Improving  Note: BEHAVIORAL TEAM DISCUSSION    Participants: SAMMIE Campbell, SHANNON, Lynn, RN, Yanelis Tay, [facility] CenterBRITTNI, Yessenia Jiménez, OT  Progress: Improving  Anticipated length of stay: 3-5 days  Continued Stay Criteria/Rationale: Depression, suicidal ideation  Medical/Physical: See medical notes  Precautions:   Behavioral Orders   Procedures     Code 1 - Restrict to Unit     Routine Programming     As clinically indicated     Single Room     Status 15     Every 15 minutes.     Suicide precautions     Patients on Suicide Precautions should have a Combination Diet ordered that includes a Diet selection(s) AND a Behavioral Tray selection for Safe Tray - with utensils, or Safe Tray - NO utensils       Plan: Pt will likely discharge to [facility] once placement has been obtained.  Rationale for change in precautions or plan: N/A

## 2020-07-02 NOTE — PROGRESS NOTES
[facility], [place]   Psychiatric Progress Note        Interim History:   From H&P: Admitted with depression, anxiety, and suicidal ideation with a plan to jump off a bridge.    The patient's care was discussed with the treatment team during the daily team meeting and/or staff's chart notes were reviewed.  Staff report patient has been calm, pleasant, cooperative.  No behavioral issues.  Has not had multiple requests.  Patient is attending groups and participating appropriately. She is eating well and taking medications as prescribed.  She is bright and social. Slept 6+3 hours.     Met with patient.  Did not think she slept that much, believes she got no more than 5 hours last night.  Yesterday she spoke with her wife and 2 sons about the divorce and selling the house.  To the patient intends to support his wife and son who has never left their home.  He has mental health issues and works part-time.  His wife has cancer and is not able to work.  His plan is to sell the house, pay all of his tests, and by a condo for his wife and son.  The other son is independent.  As far as mood, Liz reports feeling better, not as anxious or depressed.  Still has mood swings but not nearly as much as 2 days ago.  She is attending all groups.  She is eating well.  No side effects of the medications.  No suicidal ideation.    Telemedicine Visit: The patient's condition can be safely assessed and treated via synchronous audio and visual telemedicine encounter.       Start time: 1115  Stop time: 1130     Reason for Telemedicine Visit: Covid-19     Originating Site (Patient Location): [facility]     Distant Site (Provider Location) [place]  private office     Consent:  The patient/guardian has verbally consented to: the potential risks and benefits of telemedicine (video visit) versus in person care; bill my insurance or make self-payment for services provided; and responsibility for payment of  non-covered services.      Mode of Communication:  Video Conference via Skype     As the provider I attest to compliance with applicable laws and regulations related to telemedicine.          Medications:       buPROPion  150 mg Oral QAM     estradiol  1 patch Transdermal Q3 Days     estradiol  2 patch Transdermal Q3 Days     estradiol biweekly   Transdermal Q8H     guanFACINE  2 mg Oral At Bedtime     lamoTRIgine  25 mg Oral Daily     levothyroxine  75 mcg Oral Daily     lithium ER  300 mg Oral At Bedtime     spironolactone  25 mg Oral Daily     cholecalciferol  50 mcg Oral Daily          Allergies:     Allergies   Allergen Reactions     Finasteride      Mental health problems     Tetracycline Unknown     Doxycycline Rash          Labs:     Recent Results (from the past 672 hour(s))   Asymptomatic COVID-19 Virus (Coronavirus) by PCR    Collection Time: 06/25/20 12:51 AM    Specimen: Nasopharyngeal   Result Value Ref Range    COVID-19 Virus PCR to [facility] - Source Nasopharyngeal     COVID-19 Virus PCR to [facility] - Result       Test received-See reflex to IDDL test SARS CoV2 (COVID-19) Virus RT-PCR   SARS-CoV-2 COVID-19 Virus (Coronavirus) RT-PCR Nasopharyngeal    Collection Time: 06/25/20 12:51 AM    Specimen: Nasopharyngeal   Result Value Ref Range    SARS-CoV-2 Virus Specimen Source Nasopharyngeal     SARS-CoV-2 PCR Result NEGATIVE     SARS-CoV-2 PCR Comment       Testing was performed using the Simplex[employer] COVID-19 Direct Assay on the [employer] Liaison MDX   instrument. Additional information about this Emergency Use Authorization (EUA) assay can   be found via the Lab Guide.     CBC with platelets differential    Collection Time: 06/25/20  1:17 AM   Result Value Ref Range    WBC 15.4 (H) 4.0 - 11.0 10e9/L    RBC Count 5.42 4.4 - 5.9 10e12/L    Hemoglobin 16.4 13.3 - 17.7 g/dL    Hematocrit 48.1 40.0 - 53.0 %    MCV 89 78 - 100 fl    MCH 30.3 26.5 - 33.0 pg    MCHC 34.1 31.5 - 36.5 g/dL    RDW 12.6 10.0 - 15.0 %     Platelet Count 246 150 - 450 10e9/L    Diff Method Automated Method     % Neutrophils 84.2 %    % Lymphocytes 9.1 %    % Monocytes 5.1 %    % Eosinophils 0.1 %    % Basophils 0.7 %    % Immature Granulocytes 0.8 %    Nucleated RBCs 0 0 /100    Absolute Neutrophil 13.0 (H) 1.6 - 8.3 10e9/L    Absolute Lymphocytes 1.4 0.8 - 5.3 10e9/L    Absolute Monocytes 0.8 0.0 - 1.3 10e9/L    Absolute Eosinophils 0.0 0.0 - 0.7 10e9/L    Absolute Basophils 0.1 0.0 - 0.2 10e9/L    Abs Immature Granulocytes 0.1 0 - 0.4 10e9/L    Absolute Nucleated RBC 0.0    Comprehensive metabolic panel    Collection Time: 06/25/20  1:17 AM   Result Value Ref Range    Sodium 138 133 - 144 mmol/L    Potassium 3.7 3.4 - 5.3 mmol/L    Chloride 107 94 - 109 mmol/L    Carbon Dioxide 23 20 - 32 mmol/L    Anion Gap 8 3 - 14 mmol/L    Glucose 117 (H) 70 - 99 mg/dL    Urea Nitrogen 15 7 - 30 mg/dL    Creatinine 0.76 0.66 - 1.25 mg/dL    GFR Estimate >90 >60 mL/min/[1.73_m2]    GFR Estimate If Black >90 >60 mL/min/[1.73_m2]    Calcium 8.5 8.5 - 10.1 mg/dL    Bilirubin Total 0.8 0.2 - 1.3 mg/dL    Albumin 4.1 3.4 - 5.0 g/dL    Protein Total 7.6 6.8 - 8.8 g/dL    Alkaline Phosphatase 51 40 - 150 U/L    ALT 20 0 - 70 U/L    AST 12 0 - 45 U/L   Drug abuse screen 6 urine (tox)    Collection Time: 06/25/20  1:46 AM   Result Value Ref Range    Amphetamine Qual Urine Negative NEG^Negative    Barbiturates Qual Urine Negative NEG^Negative    Benzodiazepine Qual Urine Negative NEG^Negative    Cannabinoids Qual Urine Negative NEG^Negative    Cocaine Qual Urine Negative NEG^Negative    Ethanol Qual Urine Negative NEG^Negative    Opiates Qualitative Urine Negative NEG^Negative   CBC with platelets    Collection Time: 06/25/20  8:19 AM   Result Value Ref Range    WBC 13.1 (H) 4.0 - 11.0 10e9/L    RBC Count 5.46 4.4 - 5.9 10e12/L    Hemoglobin 16.5 13.3 - 17.7 g/dL    Hematocrit 48.6 40.0 - 53.0 %    MCV 89 78 - 100 fl    MCH 30.2 26.5 - 33.0 pg    MCHC 34.0 31.5 - 36.5 g/dL     RDW 12.7 10.0 - 15.0 %    Platelet Count 263 150 - 450 10e9/L   UA with Microscopic    Collection Time: 06/25/20  8:15 PM   Result Value Ref Range    Color Urine Yellow     Appearance Urine Clear     Glucose Urine Negative NEG^Negative mg/dL    Bilirubin Urine Negative NEG^Negative    Ketones Urine 5 (A) NEG^Negative mg/dL    Specific Gravity Urine 1.025 1.003 - 1.035    Blood Urine Negative NEG^Negative    pH Urine 5.0 5.0 - 7.0 pH    Protein Albumin Urine 10 (A) NEG^Negative mg/dL    Urobilinogen mg/dL 2.0 0.0 - 2.0 mg/dL    Nitrite Urine Negative NEG^Negative    Leukocyte Esterase Urine Negative NEG^Negative    Source Urine     WBC Urine <1 0 - 5 /HPF    RBC Urine 1 0 - 2 /HPF    Squamous Epithelial /HPF Urine 1 0 - 1 /HPF    Mucous Urine Present (A) NEG^Negative /LPF    Hyaline Casts 4 (H) 0 - 2 /LPF   TSH with free T4 reflex and/or T3 as indicated    Collection Time: 06/26/20  7:32 AM   Result Value Ref Range    TSH 4.55 (H) 0.40 - 4.00 mU/L   Folate    Collection Time: 06/26/20  7:32 AM   Result Value Ref Range    Folate 15.6 >5.4 ng/mL   Vitamin B12    Collection Time: 06/26/20  7:32 AM   Result Value Ref Range    Vitamin B12 1,214 (H) 193 - 986 pg/mL   Vitamin D    Collection Time: 06/26/20  7:32 AM   Result Value Ref Range    Vitamin D Deficiency screening 26 20 - 75 ug/L   CBC with platelets    Collection Time: 06/26/20  7:32 AM   Result Value Ref Range    WBC 8.1 4.0 - 11.0 10e9/L    RBC Count 5.31 4.4 - 5.9 10e12/L    Hemoglobin 16.7 13.3 - 17.7 g/dL    Hematocrit 48.3 40.0 - 53.0 %    MCV 91 78 - 100 fl    MCH 31.5 26.5 - 33.0 pg    MCHC 34.6 31.5 - 36.5 g/dL    RDW 13.0 10.0 - 15.0 %    Platelet Count 240 150 - 450 10e9/L   T4 free    Collection Time: 06/26/20  7:32 AM   Result Value Ref Range    T4 Free 1.09 0.76 - 1.46 ng/dL   Hemoglobin A1c    Collection Time: 06/26/20  7:32 AM   Result Value Ref Range    Hemoglobin A1C 5.1 0 - 5.6 %            Psychiatric Examination:   Temp: 98.2  F (36.8  C)  "Temp src: Oral       Resp: 16 SpO2: 98 % O2 Device: None (Room air)    Weight is 201 lbs 14.4 oz  Body mass index is 29.82 kg/m .    Mental Status Exam  Appearance:  awake, alert, neatly groomed and dressed in scrubs  Attitude:  pleasant, cooperative  Eye Contact: Normal intensity  Mood:  labile,depressed  Affect:  mid range  Speech:  clear, coherent,   Psychomotor Behavior:  no evidence of tardive dyskinesia, dystonia, or tics and intact gait and muscle tone  Thought Process: Linear and goal directed, much organized  Associations:  no looseness of associations  Thought Content:  no evidence of suicidal ideation or homicidal ideation, no evidence of psychotic thought  Insight: fair  Judgment:  Fair  Oriented to:  time, person, and place  Attention Span and Concentration:  intact  Recent and Remote Memory:  intact  Language: Intact  Fund of Knowledge: Normal  Muscle Strength and Tone: normal            Precautions:     Behavioral Orders   Procedures     Code 1 - Restrict to Unit     Routine Programming     As clinically indicated     Single Room     Status 15     Every 15 minutes.     Suicide precautions     Patients on Suicide Precautions should have a Combination Diet ordered that includes a Diet selection(s) AND a Behavioral Tray selection for Safe Tray - with utensils, or Safe Tray - NO utensils            DIagnoses:   1.  Bipolar II disorder currently depressed, severe, without psychosis.   2.  Cluster B traits  3.  Gender dysphoria  4.  ADHD  5.  Medical problems include obstructive sleep apnea, hypothyroidism, leukocytosis, hearing loss, hyperlipidemia, hypertension.         Plan:   The patient is a very pleasant,  transgender male to female individual who was admitted with increased depression, anxiety, and suicidal ideation with a drive her car off a bridge.  Currently denies suicidal ideation.  The goal for the hospitalization is \"to go back to the real world\".  Discussed medication changes.  The " patient does not want to retry lithium as she worries about visual problems that she had in the past, she does not want to increase the dose of the Wellbutrin.  Considering the suicidal ideation with Effexor, antidepressants needs to be prescribed at lower doses and increase slowly.  Medication changes will include the following:     --Discontinue Cymbalta. The patient is more agitated with it.   --Start Lamictal 25 mg every morning.  Patient is aware of Santos-Gilles syndrome.  She will let the staff know if rash appear.  --Start Lithium 300 mg at bedtime.  The patient reported nystagmus with 900 mg.  --Change magnesium to PRN   --Start Gabapentin 200-300 mg TID PRN  --Continue Wellbutrin 150 mg every morning.    --Continue Guanfacine 2 mg at bedtime.    -- Medications will include hydroxyzine, Zyprexa, gabapentin, and trazodone.    --Blood work was reviewed.    --Internal medicine follow-up for medical problems.    --The patient was consulted on nature of illness and treatment options.   --Care was coordinated with the treatment      Disposition Plan   Reason for ongoing admission: is unable to care for self due to depression and anxiety  Disposition: TBD  Estimated length of stay: 5 to 7 days  Legal Status: Voluntary  Discharge will be granted once symptoms improved.    Acosta COCHRAN CNP  Date: 07/02/20  Time: 1:02 PM      More than 30 minutes were spent for assessment, documentation, and coordination of care.       This note was created with the help of Dragon dictation system. All grammatical/typing errors or context distortion are unintentional and inherent to software.

## 2020-07-02 NOTE — PROGRESS NOTES
"Pt present in milieu for majority of evening and attended evening art therapy. Pt said she had a relatively good evening, however did report a period of \"sadness\". Pt had a conversation with her boss which went well, and also spoke with her two sons and wife, and said the conversation went \"as well as could be expected\". Denied SI/SIB.           07/01/20 2200   Behavioral Health   Hallucinations denies / not responding to hallucinations   Thinking intact   Orientation time: oriented;date: oriented;place: oriented;person: oriented   Memory baseline memory   Insight admits / accepts   Judgement impaired   Eye Contact at examiner   Affect full range affect   Mood anxious;mood is calm   Physical Appearance/Attire attire appropriate to age and situation   Hygiene well groomed   1. Wish to be Dead (Recent) No   2. Non-Specific Active Suicidal Thoughts (Recent) No   Elopement   (no concerns)   Activity   (social)   Speech clear;coherent   Medication Sensitivity no stated side effects   Psychomotor / Gait balanced;steady   Activities of Daily Living   Hygiene/Grooming independent   Oral Hygiene independent   Dress scrubs (behavioral health)   Laundry with supervision   Room Organization independent     "

## 2020-07-02 NOTE — PLAN OF CARE
"  Problem: Suicidal Behavior  Goal: Suicidal Behavior is Absent or Managed  Outcome: Improving  Note: Patient denied suicidal thoughts or wishing to be dead. She has plans for her future and is hopeful with resolving life problems.      Problem: Depressive Symptoms  Goal: Depressive Symptoms  Description: Signs and symptoms of listed problems will be absent or manageable.  Outcome: Improving  Note: Patient reported improvement with depression and anxiety after she had a \"heart to heart conversation\" with her family. Said she needed to make some difficult life decisions with  his wife of 39 yrs and sell the house. Patient said he talked with his boss yesterday; said he is supportive and patient is not worried about his job situation.   Patient's main concerns: night sleep and getting woken up from the flashlight during safety night checks. Team notes updated with this concern.   Patient's request: To use his phone for checking emails and using Internet for living situation related issues (new roommate).  Will talk with provider for an order/authorization.  Patient's goal for the day: To get information about his short term disability insurance coverage today.     Patient takes medications as schedule, he said he has been experiencing dizziness that is improving, he contributed this to already discontinued Cymbalta. Denied all other side effects or concerns. Said he feels better when taking Lamictal and Lithium.    Thoughts are linear, logical, and future oriented. Patient is happy about the possibility of IOP at [facility], still waiting to hear more details about dates and times of therapy. Speech is fluent and organized. Mood is bright with full range of emotions. Patient keeps self clean and well groomed. No behavioral issues exhibited or anticipated.        "

## 2020-07-02 NOTE — PROGRESS NOTES
"Pt participated enthusiastically in dance/movement therapy (DMT) with group themes of vitalization, embodied imagery, creative personal expression and social connection.  Verticality and self-determination led a range of gentle movements exploring \"getting through the storm,\" hope, resilience and planting our own flowers.  She initiated many of the useful images of power, growth, and restoration.  She celebrated her 8-yr-old girl self as when she realized her gender identity and its usefulness for eliminating much of her SI for so much of her life.  She demonstrated perspective as this being a \"rough patch\" for her, but hope she will get through.  Her dance was joyful, self-and-other-affirming, organized, and balanced.         07/02/20 1130   Dance Movement Therapy   Type of Intervention structured groups   Response participates, initiates socially appropriate   Hours 1     "

## 2020-07-02 NOTE — PLAN OF CARE
Problem: OT General Care Plan  Goal: OT Goal 1  Description: Will develop insightful ideas for coping strategies and identify symptoms of when using them would be beneficial.      Note: Attended 2 of 2 OT groups. She was social while working on a creative and more complex task she designed and planned. Pleasant with others. She participated in an activity in the afternoon focused on identifying helpful ideas for calming using the 5 senses, and practicing a grounding technique with this focus. She elaborated on ideas, offered creative answers and appeared attentive and involved. She participated in an exercise on Laughing, was quick to join in and active in her participation in this exercise. She stated the grounding technique to be helpful and one she could utilize.

## 2020-07-02 NOTE — PROGRESS NOTES
" 07/01/20 2200   Art Therapy   Type of Intervention structured groups   Response participates with  encouragement    Hours 1   Treatment Detail    (Art Therapy) strengths trees    Art Therapy Goal-to cope, express, contribute, regulate and sublimate emotions through the creative arts process and Art Therapy directives within a group setting.     Outcome- pt was pleasant, cooperative and very engaged. Pt said strengths are: creativity and she is working on kindness. She made a joke about liking her \"curls for a girl.\" She was contemplative about bi polar diagnosis and next steps for [facility]. She said family call was good but at times \" prickly.\" She enjoys experimenting with new art materials and finds Art Therapy therapuetic for developing self awareness. Pt was kind to peers.  "

## 2020-07-03 PROCEDURE — 25000132 ZZH RX MED GY IP 250 OP 250 PS 637: Performed by: PHYSICIAN ASSISTANT

## 2020-07-03 PROCEDURE — 99232 SBSQ HOSP IP/OBS MODERATE 35: CPT | Performed by: NURSE PRACTITIONER

## 2020-07-03 PROCEDURE — 99207 ZZC CDG-MDM COMPONENT: MEETS LOW - DOWN CODED: CPT | Performed by: NURSE PRACTITIONER

## 2020-07-03 PROCEDURE — 25000132 ZZH RX MED GY IP 250 OP 250 PS 637: Performed by: NURSE PRACTITIONER

## 2020-07-03 PROCEDURE — 12400002 ZZH R&B MH SENIOR/ADOLESCENT

## 2020-07-03 PROCEDURE — 25000132 ZZH RX MED GY IP 250 OP 250 PS 637: Performed by: PSYCHIATRY & NEUROLOGY

## 2020-07-03 PROCEDURE — H2032 ACTIVITY THERAPY, PER 15 MIN: HCPCS

## 2020-07-03 PROCEDURE — G0177 OPPS/PHP; TRAIN & EDUC SERV: HCPCS

## 2020-07-03 RX ORDER — OLANZAPINE 2.5 MG/1
2.5-5 TABLET, FILM COATED ORAL AT BEDTIME
Status: DISCONTINUED | OUTPATIENT
Start: 2020-07-03 | End: 2020-07-07 | Stop reason: HOSPADM

## 2020-07-03 RX ADMIN — SPIRONOLACTONE 25 MG: 25 TABLET ORAL at 08:02

## 2020-07-03 RX ADMIN — LITHIUM CARBONATE 300 MG: 300 TABLET, EXTENDED RELEASE ORAL at 22:25

## 2020-07-03 RX ADMIN — OLANZAPINE 2.5 MG: 2.5 TABLET, FILM COATED ORAL at 22:25

## 2020-07-03 RX ADMIN — LAMOTRIGINE 25 MG: 25 TABLET ORAL at 08:02

## 2020-07-03 RX ADMIN — BUPROPION HYDROCHLORIDE 150 MG: 150 TABLET, EXTENDED RELEASE ORAL at 08:02

## 2020-07-03 RX ADMIN — GUANFACINE 2 MG: 2 TABLET, EXTENDED RELEASE ORAL at 22:25

## 2020-07-03 RX ADMIN — MELATONIN 50 MCG: at 08:02

## 2020-07-03 RX ADMIN — LEVOTHYROXINE SODIUM 75 MCG: 75 TABLET ORAL at 08:02

## 2020-07-03 ASSESSMENT — ACTIVITIES OF DAILY LIVING (ADL)
DRESS: INDEPENDENT
DRESS: INDEPENDENT
LAUNDRY: WITH SUPERVISION
HYGIENE/GROOMING: INDEPENDENT
LAUNDRY: UNABLE TO COMPLETE
ORAL_HYGIENE: INDEPENDENT
HYGIENE/GROOMING: INDEPENDENT
ORAL_HYGIENE: INDEPENDENT

## 2020-07-03 NOTE — PROGRESS NOTES
Inpatient Diagnostic Assessment order acknowledged.  Patient is anticipated to be seen on 07.06 or possibly sooner if schedule allows.    Qian Hoffman, LP

## 2020-07-03 NOTE — PLAN OF CARE
"  Problem: OT General Care Plan  Goal: OT Goal 1  Description: Will develop insightful ideas for coping strategies and identify symptoms of when using them would be beneficial.      Note: Attended 2 of 2 OT groups. Work was creative, well planned and organized on a multi step task. She participated in an activity focused on Distorted Thinking. She offered clear examples, appeared engaged. At the end of the session, she was tearful, took time to calm and stated \"I just need a good cry\". She continues to appear interested and attentive, supportive in comments on topic.     "

## 2020-07-03 NOTE — PROGRESS NOTES
"   07/02/20 2000   Music Therapy   Type of Participation Music therapy group   Response Participates independently   Hours 0.5   Participated in a Tonebar Chimes choir with peers where each patient held two tonebar chimes and played melodies, harmonies, and improvised music together.  Goals of group were group cohension and cooperation in making music together, attentiveness to group process and validation of personhood (each person having an important role was discussed).  Initially MT led patients in playing a scale together. Then MT led the choir in various familiar songs (Ode to Coleen, etc.).  The patients were highly responsive, attentive and showed great enjoyment evidenced by their facial expressions and comments.  Next, MT invited patients to take turns \"conducting\" the group, where they would direct which patient when to play their tonebar chime.  This was also received a very positive response. Lastly, MT invited the group to play as a group in improvised fashion, taking cues from each other and the music as to when to play.  This encouraged creativity, independence and group effort. The patients went and got staff to do a brief \"performance\" of Ode to Coleen and their improvisation at the end of session.   After collecting the instruments, the group sat in [place] and discussed what the received from the intervention.     Liz was a strong group member, adding drumming at times, and taking her role as \"conductor\" when it was her turn very seriously, but also showing understanding for others in group who may not be able to drum, for example, exactly to the beat she had in mind.  She showed flexibiltiy, cooperation and gratitude in group.      After group was finished, she stated \"I sure would like to stay and play on this drum some more.\"  MT invited Liz to play on the drum while she sanitized the instruments and did a brief check in as to how things are going for her.  She was mostly positive, stating 'I am " "learning how to ask for help.\", and described having asked for help from her family recently.  Her affect was pleasant and earnest.    "

## 2020-07-03 NOTE — CONSULTS
Inpatient Diagnostic Assessment order acknowledged.  Patient is anticipated to be seen on 07.06.20 or possibly sooner if schedule allows.    Qian Hoffman, LP

## 2020-07-03 NOTE — PROGRESS NOTES
Received call from Madyson at [facility] who reports that they cannot accept pt to their program.  They feel that pt needs a higher level of care. Informed pt who was upset.  Provided contact information for [facility]. Pt requested times and information about other programs in the area. Informed pt that writer could find out details about a specific program and relay that to her, however, cannot answer questions about all the programs available.  Provided pt with information for PHP at this facility.

## 2020-07-03 NOTE — PROGRESS NOTES
Client will be interviewed for 55+ program on Monday July 6th between 130 and 2PM.  W will call the unit.

## 2020-07-03 NOTE — PLAN OF CARE
"Pt is active in the milieu. Calm, pleasant, social to staff. Pt endorses mood \" improved\", affect is full range, brighter. Likewise endorses anxiety at 4/10 , \"depression, better\", \"medications are working\".  Pt denies SI,SIB,HI nor any hallucinations. Attends and participates in groups. Slept for 5 hrs. \"Appetite is fair. \"  "

## 2020-07-03 NOTE — PROGRESS NOTES
[facility], [place]   Psychiatric Progress Note        Interim History:   From H&P: Admitted with depression, anxiety, and suicidal ideation with a plan to jump off a bridge.    The patient's care was discussed with the treatment team during the daily team meeting and/or staff's chart notes were reviewed.  Staff report patient has been calm, pleasant, cooperative.  No behavioral issues.  No longer having multiple requests.  Patient is attending groups and participating appropriately. She is eating well and taking medications as prescribed.  She is bright and social. Minimal depression and anxiety. Main concern is sleep.  Slept 5 hours.     Met with patient.  She is feeling better.  Depression and anxiety are rated as moderate.  Denies suicidal ideation.  States she did not sleep is much last night however, her sleep is restful.  Discussed PRN medications including gabapentin, trazodone, and Zyprexa that she can use.  States that trazodone does not do anything for her.  She was agreeable to start a small dose of Zyprexa at bedtime, instead of increasing lithium.  States that this morning she was anxious and tearful because of misunderstanding with some of the staff.  She is feeling much better now.  Discussed discharge planning.  The patient would like to attend partial hospitalization program for 1 or 2 weeks and transferred to the outpatient program at [facility] [place].  .    Telemedicine Visit: The patient's condition can be safely assessed and treated via synchronous audio and visual telemedicine encounter.       Start time: 1130  Stop time: 1145     Reason for Telemedicine Visit: Covid-19     Originating Site (Patient Location): [facility]     Distant Site (Provider Location) [place]  private office     Consent:  The patient/guardian has verbally consented to: the potential risks and benefits of telemedicine (video visit) versus in person care; bill my insurance or make self-payment for  services provided; and responsibility for payment of non-covered services.      Mode of Communication:  Video Conference via Skype     As the provider I attest to compliance with applicable laws and regulations related to telemedicine.          Medications:       buPROPion  150 mg Oral QAM     estradiol  1 patch Transdermal Q3 Days     estradiol  2 patch Transdermal Q3 Days     estradiol biweekly   Transdermal Q8H     guanFACINE  2 mg Oral At Bedtime     lamoTRIgine  25 mg Oral Daily     levothyroxine  75 mcg Oral Daily     lithium ER  300 mg Oral At Bedtime     spironolactone  25 mg Oral Daily     cholecalciferol  50 mcg Oral Daily          Allergies:     Allergies   Allergen Reactions     Finasteride      Mental health problems     Tetracycline Unknown     Doxycycline Rash          Labs:     Recent Results (from the past 672 hour(s))   Asymptomatic COVID-19 Virus (Coronavirus) by PCR    Collection Time: 06/25/20 12:51 AM    Specimen: Nasopharyngeal   Result Value Ref Range    COVID-19 Virus PCR to [facility] - Source Nasopharyngeal     COVID-19 Virus PCR to [facility] - Result       Test received-See reflex to IDDL test SARS CoV2 (COVID-19) Virus RT-PCR   SARS-CoV-2 COVID-19 Virus (Coronavirus) RT-PCR Nasopharyngeal    Collection Time: 06/25/20 12:51 AM    Specimen: Nasopharyngeal   Result Value Ref Range    SARS-CoV-2 Virus Specimen Source Nasopharyngeal     SARS-CoV-2 PCR Result NEGATIVE     SARS-CoV-2 PCR Comment       Testing was performed using the Simplexa COVID-19 Direct Assay on the [employer] MDX   instrument. Additional information about this Emergency Use Authorization (EUA) assay can   be found via the Lab Guide.     CBC with platelets differential    Collection Time: 06/25/20  1:17 AM   Result Value Ref Range    WBC 15.4 (H) 4.0 - 11.0 10e9/L    RBC Count 5.42 4.4 - 5.9 10e12/L    Hemoglobin 16.4 13.3 - 17.7 g/dL    Hematocrit 48.1 40.0 - 53.0 %    MCV 89 78 - 100 fl    MCH 30.3 26.5 - 33.0 pg    MCHC  34.1 31.5 - 36.5 g/dL    RDW 12.6 10.0 - 15.0 %    Platelet Count 246 150 - 450 10e9/L    Diff Method Automated Method     % Neutrophils 84.2 %    % Lymphocytes 9.1 %    % Monocytes 5.1 %    % Eosinophils 0.1 %    % Basophils 0.7 %    % Immature Granulocytes 0.8 %    Nucleated RBCs 0 0 /100    Absolute Neutrophil 13.0 (H) 1.6 - 8.3 10e9/L    Absolute Lymphocytes 1.4 0.8 - 5.3 10e9/L    Absolute Monocytes 0.8 0.0 - 1.3 10e9/L    Absolute Eosinophils 0.0 0.0 - 0.7 10e9/L    Absolute Basophils 0.1 0.0 - 0.2 10e9/L    Abs Immature Granulocytes 0.1 0 - 0.4 10e9/L    Absolute Nucleated RBC 0.0    Comprehensive metabolic panel    Collection Time: 06/25/20  1:17 AM   Result Value Ref Range    Sodium 138 133 - 144 mmol/L    Potassium 3.7 3.4 - 5.3 mmol/L    Chloride 107 94 - 109 mmol/L    Carbon Dioxide 23 20 - 32 mmol/L    Anion Gap 8 3 - 14 mmol/L    Glucose 117 (H) 70 - 99 mg/dL    Urea Nitrogen 15 7 - 30 mg/dL    Creatinine 0.76 0.66 - 1.25 mg/dL    GFR Estimate >90 >60 mL/min/[1.73_m2]    GFR Estimate If Black >90 >60 mL/min/[1.73_m2]    Calcium 8.5 8.5 - 10.1 mg/dL    Bilirubin Total 0.8 0.2 - 1.3 mg/dL    Albumin 4.1 3.4 - 5.0 g/dL    Protein Total 7.6 6.8 - 8.8 g/dL    Alkaline Phosphatase 51 40 - 150 U/L    ALT 20 0 - 70 U/L    AST 12 0 - 45 U/L   Drug abuse screen 6 urine (tox)    Collection Time: 06/25/20  1:46 AM   Result Value Ref Range    Amphetamine Qual Urine Negative NEG^Negative    Barbiturates Qual Urine Negative NEG^Negative    Benzodiazepine Qual Urine Negative NEG^Negative    Cannabinoids Qual Urine Negative NEG^Negative    Cocaine Qual Urine Negative NEG^Negative    Ethanol Qual Urine Negative NEG^Negative    Opiates Qualitative Urine Negative NEG^Negative   CBC with platelets    Collection Time: 06/25/20  8:19 AM   Result Value Ref Range    WBC 13.1 (H) 4.0 - 11.0 10e9/L    RBC Count 5.46 4.4 - 5.9 10e12/L    Hemoglobin 16.5 13.3 - 17.7 g/dL    Hematocrit 48.6 40.0 - 53.0 %    MCV 89 78 - 100 fl     MCH 30.2 26.5 - 33.0 pg    MCHC 34.0 31.5 - 36.5 g/dL    RDW 12.7 10.0 - 15.0 %    Platelet Count 263 150 - 450 10e9/L   UA with Microscopic    Collection Time: 06/25/20  8:15 PM   Result Value Ref Range    Color Urine Yellow     Appearance Urine Clear     Glucose Urine Negative NEG^Negative mg/dL    Bilirubin Urine Negative NEG^Negative    Ketones Urine 5 (A) NEG^Negative mg/dL    Specific Gravity Urine 1.025 1.003 - 1.035    Blood Urine Negative NEG^Negative    pH Urine 5.0 5.0 - 7.0 pH    Protein Albumin Urine 10 (A) NEG^Negative mg/dL    Urobilinogen mg/dL 2.0 0.0 - 2.0 mg/dL    Nitrite Urine Negative NEG^Negative    Leukocyte Esterase Urine Negative NEG^Negative    Source Urine     WBC Urine <1 0 - 5 /HPF    RBC Urine 1 0 - 2 /HPF    Squamous Epithelial /HPF Urine 1 0 - 1 /HPF    Mucous Urine Present (A) NEG^Negative /LPF    Hyaline Casts 4 (H) 0 - 2 /LPF   TSH with free T4 reflex and/or T3 as indicated    Collection Time: 06/26/20  7:32 AM   Result Value Ref Range    TSH 4.55 (H) 0.40 - 4.00 mU/L   Folate    Collection Time: 06/26/20  7:32 AM   Result Value Ref Range    Folate 15.6 >5.4 ng/mL   Vitamin B12    Collection Time: 06/26/20  7:32 AM   Result Value Ref Range    Vitamin B12 1,214 (H) 193 - 986 pg/mL   Vitamin D    Collection Time: 06/26/20  7:32 AM   Result Value Ref Range    Vitamin D Deficiency screening 26 20 - 75 ug/L   CBC with platelets    Collection Time: 06/26/20  7:32 AM   Result Value Ref Range    WBC 8.1 4.0 - 11.0 10e9/L    RBC Count 5.31 4.4 - 5.9 10e12/L    Hemoglobin 16.7 13.3 - 17.7 g/dL    Hematocrit 48.3 40.0 - 53.0 %    MCV 91 78 - 100 fl    MCH 31.5 26.5 - 33.0 pg    MCHC 34.6 31.5 - 36.5 g/dL    RDW 13.0 10.0 - 15.0 %    Platelet Count 240 150 - 450 10e9/L   T4 free    Collection Time: 06/26/20  7:32 AM   Result Value Ref Range    T4 Free 1.09 0.76 - 1.46 ng/dL   Hemoglobin A1c    Collection Time: 06/26/20  7:32 AM   Result Value Ref Range    Hemoglobin A1C 5.1 0 - 5.6 %             Psychiatric Examination:   Temp: 97.9  F (36.6  C) Temp src: Tympanic BP: 116/82 Pulse: 60   Resp: 16 SpO2: 97 % O2 Device: None (Room air)    Weight is 201 lbs 14.4 oz  Body mass index is 29.82 kg/m .    Mental Status Exam  Appearance:  awake, alert, neatly groomed and dressed in scrubs  Attitude:  pleasant, cooperative  Eye Contact: Normal intensity  Mood:  labile,depressed  Affect:  mid range  Speech:  clear, coherent,   Psychomotor Behavior:  no evidence of tardive dyskinesia, dystonia, or tics and intact gait and muscle tone  Thought Process: Linear and goal directed, much organized  Associations:  no looseness of associations  Thought Content:  no evidence of suicidal ideation or homicidal ideation, no evidence of psychotic thought  Insight: fair  Judgment:  Fair  Oriented to:  time, person, and place  Attention Span and Concentration:  intact  Recent and Remote Memory:  intact  Language: Intact  Fund of Knowledge: Normal  Muscle Strength and Tone: normal            Precautions:     Behavioral Orders   Procedures     Code 1 - Restrict to Unit     Routine Programming     As clinically indicated     Single Room     Status 15     Every 15 minutes.     Suicide precautions     Patients on Suicide Precautions should have a Combination Diet ordered that includes a Diet selection(s) AND a Behavioral Tray selection for Safe Tray - with utensils, or Safe Tray - NO utensils            DIagnoses:   1.  Bipolar II disorder currently depressed, severe, without psychosis.   2.  Cluster B traits  3.  Gender dysphoria  4.  ADHD  5.  Medical problems include obstructive sleep apnea, hypothyroidism, leukocytosis, hearing loss, hyperlipidemia, hypertension.         Plan:   The patient is a very pleasant,  transgender male to female individual who was admitted with increased depression, anxiety, and suicidal ideation with a drive her car off a bridge.  Currently denies suicidal ideation.  The goal for the hospitalization  "is \"to go back to the real world\".  Discussed medication changes.  The patient does not want to retry lithium as she worries about visual problems that she had in the past, she does not want to increase the dose of the Wellbutrin.  Considering the suicidal ideation with Effexor, antidepressants needs to be prescribed at lower doses and increase slowly.  Medication changes will include the following:     --Discontinue Cymbalta. The patient is more agitated with it.   --Start Lamictal 25 mg every morning.  Patient is aware of Santos-Gilles syndrome.  She will let the staff know if rash appear.  --Start Lithium 300 mg at bedtime.  The patient reported nystagmus with 900 mg.  --Change magnesium to PRN   --Start Gabapentin 200-300 mg TID PRN  --Continue Wellbutrin 150 mg every morning.    --Continue Guanfacine 2 mg at bedtime.    --Start Zyprexa 2.5 mg at bedtime.  -- Medications will include hydroxyzine, Zyprexa, gabapentin, and trazodone.    --Blood work was reviewed.    --Internal medicine follow-up for medical problems.    --The patient was consulted on nature of illness and treatment options.   --Care was coordinated with the treatment      Disposition Plan   Reason for ongoing admission: is unable to care for self due to depression and anxiety  Disposition: TBD  Estimated length of stay: 5 to 7 days  Legal Status: Voluntary  Discharge will be granted once symptoms improved.    Acosta COCHRAN CNP  Date: 07/03/20  Time: 1:57 PM      More than 30 minutes were spent for assessment, documentation, and coordination of care.       This note was created with the help of Dragon dictation system. All grammatical/typing errors or context distortion are unintentional and inherent to software.      "

## 2020-07-03 NOTE — PROGRESS NOTES
Pt was active in the milieu most of the shift. Pt is pleasant, cooperative and mood is calm. Pt was out for meals and reports good appetite. Pt has complaints about not sleeping well, in part due to the bright flashlight during room checks. Pt particularly enjoyed attending and participating in groups this evenings and reports that has positively affected her mood and also feels that medication are also going into affect. Pt admits feeling mildly anxious and depressed but bearable overall. Pt denied SI/SIB and reports no other concerns.         07/02/20 2100   Behavioral Health   Hallucinations denies / not responding to hallucinations   Thinking intact   Orientation person: oriented;place: oriented   Memory baseline memory   Insight insight appropriate to situation   Judgement intact   Eye Contact at examiner   Affect full range affect   Mood mood is calm   Physical Appearance/Attire attire appropriate to age and situation   Hygiene well groomed   Suicidality   (pt denies)   1. Wish to be Dead (Recent) No   2. Non-Specific Active Suicidal Thoughts (Recent) No   Elopement   (None)   Activity   (Active in the milieu )   Speech coherent;clear   Medication Sensitivity no observed side effects;no stated side effects   Psychomotor / Gait steady   Psycho Education   Type of Intervention 1:1 intervention   Response participates, initiates socially appropriate   Hours 0.5   Activities of Daily Living   Hygiene/Grooming independent   Oral Hygiene independent   Dress independent;scrubs (behavioral health)   Laundry unable to complete   Room Organization independent   Activity   Activity Assistance Provided independent

## 2020-07-04 PROCEDURE — 93005 ELECTROCARDIOGRAM TRACING: CPT

## 2020-07-04 PROCEDURE — 12400002 ZZH R&B MH SENIOR/ADOLESCENT

## 2020-07-04 PROCEDURE — 25000132 ZZH RX MED GY IP 250 OP 250 PS 637: Performed by: NURSE PRACTITIONER

## 2020-07-04 PROCEDURE — 25000132 ZZH RX MED GY IP 250 OP 250 PS 637: Performed by: PSYCHIATRY & NEUROLOGY

## 2020-07-04 PROCEDURE — H2032 ACTIVITY THERAPY, PER 15 MIN: HCPCS

## 2020-07-04 PROCEDURE — 25000132 ZZH RX MED GY IP 250 OP 250 PS 637: Performed by: PHYSICIAN ASSISTANT

## 2020-07-04 RX ADMIN — LEVOTHYROXINE SODIUM 75 MCG: 75 TABLET ORAL at 08:36

## 2020-07-04 RX ADMIN — MELATONIN 50 MCG: at 08:36

## 2020-07-04 RX ADMIN — BUPROPION HYDROCHLORIDE 150 MG: 150 TABLET, EXTENDED RELEASE ORAL at 08:36

## 2020-07-04 RX ADMIN — GUANFACINE 2 MG: 2 TABLET, EXTENDED RELEASE ORAL at 22:27

## 2020-07-04 RX ADMIN — SPIRONOLACTONE 25 MG: 25 TABLET ORAL at 08:36

## 2020-07-04 RX ADMIN — ESTRADIOL 1 PATCH: 0.1 PATCH, EXTENDED RELEASE TRANSDERMAL at 08:37

## 2020-07-04 RX ADMIN — LAMOTRIGINE 25 MG: 25 TABLET ORAL at 08:35

## 2020-07-04 RX ADMIN — OLANZAPINE 2.5 MG: 2.5 TABLET, FILM COATED ORAL at 22:27

## 2020-07-04 RX ADMIN — LITHIUM CARBONATE 300 MG: 300 TABLET, EXTENDED RELEASE ORAL at 22:27

## 2020-07-04 ASSESSMENT — ACTIVITIES OF DAILY LIVING (ADL)
ORAL_HYGIENE: INDEPENDENT
HYGIENE/GROOMING: INDEPENDENT
HYGIENE/GROOMING: INDEPENDENT
ORAL_HYGIENE: INDEPENDENT
LAUNDRY: WITH SUPERVISION
DRESS: INDEPENDENT
LAUNDRY: WITH SUPERVISION
DRESS: INDEPENDENT

## 2020-07-04 NOTE — PROGRESS NOTES
"Brief Medicine Note    Contacted by nursing regarding heart rate in the 50s this morning. Patient asymptomatic.     Today's vital signs, medications, and nursing notes were reviewed.  EKG: Sinus bradycardia at 54 bpm. Normal axis. QTc 386. Q wave in V1-V2. No ST elevations or depressions. No prior EKGs for comparison.     /82   Pulse 60   Temp 98.4  F (36.9  C) (Oral)   Resp 16   Ht 1.753 m (5' 9\")   Wt 91.6 kg (201 lb 14.4 oz)   SpO2 100%   BMI 29.82 kg/m        A/P:  #Bradycardia   Appears baseline heart rate has been consistently in the 50-60s. Asymptomatic. Recently started on lithium which can cause bradycardia and in rare cases Brugada syndrome. EKG ordered and I reviewed personally which is in Sinus bradycardia and no signs of Brugada.     -Please notify medicine if patient HR <50 or become symptomatic with dizziness, lightheadedness or syncope.     ADDENDUM: Was notified by nursing again later in the afternoon at 2:57PM that patient was feeling lightheaded after getting EKG. Orthostatic VS normal this morning. Nursing reported told patient to drink some water. Recommended repeating VS. Reassured that EKG looked normal. Nursing aware to contact covering MD if hemodynamically unstable or if symptoms worsen. If patient has persistent symptoms will see in AM.     Radha Mejias PA-C  Hospitalist Service  Pager [phone]    "

## 2020-07-04 NOTE — PLAN OF CARE
"Pt is active in the milieu. Pleasant, polite , social to few peers and staff. Compliant to medications. HR of 52 this morning, asymptomatic , Radha Ackerman PA-C aware. Pt mood is good. Affect is full range. Endorses \" fleeting thoughts of SI , no plans, contracts for  safety.  Pt voiced concern about flash light at night time and not able to go back to sleep easily.     EKG ordered. Pt is aware of EKG.    EKG done. Pt just told writer that she was some lightheadedness today. Radha Ackerman  Notified via text pager about EKG and above mentioned lightheadedness. Will continue to monitor. .  "

## 2020-07-04 NOTE — PLAN OF CARE
Problem: Depressive Symptoms  Goal: Depressive Symptoms  Description: Signs and symptoms of listed problems will be absent or manageable.  Outcome: Improving  Note: Patient reported feeling better. She is active with setting up her plans for after discharge. She said she called [facility] and found out that they have open spots for their 3 time per week, few hrs each program, which would have been not sufficient for patient's needs at this time. He was happy to say that he will be going to [facility] after discharge.   Thoughts are clear and logical, speech is fluent and organized. Mood is with full range of emotions and appropriate to events and situation. Patient keep self clean and well groomed. Communicated needs well. Denied pain and all other physical issues at this time. Patient takes medications as scheduled, denied side effects, none assessed.   Patient still reported decreased amount of sleep at night. Said he only slept 5 hrs again last night. He has scheduled Zyprexa 2.5 - 65 mg at HS. Patient is informed about it.

## 2020-07-05 PROCEDURE — 25000132 ZZH RX MED GY IP 250 OP 250 PS 637: Performed by: NURSE PRACTITIONER

## 2020-07-05 PROCEDURE — 12400002 ZZH R&B MH SENIOR/ADOLESCENT

## 2020-07-05 PROCEDURE — 99232 SBSQ HOSP IP/OBS MODERATE 35: CPT | Performed by: PHYSICIAN ASSISTANT

## 2020-07-05 PROCEDURE — 25000132 ZZH RX MED GY IP 250 OP 250 PS 637: Performed by: PHYSICIAN ASSISTANT

## 2020-07-05 PROCEDURE — 25000132 ZZH RX MED GY IP 250 OP 250 PS 637: Performed by: PSYCHIATRY & NEUROLOGY

## 2020-07-05 RX ORDER — LANOLIN ALCOHOL/MO/W.PET/CERES
3-6 CREAM (GRAM) TOPICAL
Status: DISCONTINUED | OUTPATIENT
Start: 2020-07-05 | End: 2020-07-07 | Stop reason: HOSPADM

## 2020-07-05 RX ADMIN — LEVOTHYROXINE SODIUM 75 MCG: 75 TABLET ORAL at 08:50

## 2020-07-05 RX ADMIN — LAMOTRIGINE 25 MG: 25 TABLET ORAL at 08:50

## 2020-07-05 RX ADMIN — ESTRADIOL 2 PATCH: 0.1 PATCH TRANSDERMAL at 08:49

## 2020-07-05 RX ADMIN — SPIRONOLACTONE 25 MG: 25 TABLET ORAL at 08:50

## 2020-07-05 RX ADMIN — MELATONIN 50 MCG: at 08:50

## 2020-07-05 RX ADMIN — MELATONIN TAB 3 MG 3 MG: 3 TAB at 22:19

## 2020-07-05 RX ADMIN — LITHIUM CARBONATE 300 MG: 300 TABLET, EXTENDED RELEASE ORAL at 22:19

## 2020-07-05 RX ADMIN — BUPROPION HYDROCHLORIDE 150 MG: 150 TABLET, EXTENDED RELEASE ORAL at 08:50

## 2020-07-05 RX ADMIN — GUANFACINE 2 MG: 2 TABLET, EXTENDED RELEASE ORAL at 22:19

## 2020-07-05 ASSESSMENT — ACTIVITIES OF DAILY LIVING (ADL)
DRESS: INDEPENDENT
DRESS: SCRUBS (BEHAVIORAL HEALTH)
LAUNDRY: WITH SUPERVISION
ORAL_HYGIENE: INDEPENDENT
HYGIENE/GROOMING: INDEPENDENT
ORAL_HYGIENE: INDEPENDENT
HYGIENE/GROOMING: INDEPENDENT

## 2020-07-05 ASSESSMENT — MIFFLIN-ST. JEOR: SCORE: 1716.65

## 2020-07-05 NOTE — PROGRESS NOTES
" 07/04/20 2200   Art Therapy   Type of Intervention structured groups   Response participates with  encouragement    Hours 1   Treatment Detail    (Art Therapy) freedom   Art Therapy Goal-to cope, express, contribute, regulate and sublimate emotions through the creative arts process and Art Therapy directives within a group setting.     Outcome- pt was pleasant, cooperative and quietly engaged.  She talked about freedom to be authentic in her identity and to have courage to leave the old life behind. She did a very thoughtful art piece About \"convergence\"; of leaving a family, becoming openly trans, [place] and [place] [employer] and [place] and the grieving of the death of Akil maxwell from where she lives. It was very thoughtful. She stayed after group to share more thoughts and ideas with writer. She has benefited from the groups, she likes art therapy and was very helped by a movement therapy group last week.     "

## 2020-07-05 NOTE — PLAN OF CARE
"  Problem: Depressive Symptoms  Goal: Depressive Symptoms  Description: Signs and symptoms of listed problems will be absent or manageable.  Outcome: Improving  Note: Patient reported feeling better. He has been working on discharge, including finding a new roommate, and filing for short term disability. Patient is happy about the possibility for PHP at . He participated in groups, was occupied with building a machine with [employer]. Patient had a phone conversation with his wife and said he feels good about it.   Thoughts are logical and linear. Speech is clear and organized. Mood was with full range of emotions and appropriate to situation. Patient keeps self well groomed and clean. Patient communicated needs well. He offered his ideas for making the unit more organized, and was given the opportunity to make it in written \"proposals\". Patient was happy that he was able to \"help\".      "

## 2020-07-05 NOTE — PROGRESS NOTES
"Subjective:   Chris Stockton is a 59 year old adult with a history of male to female transgender who prefers to be called Liz with \"she/hers\" pronouns with a history of anxiety and depression, ADHD, mild obstructive sleep apnea, HLD and sensorineural hearing loss who is admitted to [facility] with suicidal ideation. Medicine was asked to see patient in regards to dizziness, and EKG revealed Q waves with no history of prior CAD or MI.     Patient states she was feeling better today. Yesterday felt waves of lightheadedness intermittently which would improve with moving around. State dizziness resolved today. Denies any vertiginous symptoms. Denies any presyncope or dizziness with standing.     Patient denies any chest pain at rest or on exertion, SOB, or SUN. States she exercises regularly with biking 3 times a week with recent 1 hours session of dance therapy and denies any chest pain, SOB, pain radiating down arm or neck, or nausea. Denies any diaphoresis. States her father has a pacemaker for bradycardia and both parents have atrial fibrillation.     Objective:  /74   Pulse 51   Temp 97.3  F (36.3  C) (Oral)   Resp 16   Ht 1.753 m (5' 9\")   Wt 91.6 kg (202 lb)   SpO2 99%   BMI 29.83 kg/m      Vitals signs reviewed.     General: Alert, interactive, NAD  HEENT: Sclera anicteric, EOMI.   Neck: Supple, Tracheal midline.   Resp: CTA a/p, no crackles or wheezes.   Cardiac: S1, S2, Bradycardic rate and regular rhythm, no murmur  Abdomen: Soft, nontender, nondistended. +BS.   Extremities: No LE edema  Skin: Warm and dry. no generalized jaundice or acute rash. Vitiligo on hands.   Neuro: Alert & oriented. CN II-XII grossly intact. Steady gait.     Labs/diagnostics:   EKG: Sinus bradycardia at 54 bpm. Normal axis. QTc 386. Q wave in V1-V2. No ST elevations or depressions. No prior EKGs for comparison.    Assessment and plan:    #Dizziness, resolved   Reported dizziness yesterday. Denies any vertiginous " symptoms or presyncope. VSS. Orthostatic VS stable. Possibly medication side effect. Currently dizziness has resolved.   -Please notify medicine of any further concerning signs or symptoms, including hypotension or orthostatic VS     #Sinus bradycardia  Heart rate has been 50s-60s since admission. EKG in sinus bradycardia without any signs of heart block. Patient reported dizziness yesterday that would improve with movement, and has since resolved. Asymptomatic. Denies dizziness with standing and orthostatic VS stable. Denies pre-syncope.  Father does have a history of bradycardia requiring pacemaker. Patient reports regular cardiovascular exercise, and could have lower resting heart rate due to that.   -Please notify medicine if HR <50 or if symptomatic with dizziness, lightheadedness or syncope     #Q waves V1-V2, suggestive of old septal infarct   EKG ordered for bradycardia as noted above, which revealed Q waves in V1-V2. No prior EKG for comparison. Patient denies any Hx of MI. Denies any type of chest pain, typical or atypical anginal symptoms. ASCVD risk at 11/20/19 PCP visit was 9.4% and PCP recommend starting a statin at that time, but patient wanted to start with diet and exercise.   -Recommend follow up with PCP to discuss further risk stratification and work up with ?stress test     #Subclinical hypothyroidism   Takes Levothyroxine 75 mcg daily, which was continued throughout admission. Repeat TSH this admission mildly elevated at 4.55 with T4 1.09.   -Recommend follow up with PCP in 4-6 week with repeat TSH and T4 especially in setting of new Lithium     Radha Mejias PA-C  [facility]   Pager: [phone]

## 2020-07-06 ENCOUNTER — HOSPITAL ENCOUNTER (OUTPATIENT)
Dept: BEHAVIORAL HEALTH | Facility: CLINIC | Age: 60
Discharge: HOME | End: 2020-07-06
Attending: PSYCHIATRY & NEUROLOGY | Admitting: PSYCHIATRY & NEUROLOGY
Payer: COMMERCIAL

## 2020-07-06 LAB — INTERPRETATION ECG - MUSE: NORMAL

## 2020-07-06 PROCEDURE — 25000132 ZZH RX MED GY IP 250 OP 250 PS 637: Performed by: PHYSICIAN ASSISTANT

## 2020-07-06 PROCEDURE — 99207 ZZC CDG-MDM COMPONENT: MEETS LOW - DOWN CODED: CPT | Performed by: NURSE PRACTITIONER

## 2020-07-06 PROCEDURE — G0177 OPPS/PHP; TRAIN & EDUC SERV: HCPCS

## 2020-07-06 PROCEDURE — H2032 ACTIVITY THERAPY, PER 15 MIN: HCPCS

## 2020-07-06 PROCEDURE — 12400002 ZZH R&B MH SENIOR/ADOLESCENT

## 2020-07-06 PROCEDURE — 25000132 ZZH RX MED GY IP 250 OP 250 PS 637: Performed by: NURSE PRACTITIONER

## 2020-07-06 PROCEDURE — 99232 SBSQ HOSP IP/OBS MODERATE 35: CPT | Performed by: NURSE PRACTITIONER

## 2020-07-06 PROCEDURE — 90791 PSYCH DIAGNOSTIC EVALUATION: CPT | Mod: 95 | Performed by: SOCIAL WORKER

## 2020-07-06 PROCEDURE — 25000132 ZZH RX MED GY IP 250 OP 250 PS 637: Performed by: PSYCHIATRY & NEUROLOGY

## 2020-07-06 RX ADMIN — LAMOTRIGINE 25 MG: 25 TABLET ORAL at 08:23

## 2020-07-06 RX ADMIN — SPIRONOLACTONE 25 MG: 25 TABLET ORAL at 08:23

## 2020-07-06 RX ADMIN — LITHIUM CARBONATE 300 MG: 300 TABLET, EXTENDED RELEASE ORAL at 22:19

## 2020-07-06 RX ADMIN — LEVOTHYROXINE SODIUM 75 MCG: 75 TABLET ORAL at 08:23

## 2020-07-06 RX ADMIN — MELATONIN 50 MCG: at 08:23

## 2020-07-06 RX ADMIN — BUPROPION HYDROCHLORIDE 150 MG: 150 TABLET, EXTENDED RELEASE ORAL at 08:23

## 2020-07-06 RX ADMIN — GUANFACINE 2 MG: 2 TABLET, EXTENDED RELEASE ORAL at 22:19

## 2020-07-06 RX ADMIN — MELATONIN TAB 3 MG 3 MG: 3 TAB at 22:19

## 2020-07-06 ASSESSMENT — ACTIVITIES OF DAILY LIVING (ADL)
DRESS: SCRUBS (BEHAVIORAL HEALTH)
LAUNDRY: WITH SUPERVISION
ORAL_HYGIENE: INDEPENDENT
ORAL_HYGIENE: INDEPENDENT
LAUNDRY: WITH SUPERVISION
DRESS: INDEPENDENT
HYGIENE/GROOMING: INDEPENDENT
HYGIENE/GROOMING: INDEPENDENT

## 2020-07-06 NOTE — PROGRESS NOTES
"Pt was calm and cooperative. Pt spent the evening out in the lounge socializing with peers and staff, and attended and participated in art therapy group. Pt didn't think she had any SI thoughts today, which is an improvement; she also said that her mood had improved from the past few days. Pt utilized art, watching a movie, and socializing with peers as coping skills this evening.    No SI/SIB/HI nor any AVH. There were no concerns regarding elopement nor aggression       07/05/20 0994   Behavioral Health   Hallucinations denies / not responding to hallucinations   Thinking intact   Orientation person: oriented;place: oriented;date: oriented;time: oriented   Memory baseline memory   Insight admits / accepts;insight appropriate to situation   Judgement intact   Eye Contact at examiner   Affect full range affect   Mood mood is calm   Physical Appearance/Attire appears stated age;attire appropriate to age and situation   Hygiene well groomed   Suicidality other (see comments)  (\"none today, really\")   1. Wish to be Dead (Recent) No   2. Non-Specific Active Suicidal Thoughts (Recent) No   Self Injury other (see comment)  (pt denies)   Elopement   (no concerns)   Activity other (see comment)  (visible in milieu, social, participative in group)   Speech clear;coherent   Medication Sensitivity no stated side effects;no observed side effects   Psychomotor / Gait balanced;steady   Activities of Daily Living   Hygiene/Grooming independent   Oral Hygiene independent   Dress scrubs (behavioral health)   Room Organization independent     "

## 2020-07-06 NOTE — PROGRESS NOTES
Work Completed:Team meeting discharge tomorrow was discussed.  Pt has telemedicine D.A. for PHP today.    Discharge plan or goal: PHP, continue with psychiatry, and therapist.                Barriers to discharge: nnone
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
## 2020-07-06 NOTE — PROGRESS NOTES
"[facility], [place]   Psychiatric Progress Note        Interim History:   From H&P: Admitted with depression, anxiety, and suicidal ideation with a plan to jump off a bridge.    The patient's care was discussed with the treatment team during the daily team meeting and/or staff's chart notes were reviewed.  Staff report patient has been calm, pleasant, cooperative.  No behavioral issues.  No longer having multiple requests.  Patient is attending groups and participating appropriately. She is eating well and taking medications as prescribed.  She is bright and social. Minimal depression and anxiety. Slept 7 hours.     Met with patient.  Feels much better.  States that in the last 2 days she did not have any dark thoughts.  She is more alert.  Fogginess have disappeared.  Denies suicidal ideation.  Still deals with depression and anxiety however, these have been better as well.  She made to \"incremental changes\", and as a result of her sleep have been much better.  She still waking up but is able to fall back to sleep.  The patient will have a intake appointment with the the partial hospitalization program this afternoon which may take about 2 hours.  Appointments for her psychiatrist and a therapist still need to be made which will occur tomorrow morning.  The patient does not have air conditioner and worries that she will quickly decompensate if she does not find a solution.  She talked to a friend of hers who will be able to give her a window conditioner tomorrow afternoon.  Considering her history and quick decompensation, it is warranted that patient remains in the hospital until appointments and day treatment are set up, and her living conditions improved.  The patient is at risk for quick decompensation, as she is still quite fragile.    Melatonin was started over the weekend.     Telemedicine Visit: The patient's condition can be safely assessed and treated via synchronous audio and " visual telemedicine encounter.       Start time: 1012  Stop time: 1025     Reason for Telemedicine Visit: Covid-19     Originating Site (Patient Location): [facility]     Distant Site (Provider Location) [place]  private office     Consent:  The patient/guardian has verbally consented to: the potential risks and benefits of telemedicine (video visit) versus in person care; bill my insurance or make self-payment for services provided; and responsibility for payment of non-covered services.      Mode of Communication:  Video Conference via Skype     As the provider I attest to compliance with applicable laws and regulations related to telemedicine.          Medications:       buPROPion  150 mg Oral QAM     estradiol  1 patch Transdermal Q3 Days     estradiol  2 patch Transdermal Q3 Days     estradiol biweekly   Transdermal Q8H     guanFACINE  2 mg Oral At Bedtime     lamoTRIgine  25 mg Oral Daily     levothyroxine  75 mcg Oral Daily     lithium ER  300 mg Oral At Bedtime     OLANZapine  2.5-5 mg Oral At Bedtime     spironolactone  25 mg Oral Daily     cholecalciferol  50 mcg Oral Daily          Allergies:     Allergies   Allergen Reactions     Finasteride      Mental health problems     Tetracycline Unknown     Doxycycline Rash          Labs:     Recent Results (from the past 672 hour(s))   Asymptomatic COVID-19 Virus (Coronavirus) by PCR    Collection Time: 06/25/20 12:51 AM    Specimen: Nasopharyngeal   Result Value Ref Range    COVID-19 Virus PCR to [facility] - Source Nasopharyngeal     COVID-19 Virus PCR to [facility] - Result       Test received-See reflex to IDDL test SARS CoV2 (COVID-19) Virus RT-PCR   SARS-CoV-2 COVID-19 Virus (Coronavirus) RT-PCR Nasopharyngeal    Collection Time: 06/25/20 12:51 AM    Specimen: Nasopharyngeal   Result Value Ref Range    SARS-CoV-2 Virus Specimen Source Nasopharyngeal     SARS-CoV-2 PCR Result NEGATIVE     SARS-CoV-2 PCR Comment       Testing was performed using the [employer]  COVID-19 Direct Assay on the [employer] MDX   instrument. Additional information about this Emergency Use Authorization (EUA) assay can   be found via the Lab Guide.     CBC with platelets differential    Collection Time: 06/25/20  1:17 AM   Result Value Ref Range    WBC 15.4 (H) 4.0 - 11.0 10e9/L    RBC Count 5.42 4.4 - 5.9 10e12/L    Hemoglobin 16.4 13.3 - 17.7 g/dL    Hematocrit 48.1 40.0 - 53.0 %    MCV 89 78 - 100 fl    MCH 30.3 26.5 - 33.0 pg    MCHC 34.1 31.5 - 36.5 g/dL    RDW 12.6 10.0 - 15.0 %    Platelet Count 246 150 - 450 10e9/L    Diff Method Automated Method     % Neutrophils 84.2 %    % Lymphocytes 9.1 %    % Monocytes 5.1 %    % Eosinophils 0.1 %    % Basophils 0.7 %    % Immature Granulocytes 0.8 %    Nucleated RBCs 0 0 /100    Absolute Neutrophil 13.0 (H) 1.6 - 8.3 10e9/L    Absolute Lymphocytes 1.4 0.8 - 5.3 10e9/L    Absolute Monocytes 0.8 0.0 - 1.3 10e9/L    Absolute Eosinophils 0.0 0.0 - 0.7 10e9/L    Absolute Basophils 0.1 0.0 - 0.2 10e9/L    Abs Immature Granulocytes 0.1 0 - 0.4 10e9/L    Absolute Nucleated RBC 0.0    Comprehensive metabolic panel    Collection Time: 06/25/20  1:17 AM   Result Value Ref Range    Sodium 138 133 - 144 mmol/L    Potassium 3.7 3.4 - 5.3 mmol/L    Chloride 107 94 - 109 mmol/L    Carbon Dioxide 23 20 - 32 mmol/L    Anion Gap 8 3 - 14 mmol/L    Glucose 117 (H) 70 - 99 mg/dL    Urea Nitrogen 15 7 - 30 mg/dL    Creatinine 0.76 0.66 - 1.25 mg/dL    GFR Estimate >90 >60 mL/min/[1.73_m2]    GFR Estimate If Black >90 >60 mL/min/[1.73_m2]    Calcium 8.5 8.5 - 10.1 mg/dL    Bilirubin Total 0.8 0.2 - 1.3 mg/dL    Albumin 4.1 3.4 - 5.0 g/dL    Protein Total 7.6 6.8 - 8.8 g/dL    Alkaline Phosphatase 51 40 - 150 U/L    ALT 20 0 - 70 U/L    AST 12 0 - 45 U/L   Drug abuse screen 6 urine (tox)    Collection Time: 06/25/20  1:46 AM   Result Value Ref Range    Amphetamine Qual Urine Negative NEG^Negative    Barbiturates Qual Urine Negative NEG^Negative    Benzodiazepine Qual  Urine Negative NEG^Negative    Cannabinoids Qual Urine Negative NEG^Negative    Cocaine Qual Urine Negative NEG^Negative    Ethanol Qual Urine Negative NEG^Negative    Opiates Qualitative Urine Negative NEG^Negative   CBC with platelets    Collection Time: 06/25/20  8:19 AM   Result Value Ref Range    WBC 13.1 (H) 4.0 - 11.0 10e9/L    RBC Count 5.46 4.4 - 5.9 10e12/L    Hemoglobin 16.5 13.3 - 17.7 g/dL    Hematocrit 48.6 40.0 - 53.0 %    MCV 89 78 - 100 fl    MCH 30.2 26.5 - 33.0 pg    MCHC 34.0 31.5 - 36.5 g/dL    RDW 12.7 10.0 - 15.0 %    Platelet Count 263 150 - 450 10e9/L   UA with Microscopic    Collection Time: 06/25/20  8:15 PM   Result Value Ref Range    Color Urine Yellow     Appearance Urine Clear     Glucose Urine Negative NEG^Negative mg/dL    Bilirubin Urine Negative NEG^Negative    Ketones Urine 5 (A) NEG^Negative mg/dL    Specific Gravity Urine 1.025 1.003 - 1.035    Blood Urine Negative NEG^Negative    pH Urine 5.0 5.0 - 7.0 pH    Protein Albumin Urine 10 (A) NEG^Negative mg/dL    Urobilinogen mg/dL 2.0 0.0 - 2.0 mg/dL    Nitrite Urine Negative NEG^Negative    Leukocyte Esterase Urine Negative NEG^Negative    Source Urine     WBC Urine <1 0 - 5 /HPF    RBC Urine 1 0 - 2 /HPF    Squamous Epithelial /HPF Urine 1 0 - 1 /HPF    Mucous Urine Present (A) NEG^Negative /LPF    Hyaline Casts 4 (H) 0 - 2 /LPF   TSH with free T4 reflex and/or T3 as indicated    Collection Time: 06/26/20  7:32 AM   Result Value Ref Range    TSH 4.55 (H) 0.40 - 4.00 mU/L   Folate    Collection Time: 06/26/20  7:32 AM   Result Value Ref Range    Folate 15.6 >5.4 ng/mL   Vitamin B12    Collection Time: 06/26/20  7:32 AM   Result Value Ref Range    Vitamin B12 1,214 (H) 193 - 986 pg/mL   Vitamin D    Collection Time: 06/26/20  7:32 AM   Result Value Ref Range    Vitamin D Deficiency screening 26 20 - 75 ug/L   CBC with platelets    Collection Time: 06/26/20  7:32 AM   Result Value Ref Range    WBC 8.1 4.0 - 11.0 10e9/L    RBC Count  5.31 4.4 - 5.9 10e12/L    Hemoglobin 16.7 13.3 - 17.7 g/dL    Hematocrit 48.3 40.0 - 53.0 %    MCV 91 78 - 100 fl    MCH 31.5 26.5 - 33.0 pg    MCHC 34.6 31.5 - 36.5 g/dL    RDW 13.0 10.0 - 15.0 %    Platelet Count 240 150 - 450 10e9/L   T4 free    Collection Time: 06/26/20  7:32 AM   Result Value Ref Range    T4 Free 1.09 0.76 - 1.46 ng/dL   Hemoglobin A1c    Collection Time: 06/26/20  7:32 AM   Result Value Ref Range    Hemoglobin A1C 5.1 0 - 5.6 %   EKG 12-lead, complete    Collection Time: 07/04/20  2:48 PM   Result Value Ref Range    Interpretation ECG Click View Image link to view waveform and result             Psychiatric Examination:   Temp: 97.8  F (36.6  C) Temp src: Oral BP: 118/80 Pulse: 54   Resp: 16 SpO2: 98 % O2 Device: None (Room air)    Weight is 202 lbs 0 oz  Body mass index is 29.83 kg/m .    Mental Status Exam  Appearance:  awake, alert, neatly groomed and dressed in scrubs  Attitude:  pleasant, cooperative  Eye Contact: Normal intensity  Mood:  labile,depressed  Affect:  mid range  Speech:  clear, coherent,   Psychomotor Behavior:  no evidence of tardive dyskinesia, dystonia, or tics and intact gait and muscle tone  Thought Process: Linear and goal directed, much organized  Associations:  no looseness of associations  Thought Content:  no evidence of suicidal ideation or homicidal ideation, no evidence of psychotic thought  Insight: fair  Judgment:  Fair  Oriented to:  time, person, and place  Attention Span and Concentration:  intact  Recent and Remote Memory:  intact  Language: Intact  Fund of Knowledge: Normal  Muscle Strength and Tone: normal            Precautions:     Behavioral Orders   Procedures     Code 1 - Restrict to Unit     Routine Programming     As clinically indicated     Single Room     Status 15     Every 15 minutes.     Suicide precautions     Patients on Suicide Precautions should have a Combination Diet ordered that includes a Diet selection(s) AND a Behavioral Tray  "selection for Safe Tray - with utensils, or Safe Tray - NO utensils            DIagnoses:   1.  Bipolar II disorder currently depressed, severe, without psychosis.   2.  Cluster B traits  3.  Gender dysphoria  4.  ADHD  5.  Medical problems include obstructive sleep apnea, hypothyroidism, leukocytosis, hearing loss, hyperlipidemia, hypertension.         Plan:   The patient is a very pleasant,  transgender male to female individual who was admitted with increased depression, anxiety, and suicidal ideation with a drive her car off a bridge.  Currently denies suicidal ideation.  The goal for the hospitalization is \"to go back to the real world\".  Discussed medication changes.  The patient does not want to retry lithium as she worries about visual problems that she had in the past, she does not want to increase the dose of the Wellbutrin.  Considering the suicidal ideation with Effexor, antidepressants needs to be prescribed at lower doses and increase slowly.  Medication changes will include the following:     --Discontinue Cymbalta. The patient is more agitated with it.   --Start Lamictal 25 mg every morning.  Patient is aware of Santos-Gilles syndrome.  She will let the staff know if rash appear.  --Start Lithium 300 mg at bedtime.  The patient reported nystagmus with 900 mg.  --Change magnesium to PRN   --Start Gabapentin 200-300 mg TID PRN  --Continue Wellbutrin 150 mg every morning.    --Continue Guanfacine 2 mg at bedtime.    --Start Zyprexa 2.5 mg at bedtime.  --Start Melatonin 3-6 mg, at bedtime.   -- Medications will include hydroxyzine, Zyprexa, gabapentin, and trazodone.    --Blood work was reviewed.    --Internal medicine follow-up for medical problems.    --The patient was consulted on nature of illness and treatment options.   --Care was coordinated with the treatment      Disposition Plan   Reason for ongoing admission: is unable to care for self due to depression and anxiety  Disposition: " TBD  Estimated length of stay: 5 to 7 days  Legal Status: Voluntary  Discharge will be granted once symptoms improved.    Acosta COCHRAN CNP  Date: 07/06/20  Time: 2:08 PM      More than 30 minutes were spent for assessment, documentation, and coordination of care.       This note was created with the help of Dragon dictation system. All grammatical/typing errors or context distortion are unintentional and inherent to software.

## 2020-07-06 NOTE — PATIENT INSTRUCTIONS
"Outpatient Mental Health Services - Adult     MY COPING PLAN FOR SAFETY     PATIENT'S NAME:           Chris Stockton  MRN:                                  [id number]  SAFETY PLAN:  Step 1: Warning signs / cues (Thoughts, images, mood, situation, behavior) that a crisis may be developing:  ? Thoughts: \"I can't do this anymore\" and \"I just want this to end\"  ? Images: NA  ? Thinking Processes: racing thoughts, intrusive thoughts (bothersome, unwanted thoughts that come out of nowhere): of suicide and highly critical and negative thoughts: I am overwhelmed  ? Mood: worsening depression, hopelessness, helplessness and mood swings  ? Behaviors: isolating/withdrawing , can't stop crying, impulsive, reckless behaviors (acting without thinking): with ADHD and fears of the suicidal ideation., not taking care of myself, not taking care of my responsibilities and not sleeping enough  ? Situations: relationship problems and financial stress, living environment, leaving marriage, selling house.   Step 2: Coping strategies - Things I can do to take my mind off of my problems without contacting another person (relaxation technique, physical activity):  ? Distress Tolerance Strategies:  DBT deep breathing, self hug, music, chocate.  ? Physical Activities: go for a walk  ? Focus on helpful thoughts:  \"It always passes\"  Step 3: People and social settings that provide distraction:                 Name: Marisabel Philip         Phone: - in cell phone                 Name: a couple women from [facility]                          ride bike to creek.              Step 4: Remind myself of people and things that are important to me and worth living for:  \" many friends, and mom and dad\".  Step 5: When I am in crisis, I can ask these people to help me use my safety plan:                 Name: Ragini gonzalez             Phone: in cell  Step 6: Making the environment safe:   ? be around others  Step 7: Professionals or agencies I can contact during a " crisis:  ? Suicide Prevention Lifeline: 1-752-169-TALK (3877)  ? Crisis Text Line Service (available 24 hours a day, 7 days a week): Text [place] to 060780  ? Call  **CRISIS (483032) from a cell phone to talk to a team of professionals who can help you.       Crisis Services By County: Phone Number:   [place]     [phone]   [place]    [phone]   [place]    [phone]   [place]    [phone]   [place]    [phone]   [place] [phone]   [place]     [phone]      ? Call 451 or go to my nearest emergency department.              I helped develop this safety plan and agree to use it when needed.  I have been given a copy of this plan.       Client signature _________________________________________________________________  Today s date:  7/6/2020

## 2020-07-06 NOTE — PROGRESS NOTES
"   07/06/20 1500   Music Therapy   Type of Participation Music therapy group   Response Participates independently   Hours 1   Participated in Music Therapy group with intervention of being part of a musical \"[employer]\".  Patients worked on goals of cooperation, self-confidence and attentiveness to others in group/listening, asserting self, timing.  Through playing their chime at the designated time during a song, or at their own discretion during improvisations, each of these goals were met.     Liz was a strong peer leader in group, showing consideration for others and offering reassurance when one member was struggling with her hearing.  Liz shared that she uses a hearing aid as well.  During the music-making, Liz was attentive and upbeat, focused on group process.  Anticipating discharge.   "

## 2020-07-06 NOTE — PROGRESS NOTES
07/05/20 2100   General Information   Art Directive other (see comments)     AT directive was to create an image of self as landscape using art media of pts choice. Goals of directive: creating a personal self narrative, identifying personal strengths and goals, emotional expression.   Pt was a positive participant, focused on task for the full duration of group. Pt created a piece which she shared more in detail with author, about her transition in life from male to female. Pt created a city scene with lack of color/small bits of color for herself before she was female. Pt created a large river for the transition and a colorful city with lots of light, using yellow paint, to represent her current life as female. Pt enjoys painting and mixed media works and enjoys artwork as a tool for self expression.    Pts mood was calm.

## 2020-07-06 NOTE — PROGRESS NOTES
Pt is calm, pleasant and cooperative. Full range affect. Social in milieu. Attending groups and parcipating appropriately. Denies SI. Pt feels that her medication regimen is effective and is pleased that she's sleeping better. Pt reports she took melatonin last night which she feels was effective. Looking forward to discharging tomorrow.

## 2020-07-06 NOTE — PROGRESS NOTES
"Outpatient Mental Health Services - Adult    MY COPING PLAN FOR SAFETY    PATIENT'S NAME: Chris Stockton  MRN:   [id number]  SAFETY PLAN:  Step 1: Warning signs / cues (Thoughts, images, mood, situation, behavior) that a crisis may be developing:    Thoughts: \"I can't do this anymore\" and \"I just want this to end\"    Images: NA    Thinking Processes: racing thoughts, intrusive thoughts (bothersome, unwanted thoughts that come out of nowhere): of suicide and highly critical and negative thoughts: I am overwhelmed    Mood: worsening depression, hopelessness, helplessness and mood swings    Behaviors: isolating/withdrawing , can't stop crying, impulsive, reckless behaviors (acting without thinking): with ADHD and fears of the suicidal ideation., not taking care of myself, not taking care of my responsibilities and not sleeping enough    Situations: relationship problems and financial stress, living environment, leaving marriage, selling house.   Step 2: Coping strategies - Things I can do to take my mind off of my problems without contacting another person (relaxation technique, physical activity):    Distress Tolerance Strategies:  DBT deep breathing, self hug, music, chocate.    Physical Activities: go for a walk    Focus on helpful thoughts:  \"It always passes\"  Step 3: People and social settings that provide distraction:   Name: Marisabel Philip Phone: - in cell phone   Name: a couple women from [employer]    ride bike to creek.   Step 4: Remind myself of people and things that are important to me and worth living for:  \" many friends, and mom and dad\".  Step 5: When I am in crisis, I can ask these people to help me use my safety plan:   Name: Ragini gonzalez  Phone: in cell  Step 6: Making the environment safe:     be around others  Step 7: Professionals or agencies I can contact during a crisis:    Suicide Prevention Lifeline: 6-459-940-GSYC (7728)    Crisis Text Line Service (available 24 hours a day, 7 days a week): Text [place] " to 914385    Call  **CRISIS (534312) from a cell phone to talk to a team of professionals who can help you.  Crisis Services By County: Phone Number:   [place]     [phone]   [place]    [phone]   [place]    [phone]   [place]    [phone]   [place]    [phone]   [place] [phone]   [place]     [phone]       Call 911 or go to my nearest emergency department.     I helped develop this safety plan and agree to use it when needed.  I have been given a copy of this plan.      Client signature _________________________________________________________________  Today s date:  7/6/2020  Adapted from Safety Plan Template 2008 Yolanda Coronado and Barry Aroryo is reprinted with the express permission of the authors.  No portion of the Safety Plan Template may be reproduced without the express, written permission.  You can contact the authors at [email or web address] or [email or web address].

## 2020-07-07 ENCOUNTER — BEH TREATMENT PLAN (OUTPATIENT)
Dept: BEHAVIORAL HEALTH | Facility: CLINIC | Age: 60
End: 2020-07-07
Attending: PSYCHIATRY & NEUROLOGY

## 2020-07-07 VITALS
HEART RATE: 55 BPM | WEIGHT: 200.3 LBS | BODY MASS INDEX: 29.67 KG/M2 | TEMPERATURE: 97 F | SYSTOLIC BLOOD PRESSURE: 115 MMHG | HEIGHT: 69 IN | DIASTOLIC BLOOD PRESSURE: 77 MMHG | RESPIRATION RATE: 16 BRPM | OXYGEN SATURATION: 99 %

## 2020-07-07 DIAGNOSIS — F33.2 MDD (MAJOR DEPRESSIVE DISORDER), RECURRENT SEVERE, WITHOUT PSYCHOSIS (H): ICD-10-CM

## 2020-07-07 PROCEDURE — G0177 OPPS/PHP; TRAIN & EDUC SERV: HCPCS

## 2020-07-07 PROCEDURE — 25000132 ZZH RX MED GY IP 250 OP 250 PS 637: Performed by: PSYCHIATRY & NEUROLOGY

## 2020-07-07 PROCEDURE — 99238 HOSP IP/OBS DSCHRG MGMT 30/<: CPT | Performed by: NURSE PRACTITIONER

## 2020-07-07 PROCEDURE — 25000132 ZZH RX MED GY IP 250 OP 250 PS 637: Performed by: PHYSICIAN ASSISTANT

## 2020-07-07 PROCEDURE — 25000132 ZZH RX MED GY IP 250 OP 250 PS 637: Performed by: NURSE PRACTITIONER

## 2020-07-07 RX ORDER — BUPROPION HYDROCHLORIDE 150 MG/1
150 TABLET ORAL EVERY MORNING
Qty: 30 TABLET | Refills: 0 | Status: SHIPPED | OUTPATIENT
Start: 2020-07-08 | End: 2020-12-22

## 2020-07-07 RX ORDER — GUANFACINE 2 MG/1
2 TABLET, EXTENDED RELEASE ORAL AT BEDTIME
Qty: 30 TABLET | Refills: 0 | Status: SHIPPED | OUTPATIENT
Start: 2020-07-07 | End: 2020-08-06

## 2020-07-07 RX ORDER — LANOLIN ALCOHOL/MO/W.PET/CERES
3-6 CREAM (GRAM) TOPICAL
Qty: 60 TABLET | Refills: 0 | Status: SHIPPED | OUTPATIENT
Start: 2020-07-07 | End: 2020-08-06

## 2020-07-07 RX ORDER — VITAMIN B COMPLEX
50 TABLET ORAL DAILY
Qty: 60 TABLET | Refills: 0 | Status: SHIPPED | OUTPATIENT
Start: 2020-07-08 | End: 2020-08-07

## 2020-07-07 RX ORDER — LAMOTRIGINE 25 MG/1
25 TABLET ORAL DAILY
Qty: 63 TABLET | Refills: 0 | Status: SHIPPED | OUTPATIENT
Start: 2020-07-08 | End: 2020-12-22

## 2020-07-07 RX ORDER — OLANZAPINE 2.5 MG/1
2.5-5 TABLET, FILM COATED ORAL AT BEDTIME
Qty: 60 TABLET | Refills: 0 | Status: SHIPPED | OUTPATIENT
Start: 2020-07-07 | End: 2020-12-22

## 2020-07-07 RX ORDER — LITHIUM CARBONATE 300 MG/1
300 TABLET, FILM COATED, EXTENDED RELEASE ORAL AT BEDTIME
Qty: 30 TABLET | Refills: 0 | Status: SHIPPED | OUTPATIENT
Start: 2020-07-07 | End: 2020-12-22

## 2020-07-07 RX ADMIN — BUPROPION HYDROCHLORIDE 150 MG: 150 TABLET, EXTENDED RELEASE ORAL at 08:10

## 2020-07-07 RX ADMIN — ESTRADIOL 1 PATCH: 0.1 PATCH, EXTENDED RELEASE TRANSDERMAL at 08:10

## 2020-07-07 RX ADMIN — LEVOTHYROXINE SODIUM 75 MCG: 75 TABLET ORAL at 08:10

## 2020-07-07 RX ADMIN — LAMOTRIGINE 25 MG: 25 TABLET ORAL at 08:10

## 2020-07-07 RX ADMIN — MELATONIN TAB 3 MG 3 MG: 3 TAB at 01:54

## 2020-07-07 RX ADMIN — MELATONIN 50 MCG: at 08:10

## 2020-07-07 RX ADMIN — SPIRONOLACTONE 25 MG: 25 TABLET ORAL at 08:10

## 2020-07-07 ASSESSMENT — MIFFLIN-ST. JEOR: SCORE: 1708.93

## 2020-07-07 ASSESSMENT — ACTIVITIES OF DAILY LIVING (ADL)
HYGIENE/GROOMING: HANDWASHING;SHOWER;INDEPENDENT
ORAL_HYGIENE: INDEPENDENT
DRESS: SCRUBS (BEHAVIORAL HEALTH);INDEPENDENT

## 2020-07-07 NOTE — PLAN OF CARE
"  Problem: OT General Care Plan  Goal: OT Goal 1  Description: Will develop insightful ideas for coping strategies and identify symptoms of when using them would be beneficial.      Note: Attended 2 of 2 OT groups. She stated feeling better and \"ready to go home and that feels better\". She offered insightful comments, worked on a creative task and followed through to completion. She stated appreciation of the help she has received here.      "

## 2020-07-07 NOTE — PROGRESS NOTES
LOCUS Worksheet     Name: Chris Stockton MRN: [id number]    : 1960      Gender: male to female    PMI:     Provider Name: SHADIA Quinones     Provider NPI:      Actual level of Care Provided:  Diagnostic Assessment    Service(s) receiving or referred to:  PHP    Reason for Variance: discharging from the hospital      Rating completed by: SHADIA Quinones        I. Risk of Harm:   2      Low Risk of Harm    II. Functional Status:   3      Moderate Impairment    III. Co-Morbidity:   3      Significant Co-Morbidity    IV - A. Recovery Environment - Level of Stress:   5      Extremely Stress Environment    IV - B. Recovery Environment - Level of Support:   4      Minimal Support in Environment    V. Treatment and Recovery History:   3      Moderate to Equivocal Response to Treatment and Recovery Management    VI. Engagement and Recovery Project:   1      Optimal Engagement and Recovery       21 Composite Score    Level of Care Recommendation:   20 to 22       Medically Monitored Non-Residential Services

## 2020-07-07 NOTE — PROGRESS NOTES
"Pt is discharged as per avs; friend is here to transport. He has discharge meds and all belongings. He verbalizes understanding of discharge instructions. Pt's mood is \"hopeful,\" and he denies si. He rates depression and anxiety both 3. He is pleasant w bright affect. Energy level is a bit \"low,\" and he reports some difficulty both getting to and staying asleep. He was excited, when talking about his work. No acute somatic concerns, vss; HR 55. See discharge meghan, also avs.  "

## 2020-07-07 NOTE — PLAN OF CARE
Pt presents with full range affect and calm mood. Denies SI/SIB and hallucinations. Spent the evening in the lounge interacting with peers and watching television. Pt reports that she is excited for discharge tomorrow, feels as if the medications are working and she has a good plan in place. She reports that a friend is donating a window AC unit to her so she is all set. Med compliant. VSS. No other concerns or complaints noted.    Pt refused PM Zyprexa, she opted to take PRN Melatonin instead, she felt that it worked well last night.

## 2020-07-07 NOTE — DISCHARGE INSTRUCTIONS
Behavioral Discharge Planning and Instructions      Summary:  You were admitted on 6/24/2020  due to Depression and Anxiety.  You were treated by Dr. Mahad Hull MD and discharged on 7/7/2020 from [facility] to Home      Principal Diagnosis:     1.  Major depressive disorder, recurrent, severe, without psychosis, with anxious distress  2.  Cluster B traits  3.  Gender dysphoria  4.  ADHD, per history  5.  Medical problems include obstructive sleep apnea, hypothyroidism, leukocytosis, hearing loss, hyperlipidemia, hypertension.    Health Care Follow-up Appointments:   Psychiatrist: Date/Time: Thursday, July 23rd, 7:20 AM-  This is a telephone appointment.  Maliha Antonio,   [facility] [place], [place]IP [address]  Phone: [phone]  Fax: [phone]    Therapy: Friday, July 10 at 3:30 or 4:00 pm  Priscila Mia, [facility], [place], [phone]  A message has been left at this provider's office requesting an appointment. A message was received indicating available appointments on July 10 at 3:30 or 4:00 pm. Please call provider to confrm which appointment you will take.  You  Have been accepted to [facility] Phone: [phone] Monday - Friday, 9:00 am to 3:00 pm. Start date: Thursday, July 9. You will receive a call at 8:30 am for admission. You will need a computer with [employer].  Attend all scheduled appointments with your outpatient providers. Call at least 24 hours in advance if you need to reschedule an appointment to ensure continued access to your outpatient providers.  Major Treatments, Procedures and Findings:  You were provided with: a psychiatric assessment, assessed for medical stability, medication evaluation and/or management, group therapy, milieu management and medical interventions    Symptoms to Report: feeling more aggressive, increased confusion, losing more sleep, mood getting worse or thoughts of  suicide    Early warning signs can include: increased depression or anxiety sleep disturbances increased thoughts or behaviors of suicide or self-harm  increased unusual thinking, such as paranoia or hearing voices    Safety and Wellness:  Take all medicines as directed.  Make no changes unless your doctor suggests them.      Follow treatment recommendations.  Refrain from alcohol and non-prescribed drugs.  If there is a concern for safety, call [phone].    Resources:   Crisis Intervention: 807.490.4501 or 620-207-9222 (TTY: 993.868.5134).  Call anytime for help.  National [place] on Mental Illness (www.[place].perry.org): 239.175.4672 or 931-568-8896.  [place] Association for Children's Mental Health (www.macmh.org): 168.475.8025.  Suicide Awareness Voices of Education (SAVE) (www.save.org): 014-855-VDYT (2883)  National Suicide Prevention Line (www.mentalhealthmn.org): 476-680-KAPO (3200)  Mental Health Consumer/Survivor Network of [place] (www.mhcsn.net): 721.561.9743 or 102-146-1592  Mental Health Association of [place] (www.mentalhealth.org): 382.683.4351 or 228-541-2883  Self- Management and Recovery Training., SMART-- Toll free: 720.983.3499  www.GOWEX.Planana  [facility] Crisis (COPE) Response - Adult [phone]      The treatment team has appreciated the opportunity to work with you.     If you have any questions or concerns our unit number is [phone]    Follow up with primary care provider, Yanna Santizo, within 7 days to evaluate medication changes with gabapentin for your neuropathy. Discuss with your primary care provider if you should see a neurologist for your neuropathy.  Appointments on [place] and/or [facility](with [facility] provider or service). Call [phone] if you haven't heard regarding these appointments within 7 days of discharge.

## 2020-07-07 NOTE — PLAN OF CARE
Problem: OT General Care Plan  Goal: OT Goal 1  Description: Will develop insightful ideas for coping strategies and identify symptoms of when using them would be beneficial.      Note: Attended 2 of 2 OT groups. She appeared interested, attentive and spent time following through on a complex step creative task. She participated in an activity focused on Aftercare,  identiying support people, coping strategies, behaviors and red flags, affirmations and daily and weekly routines that are helpful with gaining balance. Comments were elaborated on with insightful ideas. She stated being pleased with the plan of starting a day program once home.
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
## 2020-07-08 ENCOUNTER — TELEPHONE (OUTPATIENT)
Dept: FAMILY MEDICINE | Facility: CLINIC | Age: 60
End: 2020-07-08

## 2020-07-08 NOTE — TELEPHONE ENCOUNTER
MTM referral from: Transitions of Care (recent hospital discharge or ED visit)    MTM referral outreach attempt #1 on July 8, 2020 at 9:22 AM      Outcome: Patient is a [facility] patient and information was passed along to them upon discharge, will route to MTM Pharmacist/Provider as an FYI. Thank you for the referral.     Jacquelyn Sierra, MTM Coordinator    1

## 2020-07-09 ENCOUNTER — HOSPITAL ENCOUNTER (OUTPATIENT)
Dept: BEHAVIORAL HEALTH | Facility: CLINIC | Age: 60
End: 2020-07-09
Attending: PSYCHIATRY & NEUROLOGY
Payer: COMMERCIAL

## 2020-07-09 PROBLEM — F33.2 MDD (MAJOR DEPRESSIVE DISORDER), RECURRENT SEVERE, WITHOUT PSYCHOSIS (H): Status: ACTIVE | Noted: 2020-07-09

## 2020-07-09 PROCEDURE — H0035 MH PARTIAL HOSP TX UNDER 24H: HCPCS | Mod: 95 | Performed by: COUNSELOR

## 2020-07-09 PROCEDURE — H0035 MH PARTIAL HOSP TX UNDER 24H: HCPCS | Mod: 95

## 2020-07-09 ASSESSMENT — ANXIETY QUESTIONNAIRES
7. FEELING AFRAID AS IF SOMETHING AWFUL MIGHT HAPPEN: SEVERAL DAYS
1. FEELING NERVOUS, ANXIOUS, OR ON EDGE: MORE THAN HALF THE DAYS
IF YOU CHECKED OFF ANY PROBLEMS ON THIS QUESTIONNAIRE, HOW DIFFICULT HAVE THESE PROBLEMS MADE IT FOR YOU TO DO YOUR WORK, TAKE CARE OF THINGS AT HOME, OR GET ALONG WITH OTHER PEOPLE: EXTREMELY DIFFICULT
2. NOT BEING ABLE TO STOP OR CONTROL WORRYING: MORE THAN HALF THE DAYS
5. BEING SO RESTLESS THAT IT IS HARD TO SIT STILL: SEVERAL DAYS
6. BECOMING EASILY ANNOYED OR IRRITABLE: SEVERAL DAYS
GAD7 TOTAL SCORE: 9
3. WORRYING TOO MUCH ABOUT DIFFERENT THINGS: SEVERAL DAYS

## 2020-07-09 ASSESSMENT — PATIENT HEALTH QUESTIONNAIRE - PHQ9
5. POOR APPETITE OR OVEREATING: SEVERAL DAYS
SUM OF ALL RESPONSES TO PHQ QUESTIONS 1-9: 11

## 2020-07-09 NOTE — GROUP NOTE
Psychotherapy Group Note    PATIENT'S NAME: Chris Stockton  MRN:   [id number]  :   1960  ACCT. NUMBER: [id number]  DATE OF SERVICE: 20  START TIME:  2:00 PM  END TIME:  2:50 PM  FACILITATOR: Lizy Oh LPCC  TOPIC: MH EBP Group: Relationship Skills  Adult Partial Hospitalization Program  TRACK: 1    NUMBER OF PARTICIPANTS: 6    Telemedicine Visit: The patient's condition can be safely assessed and treated via synchronous audio and visual telemedicine encounter.      Reason for Telemedicine Visit: Services only offered telehealth    Originating Site (Patient Location): Patient's home    Distant Site (Provider Location): Provider Remote Setting    Consent:  The patient/guardian has verbally consented to: the potential risks and benefits of telemedicine (video visit) versus in person care; bill my insurance or make self-payment for services provided; and responsibility for payment of non-covered services.     Mode of Communication:  Video Conference via [employer]    As the provider I attest to compliance with applicable laws and regulations related to telemedicine.      Summary of Group / Topics Discussed:  Relationship Skills: Boundaries: Patients were provided with a general overview of interpersonal boundaries and how lack of boundaries relates to symptoms and functioning. The purpose is to help patients identify boundary issues and gain awareness and skills to work towards healthier interpersonal boundaries. Current awareness of healthy boundary characteristics and barriers to establishing healthy boundaries were discussed.    Patient Session Goals / Objectives:    Familiarized patients with the concept of interpersonal boundaries and their characteristics    Discussed and practiced strategies to promote healthier interpersonal boundaries    Identified boundary issues and identified plan to improve boundaries      Patient Participation / Response:  Fully participated with the group by sharing  personal reflections / insights and openly received / provided feedback with other participants.    Demonstrated understanding of topics discussed through group discussion and participation and Demonstrated understanding of relationship skills and communication skills    Treatment Plan:  Patient has an initial individualized treatment plan that was created as part of their diagnostic assessment / admission process.  A master individualized treatment plan is in the process of being developed with the patient and multi-disciplinary care team.    Lizy Oh, [facility]

## 2020-07-09 NOTE — PROGRESS NOTES
"Outpatient Mental Health Services - Adult     MY COPING PLAN FOR SAFETY     PATIENT'S NAME:           Chris Stockton  MRN:                                  [id number]  SAFETY PLAN:  Step 1: Warning signs / cues (Thoughts, images, mood, situation, behavior) that a crisis may be developing:  ? Thoughts: \"I can't do this anymore\" and \"I just want this to end\"  ? Images: NA  ? Thinking Processes: racing thoughts, intrusive thoughts (bothersome, unwanted thoughts that come out of nowhere): of suicide and highly critical and negative thoughts: I am overwhelmed  ? Mood: worsening depression, hopelessness, helplessness and mood swings  ? Behaviors: isolating/withdrawing , can't stop crying, impulsive, reckless behaviors (acting without thinking): with ADHD and fears of the suicidal ideation., not taking care of myself, not taking care of my responsibilities and not sleeping enough  ? Situations: relationship problems and financial stress, living environment, leaving marriage, selling house.   Step 2: Coping strategies - Things I can do to take my mind off of my problems without contacting another person (relaxation technique, physical activity):  ? Distress Tolerance Strategies:  DBT deep breathing, self hug, music, chocate.  ? Physical Activities: go for a walk  ? Focus on helpful thoughts:  \"It always passes\"  Step 3: People and social settings that provide distraction:                 Name: Marisabel Philip         Phone: - in cell phone                 Name: a couple women from [facility]                          ride bike to creek.              Step 4: Remind myself of people and things that are important to me and worth living for:  \" many friends, and mom and dad\".  Step 5: When I am in crisis, I can ask these people to help me use my safety plan:                 Name: Ragini gonzalez             Phone: in cell  Step 6: Making the environment safe:   ? be around others  Step 7: Professionals or agencies I can contact during a " crisis:  ? Suicide Prevention Lifeline: 7-040-015-TALK (7286)  ? Crisis Text Line Service (available 24 hours a day, 7 days a week): Text [place] to 110690  ? Call  **CRISIS (493917) from a cell phone to talk to a team of professionals who can help you.          Crisis Services [facility]: Phone Number:   [place]     [phone]   [place]    [phone]   [place]    [phone]   [place]    [phone]   [place]    [phone]   [place] [phone]   [place]     [phone]      ? Call 251 or go to my nearest emergency department.              I helped develop this safety plan and agree to use it when needed.  I have been given a copy of this plan.
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
## 2020-07-09 NOTE — GROUP NOTE
Psychoeducation Group Note    PATIENT'S NAME: Chris Stockton  MRN:   [id number]  :   1960  ACCT. NUMBER: [id number]  DATE OF SERVICE: 20  START TIME:  1:00 PM  END TIME:  1:50 PM  FACILITATOR: Christina Roman RN  TOPIC: ANNA MARIE RN Group: Brain Health  Adult Partial Hospitalization Program  TRACK: 1    NUMBER OF PARTICIPANTS: 4    Summary of Group / Topics Discussed:  Brain Health:  Pathophysiology of stress and anxiety: Patients were educated on the difference between stress, chronic stress, and anxiety. The anatomy and pathophysiology of the body/brain were reviewed to illustrate the immediate effects of stress/anxiety in the body and the long term effects and increased risks for chronic disease that come from unmanaged stress/anxiety. Self-coping strategies to manage symptoms of stress were reviewed and pharmacologic, psychotherapeutic, and complementary treatment options were discussed.    Patient Session Goals / Objectives:  ? Described the differences between stress and anxiety and how the body responds to it  ? Listed the long term effects and increased risks for chronic disease that can arise from unmanaged stress/anxiety  ? Identified and described pharmacologic, psychotherapeutic, and complementary treatment options  Telemedicine Visit: The patient's condition can be safely assessed and treated via synchronous audio and visual telemedicine encounter.      Reason for Telemedicine Visit: Services only offered telehealth    Originating Site (Patient Location): Patient's home    Distant Site (Provider Location): Provider Remote Setting    Consent:  The patient/guardian has verbally consented to: the potential risks and benefits of telemedicine (video visit) versus in person care; bill my insurance or make self-payment for services provided; and responsibility for payment of non-covered services.     Mode of Communication:  Video Conference via [employer]    As the provider I attest to compliance with  applicable laws and regulations related to telemedicine.       Patient Participation / Response:  Fully participated with the group by sharing personal reflections / insights and openly received / provided feedback with other participants.    Demonstrated understanding of topics discussed through group discussion and participation    Treatment Plan:  Patient has a current master individualized treatment plan.  See Epic treatment plan for more information.    Christina Roman RN

## 2020-07-09 NOTE — GROUP NOTE
Psychotherapy Group Note    PATIENT'S NAME: Chris Stockton  MRN:   [id number]  :   1960  ACCT. NUMBER: [id number]  DATE OF SERVICE: 20  START TIME: 11:00 AM  END TIME: 11:50 AM  FACILITATOR: Lizy Oh LPCC  TOPIC: MH EBP Group: Relationship Skills  Adult Partial Hospitalization Program  TRACK: 1    NUMBER OF PARTICIPANTS: 7    Telemedicine Visit: The patient's condition can be safely assessed and treated via synchronous audio and visual telemedicine encounter.      Reason for Telemedicine Visit: Services only offered telehealth    Originating Site (Patient Location): Patient's home    Distant Site (Provider Location): Provider Remote Setting    Consent:  The patient/guardian has verbally consented to: the potential risks and benefits of telemedicine (video visit) versus in person care; bill my insurance or make self-payment for services provided; and responsibility for payment of non-covered services.     Mode of Communication:  Video Conference via LinPrim    As the provider I attest to compliance with applicable laws and regulations related to telemedicine.      Summary of Group / Topics Discussed:  Relationship Skills: Relationship Mapping: Patients identified different types of relationships they have in their life and examined if there is conflict or closeness, the degree of conflict or closeness, and the reason for conflict. The goal of this topic is to help patients gain awareness of the relationships they have with others, identify types of conflict in patients  lives and how they impact symptoms/functioning, and identify how they can improve relationships with relationship and interpersonal skills they have learned.     Patient Session Goals / Objectives:    Familiarized patients with awareness to the different types of relationships they may have with different people and substances in their lives    Discussed and practiced strategies to promote healthier understanding of interpersonal  relationships with a focus on awareness of conflict, the causes of conflict, and the ways to transform conflict    Discussed the use of substances and its impact on their relationships      Patient Participation / Response:  Fully participated with the group by sharing personal reflections / insights and openly received / provided feedback with other participants.    Demonstrated understanding of topics discussed through group discussion and participation and Demonstrated understanding of relationship skills and communication skills    Treatment Plan:  Patient has an initial individualized treatment plan that was created as part of their diagnostic assessment / admission process.  A master individualized treatment plan is in the process of being developed with the patient and multi-disciplinary care team.    Lizy Oh, [facility]

## 2020-07-09 NOTE — GROUP NOTE
Psychoeducation Group Note    PATIENT'S NAME: Chris Stockton  MRN:   [id number]  :   1960  ACCT. NUMBER: [id number]  DATE OF SERVICE: 20  START TIME: 10:00 AM  END TIME: 10:50 AM  FACILITATOR: Ana Bonilla LPCC  TOPIC: MH PHP OT Group: Self- Regulation Skills  Adult Partial Hospitalization Program  TRACK: 1    NUMBER OF PARTICIPANTS: 7    Summary of Group / Topics Discussed:  Self-Regulation Skills: Sensory Enhanced Mindfulness: {Patients were provided with written and verbal psychoeducation on the concept of integrating mindfulness with a bottom up, sensory rich, experiential intervention activities to develop self-awareness and skills for self-regulation.  Emphasis placed on the benefits of mindfulness through bottom up (body based) activities and how they can help to emotionally ground oneself or provide a healthy distraction to self-regulate when distressed. Patients worked to increase knowledge and skills during a guided skilled structured sensory enhanced activity: focused activities, activities of daily living, quiet meditation and active meditation practices can build resiliency self-efficacy. Facilitation of reflection to reinforce taught concepts was provided.     Patient Session Goals / Objectives:    Identified how using sensory enhanced mindfulness practices can be used for grounding, stress management, and self-regulation      Improved awareness of different types of sensory enhanced mindfulness activities that assist with healthy coping of stress and symptoms      Established a plan for practice of these skills in their own environments    Practiced and reflected on how to generalize taught skills to their everyday life        Patient Participation / Response:  Fully participated with the group by sharing personal reflections / insights and openly received / provided feedback with other participants.    Patient presentation: engaged and participatory in group discussions and  Verbalized understanding of content    Treatment Plan:  Patient has a current master individualized treatment plan.  See Epic treatment plan for more information.    Ana Bonilla, ROMIEC

## 2020-07-09 NOTE — PROGRESS NOTES
Admission Date: 7/9/2020    Identify any current concerns with potential impact to admission:     medication/medical concerns: no     immediate safety concerns:no    Does patient have safety plan? yes  Note: Please copy safety plan copied into BEH Encounter     Other (insurance/childcare/transportation/housing/planned absences/etc): 2 pm on 7/10 Friday, Medical appt    Patient's insurance is: [employer] . Does patient need appointment with provider? Dr. [email or web address]    If patient has Medical Assistance ([place]) is LOCUS and Functional Assessment completed? NA    If patient is in Partial Hospitalization Program is LOCUS completed?       GAD7 and PH9 completed    EROI completed                                                                        Completed by: Lizy Oh MA [facility]

## 2020-07-09 NOTE — PROGRESS NOTES
"RN Review of Medical History / Physical Health Screen  Outpatient Mental Health Programs - Adult    Adult Partial Hospitalization Program    PATIENT'S NAME: Chris Stockton  MRN:   [id number]  :   1960  ACCT. NUMBER: [id number]  CURRENT AGE:  60 year old    DATE OF DIAGNOSTIC ASSESSMENT: see chart  DATE OF ADMISSION: 20    Please see Diagnostic Assessment for additional Medical History.     General Health:   Have you had any exposure to any communicable disease in the past 2-3 weeks? no     Are you aware of safe sex practices? yes       Nutrition:    Are you on a special diet? If yes, please explain:  no   Do you have any concerns regarding your nutritional status? If yes, please explain:  no   Have you had any appetite changes in the last 3 months?  No     Have you had any weight loss or weight gain in the last 3 months?  No     Do you have a history of an eating disorder? no   Do you have a history of being in an eating disorder program? no     Patient height and weight recorded by RN in epic flowsheet: no    No; Unable to measure  Because of temporary in-person programmatic suspension due to COVID-19 pandemic, all pt weights and heights will be collected through patient self-report an recorded in physical health screening progress note upon admission to the program.                            Height/Weight Review:  Patient reported height: 5'9\"      Patient reports weight:  Date last checked:  200.5 lb   Any referrals/needs identified?     no     BMI Review:  Was the patient informed of BMI? no      Findings  No Intervention         Fall Risk:   Have you had any falls in the past 3 months? no     Do you currently use any assistive devices for mobility?   no      Additional Comments/Assessment: no concerns reported    Per completion of the Medical History / Physical Health Screen, is there a recommendation to see / follow up with a primary care physician/clinic or dentist?    No.      Christina Roman " RN  7/9/2020

## 2020-07-10 ENCOUNTER — HOSPITAL ENCOUNTER (OUTPATIENT)
Dept: BEHAVIORAL HEALTH | Facility: CLINIC | Age: 60
End: 2020-07-10
Attending: PSYCHIATRY & NEUROLOGY
Payer: COMMERCIAL

## 2020-07-10 PROCEDURE — H0035 MH PARTIAL HOSP TX UNDER 24H: HCPCS | Mod: 95

## 2020-07-10 PROCEDURE — H0035 MH PARTIAL HOSP TX UNDER 24H: HCPCS | Mod: GT

## 2020-07-10 ASSESSMENT — ANXIETY QUESTIONNAIRES: GAD7 TOTAL SCORE: 9

## 2020-07-10 NOTE — GROUP NOTE
"Psychoeducation Group Note    PATIENT'S NAME: Chris Stockton  MRN:   [id number]  :   1960  ACCT. NUMBER: [id number]  DATE OF SERVICE: 7/10/20  START TIME: 11:00 AM  END TIME: 11:50 AM  FACILITATOR: Lavelle Wilhelm OT  TOPIC: [facility] OT Group: Partial Hospitalization Program- Occupational Therapy  Adult Partial Hospitalization Program  TRACK: 1    Telemedicine Visit: The patient's condition can be safely assessed and treated via synchronous audio and visual telemedicine encounter.      Reason for Telemedicine Visit: Services only offered telehealth and Covid 19    Originating Site (Patient Location): Patient's home    Distant Site (Provider Location): Provider Remote Setting    Consent:  The patient/guardian has verbally consented to: the potential risks and benefits of telemedicine (video visit) versus in person care; bill my insurance or make self-payment for services provided; and responsibility for payment of non-covered services.     Mode of Communication:  Video Conference via TrabajoPanel    As the provider I attest to compliance with applicable laws and regulations related to telemedicine.      NUMBER OF PARTICIPANTS: 8    Summary of Group / Topics Discussed:  Weaving a Mindful Lifestyle to Support Mental Wellbeing and Participation in Your Meaningful Activities and Pursuits    Patients were provided with psychoeducation and experiential exploration of weaving a mindful lifestyle using a loom as metaphor. They explored the \"why, what, how, and ways\" of mindfulness as they pertain to context and nervous system activation level. They worked to demystify mindfulness. To identify ways they can practice mindfulness for the development of skill for self regulation, improved focus, and self efficacy in non pharmacological approaches to managing mental health and improve performance and participation in meaningful activities, roles, relationships, and responsibilities.       Patient Session Goals / " Objectives:  ? Learn and understand the why, what, how, and ways of practicing mindfulness as it applies to everyday life.   ? Develop understanding the neuro-sequential application of mindfulness for specific contexts and supports positive self talk.  ? Identify and make plan to apply one way they can practice mindfulness to their everyday life.             Patient Participation / Response:  Fully participated with the group by sharing personal reflections / insights and openly received / provided feedback with other participants.    Verbalized understanding of content and Patient would benefit from additional opportunities to practice the content to be able to generalize it to their everyday life with increased intentionality, consistency, and efficacy in support of their psychiatric recovery    Treatment Plan:  Patient has an initial individualized treatment plan that was created as part of their diagnostic assessment / admission process.  A master individualized treatment plan is in the process of being developed with the patient and multi-disciplinary care team.    Lavelle Wilhelm, OT

## 2020-07-10 NOTE — GROUP NOTE
Psychoeducation Group Note    PATIENT'S NAME: Chris Stockton  MRN:   [id number]  :   1960  ACCT. NUMBER: [id number]  DATE OF SERVICE: 7/10/20  START TIME:  1:00 PM  END TIME:  1:50 PM  FACILITATOR: Lavelle Wilhelm OT  TOPIC: [facility] OT Group: Lifestyle Balance and Structure  Adult Partial Hospitalization Program  TRACK:1    Telemedicine Visit: The patient's condition can be safely assessed and treated via synchronous audio and visual telemedicine encounter.      Reason for Telemedicine Visit: Services only offered telehealth and Covid 19    Originating Site (Patient Location): Patient's home    Distant Site (Provider Location): Provider Remote Setting    Consent:  The patient/guardian has verbally consented to: the potential risks and benefits of telemedicine (video visit) versus in person care; bill my insurance or make self-payment for services provided; and responsibility for payment of non-covered services.     Mode of Communication:  Video Conference via Rockpack    As the provider I attest to compliance with applicable laws and regulations related to telemedicine.      NUMBER OF PARTICIPANTS: 7    Summary of Group / Topics Discussed:  Lifestyle Balance and Structure:  Weekly reflection and weekend wellness planning. To support progress towards treatment goals and structure/balance and safety for the weekend, group members were led through a weekly structured process to reflect on progress, integrate new learning/skills, and emerging self-awareness into their daily and weekly life. Provided psychoeducation on the neuroscience of change, self-compassion. Provided opportunity to look ahead to the weekend to identify activities to support wellness including self care, productive, mindful/focused activity, and connecting with others.  Facilitated the sharing of individual reflections and plans. Group members received validation, support, and feedback provided for barriers or concerns.    Patient Session  Goals / Objectives:    Reflected on and create a vision for recovery and wellbeing    Identified structure for the weekend to maintain routine and safety.    Identified and problem solved barriers to achieving identified intentions.    Identified plan to support engagement in stated activities including problem solve barriers.         Patient Participation / Response:  Fully participated with the group by sharing personal reflections / insights and openly received / provided feedback with other participants.    Verbalized understanding of content and Patient would benefit from additional opportunities to practice the content to be able to generalize it to their everyday life with increased intentionality, consistency, and efficacy in support of their psychiatric recovery    Treatment Plan:  Patient has an initial individualized treatment plan that was created as part of their diagnostic assessment / admission process.  A master individualized treatment plan is in the process of being developed with the patient and multi-disciplinary care team.    Lavelle Wilhelm, OT

## 2020-07-10 NOTE — GROUP NOTE
"Process Group Note    PATIENT'S NAME: Chris Stockton  MRN:   [id number]  :   1960  ACCT. NUMBER: [id number]  DATE OF SERVICE: 7/10/20  START TIME:  9:00 AM  END TIME:  9:50 AM  FACILITATOR: Lanette Disla LICSW  TOPIC:  Process Group    Diagnoses:  AXIS I:  Major depressive disorder, recurrent, severe without psychosis, and with anxiety; attention deficit hyperactivity disorder by history; gender dysphoria.   AXIS II:  Cluster B personality traits.   AXIS III:  Obstructive sleep apnea; hypothyroidism; leukocytosis; hearing loss; hyperlipidemia; hypertension.          Adult Partial Hospitalization Program  TRACK: 1  Telemedicine Visit: The patient's condition can be safely assessed and treated via synchronous audio and visual telemedicine encounter.      Reason for Telemedicine Visit: Services only offered telehealth    Originating Site (Patient Location): Patient's home    Distant Site (Provider Location): [facility]: [facility]    Consent:  The patient/guardian has verbally consented to: the potential risks and benefits of telemedicine (video visit) versus in person care; bill my insurance or make self-payment for services provided; and responsibility for payment of non-covered services.     Mode of Communication:  Video Conference via Declara    As the provider I attest to compliance with applicable laws and regulations related to telemedicine.     NUMBER OF PARTICIPANTS: 7          Data:    Session content: At the start of this group, patients were invited to check in by identifying themselves, describing their current emotional status, and identifying issues to address in this group.   Area(s) of treatment focus addressed in this session included Symptom Management and Personal Safety.  Pt reports \"renewed hope\" following 15 day inpt stay and med change. Pt was is now on a mood stabilzer. Pt felt positive about birthday celebration yesterday with ex and kids. Feels grateful that " kids have agreed to help clean out the house. Denies safety concerns.    Therapeutic Interventions/Treatment Strategies:  Psychotherapist offered support, feedback and validation and reinforced use of skills.    Assessment:    Patient response:   Patient responded to session by accepting feedback, giving feedback, listening, focusing on goals, being attentive and accepting support    Possible barriers to participation / learning include: and no barriers identified    Health Issues:   None reported       Substance Use Review:   Substance Use: No active concerns identified.    Mental Status/Behavioral Observations  Appearance:   Appropriate   Eye Contact:   Good   Psychomotor Behavior: Normal   Attitude:   Cooperative   Orientation:   All  Speech   Rate / Production: Normal/ Responsive Normal    Volume:  Normal   Mood:    Anxious   Affect:    Appropriate   Thought Content:   Clear  Thought Form:  Coherent  Logical     Insight:    Good     Plan:     Safety Plan: No current safety concerns identified.  Recommended that patient call 911 or go to the local ED should there be a change in any of these risk factors.     Barriers to treatment: None identified    Patient Contracts (see media tab):  None    Substance Use: Not addressed in session     Continue or Discharge: Patient will continue in Adult Partial Hospitalization Program (PHP)  as planned. Patient is likely to benefit from learning and using skills as they work toward the goals identified in their treatment plan.      Lanette Schmid, SHADIA  July 10, 2020

## 2020-07-10 NOTE — PROGRESS NOTES
"[facility]   Dr. Peng's Psychiatric Progress Note  07/10/2020      Patient:  Chris Stockton \"Liz\"  Medical Record Number:  [id number]  :  1960          Interim History:   The patient's care was discussed with the treatment team and chart notes were reviewed.  Really glad to be in the group.  Likes the groups.  Not crying much anymore.  Was with his 2 sons and estranged wife last night. Up til midnight due to that.  Weekend plans: she and roommates are showing the vacant room in the house.        Psychiatric ROS:  Mood:  pretty good, anxiety re: having to deal with life (the house and his wife)  Sleep:  Got to bed late but then slept well  Appetite:normal  Eating:normal  Energy Level:  Tired but not exhausted  Concentration/Memory Problems:    pretty good  Suicidal Thoughts:  Fleeting SI but not plan or intent  Homicidal Thoughts:No  Psychotic Symptoms: No  Medication Side Effects:No  Medication Compliance:Yes   Physical Complaints:negative         Medications:     PAST PSYCH MEDS:      Current Outpatient Medications   Medication Sig     buPROPion (WELLBUTRIN XL) 150 MG 24 hr tablet Take 1 tablet (150 mg) by mouth every morning     estradiol (VIVELLE-DOT) 0.1 MG/24HR bi-weekly patch Place onto the skin every 72 hours Patch placement rotation every 3 days: 1 patch, 2 patch, no patch, then repeat     guanFACINE (INTUNIV) 2 MG TB24 24 hr tablet Take 1 tablet (2 mg) by mouth At Bedtime     lamoTRIgine (LAMICTAL) 25 MG tablet Take 1 tablet (25 mg) by mouth daily     levothyroxine (SYNTHROID/LEVOTHROID) 75 MCG tablet Take 1 tablet (75 mcg) by mouth daily     lithium ER (LITHOBID) 300 MG CR tablet Take 1 tablet (300 mg) by mouth At Bedtime     magnesium oxide 400 MG CAPS Take by mouth At Bedtime     melatonin 3 MG tablet Take 1-2 tablets (3-6 mg) by mouth nightly as needed for sleep     OLANZapine (ZYPREXA) 2.5 MG tablet Take 1-2 tablets (2.5-5 mg) by mouth At Bedtime     " spironolactone (ALDACTONE) 25 MG tablet Take 25 mg by mouth daily      Vitamin D3 (CHOLECALCIFEROL) 25 mcg (1000 units) tablet Take 2 tablets (50 mcg) by mouth daily     No current facility-administered medications for this encounter.              Allergies:     Allergies   Allergen Reactions     Finasteride      Mental health problems     Tetracycline Unknown     Doxycycline Rash            Psychiatric Examination:   There were no vitals taken for this visit.  Weight is 0 lbs 0 oz  There is no height or weight on file to calculate BMI.    Appearance:  awake, alert and adequately groomed  Attitude:  cooperative  Eye Contact:  good  Mood:    pretty good;  some anxiety  Affect:  mood congruent  Speech:  clear, coherent  Psychomotor Behavior:  no evidence of tardive dyskinesia, dystonia, or tics  Throught Process:  logical  Associations:  no loose associations  Thought Content:  no evidence of psychotic thought and occasional fleeting suicidal thoughts with no plan or intent to act on them;  able to contract for safety  Insight:  good  Judgement:  intact  Oriented to:  time, person, and place  Attention Span and Concentration:  intact  Recent and Remote Memory:  intact  Gait:Normal    Risk/Potential for Dangerousness:  Multiple Active Diagnoses:HIGH  Self Care:HIGH  Suicide:LOW  Assault:LOW  Self Injurious Behaviors:LOW  Inappropriate Sexual Behavior:LOW         Labs:   No results found for this or any previous visit (from the past 24 hour(s)).     Recent Results (from the past 1008 hour(s))   Asymptomatic COVID-19 Virus (Coronavirus) by PCR    Collection Time: 06/25/20 12:51 AM    Specimen: Nasopharyngeal   Result Value Ref Range    COVID-19 Virus PCR to [facility] - Source Nasopharyngeal     COVID-19 Virus PCR to [facility] - Result       Test received-See reflex to IDDL test SARS CoV2 (COVID-19) Virus RT-PCR   SARS-CoV-2 COVID-19 Virus (Coronavirus) RT-PCR Nasopharyngeal    Collection Time: 06/25/20 12:51 AM    Specimen:  Nasopharyngeal   Result Value Ref Range    SARS-CoV-2 Virus Specimen Source Nasopharyngeal     SARS-CoV-2 PCR Result NEGATIVE     SARS-CoV-2 PCR Comment       Testing was performed using the Simplexa COVID-19 Direct Assay on the Phreesia MDX   instrument. Additional information about this Emergency Use Authorization (EUA) assay can   be found via the Lab Guide.     CBC with platelets differential    Collection Time: 06/25/20  1:17 AM   Result Value Ref Range    WBC 15.4 (H) 4.0 - 11.0 10e9/L    RBC Count 5.42 4.4 - 5.9 10e12/L    Hemoglobin 16.4 13.3 - 17.7 g/dL    Hematocrit 48.1 40.0 - 53.0 %    MCV 89 78 - 100 fl    MCH 30.3 26.5 - 33.0 pg    MCHC 34.1 31.5 - 36.5 g/dL    RDW 12.6 10.0 - 15.0 %    Platelet Count 246 150 - 450 10e9/L    Diff Method Automated Method     % Neutrophils 84.2 %    % Lymphocytes 9.1 %    % Monocytes 5.1 %    % Eosinophils 0.1 %    % Basophils 0.7 %    % Immature Granulocytes 0.8 %    Nucleated RBCs 0 0 /100    Absolute Neutrophil 13.0 (H) 1.6 - 8.3 10e9/L    Absolute Lymphocytes 1.4 0.8 - 5.3 10e9/L    Absolute Monocytes 0.8 0.0 - 1.3 10e9/L    Absolute Eosinophils 0.0 0.0 - 0.7 10e9/L    Absolute Basophils 0.1 0.0 - 0.2 10e9/L    Abs Immature Granulocytes 0.1 0 - 0.4 10e9/L    Absolute Nucleated RBC 0.0    Comprehensive metabolic panel    Collection Time: 06/25/20  1:17 AM   Result Value Ref Range    Sodium 138 133 - 144 mmol/L    Potassium 3.7 3.4 - 5.3 mmol/L    Chloride 107 94 - 109 mmol/L    Carbon Dioxide 23 20 - 32 mmol/L    Anion Gap 8 3 - 14 mmol/L    Glucose 117 (H) 70 - 99 mg/dL    Urea Nitrogen 15 7 - 30 mg/dL    Creatinine 0.76 0.66 - 1.25 mg/dL    GFR Estimate >90 >60 mL/min/[1.73_m2]    GFR Estimate If Black >90 >60 mL/min/[1.73_m2]    Calcium 8.5 8.5 - 10.1 mg/dL    Bilirubin Total 0.8 0.2 - 1.3 mg/dL    Albumin 4.1 3.4 - 5.0 g/dL    Protein Total 7.6 6.8 - 8.8 g/dL    Alkaline Phosphatase 51 40 - 150 U/L    ALT 20 0 - 70 U/L    AST 12 0 - 45 U/L   Drug abuse  screen 6 urine (tox)    Collection Time: 06/25/20  1:46 AM   Result Value Ref Range    Amphetamine Qual Urine Negative NEG^Negative    Barbiturates Qual Urine Negative NEG^Negative    Benzodiazepine Qual Urine Negative NEG^Negative    Cannabinoids Qual Urine Negative NEG^Negative    Cocaine Qual Urine Negative NEG^Negative    Ethanol Qual Urine Negative NEG^Negative    Opiates Qualitative Urine Negative NEG^Negative   CBC with platelets    Collection Time: 06/25/20  8:19 AM   Result Value Ref Range    WBC 13.1 (H) 4.0 - 11.0 10e9/L    RBC Count 5.46 4.4 - 5.9 10e12/L    Hemoglobin 16.5 13.3 - 17.7 g/dL    Hematocrit 48.6 40.0 - 53.0 %    MCV 89 78 - 100 fl    MCH 30.2 26.5 - 33.0 pg    MCHC 34.0 31.5 - 36.5 g/dL    RDW 12.7 10.0 - 15.0 %    Platelet Count 263 150 - 450 10e9/L   UA with Microscopic    Collection Time: 06/25/20  8:15 PM   Result Value Ref Range    Color Urine Yellow     Appearance Urine Clear     Glucose Urine Negative NEG^Negative mg/dL    Bilirubin Urine Negative NEG^Negative    Ketones Urine 5 (A) NEG^Negative mg/dL    Specific Gravity Urine 1.025 1.003 - 1.035    Blood Urine Negative NEG^Negative    pH Urine 5.0 5.0 - 7.0 pH    Protein Albumin Urine 10 (A) NEG^Negative mg/dL    Urobilinogen mg/dL 2.0 0.0 - 2.0 mg/dL    Nitrite Urine Negative NEG^Negative    Leukocyte Esterase Urine Negative NEG^Negative    Source Urine     WBC Urine <1 0 - 5 /HPF    RBC Urine 1 0 - 2 /HPF    Squamous Epithelial /HPF Urine 1 0 - 1 /HPF    Mucous Urine Present (A) NEG^Negative /LPF    Hyaline Casts 4 (H) 0 - 2 /LPF   TSH with free T4 reflex and/or T3 as indicated    Collection Time: 06/26/20  7:32 AM   Result Value Ref Range    TSH 4.55 (H) 0.40 - 4.00 mU/L   Folate    Collection Time: 06/26/20  7:32 AM   Result Value Ref Range    Folate 15.6 >5.4 ng/mL   Vitamin B12    Collection Time: 06/26/20  7:32 AM   Result Value Ref Range    Vitamin B12 1,214 (H) 193 - 986 pg/mL   Vitamin D    Collection Time: 06/26/20  7:32  AM   Result Value Ref Range    Vitamin D Deficiency screening 26 20 - 75 ug/L   CBC with platelets    Collection Time: 06/26/20  7:32 AM   Result Value Ref Range    WBC 8.1 4.0 - 11.0 10e9/L    RBC Count 5.31 4.4 - 5.9 10e12/L    Hemoglobin 16.7 13.3 - 17.7 g/dL    Hematocrit 48.3 40.0 - 53.0 %    MCV 91 78 - 100 fl    MCH 31.5 26.5 - 33.0 pg    MCHC 34.6 31.5 - 36.5 g/dL    RDW 13.0 10.0 - 15.0 %    Platelet Count 240 150 - 450 10e9/L   T4 free    Collection Time: 06/26/20  7:32 AM   Result Value Ref Range    T4 Free 1.09 0.76 - 1.46 ng/dL   Hemoglobin A1c    Collection Time: 06/26/20  7:32 AM   Result Value Ref Range    Hemoglobin A1C 5.1 0 - 5.6 %   EKG 12-lead, complete    Collection Time: 07/04/20  2:48 PM   Result Value Ref Range    Interpretation ECG Click View Image link to view waveform and result          Impression:   This is a 60 year old adult continues PHP for mood stabilization.  Groups are going well.  She has some anxiety.          DIagnoses:     AXIS I:  Major depressive disorder, recurrent, severe without psychosis, and with anxiety; attention deficit hyperactivity disorder by history; gender dysphoria.   AXIS II:  Cluster B personality traits.   AXIS III:  Obstructive sleep apnea; hypothyroidism; leukocytosis; hearing loss; hyperlipidemia; hypertension.            Plan:     Continue [facility].   Continue current medications which were recently changed in the hospital.   Expect stabilization and completion of Partial Hospital Program.   Follow-up with current outpatient mental health providers at completion of Partial Hospital Program.     Car Peng MD

## 2020-07-10 NOTE — GROUP NOTE
Psychotherapy Group Note    PATIENT'S NAME: Chris Stockton  MRN:   [id number]  :   1960  ACCT. NUMBER: [id number]  DATE OF SERVICE: 7/10/20  START TIME: 10:00 AM  END TIME: 10:50 AM  FACILITATOR: Lanette Disla LICSW  TOPIC:  EBP Group: Coping Skills  Adult Partial Hospitalization Program  TRACK: 1  Telemedicine Visit: The patient's condition can be safely assessed and treated via synchronous audio and visual telemedicine encounter.      Reason for Telemedicine Visit: Services only offered telehealth    Originating Site (Patient Location): Patient's home    Distant Site (Provider Location): [facility]: [facility]    Consent:  The patient/guardian has verbally consented to: the potential risks and benefits of telemedicine (video visit) versus in person care; bill my insurance or make self-payment for services provided; and responsibility for payment of non-covered services.     Mode of Communication:  Video Conference via [employer]    As the provider I attest to compliance with applicable laws and regulations related to telemedicine.     NUMBER OF PARTICIPANTS: 7    Summary of Group / Topics Discussed:  Coping Skills: Meditation: Patients learned about meditation and explored how and when to utilize it to increase focus, reduce mental health symptoms, decrease physical tension, and improve mental well-being.  Approaches to meditation were presented as a means of increasing self-awareness. The benefits of various meditation practices were discussed, as well as barriers to utilization of this coping strategy.     Patient Session Goals / Objectives:    Understand the purpose and efficacy of using meditation modalities to reduce stress / symptoms.    Review / discuss situations in daily life that cause distress, where establishing a meditation routine or meditating as needed may improve functioning.      Verbalize understanding of how and when to apply grounding strategies to reduce distress  and increase presence in the moment.    Practice meditation and address barriers to use in daily life.        Patient Participation / Response:  Fully participated with the group by sharing personal reflections / insights and openly received / provided feedback with other participants.    Demonstrated knowledge of when to consider using a variety of coping skills in daily life and Identified / Expressed personal readiness to practice new coping skills    Treatment Plan:  Patient has an initial individualized treatment plan that was created as part of their diagnostic assessment / admission process.  A master individualized treatment plan is in the process of being developed with the patient and multi-disciplinary care team.    Lanette Schmid, LICSW

## 2020-07-13 ENCOUNTER — HOSPITAL ENCOUNTER (OUTPATIENT)
Dept: BEHAVIORAL HEALTH | Facility: CLINIC | Age: 60
End: 2020-07-13
Attending: PSYCHIATRY & NEUROLOGY
Payer: COMMERCIAL

## 2020-07-13 PROCEDURE — H0035 MH PARTIAL HOSP TX UNDER 24H: HCPCS | Mod: 95

## 2020-07-13 PROCEDURE — H0035 MH PARTIAL HOSP TX UNDER 24H: HCPCS | Mod: GT

## 2020-07-13 PROCEDURE — H0035 MH PARTIAL HOSP TX UNDER 24H: HCPCS | Mod: GT | Performed by: PSYCHOLOGIST

## 2020-07-13 NOTE — GROUP NOTE
Psychoeducation Group Note    PATIENT'S NAME: Chris Stockton  MRN:   [id number]  :   1960  ACCT. NUMBER: [id number]  DATE OF SERVICE: 20  START TIME:  1:00 PM  END TIME:  1:50 PM  FACILITATOR: Christina Roman RN  TOPIC: ANNA MARIE RN Group: Mental Health Maintenance  Adult Partial Hospitalization Program  TRACK: 1    NUMBER OF PARTICIPANTS: 6    Summary of Group / Topics Discussed:  Mental Health Maintenance:  Assessments of Strengths: Patients completed a self-reflection on personal strengths worksheet. The concept of personal strength as it relates to resilience were explored. Patients shared responses with the group and participated in discussion.     Patient Session Goals / Objectives:  ? Patients identified 1-3 qualities they consider a personal strength.  ? Patients understood the concept of personal strengths and the connection it has to resiliency  Telemedicine Visit: The patient's condition can be safely assessed and treated via synchronous audio and visual telemedicine encounter.      Reason for Telemedicine Visit: Services only offered telehealth    Originating Site (Patient Location): Patient's home    Distant Site (Provider Location): Provider Remote Setting    Consent:  The patient/guardian has verbally consented to: the potential risks and benefits of telemedicine (video visit) versus in person care; bill my insurance or make self-payment for services provided; and responsibility for payment of non-covered services.     Mode of Communication:  Video Conference via [employer].    As the provider I attest to compliance with applicable laws and regulations related to telemedicine.       Patient Participation / Response:  Fully participated with the group by sharing personal reflections / insights and openly received / provided feedback with other participants.    Demonstrated understanding of topics discussed through group discussion and participation    Treatment Plan:  Patient has a current  master individualized treatment plan.  See Epic treatment plan for more information.    Christina Roman RN

## 2020-07-13 NOTE — GROUP NOTE
Psychotherapy Group Note    PATIENT'S NAME: Chris Stockton  MRN:   [id number]  :   1960  ACCT. NUMBER: [id number]  DATE OF SERVICE: 20  START TIME:  2:00 PM  END TIME:  2:50 PM  FACILITATOR: Raman Turner, PhD LP  TOPIC:  EBP Group: Self-Awareness  Adult Partial Hospitalization Program  TRACK:     Telemedicine Visit: The patient's condition can be safely assessed and treated via synchronous audio and visual telemedicine encounter.      Reason for Telemedicine Visit: COVID19    Originating Site (Patient Location): Patient's home    Distant Site (Provider Location): Provider Remote Setting    Consent:  The patient/guardian has verbally consented to: the potential risks and benefits of telemedicine (video visit) versus in person care; bill my insurance or make self-payment for services provided; and responsibility for payment of non-covered services.     Mode of Communication:  Video Conference via [employer]    As the provider I attest to compliance with applicable laws and regulations related to telemedicine.      NUMBER OF PARTICIPANTS: 6    Summary of Group / Topics Discussed:  Self-Awareness: Values: Patients identified personal values by examining development of their current values and how their values influence their daily functioning and life choices. Patients explored the impact of their values on their thoughts, feelings, and actions. Patients discussed definition of personal values and how they develop and change over time. The goal is to help patients reconcile value conflicts and achieve balance and flexibility to improve mood and daily functioning. This group built upon the earlier group's discussion, with the addition of identifying barriers to committed action. Once these barriers were identified (e.g., experiential vs environmental barriers), patients created goals to eliminate those barriers by using mindfulness and acceptance-based strategies.     Patient Session Goals /  Objectives:    Examined development of values and impact of values on functioning    Identified and prioritized important values related to current well-being     Identified strategies to change or enhance values to positively impact symptoms    Assisted patients to find ways to adapt functioning to better fit their values        Patient Participation / Response:  Fully participated with the group by sharing personal reflections / insights and openly received / provided feedback with other participants.    Demonstrated understanding of topics discussed through group discussion and participation, Demonstrated understanding of values, strengths, and challenges to learn about themselves, Identified / Expressed readiness to act intentionally, increase self-compassion, promote personal growth, Verbalized understanding of ways to proactively manage illness, Identified plan to address barriers to practicing skills that promote self-awareness  and Practiced skills in session    Treatment Plan:  Patient has an initial individualized treatment plan that was created as part of their diagnostic assessment / admission process.  A master individualized treatment plan is in the process of being developed with the patient and multi-disciplinary care team.    Raman Turner, PhD LP

## 2020-07-13 NOTE — GROUP NOTE
Psychotherapy Group Note    PATIENT'S NAME: Chris Stockton  MRN:   [id number]  :   1960  ACCT. NUMBER: [id number]  DATE OF SERVICE: 20  START TIME: 10:00 AM  END TIME: 10:50 AM  FACILITATOR: Raman Turner, PhD LP  TOPIC: MH EBP Group: Self-Awareness  Adult Partial Hospitalization Program  TRACK:     Telemedicine Visit: The patient's condition can be safely assessed and treated via synchronous audio and visual telemedicine encounter.      Reason for Telemedicine Visit: COVID19    Originating Site (Patient Location): Patient's home    Distant Site (Provider Location): Provider Remote Setting    Consent:  The patient/guardian has verbally consented to: the potential risks and benefits of telemedicine (video visit) versus in person care; bill my insurance or make self-payment for services provided; and responsibility for payment of non-covered services.     Mode of Communication:  Video Conference via [employer]    As the provider I attest to compliance with applicable laws and regulations related to telemedicine.      NUMBER OF PARTICIPANTS: 6      Summary of Group / Topics Discussed:  Self-Awareness: Values: Patients identified personal values by examining development of their current values and how their values influence their daily functioning and life choices. Patients explored the impact of their values on their thoughts, feelings, and actions. Patients discussed definition of personal values and how they develop and change over time. The goal is to help patients reconcile value conflicts and achieve balance and flexibility to improve mood and daily functioning. In this group, members completed a values-oriented self-reflection exercise aimed at identifying and clarifying their values, in addition to identifying processes that have impeded freely-chosen values.     Patient Session Goals / Objectives:    Examined development of values and impact of values on functioning    Identified and prioritized  important values related to current well-being     Identified strategies to change or enhance values to positively impact symptoms    Assisted patients to find ways to adapt functioning to better fit their values        Patient Participation / Response:  Fully participated with the group by sharing personal reflections / insights and openly received / provided feedback with other participants.    Demonstrated understanding of topics discussed through group discussion and participation, Demonstrated understanding of values, strengths, and challenges to learn about themselves, Identified / Expressed readiness to act intentionally, increase self-compassion, promote personal growth, Verbalized understanding of ways to proactively manage illness, Identified plan to address barriers to practicing skills that promote self-awareness  and Practiced skills in session    Treatment Plan:  Patient has an initial individualized treatment plan that was created as part of their diagnostic assessment / admission process.  A master individualized treatment plan is in the process of being developed with the patient and multi-disciplinary care team.    Raman Turner, PhD LP

## 2020-07-13 NOTE — PROGRESS NOTES
"[facility]   Dr. Peng's Psychiatric Progress Note  2020      Patient:  Chris Stockton \"Liz\"  Medical Record Number:  [id number]  :  1960          Interim History:   The patient's care was discussed with the treatment team and chart notes were reviewed.  Celebrated her 8th birthday of becoming trans this weekend.  They are showing  The vacant room in their house they're trying to rent out.  Filling out paperwork for STD paperwork.        Psychiatric ROS:  Mood:  Overall the mood was good;  Better than a couple weeks ago;    Sleep:    Pretty good;  As falls asleep notices eyes dart about while they're closed;    Appetite:   normal  Eating:  normal  Energy Level:  Better;  Getting up and getting things done;   Concentration/Memory;  Fair;  A little better;     Suicidal Thoughts:  Fleeting;  No plan or intent; feels safe;    Homicidal Thoughts:No  Psychotic Symptoms: No  Medication Side Effects:No  Medication Compliance:Yes   Physical Complaints:  Bloody stools         Medications:     PAST PSYCH MEDS:      Current Outpatient Medications   Medication Sig     buPROPion (WELLBUTRIN XL) 150 MG 24 hr tablet Take 1 tablet (150 mg) by mouth every morning     estradiol (VIVELLE-DOT) 0.1 MG/24HR bi-weekly patch Place onto the skin every 72 hours Patch placement rotation every 3 days: 1 patch, 2 patch, no patch, then repeat     guanFACINE (INTUNIV) 2 MG TB24 24 hr tablet Take 1 tablet (2 mg) by mouth At Bedtime     lamoTRIgine (LAMICTAL) 25 MG tablet Take 1 tablet (25 mg) by mouth daily     levothyroxine (SYNTHROID/LEVOTHROID) 75 MCG tablet Take 1 tablet (75 mcg) by mouth daily     lithium ER (LITHOBID) 300 MG CR tablet Take 1 tablet (300 mg) by mouth At Bedtime     magnesium oxide 400 MG CAPS Take by mouth At Bedtime     melatonin 3 MG tablet Take 1-2 tablets (3-6 mg) by mouth nightly as needed for sleep     OLANZapine (ZYPREXA) 2.5 MG tablet Take 1-2 tablets (2.5-5 mg) by " mouth At Bedtime     spironolactone (ALDACTONE) 25 MG tablet Take 25 mg by mouth daily      Vitamin D3 (CHOLECALCIFEROL) 25 mcg (1000 units) tablet Take 2 tablets (50 mcg) by mouth daily     No current facility-administered medications for this encounter.              Allergies:     Allergies   Allergen Reactions     Finasteride      Mental health problems     Tetracycline Unknown     Doxycycline Rash            Psychiatric Examination:   There were no vitals taken for this visit.  Weight is 0 lbs 0 oz  There is no height or weight on file to calculate BMI.    Appearance:  awake, alert and adequately groomed  Attitude:  cooperative  Eye Contact:  good  Mood:    pretty good;  some anxiety  Affect:  mood congruent  Speech:  clear, coherent  Psychomotor Behavior:  no evidence of tardive dyskinesia, dystonia, or tics  Throught Process:  logical  Associations:  no loose associations  Thought Content:  no evidence of psychotic thought and occasional fleeting suicidal thoughts with no plan or intent to act on them;  able to contract for safety  Insight:  good  Judgement:  intact  Oriented to:  time, person, and place  Attention Span and Concentration:  intact  Recent and Remote Memory:  intact  Gait:Normal    Risk/Potential for Dangerousness:  Multiple Active Diagnoses:HIGH  Self Care:HIGH  Suicide:LOW  Assault:LOW  Self Injurious Behaviors:LOW  Inappropriate Sexual Behavior:LOW         Labs:   No results found for this or any previous visit (from the past 24 hour(s)).     Recent Results (from the past 1008 hour(s))   Asymptomatic COVID-19 Virus (Coronavirus) by PCR    Collection Time: 06/25/20 12:51 AM    Specimen: Nasopharyngeal   Result Value Ref Range    COVID-19 Virus PCR to [facility] - Source Nasopharyngeal     COVID-19 Virus PCR to [facility] - Result       Test received-See reflex to IDDL test SARS CoV2 (COVID-19) Virus RT-PCR   SARS-CoV-2 COVID-19 Virus (Coronavirus) RT-PCR Nasopharyngeal    Collection Time: 06/25/20  12:51 AM    Specimen: Nasopharyngeal   Result Value Ref Range    SARS-CoV-2 Virus Specimen Source Nasopharyngeal     SARS-CoV-2 PCR Result NEGATIVE     SARS-CoV-2 PCR Comment       Testing was performed using the Simplex[employer] COVID-19 Direct Assay on the [employer] MDX   instrument. Additional information about this Emergency Use Authorization (EUA) assay can   be found via the Lab Guide.     CBC with platelets differential    Collection Time: 06/25/20  1:17 AM   Result Value Ref Range    WBC 15.4 (H) 4.0 - 11.0 10e9/L    RBC Count 5.42 4.4 - 5.9 10e12/L    Hemoglobin 16.4 13.3 - 17.7 g/dL    Hematocrit 48.1 40.0 - 53.0 %    MCV 89 78 - 100 fl    MCH 30.3 26.5 - 33.0 pg    MCHC 34.1 31.5 - 36.5 g/dL    RDW 12.6 10.0 - 15.0 %    Platelet Count 246 150 - 450 10e9/L    Diff Method Automated Method     % Neutrophils 84.2 %    % Lymphocytes 9.1 %    % Monocytes 5.1 %    % Eosinophils 0.1 %    % Basophils 0.7 %    % Immature Granulocytes 0.8 %    Nucleated RBCs 0 0 /100    Absolute Neutrophil 13.0 (H) 1.6 - 8.3 10e9/L    Absolute Lymphocytes 1.4 0.8 - 5.3 10e9/L    Absolute Monocytes 0.8 0.0 - 1.3 10e9/L    Absolute Eosinophils 0.0 0.0 - 0.7 10e9/L    Absolute Basophils 0.1 0.0 - 0.2 10e9/L    Abs Immature Granulocytes 0.1 0 - 0.4 10e9/L    Absolute Nucleated RBC 0.0    Comprehensive metabolic panel    Collection Time: 06/25/20  1:17 AM   Result Value Ref Range    Sodium 138 133 - 144 mmol/L    Potassium 3.7 3.4 - 5.3 mmol/L    Chloride 107 94 - 109 mmol/L    Carbon Dioxide 23 20 - 32 mmol/L    Anion Gap 8 3 - 14 mmol/L    Glucose 117 (H) 70 - 99 mg/dL    Urea Nitrogen 15 7 - 30 mg/dL    Creatinine 0.76 0.66 - 1.25 mg/dL    GFR Estimate >90 >60 mL/min/[1.73_m2]    GFR Estimate If Black >90 >60 mL/min/[1.73_m2]    Calcium 8.5 8.5 - 10.1 mg/dL    Bilirubin Total 0.8 0.2 - 1.3 mg/dL    Albumin 4.1 3.4 - 5.0 g/dL    Protein Total 7.6 6.8 - 8.8 g/dL    Alkaline Phosphatase 51 40 - 150 U/L    ALT 20 0 - 70 U/L    AST 12 0 -  45 U/L   Drug abuse screen 6 urine (tox)    Collection Time: 06/25/20  1:46 AM   Result Value Ref Range    Amphetamine Qual Urine Negative NEG^Negative    Barbiturates Qual Urine Negative NEG^Negative    Benzodiazepine Qual Urine Negative NEG^Negative    Cannabinoids Qual Urine Negative NEG^Negative    Cocaine Qual Urine Negative NEG^Negative    Ethanol Qual Urine Negative NEG^Negative    Opiates Qualitative Urine Negative NEG^Negative   CBC with platelets    Collection Time: 06/25/20  8:19 AM   Result Value Ref Range    WBC 13.1 (H) 4.0 - 11.0 10e9/L    RBC Count 5.46 4.4 - 5.9 10e12/L    Hemoglobin 16.5 13.3 - 17.7 g/dL    Hematocrit 48.6 40.0 - 53.0 %    MCV 89 78 - 100 fl    MCH 30.2 26.5 - 33.0 pg    MCHC 34.0 31.5 - 36.5 g/dL    RDW 12.7 10.0 - 15.0 %    Platelet Count 263 150 - 450 10e9/L   UA with Microscopic    Collection Time: 06/25/20  8:15 PM   Result Value Ref Range    Color Urine Yellow     Appearance Urine Clear     Glucose Urine Negative NEG^Negative mg/dL    Bilirubin Urine Negative NEG^Negative    Ketones Urine 5 (A) NEG^Negative mg/dL    Specific Gravity Urine 1.025 1.003 - 1.035    Blood Urine Negative NEG^Negative    pH Urine 5.0 5.0 - 7.0 pH    Protein Albumin Urine 10 (A) NEG^Negative mg/dL    Urobilinogen mg/dL 2.0 0.0 - 2.0 mg/dL    Nitrite Urine Negative NEG^Negative    Leukocyte Esterase Urine Negative NEG^Negative    Source Urine     WBC Urine <1 0 - 5 /HPF    RBC Urine 1 0 - 2 /HPF    Squamous Epithelial /HPF Urine 1 0 - 1 /HPF    Mucous Urine Present (A) NEG^Negative /LPF    Hyaline Casts 4 (H) 0 - 2 /LPF   TSH with free T4 reflex and/or T3 as indicated    Collection Time: 06/26/20  7:32 AM   Result Value Ref Range    TSH 4.55 (H) 0.40 - 4.00 mU/L   Folate    Collection Time: 06/26/20  7:32 AM   Result Value Ref Range    Folate 15.6 >5.4 ng/mL   Vitamin B12    Collection Time: 06/26/20  7:32 AM   Result Value Ref Range    Vitamin B12 1,214 (H) 193 - 986 pg/mL   Vitamin D    Collection  Time: 06/26/20  7:32 AM   Result Value Ref Range    Vitamin D Deficiency screening 26 20 - 75 ug/L   CBC with platelets    Collection Time: 06/26/20  7:32 AM   Result Value Ref Range    WBC 8.1 4.0 - 11.0 10e9/L    RBC Count 5.31 4.4 - 5.9 10e12/L    Hemoglobin 16.7 13.3 - 17.7 g/dL    Hematocrit 48.3 40.0 - 53.0 %    MCV 91 78 - 100 fl    MCH 31.5 26.5 - 33.0 pg    MCHC 34.6 31.5 - 36.5 g/dL    RDW 13.0 10.0 - 15.0 %    Platelet Count 240 150 - 450 10e9/L   T4 free    Collection Time: 06/26/20  7:32 AM   Result Value Ref Range    T4 Free 1.09 0.76 - 1.46 ng/dL   Hemoglobin A1c    Collection Time: 06/26/20  7:32 AM   Result Value Ref Range    Hemoglobin A1C 5.1 0 - 5.6 %   EKG 12-lead, complete    Collection Time: 07/04/20  2:48 PM   Result Value Ref Range    Interpretation ECG Click View Image link to view waveform and result          Impression:   This is a 60 year old adult continues PHP for mood stabilization.  Groups are going well.  She has some anxiety.          DIagnoses:     AXIS I:  Major depressive disorder, recurrent, severe without psychosis, and with anxiety; attention deficit hyperactivity disorder by history; gender dysphoria.   AXIS II:  Cluster B personality traits.   AXIS III:  Obstructive sleep apnea; hypothyroidism; leukocytosis; hearing loss; hyperlipidemia; hypertension.            Plan:     Continue [facility] [place] [facility].   Continue current medications which were recently changed in the hospital.   She may try weaning off HS Zyprexa.   Expect stabilization and completion of Partial Hospital Program.   Follow-up with current outpatient mental health providers at completion of Partial Hospital Program.     Car Peng MD

## 2020-07-13 NOTE — GROUP NOTE
Process Group Note    PATIENT'S NAME: Chris Stockton  MRN:   [id number]  :   1960  ACCT. NUMBER: [id number]  DATE OF SERVICE: 20  START TIME:  9:00 AM  END TIME:  9:50 AM  FACILITATOR: Raman Turner, PhD LP  TOPIC:  Process Group    Diagnoses:   Major depressive disorder, recurrent, severe without psychosis, and with anxiety; attention deficit hyperactivity disorder by history       Adult Partial Hospitalization Program  TRACK:     Telemedicine Visit: The patient's condition can be safely assessed and treated via synchronous audio and visual telemedicine encounter.      Reason for Telemedicine Visit:  COVID19    Originating Site (Patient Location): Patient's home    Distant Site (Provider Location): Provider Remote Setting    Consent:  The patient/guardian has verbally consented to: the potential risks and benefits of telemedicine (video visit) versus in person care; bill my insurance or make self-payment for services provided; and responsibility for payment of non-covered services.     Mode of Communication:  Video Conference via [employer]    As the provider I attest to compliance with applicable laws and regulations related to telemedicine.      NUMBER OF PARTICIPANTS: 6          Data:    Session content: At the start of this group, patients were invited to check in by identifying themselves, describing their current emotional status, and identifying issues to address in this group.   Area(s) of treatment focus addressed in this session included Symptom Management, Personal Safety and Develop Socialization / Interpersonal Relationship Skills.    The patient shared that she recently had the nine-year anniversary of gender identification, which was celebrated with a cake from her Jehovah's witness. Although she felt significant gratitude in response to this, she also shared she has been significantly overwhelmed by her current living situation, and at-times fantasized about going back into the hospital to  avoid figuring this out. She received encouragement and support from the group about how she could lean-in to her responsibility, rather than avoid.     Therapeutic Interventions/Treatment Strategies:  Psychotherapist offered support, feedback and validation, set limits, provided redirection and reinforced use of skills.    Assessment:    Patient response:   Patient responded to session by accepting feedback, giving feedback, listening, focusing on goals, being attentive and accepting support    Possible barriers to participation / learning include: and no barriers identified    Health Issues:   None reported       Substance Use Review:   Substance Use: No active concerns identified.    Mental Status/Behavioral Observations  Appearance:   Appropriate   Eye Contact:   Fair   Psychomotor Behavior: Normal   Attitude:   Cooperative  Interested Playful Pleasant  Orientation:   All  Speech   Rate / Production: Hyperverbal  Talkative   Volume:  Normal   Mood:    Anxious  Depressed   Affect:    Flat   Thought Content:   Rumination  Thought Form:  Coherent  Logical     Insight:    Fair  and Intellectual Insight    Plan:     Safety Plan: No current safety concerns identified.  Recommended that patient call 911 or go to the local ED should there be a change in any of these risk factors.     Barriers to treatment: None identified    Patient Contracts (see media tab):  None    Substance Use: Not addressed in session     Continue or Discharge: Patient will continue in Adult Partial Hospitalization Program (PHP)  as planned. Patient is likely to benefit from learning and using skills as they work toward the goals identified in their treatment plan.      Raman Turner, PhD LP  July 13, 2020

## 2020-07-14 ENCOUNTER — HOSPITAL ENCOUNTER (OUTPATIENT)
Dept: BEHAVIORAL HEALTH | Facility: CLINIC | Age: 60
End: 2020-07-14
Attending: PSYCHIATRY & NEUROLOGY
Payer: COMMERCIAL

## 2020-07-14 PROCEDURE — H0035 MH PARTIAL HOSP TX UNDER 24H: HCPCS | Mod: 95

## 2020-07-14 PROCEDURE — H0035 MH PARTIAL HOSP TX UNDER 24H: HCPCS | Mod: 95 | Performed by: PSYCHOLOGIST

## 2020-07-14 NOTE — PROGRESS NOTES
Acknowledgement of Current Treatment Plan       I have reviewed my treatment plan with my therapist / counselor on 07/14/20.   I agree with the plan as it is written in the electronic health record.    Name:      Signature:  Chris Peng MD  Psychiatrist    Lizy Oh MA, [facility]   Psychotherapist     Dr. Raman Turner, PhD   Psychotherapist Raman Turner, PhD

## 2020-07-14 NOTE — DISCHARGE SUMMARY
Adult Mental Health Intensive Outpatient Discharge Summary/Instructions      Patient: Chris Stockton MRN: [id number]   : 1960 Age: 60 year old Sex: adult     Admission Date: 2020  Discharge Date: 20  Diagnosis: Major depressive disorder, recurrent, severe without psychosis, and with anxiety; attention deficit hyperactivity disorder by history    Focus of Treatment / Progress    Personal Safety: Work with your individual therapist and medication provider to ensure personal safety and stabilization     * Follow your safety plan     * Call crisis lines as needed:    [facility] [phone] [facility] [phone]  [facility] [phone] Crisis Connection [phone]  [facility] [phone] [facility] [phone]  [facility] [phone] National Suicide Prevention [phone]  [facility] [phone] Suicide Prevention [phone]  [facility] [phone]    Managing symptoms of:  Depression, anxiety, rumination, hopelessness, impulsivity, suicidal ideation  Community support/health:  Continue to build and engage in community supports that promote overall well-being and improve quality of life    Managing Symptoms and Preventing Relapse    * Go to all of your appointments    * Take all medications as directed.      * Carry a current list if medication with you    * Do not use illicit (street) drugs.  Avoid alcohol    * Report these symptoms to your care team. These are early signs of relapse:   Thoughts of suicide   Losing more sleep   Increased confusion   Mood getting worse   Feeling more aggressive   Other:  isolation    *Use these skills daily:  Mindfulness, practice self-compassion, practice authenticity in making choices, maintain balance in schedule (time for self and others, time for relaxation and time for activities, time for leisure and time for tasks, time at home and time out of the house), assert needs and set limits with others as needed,  practice intentional physical relaxation, challenge negative thoughts/use affirming and encouraging self-talk, engage with support people on regular basis, practice good self-care (sleep hygiene, balanced eating, regular rest, take care of medical needs, maintain personal hygiene, self-nurturing activities), acknowledge and accept your feelings, use sensory modulation skills plan    Copy of summary sent to: sent via my chart    Follow up with psychiatrist / main caregiver: Dr. Maliha Antonio     Next visit:  will provide follow up care while at [facility], will schedule as needed    Follow up with your therapist: Priscila Bellamy    Next visit: 7.29/ 7/31    Go to group therapy and / or support groups at: [facility]    Patient will attend the 8 week IOP at [facility] following discharge from the program.     See your medical doctor about:  Any medical concerns you may have.    Other:  Your treatment team appreciates having the opportunity to work with you and wishes you the best.    Client Signature:_______________________  Date / Time:___________  Staff Signature:________________________   Date / Time:___________    Patient unable to sign due to COVID-19. Reviewed with staff.

## 2020-07-14 NOTE — PROGRESS NOTES
Adult Partial Hospitalization Program:  Individualized Treatment Plan     Date of Plan: 2020 *did not complete on 2020 completed on 2020    Name: Chris Stockton MRN: [id number]  :   1960    Programs: Adult Partial Hospitalization Program    DSM5 Primary Diagnosis: From H&P:  Major depressive disorder, recurrent, severe without psychosis, and with anxiety; attention deficit hyperactivity disorder by history; gender dysphoria.   Cluster B personality traits.     Adult Partial Hospitalization Program Multidisciplinary Team Members: Raman Turner, PhD, LP; Valery Roman, RN, Lavelle Wilhelm, MOTR/L; [facility]; Car Peng MD    Chris Stockton will participate in the Adult Partial Hospitalization Program 5 days per week, 5 hours per day. Anticipated duration/discharge: 2 weeks    Due to COVID-19, services will be delivered via telemedicine until further notice.     Program Start Date: 2020  Anticipated Discharge Date: On or around 2020 (pending authorization/clinical changes)    NOTE: Complete CGI     Chris Stockton would be at reasonable risk of requiring inpatient hospitalization in the absence of partial hospitalization.     Review Date: Does Chris Stockton continue to meet criteria to participate in the Partial Hospitalization Program, 5 days per week; 5 hours per day?   2020 Yes   2020 yes       Patient's Strengths and Limitations: From DA:  Patient identified the following strengths or resources that will help them succeed in treatment: commitment to health and well being, friends / good social support, intelligence, positive work environment, motivation and work ethic.Things that may interfere with the patient's success in treatment include: lack of family support and lack of social support.     Client Participation in Plan:  Contributed to goals and plan   Attended individual treatment plan meeting on 2020  Agrees with plan   Received copy of treatment plan  "  Discussed with staff     Long-Term Goals:  Knowledge about illness and management of symptoms   Maintenance of personal safety     Abuse Prevention Plan:  Safe, therapeutic environment   Safety coping plan as needed   Education regarding illness and skill development   Coordination with care providers     Discharge Criteria:  Satisfactory progress toward treatment goals   Improvement re: identified problems and symptoms   Ability to continue recovery at next level of service   Has a discharge plan in place   Has safety/coping plan in place   Regular attendance as scheduled       Areas of Treatment Focus       Area of Treatment Focus:  Personal Safety  Start Date:   7/14/2020    Problem Description:  From DA:  Plan for Safety and Risk Management:A safety and risk management plan has been developed including: Patient consented to co-developed safety plan.  Safety and risk management plan was completed.  Patient agreed to use safety plan should any safety concerns arise.  A copy was given to the patient..    Goal: Target Date: 7/20/2020, discharge Status: Stopped    1) Safety:  Client will notify staff when needing assistance to develop or implement a coping plan to manage suicidal or self injurious urges.  Client will use coping plan for safety, as needed.      Progress:    7/14/2020: Met with team members. Discussed program, process, and progress. Discussed and set treatment goals.  7/21/2020: Patient reported experiencing suicidal ideation due to stressors with housing and relationships. She reported less suicidal ideation than when she went to the hospital. She noted \"I think I am safe.\" She is willing to be open with staff about her safety concerns. Patient will continue to utilize the group for support.     Treatment Strategies:   Assist clients in establishing / strengthening support network  Assist to identify treatment goals  Engage in safety planning when indicated  Facilitate increased self awareness " "    Area of Treatment Focus:  Symptom Management  Start Date:    7/14/2020    Description:  From DA:  Chief Complaint:   Per Psycho/Social History dated, 6.25.20:  Patient is a 59 year old male to female transgender admitted for suicidal ideation with a plan to jump off a bridge. Stressors include a pending divorce and moving out of the home she shared with her wife. She has been hearing gunshots in her neighborhood and having vivid dreams of dying. \" I was admitted to the Monday June 24 and it is part of my discharge and the suicidal thinking has been very concerning for me\".  From DA:   Depression:Lack of interest, Change in energy level, Difficulties concentrating, Suicidal ideation, Feelings of hopelessness, Feelings of helplessness, Low self-worth, Feeling sad, down, or depressed, Withdrawn and Frequent crying. Anxiety: Excessive worry, Nervousness, Sleep disturbance, Ruminations and Poor concentration    Goal: Target Date: 7/20/2020, discharge Status: Stopped    2) In Psychotherapy groups Liz will:   Continue to gain insight into the triggers behind certain emotions, and learn to act more effectively in response to emotionality. Better regulating emotions so that she does not engage in self-destructive behavior.      As measured by self report and staff observation during groups daily.       Progress:   7/14/2020: Met with team members. Discussed program, process, and progress. Discussed and set treatment goals.  7/21/2020: Patient is making progress on this goal.She is using the group effectively.     Treatment Strategies:  Assist clients in establishing / strengthening support network  Assist to identify treatment goals  Assist with discharge planning  Engage in safety planning when indicated  Facilitate increased self awareness  Provide education regarding how to use group process, thoughts/behaviors/emotions management, emotional regulation, autonomic nervous system (Polyvagal), core beliefs, values " identification, distorted thinking, grief and loss, shame, self compassion, self awareness, mindfulness, radical acceptance, distress tolerance skills, problem solving. Communication/interpersonal effectiveness.        Area of Treatment Focus:  Develop / Improve Independent Living Skills  Start Date:    7/14/2020    Description:  From DA:  Functional Status:  Patient reports the following functional impairments: home life with family/ , relationship(s), social interactions and work / vocational responsibilities.        Goal: Target Date:7/20/2020  discharge Status: Stopped  3) In OT life skills Liz will:    Learn, practice, apply 2 skills/strategies that support lifestyle balance/structure/routine with an emphasis on focusing on developing healthy self care habits to improve mental health management after discharge.     Learn, practice, apply 2 sensory/body based mindfulness skills for improved focus/concentration and distress tolerance to support participation in meaningful relationships and responsibilities.      As measured by self report and staff observation during groups daily.     Progress:  7/14/2020: Met with team members. Discussed program, process, and progress. Discussed and set treatment goals. Liz endorses that inpatient stay helped her start to develop healthy self care habits and would like to continue that work at home. She is also working on feeling productive with organizing her home/going through things and asking for help.   7/21/2020: Patient is making progress in this goal. She will continue to develop skills.     Treatment Strategies:  Assist to identify treatment goals  Engage in safety planning when indicated  Facilitate increased self awareness  Provide education regarding lifestyle balance/structure/routine, goal setting and integration, stress management, prioritizing, leisure values, behavior activation, benefits of focused activity, self regulation: sensory modulation, window of  tolerance, self awareness, sensory enhanced mindfulness, distress tolerance skills.       Area of Treatment Focus:  Community Resources/Discharge Planning  Start Date:   7/14/2020    Description:  From H&P:  Outpatient psychiatrist is Dr. Maliha Antonio at [facility].    Therapist is Priscila Bellamy on [address] [place] [address] in [place].    Goal: Target Date: 7/20/2020, discharge   Status: Stopped    4) Wellness and discharge preparation:  Will improve wellness related behaviors by setting up appointments with aftercare team before discharge.  Will increase effective use of support / increase ability to ask for help. Invite someone to the Support Network Education group to discuss your support needs.  Will develop an aftercare / transition plan by reflecting on whether she would like to attend an IOP.     Progress:  7/14/2020: Met with team members. Discussed program, process, and progress. Discussed and set treatment goals.  7/21/2020: Patient expressed interest in a step down program after discharge from [facility]. Writer will provide information on [facility] and verbally explained the IOP and ADT at [facility]. Writer encouraged patient to think about a discharge date, and it will be decided tomorrow. Patient decided to attend ADT/IOP at . She will reach out to [facility] and [facility] to gather information on their programs and logistical information. She would be okay with discharging on 7/24 to start [facility] on M or T. Patient has intake at [facility] and will not be starting IOP at . She will see her individual therapist twice next week after she discharges on 7/27.    Treatment Strategies:   Assist clients in establishing / strengthening support network  Assist to identify treatment goals  Assist with discharge planning  Engage in safety planning when indicated  Facilitate increased self awareness  Provide education regarding 8 dimensions of wellness. nutrition. sleep hygiene. medication  "management. signs and symptoms. relapse management. stress management. community resources. support network education.      Lavelle Wilhelm, OT    NOTE: Required signatures are completed manually and scanned into the electronic medical record. See \"Media\" tab in epic.    The Individualized Treatment Plan Signature Page has been routed to the provider for co-sign.     "

## 2020-07-14 NOTE — GROUP NOTE
Psychoeducation Group Note    PATIENT'S NAME: Chris Stockton  MRN:   [id number]  :   1960  ACCT. NUMBER: [id number]  DATE OF SERVICE: 20  START TIME:  1:00 PM  END TIME:  1:50 PM  FACILITATOR: Christina Roman RN  TOPIC: ANNA MARIE RN Group: [facility]  Adult Partial Hospitalization Program  TRACK: 1    NUMBER OF PARTICIPANTS: 5    Summary of Group / Topics Discussed:  Foundations of Health: Nutrition: Super Nutrients & Micronutrients: Super Nutrients are Foods that have high nutritional yield. Micronutrients are essential elements found in food or taken through supplements that are necessary for normal physiological functioning. This group was designed to complement the macronutrients group and build upon previous education. The changes that food makes on the brain (how the brain uses sugar) and nutrition as it relates to mental health was also discussed.       Patient Session Goals / Objectives:  ? Identified the health enhancing benefits to good nutrition  ? Verbalized ways in which the food we eat affects the brain  ? Explained the role of micronutrients in the body, how much we need, and how to get it  Telemedicine Visit: The patient's condition can be safely assessed and treated via synchronous audio and visual telemedicine encounter.      Reason for Telemedicine Visit: Services only offered telehealth    Originating Site (Patient Location): Patient's home    Distant Site (Provider Location): Provider Remote Setting    Consent:  The patient/guardian has verbally consented to: the potential risks and benefits of telemedicine (video visit) versus in person care; bill my insurance or make self-payment for services provided; and responsibility for payment of non-covered services.     Mode of Communication:  Video Conference via [employer]    As the provider I attest to compliance with applicable laws and regulations related to telemedicine.       Patient Participation / Response:  Fully  participated with the group by sharing personal reflections / insights and openly received / provided feedback with other participants.    Identified / Expressed personal readiness to practice skills    Treatment Plan:  Patient has a current master individualized treatment plan.  See Epic treatment plan for more information.    Christina Roman RN

## 2020-07-14 NOTE — GROUP NOTE
Psychotherapy Group Note    PATIENT'S NAME: Chris Stockton  MRN:   [id number]  :   1960  ACCT. NUMBER: [id number]  DATE OF SERVICE: 20  START TIME:  2:00 PM  END TIME:  2:50 PM  FACILITATOR: Raman Turner, PhD LP  TOPIC:  EBP Group: Emotions Management  Adult Partial Hospitalization Program  TRACK:     Telemedicine Visit: The patient's condition can be safely assessed and treated via synchronous audio and visual telemedicine encounter.      Reason for Telemedicine Visit:  COVID19    Originating Site (Patient Location): Patient's home    Distant Site (Provider Location): Provider Remote Setting    Consent:  The patient/guardian has verbally consented to: the potential risks and benefits of telemedicine (video visit) versus in person care; bill my insurance or make self-payment for services provided; and responsibility for payment of non-covered services.     Mode of Communication:  Video Conference via [employer]    As the provider I attest to compliance with applicable laws and regulations related to telemedicine.      NUMBER OF PARTICIPANTS: 5    Summary of Group / Topics Discussed:  Emotions Management: Anger: Patients explored and shared personal experiences associated with feelings of anger.  Group explored how these feelings develop, what they mean to each individual, and how to increase acceptance and usefulness of these feelings.  Discussed anger as a  secondary  emotion and reviewed ways to manage anger and challenge associated cognitive distortions. Group members worked to contextualize these concepts and promote healing.     Patient Session Goals / Objectives:    Discuss and review definitions and personal views/experiences with anger    Explore how feelings of anger impact functioning    Understand and practice strategies to manage difficult emotions and move towards healing    Demonstrate understanding of the feelings of anger    Verbalize how these emotions have impacted their  lives/functioning    Verbalize of knowledge gained and possible interventions to manage feelings      Patient Participation / Response:  Fully participated with the group by sharing personal reflections / insights and openly received / provided feedback with other participants.    Demonstrated understanding of topics discussed through group discussion and participation, Expressed understanding of the relevance / importance of emotions management skills at distressing times in life, Self-aware of experiences with difficult emotions, and strategies to employ to manage them, Demonstrated knowledge of when to consider applying a variety of emotions management skills in daily life, Demonstrated understanding and practice strategies to manage difficult emotions and move towards healing, Identified barriers to applying emotions management strategies and Identified strategies to overcome barriers to use of emotions management skills    Treatment Plan:  Patient has a current master individualized treatment plan.  See Epic treatment plan for more information.    Raman Turner, PhD LP

## 2020-07-14 NOTE — GROUP NOTE
Psychotherapy Group Note    PATIENT'S NAME: Chris Stockton  MRN:   [id number]  :   1960  ACCT. NUMBER: [id number]  DATE OF SERVICE: 20  START TIME: 11:00 AM  END TIME: 11:50 AM  FACILITATOR: Raman Turner, PhD LP  TOPIC:  EBP Group: Self-Awareness  Adult Partial Hospitalization Program  TRACK:     Telemedicine Visit: The patient's condition can be safely assessed and treated via synchronous audio and visual telemedicine encounter.      Reason for Telemedicine Visit:  COVID19    Originating Site (Patient Location): Patient's home    Distant Site (Provider Location): Provider Remote Setting    Consent:  The patient/guardian has verbally consented to: the potential risks and benefits of telemedicine (video visit) versus in person care; bill my insurance or make self-payment for services provided; and responsibility for payment of non-covered services.     Mode of Communication:  Video Conference via [employer]    As the provider I attest to compliance with applicable laws and regulations related to telemedicine.    NUMBER OF PARTICIPANTS: 6    Summary of Group / Topics Discussed:  Self-Awareness: Grief: Patients were provided with an overview of how personal losses impact their thoughts, feelings, and behaviors. Different stages of grief were discussed, with a focus on the personal and individual experiences of grief as a natural response to loss. The relationship between grief, depression, and anxiety was also discussed. Patients were provided with information regarding different ways of processing grief and shared their personal experiences.     Patient Session Goals / Objectives:    Defined and explored the concept of grief and the grieving process    Discussed relationship between grief, depression, and anxiety     Normalized and recognized the purpose/benefits of the grieving process    Discussed management of the thoughts and feelings associated with grief      Patient Participation /  Response:  Fully participated with the group by sharing personal reflections / insights and openly received / provided feedback with other participants.    Demonstrated understanding of topics discussed through group discussion and participation, Demonstrated understanding of values, strengths, and challenges to learn about themselves, Identified / Expressed readiness to act intentionally, increase self-compassion, promote personal growth, Verbalized understanding of ways to proactively manage illness and Identified plan to address barriers to practicing skills that promote self-awareness     Treatment Plan:  Patient has a current master individualized treatment plan.  See Epic treatment plan for more information.    Raman Turner, PhD LP

## 2020-07-15 ENCOUNTER — HOSPITAL ENCOUNTER (OUTPATIENT)
Dept: BEHAVIORAL HEALTH | Facility: CLINIC | Age: 60
End: 2020-07-15
Attending: PSYCHIATRY & NEUROLOGY
Payer: COMMERCIAL

## 2020-07-15 PROCEDURE — H0035 MH PARTIAL HOSP TX UNDER 24H: HCPCS | Mod: GT | Performed by: SOCIAL WORKER

## 2020-07-15 PROCEDURE — H0035 MH PARTIAL HOSP TX UNDER 24H: HCPCS | Mod: 95

## 2020-07-15 PROCEDURE — H0035 MH PARTIAL HOSP TX UNDER 24H: HCPCS | Mod: GT

## 2020-07-15 PROCEDURE — H0035 MH PARTIAL HOSP TX UNDER 24H: HCPCS | Mod: 95 | Performed by: PSYCHOLOGIST

## 2020-07-15 NOTE — GROUP NOTE
Psychoeducation Group Note    PATIENT'S NAME: Chris Stockton  MRN:   [id number]  :   1960  ACCT. NUMBER: [id number]  DATE OF SERVICE: 7/15/20  START TIME:  1:00 PM  END TIME:  1:50 PM  FACILITATOR: Christina Roman RN  TOPIC: ANNA MARIE RN Group: [facility]  Adult Partial Hospitalization Program  TRACK: 1    NUMBER OF PARTICIPANTS: 7    Summary of Group / Topics Discussed:  Specialty Health:  Specialty Health: self compassion    Patient Session Goals / Objectives:  ? Pts will view 15 minute video  ? Pts will review components of self compassion exercise and comment on video  ? Ptw will complete and share self-compassion writing exercise    Telemedicine Visit: The patient's condition can be safely assessed and treated via synchronous audio and visual telemedicine encounter.      Reason for Telemedicine Visit: Services only offered telehealth    Originating Site (Patient Location): Patient's home    Distant Site (Provider Location): Provider Remote Setting    Consent:  The patient/guardian has verbally consented to: the potential risks and benefits of telemedicine (video visit) versus in person care; bill my insurance or make self-payment for services provided; and responsibility for payment of non-covered services.     Mode of Communication:  Video Conference via [employer]    As the provider I attest to compliance with applicable laws and regulations related to telemedicine.            Patient Participation / Response:  Fully participated with the group by sharing personal reflections / insights and openly received / provided feedback with other participants.    Identified / Expressed personal readiness to practice skills    Treatment Plan:  Patient has a current master individualized treatment plan.  See Epic treatment plan for more information.    Christina Roman RN

## 2020-07-15 NOTE — GROUP NOTE
Psychotherapy Group Note    PATIENT'S NAME: Chris Stockton  MRN:   [id number]  :   1960  ACCT. NUMBER: [id number]  DATE OF SERVICE: 7/15/20  START TIME:  2:00 PM  END TIME:  2:50 PM  FACILITATOR: Alvina Christopher LICSW  TOPIC:  EBP Group: Enhanced Mindfulness  Adult Partial Hospitalization Program  TRACK: 1    NUMBER OF PARTICIPANTS: 8    Summary of Group / Topics Discussed:  Enhanced Mindfulness: Body and Mind Integration: Patients received an overview and education regarding the importance of including the body in the management of emotional health and self-care and as a direct route to mindfulness practice.  Patients discussed various ways in which the body can serve as an informant to their physical and emotional experiences/need. Patients discussed the body as a direct link to the present moment and to mindfulness practice.  Patients discussed current relationship with body, self-awareness, mindfulness practice and barriers to connection with body.  Patients were guided through breath work and movement to facilitate greater self-awareness, grounding, self-expression, and connection to other.  Patients discussed how the experiential could be applied to better manage mental health and develop rain connection to self.    Patient Session Goals / Objectives:    Identify how movement awareness could be used for grounding, stress management, self-expression, connection to other and self-regulation    Improved awareness of breath and movement preferences    Identify how movement and the body is used in mindfulness practice    Reflect on use of these practices in everyday life.    Identify barriers to attending to body  Telemedicine Visit: The patient's condition can be safely assessed and treated via synchronous audio and visual telemedicine encounter.      Reason for Telemedicine Visit: Services only offered telehealth and covid19    Originating Site (Patient Location): Patient's home    Distant Site  (Provider Location): Provider Remote Setting    Consent:  The patient/guardian has verbally consented to: the potential risks and benefits of telemedicine (video visit) versus in person care; bill my insurance or make self-payment for services provided; and responsibility for payment of non-covered services.     Mode of Communication:  Video Conference via [employer]    As the provider I attest to compliance with applicable laws and regulations related to telemedicine.       Patient Participation / Response:  Fully participated with the group by sharing personal reflections / insights and openly received / provided feedback with other participants.    Demonstrated understanding of topics discussed through group discussion and participation and Practiced skills in session    Treatment Plan:  Patient has a current master individualized treatment plan.  See Epic treatment plan for more information.    TORRI AlmodovarSW

## 2020-07-15 NOTE — GROUP NOTE
"Psychoeducation Group Note    PATIENT'S NAME: Chris Stockton  MRN:   [id number]  :   1960  ACCT. NUMBER: [id number]  DATE OF SERVICE: 7/15/20  START TIME: 10:00 AM  END TIME: 10:50 AM  FACILITATOR: Lavelle Wilhelm OT  TOPIC: [facility] OT Group: Lifestyle Balance and Structure  Adult Partial Hospitalization Program  TRACK: 1    Telemedicine Visit: The patient's condition can be safely assessed and treated via synchronous audio and visual telemedicine encounter.      Reason for Telemedicine Visit: Services only offered telehealth and Covid 19    Originating Site (Patient Location): Patient's home    Distant Site (Provider Location): Provider Remote Setting    Consent:  The patient/guardian has verbally consented to: the potential risks and benefits of telemedicine (video visit) versus in person care; bill my insurance or make self-payment for services provided; and responsibility for payment of non-covered services.     Mode of Communication:  Video Conference via [employer]    As the provider I attest to compliance with applicable laws and regulations related to telemedicine.      NUMBER OF PARTICIPANTS: 8    Summary of Group / Topics Discussed:  Lifestyle Balance and Structure: Embracing self compassion through Yin and Bajwa energies of everyday activities.  Patients were provided psychoeducation on the components of Self Compassion and worked to apply the concept of complementary energies (Yin and Yang) as they engage in their daily routines, activities. Worked to re-frame negative self talk and embrace mindfulness through understanding and accepting where their energy is in a given moment. (Evidence based: Daija Murillo). As a group they worked at developing understanding of what Yin energy activities look like during recovery and what Bajwa energy activities look like. They worked on understanding that activities such as \"resting\" is not \"lazy\" for instance and using self compassion to challenge/reframe to a " more loving kind way to talk to ones self. .     Patient Session Goals / Objectives:    Reflected on possibilities of what lifestyle balance consists of though a group brainstorming process that embraces the concept of Yin (being) and Yag (doing) energy    Applied concept to their own daily chosen activities and shared/validated with each other to support embracing self compassion during daily activities.     Identify ways to apply the concept/practice of Yin and Yang energy to aid them on their psychiatric recovery.       Patient Participation / Response:  Fully participated with the group by sharing personal reflections / insights and openly received / provided feedback with other participants.    Verbalized understanding of content and Patient would benefit from additional opportunities to practice the content to be able to generalize it to their everyday life with increased intentionality, consistency, and efficacy in support of their psychiatric recovery    Treatment Plan:  Patient has a current master individualized treatment plan.  See Epic treatment plan for more information.    Lavelle Wilhelm, OT

## 2020-07-15 NOTE — GROUP NOTE
Process Group Note    PATIENT'S NAME: Chris Stockton  MRN:   [id number]  :   1960  ACCT. NUMBER: [id number]  DATE OF SERVICE: 7/15/20  START TIME:  9:00 AM  END TIME:  9:50 AM  FACILITATOR: Raman Turner, PhD LP  TOPIC:  Process Group    Diagnoses:   Major depressive disorder, recurrent, severe without psychosis, and with anxiety; attention deficit hyperactivity disorder by history       Adult Partial Hospitalization Program  TRACK:     Telemedicine Visit: The patient's condition can be safely assessed and treated via synchronous audio and visual telemedicine encounter.      Reason for Telemedicine Visit:  COVID19    Originating Site (Patient Location): Patient's home    Distant Site (Provider Location): Provider Remote Setting    Consent:  The patient/guardian has verbally consented to: the potential risks and benefits of telemedicine (video visit) versus in person care; bill my insurance or make self-payment for services provided; and responsibility for payment of non-covered services.     Mode of Communication:  Video Conference via [employer]    As the provider I attest to compliance with applicable laws and regulations related to telemedicine.      NUMBER OF PARTICIPANTS: 7          Data:    Session content: At the start of this group, patients were invited to check in by identifying themselves, describing their current emotional status, and identifying issues to address in this group.   Area(s) of treatment focus addressed in this session included Symptom Management, Personal Safety and Develop Socialization / Interpersonal Relationship Skills.    The group worked with one another to explore how they could each better handle interpersonal conflict. Multiple members referenced feelings of anger, misattunement, and broadly feeling misunderstood in important relationships. The importance of perspective taking was centralized during this discussion, and members worked hard to balance validation and  asking others for change in behavior. The patient brought the aforementioned topic to the group, as she is struggling working through conflict with one of her rooomates.     Therapeutic Interventions/Treatment Strategies:  Psychotherapist offered support, feedback and validation, set limits, provided redirection and reinforced use of skills.    Assessment:    Patient response:   Patient responded to session by accepting feedback, giving feedback, listening, focusing on goals, being attentive and accepting support    Possible barriers to participation / learning include: and no barriers identified    Health Issues:   None reported       Substance Use Review:   Substance Use: No active concerns identified.    Mental Status/Behavioral Observations  Appearance:   Appropriate   Eye Contact:   Fair   Psychomotor Behavior: Restless   Attitude:   Cooperative  Interested Friendly  Orientation:   All  Speech   Rate / Production: Normal    Volume:  Normal   Mood:    Anxious   Affect:    Labile   Thought Content:   Rumination  Thought Form:  Coherent  Logical     Insight:    Fair     Plan:     Safety Plan: No current safety concerns identified.  Recommended that patient call 911 or go to the local ED should there be a change in any of these risk factors.     Barriers to treatment: None identified    Patient Contracts (see media tab):  None    Substance Use: Not addressed in session     Continue or Discharge: Patient will continue in Adult Partial Hospitalization Program (PHP)  as planned. Patient is likely to benefit from learning and using skills as they work toward the goals identified in their treatment plan.      Raman Turner, PhD LP  July 15, 2020
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
## 2020-07-15 NOTE — GROUP NOTE
"Psychotherapy Group Note    PATIENT'S NAME: Chris Stockton  MRN:   [id number]  :   1960  ACCT. NUMBER: [id number]  DATE OF SERVICE: 7/15/20  START TIME: 11:00 AM  END TIME: 11:50 AM  FACILITATOR: Raman Turner, PhD LP  TOPIC:  EBP Group: Self-Awareness  Adult Partial Hospitalization Program  TRACK:     Telemedicine Visit: The patient's condition can be safely assessed and treated via synchronous audio and visual telemedicine encounter.      Reason for Telemedicine Visit: COVID19    Originating Site (Patient Location): Patient's home    Distant Site (Provider Location): Provider Remote Setting    Consent:  The patient/guardian has verbally consented to: the potential risks and benefits of telemedicine (video visit) versus in person care; bill my insurance or make self-payment for services provided; and responsibility for payment of non-covered services.     Mode of Communication:  Video Conference via [id number]    As the provider I attest to compliance with applicable laws and regulations related to telemedicine.    NUMBER OF PARTICIPANTS: 8    Summary of Group / Topics Discussed:  Self-Awareness: Self-Compassion: Patients received overview of key concepts in developing self-compassion. Patients discussed mindfulness, self-kindness, and finding common humanity. Patients identified their current approach to problems in their lives and learned skills for increasing self-compassion. Patients identified ways they can put self-compassion skills into practice and problem solve barriers to application of skills. This group was focused on an experiential exercising focusing on understanding the function of the \"inner critic,\" while also making room for an inner-compassionate voice to appear and motivate through compassion, rather than criticism.     Patient Session Goals / Objectives:    Paradis components of self-compassion    Identify ways to practice self-compassion in daily life    Problem solve barriers to " self-compassion practice      Patient Participation / Response:  Fully participated with the group by sharing personal reflections / insights and openly received / provided feedback with other participants.    Demonstrated understanding of topics discussed through group discussion and participation, Demonstrated understanding of values, strengths, and challenges to learn about themselves, Identified / Expressed readiness to act intentionally, increase self-compassion, promote personal growth, Verbalized understanding of ways to proactively manage illness and Identified plan to address barriers to practicing skills that promote self-awareness     Treatment Plan:  Patient has a current master individualized treatment plan.  See Epic treatment plan for more information.    Raman Turner, PhD LP

## 2020-07-16 ENCOUNTER — HOSPITAL ENCOUNTER (OUTPATIENT)
Dept: BEHAVIORAL HEALTH | Facility: CLINIC | Age: 60
End: 2020-07-16
Attending: PSYCHIATRY & NEUROLOGY
Payer: COMMERCIAL

## 2020-07-16 PROCEDURE — H0035 MH PARTIAL HOSP TX UNDER 24H: HCPCS | Mod: GT | Performed by: OCCUPATIONAL THERAPIST

## 2020-07-16 PROCEDURE — H0035 MH PARTIAL HOSP TX UNDER 24H: HCPCS | Mod: GT

## 2020-07-16 PROCEDURE — H0035 MH PARTIAL HOSP TX UNDER 24H: HCPCS | Mod: 95 | Performed by: PSYCHOLOGIST

## 2020-07-16 NOTE — GROUP NOTE
Process Group Note    PATIENT'S NAME: Chris Stockton  MRN:   [id number]  :   1960  ACCT. NUMBER: [id number]  DATE OF SERVICE: 20  START TIME:  9:00 AM  END TIME:  9:50 AM  FACILITATOR: Raman Turner, PhD LP  TOPIC:  Process Group    Diagnoses:   Major depressive disorder, recurrent, severe without psychosis, and with anxiety; attention deficit hyperactivity disorder by history       Adult Partial Hospitalization Program  TRACK:     Telemedicine Visit: The patient's condition can be safely assessed and treated via synchronous audio and visual telemedicine encounter.      Reason for Telemedicine Visit:  COVID19    Originating Site (Patient Location): Patient's home    Distant Site (Provider Location): Provider Remote Setting    Consent:  The patient/guardian has verbally consented to: the potential risks and benefits of telemedicine (video visit) versus in person care; bill my insurance or make self-payment for services provided; and responsibility for payment of non-covered services.     Mode of Communication:  Video Conference via [employer]    As the provider I attest to compliance with applicable laws and regulations related to telemedicine.      NUMBER OF PARTICIPANTS: 8          Data:    Session content: At the start of this group, patients were invited to check in by identifying themselves, describing their current emotional status, and identifying issues to address in this group.   Area(s) of treatment focus addressed in this session included Symptom Management, Personal Safety and Develop Socialization / Interpersonal Relationship Skills.    The group spent its time focusing on a few different issues. First, multiple group members took space to share how angry and frustrated they are feeling to  have  to be in a group therapy setting, despite knowing that it would be best for their mental health to do so. This created space for unspeakable/impolite feelings to appear in a more authentic,  true-to-self manner. Secondly, the group also spent time processing how many members talk openly in the group, but struggle to do so in their own lives outside the group. The patient did share how she has a limited [place] of friends with whom she can process important feelings, thoughts, and events. Overall, she appeared to be feeling quite anxious today, likely due to an impending conversation with her roommate today.     Therapeutic Interventions/Treatment Strategies:  Psychotherapist offered support, feedback and validation, set limits, provided redirection and reinforced use of skills.    Assessment:    Patient response:   Patient responded to session by accepting feedback, giving feedback, listening, focusing on goals, being attentive and accepting support    Possible barriers to participation / learning include: and no barriers identified    Health Issues:   None reported       Substance Use Review:   Substance Use: No active concerns identified.    Mental Status/Behavioral Observations  Appearance:   Appropriate   Eye Contact:   Fair   Psychomotor Behavior: Normal   Attitude:   Cooperative  Interested Playful Friendly  Orientation:   All  Speech   Rate / Production: Hyperverbal  Talkative   Volume:  Soft   Mood:    Anxious  Depressed   Affect:    Labile   Thought Content:   Clear  Thought Form:  Coherent  Logical     Insight:    Fair     Plan:     Safety Plan: No current safety concerns identified.  Recommended that patient call 911 or go to the local ED should there be a change in any of these risk factors.     Barriers to treatment: None identified    Patient Contracts (see media tab):  None    Substance Use: Not addressed in session     Continue or Discharge: Patient will continue in Adult Partial Hospitalization Program (PHP)  as planned. Patient is likely to benefit from learning and using skills as they work toward the goals identified in their treatment plan.      Raman Turner, PhD LP  July 16,  2020

## 2020-07-16 NOTE — GROUP NOTE
Psychoeducation Group Note    PATIENT'S NAME: Chris Stockton  MRN:   [id number]  :   1960  ACCT. NUMBER: [id number]  DATE OF SERVICE: 20  START TIME:  1:00 PM  END TIME:  1:50 PM  FACILITATOR: Christina Roman RN  TOPIC: ANNA MARIE RN Group: Brain Health  Adult Partial Hospitalization Program  TRACK: 1    NUMBER OF PARTICIPANTS: 7    Summary of Group / Topics Discussed:  Brain Health:  Pathophysiology of Mood Disorders: Patients were educated on mood disorder etiology and neuroscience, risk factors, symptoms, and pharmacologic, psychotherapeutic, and complementary treatment options. Patients were guided on a discussion of mental, behavioral, and physical symptoms and shared their symptoms with the group.     Patient Session Goals / Objectives:  ? Described what mood disorders are and identified risk factors   ? Explained how chemical imbalances in the brain can cause symptoms and how medications work to reverse this imbalance   ? Identified and described pharmacologic, psychotherapeutic, and complementary treatment options  Telemedicine Visit: The patient's condition can be safely assessed and treated via synchronous audio and visual telemedicine encounter.      Reason for Telemedicine Visit: Services only offered telehealth    Originating Site (Patient Location): Patient's home    Distant Site (Provider Location): Provider Remote Setting    Consent:  The patient/guardian has verbally consented to: the potential risks and benefits of telemedicine (video visit) versus in person care; bill my insurance or make self-payment for services provided; and responsibility for payment of non-covered services.     Mode of Communication:  Video Conference via [employer]    As the provider I attest to compliance with applicable laws and regulations related to telemedicine.       Patient Participation / Response:  Fully participated with the group by sharing personal reflections / insights and openly received / provided  feedback with other participants.    Demonstrated understanding of topics discussed through group discussion and participation    Treatment Plan:  Patient has a current master individualized treatment plan.  See Epic treatment plan for more information.    Christina Roman RN

## 2020-07-17 ENCOUNTER — HOSPITAL ENCOUNTER (OUTPATIENT)
Dept: BEHAVIORAL HEALTH | Facility: CLINIC | Age: 60
End: 2020-07-17
Attending: PSYCHIATRY & NEUROLOGY
Payer: COMMERCIAL

## 2020-07-17 PROCEDURE — H0035 MH PARTIAL HOSP TX UNDER 24H: HCPCS | Mod: GT | Performed by: PSYCHOLOGIST

## 2020-07-17 PROCEDURE — H0035 MH PARTIAL HOSP TX UNDER 24H: HCPCS | Mod: 95

## 2020-07-17 NOTE — GROUP NOTE
Psychotherapy Group Note    PATIENT'S NAME: Chris tSockton  MRN:   [id number]  :   1960  ACCT. NUMBER: [id number]  DATE OF SERVICE: 20  START TIME: 11:00 AM  END TIME: 11:50 AM  FACILITATOR: Raman Turner, PhD LP  TOPIC:  EBP Group: Emotions Management  Adult Partial Hospitalization Program  TRACK:     Telemedicine Visit: The patient's condition can be safely assessed and treated via synchronous audio and visual telemedicine encounter.      Reason for Telemedicine Visit: COVID19    Originating Site (Patient Location): Patient's home    Distant Site (Provider Location): Provider Remote Setting    Consent:  The patient/guardian has verbally consented to: the potential risks and benefits of telemedicine (video visit) versus in person care; bill my insurance or make self-payment for services provided; and responsibility for payment of non-covered services.     Mode of Communication:  Video Conference via [email or web address]    As the provider I attest to compliance with applicable laws and regulations related to telemedicine.      NUMBER OF PARTICIPANTS: 8    Summary of Group / Topics Discussed:  Emotions Management: Guilt and Shame: Patients explored and shared personal experiences associated with feelings of guilt and shame.  Group explored how these feelings develop, what they mean to each individual, and how to increase acceptance and usefulness of these feelings.  Group members assisted one another to contextualize these concepts and promote healing. The group focused on differentiating shame and guilt, while also understand how early emotional experiences (e.g., misattunement, oppression) can result in over-internalization of shame.     Patient Session Goals / Objectives:    Discuss and review definitions and personal views/experiences with guilt and shame    Understand the differences between guilt and shame    Explore how feelings of guilt and shame impact functioning    Understand and practice  strategies to manage difficult emotions and move towards healing    Understand and normalize difficult emotions through group discussion    Understand the utility of guilt and shame    Target  unwanted  emotions for change      Patient Participation / Response:  Fully participated with the group by sharing personal reflections / insights and openly received / provided feedback with other participants.    Demonstrated understanding of topics discussed through group discussion and participation, Expressed understanding of the relevance / importance of emotions management skills at distressing times in life, Self-aware of experiences with difficult emotions, and strategies to employ to manage them, Demonstrated knowledge of when to consider applying a variety of emotions management skills in daily life, Demonstrated understanding and practice strategies to manage difficult emotions and move towards healing, Identified barriers to applying emotions management strategies and Identified strategies to overcome barriers to use of emotions management skills    Treatment Plan:  Patient has a current master individualized treatment plan.  See Epic treatment plan for more information.    Raman Turner, PhD LP

## 2020-07-17 NOTE — GROUP NOTE
Psychoeducation Group Note    PATIENT'S NAME: Chris Stockton  MRN:   [id number]  :   1960  ACCT. NUMBER: [id number]  DATE OF SERVICE: 20  START TIME: 10:00 AM  END TIME: 10:50 AM  FACILITATOR: Donna Garcia OTR/L  TOPIC: [facility] PHP OT Group: Self- Regulation Skills  55+ Program  TRACK: 1    NUMBER OF PARTICIPANTS: 8    Telemedicine Visit: The patient's condition can be safely assessed and treated via synchronous audio and visual telemedicine encounter.      Reason for Telemedicine Visit: Services only offered telehealth    Originating Site (Patient Location): Patient's home    Distant Site (Provider Location): [facility] Outpatient Setting: [facility]    Consent:  The patient/guardian has verbally consented to: the potential risks and benefits of telemedicine (video visit) versus in person care; bill my insurance or make self-payment for services provided; and responsibility for payment of non-covered services.     Mode of Communication:  Video Conference via [employer]    As the provider I attest to compliance with applicable laws and regulations related to telemedicine.     Summary of Group / Topics Discussed:  Self-Regulation Skills: Coping Through the Senses Introduction: Patients were introduced and taught about neurosensory based skills and strategies related to supporting effective self regulation skills.  Patients were taught about the eight sensory systems (external and internal) and how they can be used for coping with mental health symptoms and stressors.  Patients were provided with an experiential opportunity to increase self-awareness of helpful sensory input and self care activities. Patients were introduced on how to create supportive environments that encourage use of these skills.    Patient Session Goals / Objectives:    Review/learn the 8 sensory systems and how they relate to self-regulation    Identified specific and individualized neurosensory skills to help when distressed       Identified skills learned and how this applies to current daily life    Established a plan for practice of these skills in their own environments      Patient Participation / Response:  Fully participated with the group by sharing personal reflections / insights and openly received / provided feedback with other participants.    Patient presentation: constricted affect; active in giving examples and feedback, Verbalized understanding of content and Patient would benefit from additional opportunities to practice the content to be able to generalize it to their everyday life with increased intentionality, consistency, and efficacy in support of their psychiatric recovery    Treatment Plan:  Patient has a current master individualized treatment plan.  See Epic treatment plan for more information.    Donna Garcia, OTR/L

## 2020-07-17 NOTE — GROUP NOTE
Psychotherapy Group Note    PATIENT'S NAME: Chris Stockton  MRN:   [id number]  :   1960  ACCT. NUMBER: [id number]  DATE OF SERVICE: 20  START TIME:  2:00 PM  END TIME:  2:50 PM  FACILITATOR: Raman Turner, PhD LP  TOPIC:  EBP Group: Emotions Management  Adult Partial Hospitalization Program  TRACK:     Telemedicine Visit: The patient's condition can be safely assessed and treated via synchronous audio and visual telemedicine encounter.      Reason for Telemedicine Visit: COVID19    Originating Site (Patient Location): Patient's home    Distant Site (Provider Location): Provider Remote Setting    Consent:  The patient/guardian has verbally consented to: the potential risks and benefits of telemedicine (video visit) versus in person care; bill my insurance or make self-payment for services provided; and responsibility for payment of non-covered services.     Mode of Communication:  Video Conference via [employer]    As the provider I attest to compliance with applicable laws and regulations related to telemedicine.      NUMBER OF PARTICIPANTS: 8    Summary of Group / Topics Discussed:  Emotions Management: Guilt and Shame: Patients explored and shared personal experiences associated with feelings of guilt and shame.  Group explored how these feelings develop, what they mean to each individual, and how to increase acceptance and usefulness of these feelings.  Group members assisted one another to contextualize these concepts and promote healing. In this group, the group explore the Compass of Shame model, in addition to body-oriented ways of reducing shame and instead focusing on increasing accountability and empathy as alternatives to shame.     Patient Session Goals / Objectives:    Discuss and review definitions and personal views/experiences with guilt and shame    Understand the differences between guilt and shame    Explore how feelings of guilt and shame impact functioning    Understand and  practice strategies to manage difficult emotions and move towards healing    Understand and normalize difficult emotions through group discussion    Understand the utility of guilt and shame    Target  unwanted  emotions for change      Patient Participation / Response:  Fully participated with the group by sharing personal reflections / insights and openly received / provided feedback with other participants.    Demonstrated understanding of topics discussed through group discussion and participation, Expressed understanding of the relevance / importance of emotions management skills at distressing times in life, Self-aware of experiences with difficult emotions, and strategies to employ to manage them, Demonstrated knowledge of when to consider applying a variety of emotions management skills in daily life, Demonstrated understanding and practice strategies to manage difficult emotions and move towards healing and Identified barriers to applying emotions management strategies    Treatment Plan:  Patient has a current master individualized treatment plan.  See Epic treatment plan for more information.    Raman Turner, PhD LP

## 2020-07-17 NOTE — GROUP NOTE
Psychoeducation Group Note    PATIENT'S NAME: Chris Stockton  MRN:   [id number]  :   1960  ACCT. NUMBER: [id number]  DATE OF SERVICE: 20  START TIME: 11:00 AM  END TIME: 11:50 AM  FACILITATOR: Lavelle Wilhelm OT  TOPIC: [facility] OT Group: Self- Regulation Skills  Adult Partial Hospitalization Program  TRACK: 1    Telemedicine Visit: The patient's condition can be safely assessed and treated via synchronous audio and visual telemedicine encounter.      Reason for Telemedicine Visit: Services only offered telehealth and Covid 19    Originating Site (Patient Location): Patient's home    Distant Site (Provider Location): Provider Remote Setting    Consent:  The patient/guardian has verbally consented to: the potential risks and benefits of telemedicine (video visit) versus in person care; bill my insurance or make self-payment for services provided; and responsibility for payment of non-covered services.     Mode of Communication:  Video Conference via [employer]    As the provider I attest to compliance with applicable laws and regulations related to telemedicine.  NUMBER OF PARTICIPANTS: 7    Summary of Group / Topics Discussed:  Self-Regulation Skills: Sensory Enhanced Mindfulness: Patients were provided with written and verbal psychoeducation on the concept of integrating mindfulness with a bottom up, sensory rich, experiential intervention activity to develop self-awareness and skills for self-regulation.  Emphasis placed on interoceptive awareness and effective breathing and how they can help to emotionally ground oneself or provide a healthy and evidenced way to be present in the moment which support self regulation. Patients worked to increase knowledge and skills during an experiential intervention activity: Guided Qi Gong. Facilitation of reflection to reinforce taught concepts was provided.     Patient Session Goals / Objectives:    Identified how using sensory enhanced mindfulness practices can  be used for grounding, stress management, and self-regulation      Improved awareness of different types of sensory enhanced mindfulness activities that assist with healthy coping of stress and symptoms      Established a plan for practice of these skills in their own environments    Practiced and reflected on how to generalize taught skills to their everyday life        Patient Participation / Response:  Fully participated with the group by sharing personal reflections / insights and openly received / provided feedback with other participants.    Verbalized understanding of content and Patient would benefit from additional opportunities to practice the content to be able to generalize it to their everyday life with increased intentionality, consistency, and efficacy in support of their psychiatric recovery    Treatment Plan:  Patient has a current master individualized treatment plan.  See Epic treatment plan for more information.    Lavelle Wilhelm, OT

## 2020-07-17 NOTE — PROGRESS NOTES
"[facility]   Dr. Peng's Psychiatric Progress Note  2020      Patient:  Chris Stockton \"Liz\"  Medical Record Number:  [id number]  :  1960    Telemedicine Visit: The patient's condition can be safely assessed and treated via synchronous audio and visual telemedicine encounter.       Reason for Telemedicine Visit: COVID-19 lockdown     Originating Site (Patient Location):  HOME     Distant Site (Provider Location): [facility]: [place]     Consent:  The patient/guardian has verbally consented to: the potential risks and benefits of telemedicine (video visit) versus in person care; bill my insurance or make self-payment for services provided; and responsibility for payment of non-covered services.         Interim History:   The patient's care was discussed with the treatment team and chart notes were reviewed.  Pt spoke with roommate for over an hour last night.  It was fruitful.  Meeting ended ok.  Then 3 hours later pt cleaned her shelf out of the frig.  The roommate got mad.  Pt realizes a lot of the problem is the roommate and not all patient.  Used Zyprexa last 2 nights and slept well.  Pt is going out for a pedicure tomorrow morning with a friend.   Still working on getting the divorce papers in order and get the family house sold.       Psychiatric ROS:  Mood:  A little better; less depressed;  Still has some anxiety  Sleep:    Good on Zyprexa  Appetite:   normal  Eating:  normal  Energy Level:  OK  Concentration/Memory: ok      Suicidal Thoughts:  Fleeting yesterday;  One today;  No plan or intent;  Able to contract for safety  Homicidal Thoughts:No  Psychotic Symptoms: No  Medication Side Effects:No  Medication Compliance:Yes   Physical Complaints:  none         Medications:     PAST PSYCH MEDS:  Adderall, Ritalin, guanfacine, Wellbutrin, Cymbalta, Prozac, Effexor, lithium, Lamictal, Zyprexa, and melatonin    Current Outpatient Medications "   Medication Sig     buPROPion (WELLBUTRIN XL) 150 MG 24 hr tablet Take 1 tablet (150 mg) by mouth every morning     estradiol (VIVELLE-DOT) 0.1 MG/24HR bi-weekly patch Place onto the skin every 72 hours Patch placement rotation every 3 days: 1 patch, 2 patch, no patch, then repeat     guanFACINE (INTUNIV) 2 MG TB24 24 hr tablet Take 1 tablet (2 mg) by mouth At Bedtime     lamoTRIgine (LAMICTAL) 25 MG tablet Take 2 tablets (50 mg) by mouth daily     levothyroxine (SYNTHROID/LEVOTHROID) 75 MCG tablet Take 1 tablet (75 mcg) by mouth daily     lithium ER (LITHOBID) 300 MG CR tablet Take 1 tablet (300 mg) by mouth At Bedtime     magnesium oxide 400 MG CAPS Take by mouth At Bedtime     melatonin 3 MG tablet Take 1-2 tablets (3-6 mg) by mouth nightly as needed for sleep     OLANZapine (ZYPREXA) 2.5 MG tablet Take 1-2 tablets (2.5-5 mg) by mouth At Bedtime     spironolactone (ALDACTONE) 25 MG tablet Take 25 mg by mouth daily      Vitamin D3 (CHOLECALCIFEROL) 25 mcg (1000 units) tablet Take 2 tablets (50 mcg) by mouth daily     No current facility-administered medications for this encounter.              Allergies:     Allergies   Allergen Reactions     Finasteride      Mental health problems     Tetracycline Unknown     Doxycycline Rash            Psychiatric Examination:   There were no vitals taken for this visit.  Weight is 0 lbs 0 oz  There is no height or weight on file to calculate BMI.    Appearance:  awake, alert and adequately groomed  Attitude:  cooperative  Eye Contact:  good  Mood:  Better today  Affect:  mood congruent  Speech:  clear, coherent  Psychomotor Behavior:  no evidence of tardive dyskinesia, dystonia, or tics  Throught Process:  logical  Associations:  no loose associations  Thought Content:  no evidence of psychotic thought and occasional fleeting suicidal thoughts with no plan or intent to act on them;  able to contract for safety  Insight:  good  Judgement:  intact  Oriented to:  time, person,  and place  Attention Span and Concentration:  intact  Recent and Remote Memory:  intact  Gait:Normal    Risk/Potential for Dangerousness:  Multiple Active Diagnoses:HIGH  Self Care:HIGH  Suicide:LOW  Assault:LOW  Self Injurious Behaviors:LOW  Inappropriate Sexual Behavior:LOW         Labs:   No results found for this or any previous visit (from the past 24 hour(s)).     Recent Results (from the past 1008 hour(s))   Asymptomatic COVID-19 Virus (Coronavirus) by PCR    Collection Time: 06/25/20 12:51 AM    Specimen: Nasopharyngeal   Result Value Ref Range    COVID-19 Virus PCR to [facility] - Source Nasopharyngeal     COVID-19 Virus PCR to [facility] - Result       Test received-See reflex to IDDL test SARS CoV2 (COVID-19) Virus RT-PCR   SARS-CoV-2 COVID-19 Virus (Coronavirus) RT-PCR Nasopharyngeal    Collection Time: 06/25/20 12:51 AM    Specimen: Nasopharyngeal   Result Value Ref Range    SARS-CoV-2 Virus Specimen Source Nasopharyngeal     SARS-CoV-2 PCR Result NEGATIVE     SARS-CoV-2 PCR Comment       Testing was performed using the Simplexa COVID-19 Direct Assay on the Mobissimo MDX   instrument. Additional information about this Emergency Use Authorization (EUA) assay can   be found via the Lab Guide.     CBC with platelets differential    Collection Time: 06/25/20  1:17 AM   Result Value Ref Range    WBC 15.4 (H) 4.0 - 11.0 10e9/L    RBC Count 5.42 4.4 - 5.9 10e12/L    Hemoglobin 16.4 13.3 - 17.7 g/dL    Hematocrit 48.1 40.0 - 53.0 %    MCV 89 78 - 100 fl    MCH 30.3 26.5 - 33.0 pg    MCHC 34.1 31.5 - 36.5 g/dL    RDW 12.6 10.0 - 15.0 %    Platelet Count 246 150 - 450 10e9/L    Diff Method Automated Method     % Neutrophils 84.2 %    % Lymphocytes 9.1 %    % Monocytes 5.1 %    % Eosinophils 0.1 %    % Basophils 0.7 %    % Immature Granulocytes 0.8 %    Nucleated RBCs 0 0 /100    Absolute Neutrophil 13.0 (H) 1.6 - 8.3 10e9/L    Absolute Lymphocytes 1.4 0.8 - 5.3 10e9/L    Absolute Monocytes 0.8 0.0 - 1.3 10e9/L     Absolute Eosinophils 0.0 0.0 - 0.7 10e9/L    Absolute Basophils 0.1 0.0 - 0.2 10e9/L    Abs Immature Granulocytes 0.1 0 - 0.4 10e9/L    Absolute Nucleated RBC 0.0    Comprehensive metabolic panel    Collection Time: 06/25/20  1:17 AM   Result Value Ref Range    Sodium 138 133 - 144 mmol/L    Potassium 3.7 3.4 - 5.3 mmol/L    Chloride 107 94 - 109 mmol/L    Carbon Dioxide 23 20 - 32 mmol/L    Anion Gap 8 3 - 14 mmol/L    Glucose 117 (H) 70 - 99 mg/dL    Urea Nitrogen 15 7 - 30 mg/dL    Creatinine 0.76 0.66 - 1.25 mg/dL    GFR Estimate >90 >60 mL/min/[1.73_m2]    GFR Estimate If Black >90 >60 mL/min/[1.73_m2]    Calcium 8.5 8.5 - 10.1 mg/dL    Bilirubin Total 0.8 0.2 - 1.3 mg/dL    Albumin 4.1 3.4 - 5.0 g/dL    Protein Total 7.6 6.8 - 8.8 g/dL    Alkaline Phosphatase 51 40 - 150 U/L    ALT 20 0 - 70 U/L    AST 12 0 - 45 U/L   Drug abuse screen 6 urine (tox)    Collection Time: 06/25/20  1:46 AM   Result Value Ref Range    Amphetamine Qual Urine Negative NEG^Negative    Barbiturates Qual Urine Negative NEG^Negative    Benzodiazepine Qual Urine Negative NEG^Negative    Cannabinoids Qual Urine Negative NEG^Negative    Cocaine Qual Urine Negative NEG^Negative    Ethanol Qual Urine Negative NEG^Negative    Opiates Qualitative Urine Negative NEG^Negative   CBC with platelets    Collection Time: 06/25/20  8:19 AM   Result Value Ref Range    WBC 13.1 (H) 4.0 - 11.0 10e9/L    RBC Count 5.46 4.4 - 5.9 10e12/L    Hemoglobin 16.5 13.3 - 17.7 g/dL    Hematocrit 48.6 40.0 - 53.0 %    MCV 89 78 - 100 fl    MCH 30.2 26.5 - 33.0 pg    MCHC 34.0 31.5 - 36.5 g/dL    RDW 12.7 10.0 - 15.0 %    Platelet Count 263 150 - 450 10e9/L   UA with Microscopic    Collection Time: 06/25/20  8:15 PM   Result Value Ref Range    Color Urine Yellow     Appearance Urine Clear     Glucose Urine Negative NEG^Negative mg/dL    Bilirubin Urine Negative NEG^Negative    Ketones Urine 5 (A) NEG^Negative mg/dL    Specific Gravity Urine 1.025 1.003 - 1.035     Blood Urine Negative NEG^Negative    pH Urine 5.0 5.0 - 7.0 pH    Protein Albumin Urine 10 (A) NEG^Negative mg/dL    Urobilinogen mg/dL 2.0 0.0 - 2.0 mg/dL    Nitrite Urine Negative NEG^Negative    Leukocyte Esterase Urine Negative NEG^Negative    Source Urine     WBC Urine <1 0 - 5 /HPF    RBC Urine 1 0 - 2 /HPF    Squamous Epithelial /HPF Urine 1 0 - 1 /HPF    Mucous Urine Present (A) NEG^Negative /LPF    Hyaline Casts 4 (H) 0 - 2 /LPF   TSH with free T4 reflex and/or T3 as indicated    Collection Time: 06/26/20  7:32 AM   Result Value Ref Range    TSH 4.55 (H) 0.40 - 4.00 mU/L   Folate    Collection Time: 06/26/20  7:32 AM   Result Value Ref Range    Folate 15.6 >5.4 ng/mL   Vitamin B12    Collection Time: 06/26/20  7:32 AM   Result Value Ref Range    Vitamin B12 1,214 (H) 193 - 986 pg/mL   Vitamin D    Collection Time: 06/26/20  7:32 AM   Result Value Ref Range    Vitamin D Deficiency screening 26 20 - 75 ug/L   CBC with platelets    Collection Time: 06/26/20  7:32 AM   Result Value Ref Range    WBC 8.1 4.0 - 11.0 10e9/L    RBC Count 5.31 4.4 - 5.9 10e12/L    Hemoglobin 16.7 13.3 - 17.7 g/dL    Hematocrit 48.3 40.0 - 53.0 %    MCV 91 78 - 100 fl    MCH 31.5 26.5 - 33.0 pg    MCHC 34.6 31.5 - 36.5 g/dL    RDW 13.0 10.0 - 15.0 %    Platelet Count 240 150 - 450 10e9/L   T4 free    Collection Time: 06/26/20  7:32 AM   Result Value Ref Range    T4 Free 1.09 0.76 - 1.46 ng/dL   Hemoglobin A1c    Collection Time: 06/26/20  7:32 AM   Result Value Ref Range    Hemoglobin A1C 5.1 0 - 5.6 %   EKG 12-lead, complete    Collection Time: 07/04/20  2:48 PM   Result Value Ref Range    Interpretation ECG Click View Image link to view waveform and result          Impression:   This is a 60 year old adult continues PHP for mood stabilization.  Groups are going well.  Mood is a little better.               DIagnoses:     AXIS I:  Major depressive disorder, recurrent, severe without psychosis, and with anxiety; attention deficit  hyperactivity disorder by history; gender dysphoria.   AXIS II:  Cluster B personality traits.   AXIS III:  Obstructive sleep apnea; hypothyroidism; leukocytosis; hearing loss; hyperlipidemia; hypertension.            Plan:     Continue [facility] [place] [facility].   Continue current medications which were recently changed in the hospital.   Increased Lamictal from 25mg to 50mg/d.    She may try weaning off HS Zyprexa.   Expect stabilization and completion of Partial Hospital Program.   Follow-up with current outpatient mental health providers at completion of Partial Hospital Program.  Outpatient psychiatrist is Dr. Maliha Antonio at [facility].  Therapist is Priscila Bellamy on [address] [place] [address] in [place].    Car Peng MD

## 2020-07-17 NOTE — GROUP NOTE
Psychoeducation Group Note    PATIENT'S NAME: Chris Stockton  MRN:   [id number]  :   1960  ACCT. NUMBER: [id number]  DATE OF SERVICE: 20  START TIME:  1:00 PM  END TIME:  1:50 PM  FACILITATOR: Lavelle Wilhelm OT  TOPIC: [facility] OT Group: Lifestyle Balance and Structure  Adult Partial Hospitalization Program  TRACK: 1    Telemedicine Visit: The patient's condition can be safely assessed and treated via synchronous audio and visual telemedicine encounter.      Reason for Telemedicine Visit: Services only offered telehealth and Covid 19    Originating Site (Patient Location): Patient's home    Distant Site (Provider Location): Provider Remote Setting    Consent:  The patient/guardian has verbally consented to: the potential risks and benefits of telemedicine (video visit) versus in person care; bill my insurance or make self-payment for services provided; and responsibility for payment of non-covered services.     Mode of Communication:  Video Conference via [employer]    As the provider I attest to compliance with applicable laws and regulations related to telemedicine.      NUMBER OF PARTICIPANTS: 5    Summary of Group / Topics Discussed:  Lifestyle Balance and Structure:  Weekly reflection/Weekend wellness planning. To support progress towards treatment goals and psychiatric recovery, group members were led through a weekly structured process to reflect on progress, glimmers, think about how they can use new skills/insights moving forward. They then worked on planning to maintain safety and wellness over the weekend, focusing on balance of activities, routines, structure and ways to integrate new learning into their daily life. They also shared any challenges and brainstormed with group members for ways to overcome barriers and skills to focus on using over the weekend.     Patient Session Goals / Objectives:    Reflected on how the week went with an emphasis on new learning, progress towards goals, new  skills or insight.    Identified a plan for the weekend to maintain/develop safety and wellness including skills and strategies to use as needed.    Exchanged validation and support with peers.         Patient Participation / Response:  Fully participated with the group by sharing personal reflections / insights and openly received / provided feedback with other participants.    Verbalized understanding of content and Patient would benefit from additional opportunities to practice the content to be able to generalize it to their everyday life with increased intentionality, consistency, and efficacy in support of their psychiatric recovery    Treatment Plan:  Patient has a current master individualized treatment plan.  See Epic treatment plan for more information.    Lavelle Wilhelm, OT

## 2020-07-17 NOTE — GROUP NOTE
Psychotherapy Group Note    PATIENT'S NAME: Chris Stockton  MRN:   [id number]  :   1960  ACCT. NUMBER: [id number]  DATE OF SERVICE: 20  START TIME:  2:00 PM  END TIME:  2:50 PM  FACILITATOR: Raman Turner, PhD LP  TOPIC:  EBP Group: Mindfulness  Adult Partial Hospitalization Program  TRACK:     Telemedicine Visit: The patient's condition can be safely assessed and treated via synchronous audio and visual telemedicine encounter.      Reason for Telemedicine Visit: COVID19    Originating Site (Patient Location): Patient's home    Distant Site (Provider Location): Provider Remote Setting    Consent:  The patient/guardian has verbally consented to: the potential risks and benefits of telemedicine (video visit) versus in person care; bill my insurance or make self-payment for services provided; and responsibility for payment of non-covered services.     Mode of Communication:  Video Conference via [employer]    As the provider I attest to compliance with applicable laws and regulations related to telemedicine.    NUMBER OF PARTICIPANTS: 5    Summary of Group / Topics Discussed:  Mindfulness: Mindfulness Experiential: Patients received an overview on what mindfulness is and how mindfulness can benefit general health, mental health symptoms, and stressors. The history of mindfulness, its application to mental health therapies, and key concepts were also discussed. Patients discussed current awareness, knowledge, and practice of mindfulness skills. Patients also discussed barriers to mindfulness practice. This practice was focused on the concept of equanimity. Patients worked on using this term to clarify how they can maintain mental balance in the midst of opposites, such as pleasure and pain, success and failure, or best and sorrow.    Patient Session Goals / Objectives:    Demonstrated and verbalized understanding of key mindfulness concepts    Identified when/how to use mindfulness skills    Resolved  barriers to practicing mindfulness skills    Identified plan to use mindfulness skills in daily life       Patient Participation / Response:  Fully participated with the group by sharing personal reflections / insights and openly received / provided feedback with other participants.    Demonstrated understanding of topics discussed through group discussion and participation, Demonstrated understanding of mindfulness skills and benefits of practice, Identified / Expressed personal readiness to practice mindfulness skills, Verbalized understanding of how mindfulness can benefit mental health symptoms and Identified plan to address barriers to practicing mindfulness skills     Treatment Plan:  Patient has a current master individualized treatment plan.  See Epic treatment plan for more information.    Raman Turner, PhD LP

## 2020-07-17 NOTE — GROUP NOTE
Psychotherapy Group Note    PATIENT'S NAME: Chris Stockton  MRN:   [id number]  :   1960  ACCT. NUMBER: [id number]  DATE OF SERVICE: 20  START TIME: 10:00 AM  END TIME: 10:50 AM  FACILITATOR: Raman Turner, PhD LP  TOPIC: MH EBP Group: Specialty Awareness  Adult Partial Hospitalization Program  TRACK:     Telemedicine Visit: The patient's condition can be safely assessed and treated via synchronous audio and visual telemedicine encounter.      Reason for Telemedicine Visit: COVID19    Originating Site (Patient Location): Patient's home    Distant Site (Provider Location): Provider Remote Setting    Consent:  The patient/guardian has verbally consented to: the potential risks and benefits of telemedicine (video visit) versus in person care; bill my insurance or make self-payment for services provided; and responsibility for payment of non-covered services.     Mode of Communication:  Video Conference via [employer]    As the provider I attest to compliance with applicable laws and regulations related to telemedicine.    NUMBER OF PARTICIPANTS: 7    Summary of Group / Topics Discussed:  Specialty Topics: Forgiveness: Patients were provided with an overview of the topic of forgiveness including how to better understand the nature of forgiveness of others and oneself. Exploring the phases of forgiveness and how one works them was covered. Patients were able to explore how practicing forgiveness may positively impact their functioning.     Patient Session Goals / Objectives:    Discussed definitions of forgiveness and self-forgiveness    Explored the phases and process of forgiveness    Discussed benefits of forgiveness to their relationships with themselves and others, connecting the concept to mindfulness and acceptance    Assisted patients in recognizing when practicing forgiveness may be helpful      Patient Participation / Response:  Fully participated with the group by sharing personal reflections /  insights and openly received / provided feedback with other participants.    Demonstrated understanding of topics discussed through group discussion and participation, Identified / Expressed readiness to act on skill suggestions discussed in topic, Verbalized understanding of ways to proactively manage illness, Identified plan to address barriers to practicing skills discussed in topic and Practiced skills in session    Treatment Plan:  Patient has a current master individualized treatment plan.  See Epic treatment plan for more information.    Raman Turner, PhD LP

## 2020-07-17 NOTE — GROUP NOTE
Process Group Note    PATIENT'S NAME: Chris Stockton  MRN:   [id number]  :   1960  ACCT. NUMBER: [id number]  DATE OF SERVICE: 20  START TIME:  9:00 AM  END TIME:  9:50 AM  FACILITATOR: Raman Turner, PhD LP  TOPIC:  Process Group    Diagnoses:   Major depressive disorder, recurrent, severe without psychosis, and with anxiety; attention deficit hyperactivity disorder by history       Adult Partial Hospitalization Program  TRACK:     Telemedicine Visit: The patient's condition can be safely assessed and treated via synchronous audio and visual telemedicine encounter.      Reason for Telemedicine Visit:  COVID19    Originating Site (Patient Location): Patient's home    Distant Site (Provider Location): Provider Remote Setting    Consent:  The patient/guardian has verbally consented to: the potential risks and benefits of telemedicine (video visit) versus in person care; bill my insurance or make self-payment for services provided; and responsibility for payment of non-covered services.     Mode of Communication:  Video Conference via [employer]    As the provider I attest to compliance with applicable laws and regulations related to telemedicine.      NUMBER OF PARTICIPANTS: 6          Data:    Session content: At the start of this group, patients were invited to check in by identifying themselves, describing their current emotional status, and identifying issues to address in this group.   Area(s) of treatment focus addressed in this session included Symptom Management, Personal Safety and Develop Socialization / Interpersonal Relationship Skills.    The patients spent the group processing how difficult it can be for them be grateful and angry towards people on whom they depend. The group discussed whether being given help automatically means the  giver  gets to dictate conditions of living and expectations. The patient was very active during this group, and offered lots of feedback to the group. She  appeared to be doing better today, but still presented with significant anxiety and rumination.     Therapeutic Interventions/Treatment Strategies:  Psychotherapist offered support, feedback and validation, set limits, provided redirection and reinforced use of skills.    Assessment:    Patient response:   Patient responded to session by accepting feedback, giving feedback, listening and focusing on goals    Possible barriers to participation / learning include: and no barriers identified    Health Issues:   None reported       Substance Use Review:   Substance Use: No active concerns identified.    Mental Status/Behavioral Observations  Appearance:   Appropriate   Eye Contact:   Fair   Psychomotor Behavior: Restless   Attitude:   Cooperative  Interested Friendly Pleasant  Orientation:   All  Speech   Rate / Production: Hyperverbal    Volume:  Normal   Mood:    Anxious  Depressed  Sad  Dysphoric  Affect:    Blunted   Thought Content:   Rumination  Thought Form:  Coherent  Logical     Insight:    Fair     Plan:     Safety Plan: No current safety concerns identified.  Recommended that patient call 911 or go to the local ED should there be a change in any of these risk factors.     Barriers to treatment: None identified    Patient Contracts (see media tab):  None    Substance Use: Not addressed in session     Continue or Discharge: Patient will continue in Adult Partial Hospitalization Program (PHP)  as planned. Patient is likely to benefit from learning and using skills as they work toward the goals identified in their treatment plan.      Raman Turner, PhD LP  July 17, 2020

## 2020-07-20 ENCOUNTER — HOSPITAL ENCOUNTER (OUTPATIENT)
Dept: BEHAVIORAL HEALTH | Facility: CLINIC | Age: 60
End: 2020-07-20
Attending: PSYCHIATRY & NEUROLOGY
Payer: COMMERCIAL

## 2020-07-20 PROCEDURE — H0035 MH PARTIAL HOSP TX UNDER 24H: HCPCS | Mod: GT

## 2020-07-20 PROCEDURE — H0035 MH PARTIAL HOSP TX UNDER 24H: HCPCS | Mod: 95 | Performed by: COUNSELOR

## 2020-07-20 PROCEDURE — H0035 MH PARTIAL HOSP TX UNDER 24H: HCPCS | Mod: GT | Performed by: PSYCHOLOGIST

## 2020-07-20 NOTE — GROUP NOTE
Psychoeducation Group Note    PATIENT'S NAME: Chris Stockton  MRN:   [id number]  :   1960  ACCT. NUMBER: [id number]  DATE OF SERVICE: 20  START TIME: 11:00 AM  END TIME: 11:50 AM  FACILITATOR: Lavelle Wilhelm OT  TOPIC: [facility] OT Group: Lifestyle Balance and Structure  Adult Partial Hospitalization Program  TRACK: 1    Telemedicine Visit: The patient's condition can be safely assessed and treated via synchronous audio and visual telemedicine encounter.      Reason for Telemedicine Visit: Services only offered telehealth and Covid 19    Originating Site (Patient Location): Patient's home    Distant Site (Provider Location): Provider Remote Setting    Consent:  The patient/guardian has verbally consented to: the potential risks and benefits of telemedicine (video visit) versus in person care; bill my insurance or make self-payment for services provided; and responsibility for payment of non-covered services.     Mode of Communication:  Video Conference via [employer] ([email or web address])    As the provider I attest to compliance with applicable laws and regulations related to telemedicine.      NUMBER OF PARTICIPANTS:8    Summary of Group / Topics Discussed:  Lifestyle Balance and Structure:  OT Goal-setting & integration: To support progress towards treatment goals and psychiatric recovery, group members were led through a weekly structured process to reflect on progress, integrate new learning/skills, and emerging self-awareness into their daily and weekly life. Provided psychoeducation on the neuroscience of change, self-compassion, and the anatomy of a SMART goal to the group. Facilitated the sharing of individual goals with validation, support, and feedback provided.    Patient Session Goals / Objectives:    Reflected on and create a vision for recovery and wellbeing    Identified and wrote 3 SMART goals for the week    Identified and problem solved barriers to achieving identified goals     Identified plan to support  follow through on goals and reflection on progress made        Patient Participation / Response:  Fully participated with the group by sharing personal reflections / insights and openly received / provided feedback with other participants.    Verbalized understanding of content and Patient would benefit from additional opportunities to practice the content to be able to generalize it to their everyday life with increased intentionality, consistency, and efficacy in support of their psychiatric recovery    Treatment Plan:  Patient has a current master individualized treatment plan.  See Epic treatment plan for more information.    Lavelle Wilhelm, OT

## 2020-07-20 NOTE — PROGRESS NOTES
"[facility]   Dr. Pneg's Psychiatric Progress Note  2020      Patient:  Chris Stockton \"Liz\"  Medical Record Number:  [id number]  :  1960    Telemedicine Visit: The patient's condition can be safely assessed and treated via synchronous audio and visual telemedicine encounter.       Reason for Telemedicine Visit: COVID-19 lockdown     Originating Site (Patient Location):  HOME     Distant Site (Provider Location): [facility]: [place]     Consent:  The patient/guardian has verbally consented to: the potential risks and benefits of telemedicine (video visit) versus in person care; bill my insurance or make self-payment for services provided; and responsibility for payment of non-covered services.         Interim History:   The patient's care was discussed with the treatment team and chart notes were reviewed.  Pt had an awful weekend.  Pt's roommate told pt she was thinking of moving out.  Bad roommate has been slamming doors.  Trying to get 2 roommates now.  Pt may move again to another roommate's room.  Pt is considering moving out.   Pt stays away from home to avoid roommate.  Enjoyed pedicure.      Psychiatric ROS:  Mood:  Very anxious;  On verge of tears;    Sleep:    Good on Zyprexa  Appetite:   normal  Eating:  normal  Energy Level:  OK  Concentration/Memory: ok      Suicidal Thoughts:  Fleeting yesterday;  No plan or intent;  Able to contract for safety  Homicidal Thoughts:No  Psychotic Symptoms: No  Medication Side Effects:  constipation  Medication Compliance:Yes   Physical Complaints:    Body aches;           Medications:     PAST PSYCH MEDS:  Adderall, Ritalin, guanfacine, Wellbutrin, Cymbalta, Prozac, Effexor, lithium, Lamictal, Zyprexa, and melatonin    Current Outpatient Medications   Medication Sig     buPROPion (WELLBUTRIN XL) 150 MG 24 hr tablet Take 1 tablet (150 mg) by mouth every morning     estradiol (VIVELLE-DOT) 0.1 MG/24HR " bi-weekly patch Place onto the skin every 72 hours Patch placement rotation every 3 days: 1 patch, 2 patch, no patch, then repeat     guanFACINE (INTUNIV) 2 MG TB24 24 hr tablet Take 1 tablet (2 mg) by mouth At Bedtime     lamoTRIgine (LAMICTAL) 25 MG tablet Take 2 tablets (50 mg) by mouth daily     levothyroxine (SYNTHROID/LEVOTHROID) 75 MCG tablet Take 1 tablet (75 mcg) by mouth daily     lithium ER (LITHOBID) 300 MG CR tablet Take 1 tablet (300 mg) by mouth At Bedtime     magnesium oxide 400 MG CAPS Take by mouth At Bedtime     melatonin 3 MG tablet Take 1-2 tablets (3-6 mg) by mouth nightly as needed for sleep     OLANZapine (ZYPREXA) 2.5 MG tablet Take 1-2 tablets (2.5-5 mg) by mouth At Bedtime     spironolactone (ALDACTONE) 25 MG tablet Take 25 mg by mouth daily      Vitamin D3 (CHOLECALCIFEROL) 25 mcg (1000 units) tablet Take 2 tablets (50 mcg) by mouth daily     No current facility-administered medications for this encounter.              Allergies:     Allergies   Allergen Reactions     Finasteride      Mental health problems     Tetracycline Unknown     Doxycycline Rash            Psychiatric Examination:   There were no vitals taken for this visit.  Weight is 0 lbs 0 oz  There is no height or weight on file to calculate BMI.    Appearance:  awake, alert and adequately groomed  Attitude:  cooperative  Eye Contact:  good  Mood:  Better today  Affect:  mood congruent  Speech:  clear, coherent  Psychomotor Behavior:  no evidence of tardive dyskinesia, dystonia, or tics  Throught Process:  logical  Associations:  no loose associations  Thought Content:  no evidence of psychotic thought and occasional fleeting suicidal thoughts with no plan or intent to act on them;  able to contract for safety  Insight:  good  Judgement:  intact  Oriented to:  time, person, and place  Attention Span and Concentration:  intact  Recent and Remote Memory:  intact  Gait:Normal    Risk/Potential for Dangerousness:  Multiple Active  Diagnoses:HIGH  Self Care:HIGH  Suicide:LOW  Assault:LOW  Self Injurious Behaviors:LOW  Inappropriate Sexual Behavior:LOW         Labs:   No results found for this or any previous visit (from the past 24 hour(s)).     Recent Results (from the past 1008 hour(s))   Asymptomatic COVID-19 Virus (Coronavirus) by PCR    Collection Time: 06/25/20 12:51 AM    Specimen: Nasopharyngeal   Result Value Ref Range    COVID-19 Virus PCR to [facility] - Source Nasopharyngeal     COVID-19 Virus PCR to [facility] - Result       Test received-See reflex to IDDL test SARS CoV2 (COVID-19) Virus RT-PCR   SARS-CoV-2 COVID-19 Virus (Coronavirus) RT-PCR Nasopharyngeal    Collection Time: 06/25/20 12:51 AM    Specimen: Nasopharyngeal   Result Value Ref Range    SARS-CoV-2 Virus Specimen Source Nasopharyngeal     SARS-CoV-2 PCR Result NEGATIVE     SARS-CoV-2 PCR Comment       Testing was performed using the Simplexa COVID-19 Direct Assay on the [employer] MDX   instrument. Additional information about this Emergency Use Authorization (EUA) assay can   be found via the Lab Guide.     CBC with platelets differential    Collection Time: 06/25/20  1:17 AM   Result Value Ref Range    WBC 15.4 (H) 4.0 - 11.0 10e9/L    RBC Count 5.42 4.4 - 5.9 10e12/L    Hemoglobin 16.4 13.3 - 17.7 g/dL    Hematocrit 48.1 40.0 - 53.0 %    MCV 89 78 - 100 fl    MCH 30.3 26.5 - 33.0 pg    MCHC 34.1 31.5 - 36.5 g/dL    RDW 12.6 10.0 - 15.0 %    Platelet Count 246 150 - 450 10e9/L    Diff Method Automated Method     % Neutrophils 84.2 %    % Lymphocytes 9.1 %    % Monocytes 5.1 %    % Eosinophils 0.1 %    % Basophils 0.7 %    % Immature Granulocytes 0.8 %    Nucleated RBCs 0 0 /100    Absolute Neutrophil 13.0 (H) 1.6 - 8.3 10e9/L    Absolute Lymphocytes 1.4 0.8 - 5.3 10e9/L    Absolute Monocytes 0.8 0.0 - 1.3 10e9/L    Absolute Eosinophils 0.0 0.0 - 0.7 10e9/L    Absolute Basophils 0.1 0.0 - 0.2 10e9/L    Abs Immature Granulocytes 0.1 0 - 0.4 10e9/L    Absolute  Nucleated RBC 0.0    Comprehensive metabolic panel    Collection Time: 06/25/20  1:17 AM   Result Value Ref Range    Sodium 138 133 - 144 mmol/L    Potassium 3.7 3.4 - 5.3 mmol/L    Chloride 107 94 - 109 mmol/L    Carbon Dioxide 23 20 - 32 mmol/L    Anion Gap 8 3 - 14 mmol/L    Glucose 117 (H) 70 - 99 mg/dL    Urea Nitrogen 15 7 - 30 mg/dL    Creatinine 0.76 0.66 - 1.25 mg/dL    GFR Estimate >90 >60 mL/min/[1.73_m2]    GFR Estimate If Black >90 >60 mL/min/[1.73_m2]    Calcium 8.5 8.5 - 10.1 mg/dL    Bilirubin Total 0.8 0.2 - 1.3 mg/dL    Albumin 4.1 3.4 - 5.0 g/dL    Protein Total 7.6 6.8 - 8.8 g/dL    Alkaline Phosphatase 51 40 - 150 U/L    ALT 20 0 - 70 U/L    AST 12 0 - 45 U/L   Drug abuse screen 6 urine (tox)    Collection Time: 06/25/20  1:46 AM   Result Value Ref Range    Amphetamine Qual Urine Negative NEG^Negative    Barbiturates Qual Urine Negative NEG^Negative    Benzodiazepine Qual Urine Negative NEG^Negative    Cannabinoids Qual Urine Negative NEG^Negative    Cocaine Qual Urine Negative NEG^Negative    Ethanol Qual Urine Negative NEG^Negative    Opiates Qualitative Urine Negative NEG^Negative   CBC with platelets    Collection Time: 06/25/20  8:19 AM   Result Value Ref Range    WBC 13.1 (H) 4.0 - 11.0 10e9/L    RBC Count 5.46 4.4 - 5.9 10e12/L    Hemoglobin 16.5 13.3 - 17.7 g/dL    Hematocrit 48.6 40.0 - 53.0 %    MCV 89 78 - 100 fl    MCH 30.2 26.5 - 33.0 pg    MCHC 34.0 31.5 - 36.5 g/dL    RDW 12.7 10.0 - 15.0 %    Platelet Count 263 150 - 450 10e9/L   UA with Microscopic    Collection Time: 06/25/20  8:15 PM   Result Value Ref Range    Color Urine Yellow     Appearance Urine Clear     Glucose Urine Negative NEG^Negative mg/dL    Bilirubin Urine Negative NEG^Negative    Ketones Urine 5 (A) NEG^Negative mg/dL    Specific Gravity Urine 1.025 1.003 - 1.035    Blood Urine Negative NEG^Negative    pH Urine 5.0 5.0 - 7.0 pH    Protein Albumin Urine 10 (A) NEG^Negative mg/dL    Urobilinogen mg/dL 2.0 0.0 -  2.0 mg/dL    Nitrite Urine Negative NEG^Negative    Leukocyte Esterase Urine Negative NEG^Negative    Source Urine     WBC Urine <1 0 - 5 /HPF    RBC Urine 1 0 - 2 /HPF    Squamous Epithelial /HPF Urine 1 0 - 1 /HPF    Mucous Urine Present (A) NEG^Negative /LPF    Hyaline Casts 4 (H) 0 - 2 /LPF   TSH with free T4 reflex and/or T3 as indicated    Collection Time: 06/26/20  7:32 AM   Result Value Ref Range    TSH 4.55 (H) 0.40 - 4.00 mU/L   Folate    Collection Time: 06/26/20  7:32 AM   Result Value Ref Range    Folate 15.6 >5.4 ng/mL   Vitamin B12    Collection Time: 06/26/20  7:32 AM   Result Value Ref Range    Vitamin B12 1,214 (H) 193 - 986 pg/mL   Vitamin D    Collection Time: 06/26/20  7:32 AM   Result Value Ref Range    Vitamin D Deficiency screening 26 20 - 75 ug/L   CBC with platelets    Collection Time: 06/26/20  7:32 AM   Result Value Ref Range    WBC 8.1 4.0 - 11.0 10e9/L    RBC Count 5.31 4.4 - 5.9 10e12/L    Hemoglobin 16.7 13.3 - 17.7 g/dL    Hematocrit 48.3 40.0 - 53.0 %    MCV 91 78 - 100 fl    MCH 31.5 26.5 - 33.0 pg    MCHC 34.6 31.5 - 36.5 g/dL    RDW 13.0 10.0 - 15.0 %    Platelet Count 240 150 - 450 10e9/L   T4 free    Collection Time: 06/26/20  7:32 AM   Result Value Ref Range    T4 Free 1.09 0.76 - 1.46 ng/dL   Hemoglobin A1c    Collection Time: 06/26/20  7:32 AM   Result Value Ref Range    Hemoglobin A1C 5.1 0 - 5.6 %   EKG 12-lead, complete    Collection Time: 07/04/20  2:48 PM   Result Value Ref Range    Interpretation ECG Click View Image link to view waveform and result          Impression:   This is a 60 year old adult continues PHP for mood stabilization.  Groups are going well.  Mood is anxious with the roommate situation.             DIagnoses:     AXIS I:  Major depressive disorder, recurrent, severe without psychosis, and with anxiety; attention deficit hyperactivity disorder by history; gender dysphoria.   AXIS II:  Cluster B personality traits.   AXIS III:  Obstructive sleep apnea;  hypothyroidism; leukocytosis; hearing loss; hyperlipidemia; hypertension.            Plan:     Continue [facility] [place] [facility] Program.    Continue current medications which were recently changed in the hospital.   Increased Lamictal from 25mg to 50mg/d on 7/15/20.  It helped with anxiety.    She may try weaning off HS Zyprexa.   Expect stabilization and completion of Partial Hospital Program.   Follow-up with current outpatient mental health providers at completion of Partial Hospital Program.  Outpatient psychiatrist is Dr. Maliha Antonio 7/23/20 at [facility].  Therapist is Priscila Bellamy on [address] [place] [address] in [place].    Car Peng MD

## 2020-07-20 NOTE — GROUP NOTE
Psychotherapy Group Note    PATIENT'S NAME: Chris Stockton  MRN:   [id number]  :   1960  ACCT. NUMBER: [id number]  DATE OF SERVICE: 20  START TIME: 10:00 AM  END TIME: 10:50 AM  FACILITATOR: Lizy Oh LPCC  TOPIC:  EBP Group: Symptom Awareness  Adult Partial Hospitalization Program  TRACK: 1    NUMBER OF PARTICIPANTS: 8    Telemedicine Visit: The patient's condition can be safely assessed and treated via synchronous audio and visual telemedicine encounter.      Reason for Telemedicine Visit: Services only offered telehealth    Originating Site (Patient Location): Patient's home    Distant Site (Provider Location): Provider Remote Setting    Consent:  The patient/guardian has verbally consented to: the potential risks and benefits of telemedicine (video visit) versus in person care; bill my insurance or make self-payment for services provided; and responsibility for payment of non-covered services.     Mode of Communication:  Video Conference via [employer]    As the provider I attest to compliance with applicable laws and regulations related to telemedicine.      Summary of Group / Topics Discussed:  Symptom Awareness: Mood Disorders: Patients received a general overview of mood disorders including depressive disorders, and how it relates to their current symptoms. The purpose is to promote understanding of their diagnoses and how it impacts their functioning. Patients reviewed their current awareness of symptoms and diagnoses. Patients received information regarding diagnoses, etiology, cultural, and environmental factors as well as impact on functioning. Patients completed a symptom check-list that helped them identify different levels of symptoms they experience.    Patient Session Goals / Objectives:    Discussed patient individual symptoms and experiences    Reviewed diagnostic criteria and etiology of diagnoses         Patient Participation / Response:  Fully participated with the group by  sharing personal reflections / insights and openly received / provided feedback with other participants.    Demonstrated understanding of topics discussed through group discussion and participation and Demonstrated understanding of how information regarding symptoms can assist in management of symptoms    Treatment Plan:  Patient has a current master individualized treatment plan.  See Epic treatment plan for more information.    Lizy Oh, [facility]

## 2020-07-21 ENCOUNTER — HOSPITAL ENCOUNTER (OUTPATIENT)
Dept: BEHAVIORAL HEALTH | Facility: CLINIC | Age: 60
End: 2020-07-21
Attending: PSYCHIATRY & NEUROLOGY
Payer: COMMERCIAL

## 2020-07-21 PROCEDURE — H0035 MH PARTIAL HOSP TX UNDER 24H: HCPCS | Mod: GT

## 2020-07-21 PROCEDURE — H0035 MH PARTIAL HOSP TX UNDER 24H: HCPCS | Mod: GT | Performed by: PSYCHOLOGIST

## 2020-07-21 PROCEDURE — H0035 MH PARTIAL HOSP TX UNDER 24H: HCPCS | Mod: 95 | Performed by: PSYCHOLOGIST

## 2020-07-21 NOTE — GROUP NOTE
Psychotherapy Group Note    PATIENT'S NAME: Chris Stockton  MRN:   [id number]  :   1960  ACCT. NUMBER: [id number]  DATE OF SERVICE: 20  START TIME:  2:00 PM  END TIME:  2:50 PM  FACILITATOR: Raman Turner, PhD LP  TOPIC:  EBP Group: Social Support  Adult Partial Hospitalization Program  TRACK:     Telemedicine Visit: The patient's condition can be safely assessed and treated via synchronous audio and visual telemedicine encounter.      Reason for Telemedicine Visit: COVID19    Originating Site (Patient Location): Patient's home    Distant Site (Provider Location): Provider Remote Setting    Consent:  The patient/guardian has verbally consented to: the potential risks and benefits of telemedicine (video visit) versus in person care; bill my insurance or make self-payment for services provided; and responsibility for payment of non-covered services.     Mode of Communication:  Video Conference via [employer]    As the provider I attest to compliance with applicable laws and regulations related to telemedicine.    NUMBER OF PARTICIPANTS: 6    Summary of Group / Topics Discussed:  Social Support:  Building and educating social support systems: The patients engaged in a discussion of how the patient's mental health symptoms are related to psychosocial impairment. The patients also shared experiences of when stigma associated with mental illness has created barriers between the patient and their social network. The patients benefitted from this group by exploring ways in which the patient can re-engage theitr social network to decrease feelings of isolation and loneliness.    Patient Session Goals / Objectives:    Reduce overidentification/fusion with mental illness as being a primary definer of identity.     Identify ways that support network can assist with recovery.     Reduce stigma associated with mental health diagnosis(es).    Improve interpersonal communication regarding symptoms    Build a sense  of mastery and self-respect          Patient Participation / Response:  Fully participated with the group by sharing personal reflections / insights and openly received / provided feedback with other participants.    {OP MH PROGRESS NOTE PHP SOCIAL SUPPORT:[id number]    Treatment Plan:  Patient has a current master individualized treatment plan.  See Epic treatment plan for more information.    Raman Turner, PhD LP

## 2020-07-21 NOTE — PROGRESS NOTES
Acknowledgement of Current Treatment Plan       I have reviewed my treatment plan with my therapist / counselor on 7/21/2020.   I agree with the plan as it is written in the electronic health record.    Name:      Signature:  Chris Stockton Unable to sign due to covid. Completed via telephone     Dr. Car Peng MD  Psychiatrist    Lizy Oh MA, [facility]   Psychotherapist  Lizy Oh MA [facility]   Dr. Raman Turner, PhD LP  Psychotherapist Dr. Raman Turner PhD LP

## 2020-07-21 NOTE — GROUP NOTE
"Process Group Note    PATIENT'S NAME: Chris Stockton  MRN:   [id number]  :   1960  ACCT. NUMBER: [id number]  DATE OF SERVICE: 20  START TIME:  9:00 AM  END TIME:  9:50 AM  FACILITATOR: Raman Turner, PhD LP  TOPIC:  Process Group    Diagnoses:   Major depressive disorder, recurrent, severe without psychosis, and with anxiety; attention deficit hyperactivity disorder by history       Adult Partial Hospitalization Program  TRACK:     Telemedicine Visit: The patient's condition can be safely assessed and treated via synchronous audio and visual telemedicine encounter.      Reason for Telemedicine Visit:  COVID19    Originating Site (Patient Location): Patient's home    Distant Site (Provider Location): Provider Remote Setting    Consent:  The patient/guardian has verbally consented to: the potential risks and benefits of telemedicine (video visit) versus in person care; bill my insurance or make self-payment for services provided; and responsibility for payment of non-covered services.     Mode of Communication:  Video Conference via [employer]    As the provider I attest to compliance with applicable laws and regulations related to telemedicine.      NUMBER OF PARTICIPANTS: 6          Data:    Session content: At the start of this group, patients were invited to check in by identifying themselves, describing their current emotional status, and identifying issues to address in this group.   Area(s) of treatment focus addressed in this session included Symptom Management, Personal Safety and Develop Socialization / Interpersonal Relationship Skills.    The patient spent time processing and seeking feedback re: her difficult roommate situation. She not only received advice and resources from other group members, but also validation for the \"stuckness\" that she appears to be going through at this time. She was very responsive and appreciative for the group's willingness to hold her ambivalence and " "\"stuckness.\"     Therapeutic Interventions/Treatment Strategies:  Psychotherapist offered support, feedback and validation, set limits, provided redirection and reinforced use of skills.    Assessment:    Patient response:   Patient responded to session by accepting feedback, giving feedback, listening, focusing on goals and being attentive    Possible barriers to participation / learning include: and no barriers identified    Health Issues:   None reported       Substance Use Review:   Substance Use: No active concerns identified.    Mental Status/Behavioral Observations  Appearance:   Appropriate   Eye Contact:   Fair   Psychomotor Behavior: Normal   Attitude:   Cooperative  Interested Playful  Orientation:   All  Speech   Rate / Production: Normal    Volume:  Normal   Mood:    Anxious  Depressed  Sad   Affect:    Constricted   Thought Content:   Rumination  Thought Form:  Coherent  Logical     Insight:    Fair     Plan:     Safety Plan: No current safety concerns identified.  Recommended that patient call 911 or go to the local ED should there be a change in any of these risk factors.     Barriers to treatment: None identified    Patient Contracts (see media tab):  None    Substance Use: Not addressed in session     Continue or Discharge: Patient will continue in Adult Partial Hospitalization Program (PHP)  as planned. Patient is likely to benefit from learning and using skills as they work toward the goals identified in their treatment plan.      Raman Turner, PhD LP  July 21, 2020  "

## 2020-07-22 ENCOUNTER — HOSPITAL ENCOUNTER (OUTPATIENT)
Dept: BEHAVIORAL HEALTH | Facility: CLINIC | Age: 60
End: 2020-07-22
Attending: PSYCHIATRY & NEUROLOGY
Payer: COMMERCIAL

## 2020-07-22 PROCEDURE — H0035 MH PARTIAL HOSP TX UNDER 24H: HCPCS | Mod: 95 | Performed by: SOCIAL WORKER

## 2020-07-22 PROCEDURE — H0035 MH PARTIAL HOSP TX UNDER 24H: HCPCS | Mod: GT

## 2020-07-22 PROCEDURE — H0035 MH PARTIAL HOSP TX UNDER 24H: HCPCS | Mod: 95 | Performed by: PSYCHOLOGIST

## 2020-07-22 NOTE — GROUP NOTE
Psychoeducation Group Note    PATIENT'S NAME: Chris Stockton  MRN:   [id number]  :   1960  ACCT. NUMBER: [id number]  DATE OF SERVICE: 20  START TIME:  1:00 PM  END TIME:  1:50 PM  FACILITATOR: Lavelle Wilhelm OT  TOPIC: [facility] OT Group: Self- Regulation Skills  Adult Partial Hospitalization Program  TRACK: *1    Telemedicine Visit: The patient's condition can be safely assessed and treated via synchronous audio and visual telemedicine encounter.      Reason for Telemedicine Visit: Services only offered telehealth and Covid 19    Originating Site (Patient Location): Patient's home    Distant Site (Provider Location): Provider Remote Setting    Consent:  The patient/guardian has verbally consented to: the potential risks and benefits of telemedicine (video visit) versus in person care; bill my insurance or make self-payment for services provided; and responsibility for payment of non-covered services.     Mode of Communication:  Video Conference via [employer]    As the provider I attest to compliance with applicable laws and regulations related to telemedicine.    NUMBER OF PARTICIPANTS: 6    Summary of Group / Topics Discussed:  Self-Regulation Skills: Sensory Modulation: Patients were provided with education on Autonomic Nervous System activation and the importance of identifying their Window of Tolerance for effective self-regulation.  Patients were taught how to recognize specific signs and symptoms of their individualized state of arousal and how to make changes when needed.  Patient's explored body based skills (bottom up processing skills) and techniques to help manage symptoms and change level of arousal when needed to be in control and comfortable so they are able to function in different environments.         Patient Session Goals / Objectives:    [place] how the ANS works and importance of its  impact on functioning and mental wellbeing    [place] how developing interoceptive awareness can  help one self-regulate sooner rather than later    Identified signs and symptoms of current level of arousal and ways to make changes in level of arousal when needed    Identified specific and individualized neurosensory skills to help when distressed and for crisis management    Established a plan for practice of these skills in their own environments        Patient Participation / Response:  Fully participated with the group by sharing personal reflections / insights and openly received / provided feedback with other participants.    Verbalized understanding of content and Patient would benefit from additional opportunities to practice the content to be able to generalize it to their everyday life with increased intentionality, consistency, and efficacy in support of their psychiatric recovery    Treatment Plan:  Patient has a current master individualized treatment plan.  See Epic treatment plan for more information.    Lavelle Wilhelm, OT

## 2020-07-22 NOTE — GROUP NOTE
"Process Group Note    PATIENT'S NAME: Chris Stockton  MRN:   [id number]  :   1960  ACCT. NUMBER: [id number]  DATE OF SERVICE: 20  START TIME:  1:00 PM  END TIME:  1:50 PM  FACILITATOR: Raman Turner, PhD LP  TOPIC:  Process Group    Diagnoses:   Major depressive disorder, recurrent, severe without psychosis, and with anxiety; attention deficit hyperactivity disorder by history       Adult Partial Hospitalization Program  TRACK:     Telemedicine Visit: The patient's condition can be safely assessed and treated via synchronous audio and visual telemedicine encounter.      Reason for Telemedicine Visit:  COVID19    Originating Site (Patient Location): Patient's home    Distant Site (Provider Location): Provider Remote Setting    Consent:  The patient/guardian has verbally consented to: the potential risks and benefits of telemedicine (video visit) versus in person care; bill my insurance or make self-payment for services provided; and responsibility for payment of non-covered services.     Mode of Communication:  Video Conference via [employer]    As the provider I attest to compliance with applicable laws and regulations related to telemedicine.      NUMBER OF PARTICIPANTS: 6          Data:    Session content: At the start of this group, patients were invited to check in by identifying themselves, describing their current emotional status, and identifying issues to address in this group.   Area(s) of treatment focus addressed in this session included Symptom Management, Personal Safety and Develop Socialization / Interpersonal Relationship Skills.    The patient shared she believe she \"still has a lot of issues to deal with.\" she elaborated by sharing that she is working to find more comfortable/suitable housing that would have less roommate conflict, and she has also been thinking about how her son spent many years to struggle \"to get better.\" She spent time reflecting on her own experiences in a way " that furthered the group's process of how to let go of the fantasy of omnipotent control re: others' behaviors.    Therapeutic Interventions/Treatment Strategies:  Psychotherapist offered support, feedback and validation, set limits, provided redirection and reinforced use of skills.    Assessment:    Patient response:   Patient responded to session by accepting feedback, giving feedback, listening, focusing on goals, being attentive and accepting support    Possible barriers to participation / learning include: and no barriers identified    Health Issues:   None reported       Substance Use Review:   Substance Use: No active concerns identified.    Mental Status/Behavioral Observations  Appearance:   Appropriate   Eye Contact:   Fair   Psychomotor Behavior: Normal   Attitude:   Cooperative  Interested  Orientation:   All  Speech   Rate / Production: Normal    Volume:  Normal   Mood:    Anxious  Depressed   Affect:    Constricted   Thought Content:   Rumination  Thought Form:  Coherent  Logical     Insight:    Fair     Plan:     Safety Plan: No current safety concerns identified.  Recommended that patient call 911 or go to the local ED should there be a change in any of these risk factors.     Barriers to treatment: None identified    Patient Contracts (see media tab):  None    Substance Use: Not addressed in session     Continue or Discharge: Patient will continue in Adult Partial Hospitalization Program (PHP)  as planned. Patient is likely to benefit from learning and using skills as they work toward the goals identified in their treatment plan.      Raman Turner, PhD LP  July 22, 2020

## 2020-07-22 NOTE — GROUP NOTE
Psychotherapy Group Note    PATIENT'S NAME: Chris Stockton  MRN:   [id number]  :   1960  ACCT. NUMBER: [id number]  DATE OF SERVICE: 20  START TIME:  2:00 PM  END TIME:  2:50 PM  FACILITATOR: Raman Turner, PhD LP  TOPIC: MH EBP Group: Relationship Skills  Adult Partial Hospitalization Program  TRACK:     Telemedicine Visit: The patient's condition can be safely assessed and treated via synchronous audio and visual telemedicine encounter.      Reason for Telemedicine Visit:  COVID19    Originating Site (Patient Location): Patient's home    Distant Site (Provider Location): Provider Remote Setting    Consent:  The patient/guardian has verbally consented to: the potential risks and benefits of telemedicine (video visit) versus in person care; bill my insurance or make self-payment for services provided; and responsibility for payment of non-covered services.     Mode of Communication:  Video Conference via [employer]    As the provider I attest to compliance with applicable laws and regulations related to telemedicine.      NUMBER OF PARTICIPANTS: 5    Summary of Group / Topics Discussed:  Relationship Skills: Boundaries: Patients were provided with a general overview of interpersonal boundaries and how lack of boundaries relates to symptoms and functioning. The purpose is to help patients identify boundary issues and gain awareness and skills to work towards healthier interpersonal boundaries. Current awareness of healthy boundary characteristics and barriers to establishing healthy boundaries were discussed.    Patient Session Goals / Objectives:    Familiarized patients with the concept of interpersonal boundaries and their characteristics    Discussed and practiced strategies to promote healthier interpersonal boundaries    Identified boundary issues and identified plan to improve boundaries      Patient Participation / Response:  Fully participated with the group by sharing personal reflections /  insights and openly received / provided feedback with other participants.    Demonstrated understanding of topics discussed through group discussion and participation, Demonstrated understanding of relationship skills and communication skills, Identified / Expressed personal readiness to incorporate effective communication skills, Verbalized understanding of communication skills, communication challenges, and communication strengths and Identified plan to address barriers to practicing relationship skills     Treatment Plan:  Patient has a current master individualized treatment plan.  See Epic treatment plan for more information.    Raman Turner, PhD LP

## 2020-07-22 NOTE — GROUP NOTE
Psychoeducation Group Note    PATIENT'S NAME: Chris Stockton  MRN:   [id number]  :   1960  ACCT. NUMBER: [id number]  DATE OF SERVICE: 20  START TIME:  3:00 PM  END TIME:  3:50 PM  FACILITATOR: Lavelle Wilhelm OT  TOPIC: [facility] OT Group: Self- Regulation Skills  Adult Partial Hospitalization Program  TRACK: 1    Telemedicine Visit: The patient's condition can be safely assessed and treated via synchronous audio and visual telemedicine encounter.      Reason for Telemedicine Visit: Services only offered telehealth and Covid 19    Originating Site (Patient Location): Patient's home    Distant Site (Provider Location): Provider Remote Setting    Consent:  The patient/guardian has verbally consented to: the potential risks and benefits of telemedicine (video visit) versus in person care; bill my insurance or make self-payment for services provided; and responsibility for payment of non-covered services.     Mode of Communication:  Video Conference via [employer]    As the provider I attest to compliance with applicable laws and regulations related to telemedicine.      NUMBER OF PARTICIPANTS: 4 of 8    Summary of Group / Topics Discussed:  Self-Regulation Skills: Sensory Enhanced Mindfulness: {Patients were provided with written and verbal psychoeducation on the concept of integrating mindfulness with a bottom up, sensory rich, experiential intervention activities to develop self-awareness and skills for self-regulation.  Emphasis placed on the benefits of mindfulness through bottom up (body based) activities and how they can help to emotionally ground oneself or provide a healthy distraction to self-regulate when distressed. Patients worked to increase knowledge and skills during a guided skilled structured sensory enhanced activity: focused activities, activities of daily living, quiet meditation and active meditation practices can build resiliency self-efficacy. Facilitation of reflection to reinforce  taught concepts was provided.     Patient Session Goals / Objectives:    Identified how using sensory enhanced mindfulness practices can be used for grounding, stress management, and self-regulation      Improved awareness of different types of sensory enhanced mindfulness activities that assist with healthy coping of stress and symptoms      Established a plan for practice of these skills in their own environments    Practiced and reflected on how to generalize taught skills to their everyday life        Patient Participation / Response:  Fully participated with the group by sharing personal reflections / insights and openly received / provided feedback with other participants.    Verbalized understanding of content and Patient would benefit from additional opportunities to practice the content to be able to generalize it to their everyday life with increased intentionality, consistency, and efficacy in support of their psychiatric recovery    Treatment Plan:  Patient has a current master individualized treatment plan.  See Epic treatment plan for more information.    Lavelle Wilhelm, OT

## 2020-07-22 NOTE — GROUP NOTE
Psychotherapy Group Note    PATIENT'S NAME: Chris Stockton  MRN:   [id number]  :   1960  ACCT. NUMBER: [id number]  DATE OF SERVICE: 20  START TIME:  4:00 PM  END TIME:  6:00 PM  FACILITATOR: Raman Turner, PhD LP  TOPIC:  EBP Group: Social Support  Adult Partial Hospitalization Program    Telemedicine Visit: The patient's condition can be safely assessed and treated via synchronous audio and visual telemedicine encounter.      Reason for Telemedicine Visit: COVID19    Originating Site (Patient Location): Patient's home    Distant Site (Provider Location): Provider Remote Setting    Consent:  The patient/guardian has verbally consented to: the potential risks and benefits of telemedicine (video visit) versus in person care; bill my insurance or make self-payment for services provided; and responsibility for payment of non-covered services.     Mode of Communication:  Video Conference via [employer]    As the provider I attest to compliance with applicable laws and regulations related to telemedicine.    NUMBER OF PARTICIPANTS: 6    Summary of Group / Topics Discussed:  Social Support:   Meeting: Patients and their support system participated in a session on creating family and community-based support.  The importance and role of family/social support was stressed to increase the effectiveness of treatment. Patients and their support system were given information on PHP structure, diagnoses served within the program, in addition to key goals and objectives. Additionally, physical symptoms, thoughts, and behaviors commonly observed in psychopathology were presented and discussed.  Lastly, information was presented on ways to help promote and support recovery.  The patients and their support system benefitted from this session by working through an exercise designed to clarify their needs for assistance and support in order to enhance their recovery. Staff helped each clarify their roles,  so that the patients can be in charge of their stabilization and recovery.      Patient Session Goals / Objectives:    Normalize aspects of mental health conditions and mental illness    Provide education on  normal  vs.  abnormal  aspects of mental health    Clarify the role of partial hospitalization programming    Reduce stigma associated with mental health diagnosis (es).    Provide social support system with techniques and strategies to improve communication and enhance empathy.    Improve interpersonal communication regarding symptoms    Build a sense of mastery and self-respect          Patient Participation / Response:  Fully participated with the group by sharing personal reflections / insights and openly received / provided feedback with other participants.    {OP MH PROGRESS NOTE PHP SOCIAL SUPPORT:[id number]    Treatment Plan:  Patient has a current master individualized treatment plan.  See Epic treatment plan for more information.    Raman Turner, PhD LP
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
## 2020-07-22 NOTE — GROUP NOTE
Psychoeducation Group Note    PATIENT'S NAME: Chris Stockton  MRN:   [id number]  :   1960  ACCT. NUMBER: [id number]  DATE OF SERVICE: 20  START TIME: 11:00 AM  END TIME: 11:50 AM  FACILITATOR: Christina Roman RN  TOPIC: ANNA MARIE RN Group: [facility]  Adult Partial Hospitalization Program  TRACK: 1    NUMBER OF PARTICIPANTS: 7    Summary of Group / Topics Discussed:  Foundations of Health: Sleep: Pathophysiology of sleep disorders: The anatomy of sleep was reviewed including structures, stages, mechanisms, and the purpose that sleep has on the brain. Sleep disorders were discussed within the group with a focus on gaining knowledge about the etiology and pathophysiology of insomnia, sleep apnea, restless leg syndrome, narcolepsy, medications that can cause risk for sleeping disorders, and other symptoms/factors that may interfere with sleep. Risk factors for developing sleep disorders were discussed and treatments/pharmacological options were explored.     Patient Session Goals / Objectives:  ? Described the effect and purpose of sleep on the brain and identify the amount of sleep needed  ? Identified differences between sleep disorders and risks associated with sleep disorders  ? Described the connection between sleep disturbances and mental illness    Increased knowledge about treatments for sleep disorders                  Telemedicine Visit: The patient's condition can be safely assessed and treated via synchronous audio and visual telemedicine encounter.          Reason for Telemedicine Visit: Services only offered telehealth        Originating Site (Patient Location): Patient's home        Distant Site (Provider Location): Provider Remote Setting        Consent:  The patient/guardian has verbally consented to: the potential risks and benefits of telemedicine (video visit) versus in person care; bill my insurance or make self-payment for services provided; and responsibility for payment of  non-covered services.         Mode of Communication:  Video Conference via [employer]        As the provider I attest to compliance with applicable laws and regulations related to telemedicine.       Patient Participation / Response:  Fully participated with the group by sharing personal reflections / insights and openly received / provided feedback with other participants.    Identified / Expressed personal readiness to practice skills    Treatment Plan:  Patient has a current master individualized treatment plan.  See Epic treatment plan for more information.    Christina Roman RN

## 2020-07-22 NOTE — GROUP NOTE
Psychotherapy Group Note    PATIENT'S NAME: Chris Stockton  MRN:   [id number]  :   1960  ACCT. NUMBER: [id number]  DATE OF SERVICE: 20  START TIME:  2:00 PM  END TIME:  2:50 PM  FACILITATOR: Alvina Christopher LICSW  TOPIC:  EBP Group: Enhanced Mindfulness  Adult Partial Hospitalization Program  TRACK:     NUMBER OF PARTICIPANTS: 5    Summary of Group / Topics Discussed:  Enhanced Mindfulness: Body and Mind Integration: Patients received an overview and education regarding the importance of including the body in the management of emotional health and self-care and as a direct route to mindfulness practice.  Patients discussed various ways in which the body can serve as an informant to their physical and emotional experiences/need. Patients discussed the body as a direct link to the present moment and to mindfulness practice.  Patients discussed current relationship with body, self-awareness, mindfulness practice and barriers to connection with body.  Patients were guided through breath work and movement to facilitate greater self-awareness, grounding, self-expression, and connection to other.  Patients discussed how the experiential could be applied to better manage mental health and develop rain connection to self.    Patient Session Goals / Objectives:    Identify how movement awareness could be used for grounding, stress management, self-expression, connection to other and self-regulation    Improved awareness of breath and movement preferences    Identify how movement and the body is used in mindfulness practice    Reflect on use of these practices in everyday life.    Identify barriers to attending to body  Telemedicine Visit: The patient's condition can be safely assessed and treated via synchronous audio and visual telemedicine encounter.      Reason for Telemedicine Visit: Services only offered telehealth and covid19    Originating Site (Patient Location): Patient's home    Distant Site (Provider  Location): Provider Remote Setting    Consent:  The patient/guardian has verbally consented to: the potential risks and benefits of telemedicine (video visit) versus in person care; bill my insurance or make self-payment for services provided; and responsibility for payment of non-covered services.     Mode of Communication:  Video Conference via [employer]    As the provider I attest to compliance with applicable laws and regulations related to telemedicine.       Patient Participation / Response:  Fully participated with the group by sharing personal reflections / insights and openly received / provided feedback with other participants.    Demonstrated understanding of topics discussed through group discussion and participation, Identified / Expressed readiness to act on skill suggestions discussed in topic and Practiced skills in session    Treatment Plan:  Patient has a current master individualized treatment plan.  See Epic treatment plan for more information.    Alvina Christopher, TORRISW

## 2020-07-23 ENCOUNTER — HOSPITAL ENCOUNTER (OUTPATIENT)
Dept: BEHAVIORAL HEALTH | Facility: CLINIC | Age: 60
End: 2020-07-23
Attending: PSYCHIATRY & NEUROLOGY
Payer: COMMERCIAL

## 2020-07-23 PROCEDURE — H0035 MH PARTIAL HOSP TX UNDER 24H: HCPCS | Mod: 95 | Performed by: PSYCHOLOGIST

## 2020-07-23 PROCEDURE — H0035 MH PARTIAL HOSP TX UNDER 24H: HCPCS | Mod: 95 | Performed by: COUNSELOR

## 2020-07-23 PROCEDURE — H0035 MH PARTIAL HOSP TX UNDER 24H: HCPCS | Mod: GT

## 2020-07-23 PROCEDURE — H0035 MH PARTIAL HOSP TX UNDER 24H: HCPCS | Mod: GT | Performed by: COUNSELOR

## 2020-07-23 NOTE — GROUP NOTE
Psychotherapy Group Note    PATIENT'S NAME: Chris Stockton  MRN:   [id number]  :   1960  ACCT. NUMBER: [id number]  DATE OF SERVICE: 20  START TIME: 10:00 AM  END TIME: 10:50 AM  FACILITATOR: Raman Turner, PhD LP  TOPIC: MH EBP Group: Mindfulness  Adult Partial Hospitalization Program  TRACK:     Telemedicine Visit: The patient's condition can be safely assessed and treated via synchronous audio and visual telemedicine encounter.      Reason for Telemedicine Visit: COVID19    Originating Site (Patient Location): Patient's home    Distant Site (Provider Location): Provider Remote Setting    Consent:  The patient/guardian has verbally consented to: the potential risks and benefits of telemedicine (video visit) versus in person care; bill my insurance or make self-payment for services provided; and responsibility for payment of non-covered services.     Mode of Communication:  Video Conference via [employer]    As the provider I attest to compliance with applicable laws and regulations related to telemedicine.    NUMBER OF PARTICIPANTS: 7    Summary of Group / Topics Discussed:  Mindfulness: Mindfulness Skills: Patients received information on the main components of mindfulness. Patients participated in discussion on how to practice observing, describing, and participating in internal and external environments. Relevance of mindfulness skills to overall mental and physical health was explored.  Patients explored and discussed in group their current awareness and knowledge of mindfulness skills as well as barriers to applying skills. This group focused on helping group members understand how cognitive defusion is a key part of mindfulness.     Patient Session Goals / Objectives:    Demonstrated and verbalized understanding of key mindfulness concepts    Identified when/how to use mindfulness skills    Resolved barriers to practicing mindfulness skills    Identified plan to use mindfulness skills in daily  life       Patient Participation / Response:  Fully participated with the group by sharing personal reflections / insights and openly received / provided feedback with other participants.    Demonstrated understanding of topics discussed through group discussion and participation, Demonstrated understanding of mindfulness skills and benefits of practice, Identified / Expressed personal readiness to practice mindfulness skills, Verbalized understanding of how mindfulness can benefit mental health symptoms, Identified plan to address barriers to practicing mindfulness skills  and Practiced skills in session    Treatment Plan:  Patient has a current master individualized treatment plan.  See Epic treatment plan for more information.    Raman Turner, PhD LP

## 2020-07-23 NOTE — GROUP NOTE
Psychotherapy Group Note    PATIENT'S NAME: Chris Stockton  MRN:   [id number]  :   1960  ACCT. NUMBER: [id number]  DATE OF SERVICE: 20  START TIME: 10:00 AM  END TIME: 10:50 AM  FACILITATOR: Raman Turner, PhD LP  TOPIC:  EBP Group: Mindfulness  Adult Partial Hospitalization Program  TRACK:     Telemedicine Visit: The patient's condition can be safely assessed and treated via synchronous audio and visual telemedicine encounter.      Reason for Telemedicine Visit: COVID19    Originating Site (Patient Location): Patient's home    Distant Site (Provider Location): Provider Remote Setting    Consent:  The patient/guardian has verbally consented to: the potential risks and benefits of telemedicine (video visit) versus in person care; bill my insurance or make self-payment for services provided; and responsibility for payment of non-covered services.     Mode of Communication:  Video Conference via [employer]    As the provider I attest to compliance with applicable laws and regulations related to telemedicine.      NUMBER OF PARTICIPANTS: 7    Summary of Group / Topics Discussed:  Mindfulness: Mindfulness Experiential: Patients received an overview on what mindfulness is and how mindfulness can benefit general health, mental health symptoms, and stressors. The history of mindfulness, its application to mental health therapies, and key concepts were also discussed. Patients discussed current awareness, knowledge, and practice of mindfulness skills. Patients also discussed barriers to mindfulness practice. This mindfulness exercise was rooted in Bita Murillo's SCT approach to self-compassion shame meditation.     Patient Session Goals / Objectives:    Demonstrated and verbalized understanding of key mindfulness concepts    Identified when/how to use mindfulness skills    Resolved barriers to practicing mindfulness skills    Identified plan to use mindfulness skills in daily life       Patient Participation  / Response:  Fully participated with the group by sharing personal reflections / insights and openly received / provided feedback with other participants.    Demonstrated understanding of topics discussed through group discussion and participation, Demonstrated understanding of mindfulness skills and benefits of practice, Identified / Expressed personal readiness to practice mindfulness skills, Verbalized understanding of how mindfulness can benefit mental health symptoms, Identified plan to address barriers to practicing mindfulness skills  and Practiced skills in session    Treatment Plan:  Patient has a current master individualized treatment plan.  See Epic treatment plan for more information.    Raman Turner, PhD LP

## 2020-07-23 NOTE — GROUP NOTE
Psychotherapy Group Note    PATIENT'S NAME: Chris Stockton  MRN:   [id number]  :   1960  ACCT. NUMBER: [id number]  DATE OF SERVICE: 20  START TIME: 11:00 AM  END TIME: 11:50 AM  FACILITATOR: Raman Turner, PhD LP  TOPIC:  EBP Group: Mindfulness  Adult Partial Hospitalization Program  TRACK:     Telemedicine Visit: The patient's condition can be safely assessed and treated via synchronous audio and visual telemedicine encounter.      Reason for Telemedicine Visit: COVID19    Originating Site (Patient Location): Patient's home    Distant Site (Provider Location): Provider Remote Setting    Consent:  The patient/guardian has verbally consented to: the potential risks and benefits of telemedicine (video visit) versus in person care; bill my insurance or make self-payment for services provided; and responsibility for payment of non-covered services.     Mode of Communication:  Video Conference via [employer]    As the provider I attest to compliance with applicable laws and regulations related to telemedicine.      NUMBER OF PARTICIPANTS: 6    Summary of Group / Topics Discussed:  Mindfulness: What is Mindfulness: Patients received an overview on what mindfulness is and how mindfulness can benefit general health, mental health symptoms, and stressors. The history of mindfulness, its application to mental health therapies, and key concepts were also discussed. Patients discussed current awareness, knowledge, and practice of mindfulness skills. Patients also discussed barriers to mindfulness practice.     Patient Session Goals / Objectives:    Demonstrated understanding of key concepts and application to daily life    Identified when/how to use mindfulness     Resolved barriers to practice    Identified plan to use mindfulness in daily life      Patient Participation / Response:  Fully participated with the group by sharing personal reflections / insights and openly received / provided feedback with other  participants.    Demonstrated understanding of topics discussed through group discussion and participation, Demonstrated understanding of mindfulness skills and benefits of practice, Identified / Expressed personal readiness to practice mindfulness skills, Verbalized understanding of how mindfulness can benefit mental health symptoms, Identified plan to address barriers to practicing mindfulness skills  and Practiced skills in session    Treatment Plan:  Patient has a current master individualized treatment plan.  See Epic treatment plan for more information.    Raman Turner, PhD LP

## 2020-07-23 NOTE — GROUP NOTE
Psychotherapy Group Note    PATIENT'S NAME: Chris Stockton  MRN:   [id number]  :   1960  ACCT. NUMBER: [id number]  DATE OF SERVICE: 20  START TIME:  2:00 PM  END TIME:  2:50 PM  FACILITATOR: Lizy Oh LPCC  TOPIC: MH EBP Group: Mindfulness  Adult Partial Hospitalization Program  TRACK: 1    NUMBER OF PARTICIPANTS: 6    Telemedicine Visit: The patient's condition can be safely assessed and treated via synchronous audio and visual telemedicine encounter.      Reason for Telemedicine Visit: Services only offered telehealth    Originating Site (Patient Location): Patient's home    Distant Site (Provider Location): Provider Remote Setting    Consent:  The patient/guardian has verbally consented to: the potential risks and benefits of telemedicine (video visit) versus in person care; bill my insurance or make self-payment for services provided; and responsibility for payment of non-covered services.     Mode of Communication:  Video Conference via [employer]    As the provider I attest to compliance with applicable laws and regulations related to telemedicine.     Summary of Group / Topics Discussed:  Mindfulness: Mindfulness Experiential: Patients received an overview on what mindfulness is and how mindfulness can benefit general health, mental health symptoms, and stressors. The history of mindfulness, its application to mental health therapies, and key concepts were also discussed. Patients discussed current awareness, knowledge, and practice of mindfulness skills. Patients also discussed barriers to mindfulness practice.    They participated in a guide imagery and started to work on building their own imagery script.    Patient Session Goals / Objectives:    Demonstrated and verbalized understanding of key mindfulness concepts    Identified when/how to use mindfulness skills    Resolved barriers to practicing mindfulness skills    Identified plan to use mindfulness skills in daily life       Patient  Participation / Response:  Fully participated with the group by sharing personal reflections / insights and openly received / provided feedback with other participants.    Demonstrated understanding of topics discussed through group discussion and participation, Demonstrated understanding of mindfulness skills and benefits of practice and Practiced skills in session    Treatment Plan:  Patient has a current master individualized treatment plan.  See Epic treatment plan for more information.    Lizy Oh, ROMIEC

## 2020-07-23 NOTE — GROUP NOTE
Psychoeducation Group Note    PATIENT'S NAME: Chris Stockton  MRN:   [id number]  :   1960  ACCT. NUMBER: [id number]  DATE OF SERVICE: 20  START TIME:  1:00 PM  END TIME:  1:50 PM  FACILITATOR: Christina Roman RN  TOPIC: ANNA MARIE RN Group: Brain Health  Adult Partial Hospitalization Program  TRACK: 1    NUMBER OF PARTICIPANTS: 7    Summary of Group / Topics Discussed:  Brain Health:  Pathophysiology of stress and anxiety: Patients were educated on the difference between stress, chronic stress, and anxiety. The anatomy and pathophysiology of the body/brain were reviewed to illustrate the immediate effects of stress/anxiety in the body and the long term effects and increased risks for chronic disease that come from unmanaged stress/anxiety. Self-coping strategies to manage symptoms of stress were reviewed and pharmacologic, psychotherapeutic, and complementary treatment options were discussed.    Patient Session Goals / Objectives:  ? Described the differences between stress and anxiety and how the body responds to it  ? Listed the long term effects and increased risks for chronic disease that can arise from unmanaged stress/anxiety  ? Identified and described pharmacologic, psychotherapeutic, and complementary treatment options  Telemedicine Visit: The patient's condition can be safely assessed and treated via synchronous audio and visual telemedicine encounter.      Reason for Telemedicine Visit: Services only offered telehealth    Originating Site (Patient Location): Patient's home    Distant Site (Provider Location): Provider Remote Setting    Consent:  The patient/guardian has verbally consented to: the potential risks and benefits of telemedicine (video visit) versus in person care; bill my insurance or make self-payment for services provided; and responsibility for payment of non-covered services.     Mode of Communication:  Video Conference via [employer]    As the provider I attest to compliance with  applicable laws and regulations related to telemedicine.       Patient Participation / Response:  Fully participated with the group by sharing personal reflections / insights and openly received / provided feedback with other participants.    Identified / Expressed personal readiness to practice skills    Treatment Plan:  Patient has a current master individualized treatment plan.  See Epic treatment plan for more information.    Christina Roman RN

## 2020-07-23 NOTE — GROUP NOTE
"Process Group Note    PATIENT'S NAME: Chris Stockton  MRN:   [id number]  :   1960  ACCT. NUMBER: [id number]  DATE OF SERVICE: 20  START TIME:  9:00 AM  END TIME:  9:50 AM  FACILITATOR: Raman Turner, PhD LP  TOPIC:  Process Group    Diagnoses:   Major depressive disorder, recurrent, severe without psychosis, and with anxiety; attention deficit hyperactivity disorder by history       Adult Partial Hospitalization Program  TRACK:     Telemedicine Visit: The patient's condition can be safely assessed and treated via synchronous audio and visual telemedicine encounter.      Reason for Telemedicine Visit:  COVID19    Originating Site (Patient Location): Patient's home    Distant Site (Provider Location): Provider Remote Setting    Consent:  The patient/guardian has verbally consented to: the potential risks and benefits of telemedicine (video visit) versus in person care; bill my insurance or make self-payment for services provided; and responsibility for payment of non-covered services.     Mode of Communication:  Video Conference via [employer]    As the provider I attest to compliance with applicable laws and regulations related to telemedicine.      NUMBER OF PARTICIPANTS: 7          Data:    Session content: At the start of this group, patients were invited to check in by identifying themselves, describing their current emotional status, and identifying issues to address in this group.   Area(s) of treatment focus addressed in this session included Symptom Management, Personal Safety and Develop Socialization / Interpersonal Relationship Skills.    The patient was very active in the group, especially as she reflected on her difficulty processing her depression. She shared how her depression feels \"sneaky,\" clarifying that she is working to better understand warning signs that suggests her depression is worsening.     Therapeutic Interventions/Treatment Strategies:  Psychotherapist set limits, " provided redirection and reinforced use of skills.    Assessment:    Patient response:   Patient responded to session by giving feedback, listening, focusing on goals, being attentive and accepting support    Possible barriers to participation / learning include: and no barriers identified    Health Issues:   None reported       Substance Use Review:   Substance Use: No active concerns identified.    Mental Status/Behavioral Observations  Appearance:   Appropriate   Eye Contact:   Good   Psychomotor Behavior: Normal   Attitude:   Cooperative  Playful Friendly Pleasant  Orientation:   All  Speech   Rate / Production: Normal    Volume:  Normal   Mood:    Anxious   Affect:    Appropriate  Bright   Thought Content:   Clear  Thought Form:  Coherent  Logical     Insight:    Fair     Plan:     Safety Plan: No current safety concerns identified.  Recommended that patient call 911 or go to the local ED should there be a change in any of these risk factors.     Barriers to treatment: None identified    Patient Contracts (see media tab):  None    Substance Use: Not addressed in session     Continue or Discharge: Patient will continue in Adult Partial Hospitalization Program (PHP)  as planned. Patient is likely to benefit from learning and using skills as they work toward the goals identified in their treatment plan.      Raman Turner, PhD LP  July 23, 2020

## 2020-07-23 NOTE — GROUP NOTE
Process Group Note    PATIENT'S NAME: Chris Stockton  MRN:   [id number]  :   1960  ACCT. NUMBER: [id number]  DATE OF SERVICE: 20  START TIME:  9:00 AM  END TIME:  9:50 AM  FACILITATOR: Raman Turner, PhD LP  TOPIC:  Process Group    Diagnoses:   Major depressive disorder, recurrent, severe without psychosis, and with anxiety; attention deficit hyperactivity disorder by history       Adult Partial Hospitalization Program  TRACK:     Telemedicine Visit: The patient's condition can be safely assessed and treated via synchronous audio and visual telemedicine encounter.      Reason for Telemedicine Visit:  COVID19    Originating Site (Patient Location): Patient's home    Distant Site (Provider Location): Provider Remote Setting    Consent:  The patient/guardian has verbally consented to: the potential risks and benefits of telemedicine (video visit) versus in person care; bill my insurance or make self-payment for services provided; and responsibility for payment of non-covered services.     Mode of Communication:  Video Conference via [employer]    As the provider I attest to compliance with applicable laws and regulations related to telemedicine.      NUMBER OF PARTICIPANTS: 7          Data:    Session content: At the start of this group, patients were invited to check in by identifying themselves, describing their current emotional status, and identifying issues to address in this group.   Area(s) of treatment focus addressed in this session included Symptom Management, Personal Safety and Develop Socialization / Interpersonal Relationship Skills.    The patient said she is feeling hopeful about her potential new housing situation, which is uplifting her mood. She was very active in the group as multiple members appeared to be considerably more depressed and anxious. She used her own experiences with depression to help further the group discussion.     Therapeutic Interventions/Treatment  Strategies:  Psychotherapist offered support, feedback and validation, set limits, provided redirection and reinforced use of skills.    Assessment:    Patient response:   Patient responded to session by accepting feedback, giving feedback, listening, focusing on goals, being attentive and accepting support    Possible barriers to participation / learning include: and no barriers identified    Health Issues:   None reported       Substance Use Review:   Substance Use: No active concerns identified.    Mental Status/Behavioral Observations  Appearance:   Appropriate   Eye Contact:   Fair   Psychomotor Behavior: Restless   Attitude:   Cooperative  Interested Playful Friendly Pleasant  Orientation:   All  Speech   Rate / Production: Normal    Volume:  Normal   Mood:    Normal  Affect:    Constricted   Thought Content:   Rumination  Thought Form:  Coherent  Logical     Insight:    Fair     Plan:     Safety Plan: No current safety concerns identified.  Recommended that patient call 911 or go to the local ED should there be a change in any of these risk factors.     Barriers to treatment: None identified    Patient Contracts (see media tab):  None    Substance Use: Not addressed in session     Continue or Discharge: Patient will continue in Adult Partial Hospitalization Program (PHP)  as planned. Patient is likely to benefit from learning and using skills as they work toward the goals identified in their treatment plan.      Raman Turner, PhD LP  July 23, 2020

## 2020-07-24 ENCOUNTER — HOSPITAL ENCOUNTER (OUTPATIENT)
Dept: BEHAVIORAL HEALTH | Facility: CLINIC | Age: 60
End: 2020-07-24
Attending: PSYCHIATRY & NEUROLOGY
Payer: COMMERCIAL

## 2020-07-24 PROCEDURE — H0035 MH PARTIAL HOSP TX UNDER 24H: HCPCS | Mod: 95

## 2020-07-24 PROCEDURE — H0035 MH PARTIAL HOSP TX UNDER 24H: HCPCS | Mod: GT

## 2020-07-24 PROCEDURE — H0035 MH PARTIAL HOSP TX UNDER 24H: HCPCS | Mod: GT | Performed by: PSYCHOLOGIST

## 2020-07-24 NOTE — GROUP NOTE
Psychoeducation Group Note    PATIENT'S NAME: Chris Stockton  MRN:   [id number]  :   1960  ACCT. NUMBER: [id number]  DATE OF SERVICE: 20  START TIME:  1:00 PM  END TIME:  1:50 PM  FACILITATOR: Gemma Spence RN  TOPIC: MH RN Group: Health Maintenance  Telemedicine Visit: The patient's condition can be safely assessed and treated via synchronous audio and visual telemedicine encounter.      Reason for Telemedicine Visit:  covid19    Originating Site (Patient Location): Patient's home    Distant Site (Provider Location): Provider Remote Setting    Consent:  The patient/guardian has verbally consented to: the potential risks and benefits of telemedicine (video visit) versus in person care; bill my insurance or make self-payment for services provided; and responsibility for payment of non-covered services.     Mode of Communication:  Video Conference via [employer]    As the provider I attest to compliance with applicable laws and regulations related to telemedicine.      Adult Mental Health Day Treatment  TRACK: 1    NUMBER OF PARTICIPANTS: 4    Summary of Group / Topics Discussed:  Health Maintenance: Weekend planning: Patients were given time to complete a weekend plan of what they will do to promote wellness and sobriety over the weekend when they do not have the structure of group. Patients were encouraged to review progress on their treatment goals and were challenged to identify ways to work toward meeting them. Patients identified and discussed foreseeable barriers to success over the weekend and then developed a plan to overcome them. Patients reviewed their distress coping skills and social support network and discussed this with the group.       Patient Session Goals / Objectives:    ?    Identified activities to engage in that promote balance in wellness  ?    Distinguished possible barriers to success over the weekend and created a plan to overcome them  ?    Listed distress coping skills  and identified social support network to utilize if in crisis during the weekend          Patient Participation / Response:  Fully participated with the group by sharing personal reflections / insights and openly received / provided feedback with other participants.    Demonstrated understanding of topics discussed through group discussion and participation and Identified / Expressed personal readiness to practice skills    Treatment Plan:  Patient has a current master individualized treatment plan.  See Epic treatment plan for more information.    Gemma Spence RN

## 2020-07-24 NOTE — PROGRESS NOTES
"[facility]   Dr. Peng's Psychiatric Progress Note  2020      Patient:  Chris Stockton \"Liz\"  Medical Record Number:  [id number]  :  1960    Telemedicine Visit: The patient's condition can be safely assessed and treated via synchronous audio and visual telemedicine encounter.       Reason for Telemedicine Visit: COVID-19 lockdown     Originating Site (Patient Location):  HOME     Distant Site (Provider Location): [facility]: [place]     Consent:  The patient/guardian has verbally consented to: the potential risks and benefits of telemedicine (video visit) versus in person care; bill my insurance or make self-payment for services provided; and responsibility for payment of non-covered services.         Interim History:   The patient's care was discussed with the treatment team and chart notes were reviewed.  Pt is starting to lose hair.  Googled all his meds to find if meds cause hair loss.  He felt better on 25mg Lamictal in the hospital.  She'd like to go back to that dose.  Weekend plans: trying to rent out the empty rooms in the apt.  May work on the house to sell.  Bike ride.  Pt plans to move at the end of the month to another room. 2 of the 3 remaining people in the current place are moving.  Had a crying jag last night triggered by hearing  song.  Happy she had no SI.              Psychiatric ROS:  Mood:  Stressed by roommates but getting through that  Sleep:  Poor after eating a DQ milk shake late last night  Appetite:   normal  Eating:  normal  Energy Level:  OK  Concentration/Memory: ok      Suicidal Thoughts:  No SI now  Homicidal Thoughts:No  Psychotic Symptoms: No  Medication Side Effects:  Hair loss  Medication Compliance:Yes   Physical Complaints:  Body aches come and go         Medications:     PAST PSYCH MEDS:  Adderall, Ritalin, guanfacine, Wellbutrin, Cymbalta, Prozac, Effexor, lithium, Lamictal, Zyprexa, and melatonin    Current " Outpatient Medications   Medication Sig     buPROPion (WELLBUTRIN XL) 150 MG 24 hr tablet Take 1 tablet (150 mg) by mouth every morning     estradiol (VIVELLE-DOT) 0.1 MG/24HR bi-weekly patch Place onto the skin every 72 hours Patch placement rotation every 3 days: 1 patch, 2 patch, no patch, then repeat     guanFACINE (INTUNIV) 2 MG TB24 24 hr tablet Take 1 tablet (2 mg) by mouth At Bedtime     lamoTRIgine (LAMICTAL) 25 MG tablet Take 25mg by mouth daily     levothyroxine (SYNTHROID/LEVOTHROID) 75 MCG tablet Take 1 tablet (75 mcg) by mouth daily     lithium ER (LITHOBID) 300 MG CR tablet Take 1 tablet (300 mg) by mouth At Bedtime     magnesium oxide 400 MG CAPS Take by mouth At Bedtime     melatonin 3 MG tablet Take 1-2 tablets (3-6 mg) by mouth nightly as needed for sleep     OLANZapine (ZYPREXA) 2.5 MG tablet Take 1-2 tablets (2.5-5 mg) by mouth At Bedtime     spironolactone (ALDACTONE) 25 MG tablet Take 25 mg by mouth daily      Vitamin D3 (CHOLECALCIFEROL) 25 mcg (1000 units) tablet Take 2 tablets (50 mcg) by mouth daily     No current facility-administered medications for this encounter.              Allergies:     Allergies   Allergen Reactions     Finasteride      Mental health problems     Tetracycline Unknown     Doxycycline Rash            Psychiatric Examination:   There were no vitals taken for this visit.  Weight is 0 lbs 0 oz  There is no height or weight on file to calculate BMI.    Appearance:  awake, alert and adequately groomed  Attitude:  cooperative  Eye Contact:  good  Mood:  medium  Affect:  mood congruent  Speech:  clear, coherent  Psychomotor Behavior:  no evidence of tardive dyskinesia, dystonia, or tics  Throught Process:  logical  Associations:  no loose associations  Thought Content:  no evidence of psychotic thought and occasional fleeting suicidal thoughts with no plan or intent to act on them;  able to contract for safety  Insight:  good  Judgement:  intact  Oriented to:  time,  person, and place  Attention Span and Concentration:  intact  Recent and Remote Memory:  intact  Gait:Normal    Risk/Potential for Dangerousness:  Multiple Active Diagnoses:HIGH  Self Care:HIGH  Suicide:LOW  Assault:LOW  Self Injurious Behaviors:LOW  Inappropriate Sexual Behavior:LOW         Labs:   No results found for this or any previous visit (from the past 24 hour(s)).     Recent Results (from the past 1008 hour(s))   Asymptomatic COVID-19 Virus (Coronavirus) by PCR    Collection Time: 06/25/20 12:51 AM    Specimen: Nasopharyngeal   Result Value Ref Range    COVID-19 Virus PCR to [facility] - Source Nasopharyngeal     COVID-19 Virus PCR to [facility] - Result       Test received-See reflex to IDDL test SARS CoV2 (COVID-19) Virus RT-PCR   SARS-CoV-2 COVID-19 Virus (Coronavirus) RT-PCR Nasopharyngeal    Collection Time: 06/25/20 12:51 AM    Specimen: Nasopharyngeal   Result Value Ref Range    SARS-CoV-2 Virus Specimen Source Nasopharyngeal     SARS-CoV-2 PCR Result NEGATIVE     SARS-CoV-2 PCR Comment       Testing was performed using the Simplex[employer] COVID-19 Direct Assay on the [employer] MDX   instrument. Additional information about this Emergency Use Authorization (EUA) assay can   be found via the Lab Guide.     CBC with platelets differential    Collection Time: 06/25/20  1:17 AM   Result Value Ref Range    WBC 15.4 (H) 4.0 - 11.0 10e9/L    RBC Count 5.42 4.4 - 5.9 10e12/L    Hemoglobin 16.4 13.3 - 17.7 g/dL    Hematocrit 48.1 40.0 - 53.0 %    MCV 89 78 - 100 fl    MCH 30.3 26.5 - 33.0 pg    MCHC 34.1 31.5 - 36.5 g/dL    RDW 12.6 10.0 - 15.0 %    Platelet Count 246 150 - 450 10e9/L    Diff Method Automated Method     % Neutrophils 84.2 %    % Lymphocytes 9.1 %    % Monocytes 5.1 %    % Eosinophils 0.1 %    % Basophils 0.7 %    % Immature Granulocytes 0.8 %    Nucleated RBCs 0 0 /100    Absolute Neutrophil 13.0 (H) 1.6 - 8.3 10e9/L    Absolute Lymphocytes 1.4 0.8 - 5.3 10e9/L    Absolute Monocytes 0.8 0.0 - 1.3  10e9/L    Absolute Eosinophils 0.0 0.0 - 0.7 10e9/L    Absolute Basophils 0.1 0.0 - 0.2 10e9/L    Abs Immature Granulocytes 0.1 0 - 0.4 10e9/L    Absolute Nucleated RBC 0.0    Comprehensive metabolic panel    Collection Time: 06/25/20  1:17 AM   Result Value Ref Range    Sodium 138 133 - 144 mmol/L    Potassium 3.7 3.4 - 5.3 mmol/L    Chloride 107 94 - 109 mmol/L    Carbon Dioxide 23 20 - 32 mmol/L    Anion Gap 8 3 - 14 mmol/L    Glucose 117 (H) 70 - 99 mg/dL    Urea Nitrogen 15 7 - 30 mg/dL    Creatinine 0.76 0.66 - 1.25 mg/dL    GFR Estimate >90 >60 mL/min/[1.73_m2]    GFR Estimate If Black >90 >60 mL/min/[1.73_m2]    Calcium 8.5 8.5 - 10.1 mg/dL    Bilirubin Total 0.8 0.2 - 1.3 mg/dL    Albumin 4.1 3.4 - 5.0 g/dL    Protein Total 7.6 6.8 - 8.8 g/dL    Alkaline Phosphatase 51 40 - 150 U/L    ALT 20 0 - 70 U/L    AST 12 0 - 45 U/L   Drug abuse screen 6 urine (tox)    Collection Time: 06/25/20  1:46 AM   Result Value Ref Range    Amphetamine Qual Urine Negative NEG^Negative    Barbiturates Qual Urine Negative NEG^Negative    Benzodiazepine Qual Urine Negative NEG^Negative    Cannabinoids Qual Urine Negative NEG^Negative    Cocaine Qual Urine Negative NEG^Negative    Ethanol Qual Urine Negative NEG^Negative    Opiates Qualitative Urine Negative NEG^Negative   CBC with platelets    Collection Time: 06/25/20  8:19 AM   Result Value Ref Range    WBC 13.1 (H) 4.0 - 11.0 10e9/L    RBC Count 5.46 4.4 - 5.9 10e12/L    Hemoglobin 16.5 13.3 - 17.7 g/dL    Hematocrit 48.6 40.0 - 53.0 %    MCV 89 78 - 100 fl    MCH 30.2 26.5 - 33.0 pg    MCHC 34.0 31.5 - 36.5 g/dL    RDW 12.7 10.0 - 15.0 %    Platelet Count 263 150 - 450 10e9/L   UA with Microscopic    Collection Time: 06/25/20  8:15 PM   Result Value Ref Range    Color Urine Yellow     Appearance Urine Clear     Glucose Urine Negative NEG^Negative mg/dL    Bilirubin Urine Negative NEG^Negative    Ketones Urine 5 (A) NEG^Negative mg/dL    Specific Gravity Urine 1.025 1.003 -  1.035    Blood Urine Negative NEG^Negative    pH Urine 5.0 5.0 - 7.0 pH    Protein Albumin Urine 10 (A) NEG^Negative mg/dL    Urobilinogen mg/dL 2.0 0.0 - 2.0 mg/dL    Nitrite Urine Negative NEG^Negative    Leukocyte Esterase Urine Negative NEG^Negative    Source Urine     WBC Urine <1 0 - 5 /HPF    RBC Urine 1 0 - 2 /HPF    Squamous Epithelial /HPF Urine 1 0 - 1 /HPF    Mucous Urine Present (A) NEG^Negative /LPF    Hyaline Casts 4 (H) 0 - 2 /LPF   TSH with free T4 reflex and/or T3 as indicated    Collection Time: 06/26/20  7:32 AM   Result Value Ref Range    TSH 4.55 (H) 0.40 - 4.00 mU/L   Folate    Collection Time: 06/26/20  7:32 AM   Result Value Ref Range    Folate 15.6 >5.4 ng/mL   Vitamin B12    Collection Time: 06/26/20  7:32 AM   Result Value Ref Range    Vitamin B12 1,214 (H) 193 - 986 pg/mL   Vitamin D    Collection Time: 06/26/20  7:32 AM   Result Value Ref Range    Vitamin D Deficiency screening 26 20 - 75 ug/L   CBC with platelets    Collection Time: 06/26/20  7:32 AM   Result Value Ref Range    WBC 8.1 4.0 - 11.0 10e9/L    RBC Count 5.31 4.4 - 5.9 10e12/L    Hemoglobin 16.7 13.3 - 17.7 g/dL    Hematocrit 48.3 40.0 - 53.0 %    MCV 91 78 - 100 fl    MCH 31.5 26.5 - 33.0 pg    MCHC 34.6 31.5 - 36.5 g/dL    RDW 13.0 10.0 - 15.0 %    Platelet Count 240 150 - 450 10e9/L   T4 free    Collection Time: 06/26/20  7:32 AM   Result Value Ref Range    T4 Free 1.09 0.76 - 1.46 ng/dL   Hemoglobin A1c    Collection Time: 06/26/20  7:32 AM   Result Value Ref Range    Hemoglobin A1C 5.1 0 - 5.6 %   EKG 12-lead, complete    Collection Time: 07/04/20  2:48 PM   Result Value Ref Range    Interpretation ECG Click View Image link to view waveform and result          Impression:   This is a 60 year old adult continues PHP for mood stabilization.  Groups are going well.  Mood is medium.  Ending group this week.            DIagnoses:     AXIS I:  Major depressive disorder, recurrent, severe without psychosis, and with anxiety;  attention deficit hyperactivity disorder by history; gender dysphoria.   AXIS II:  Cluster B personality traits.   AXIS III:  Obstructive sleep apnea; hypothyroidism; leukocytosis; hearing loss; hyperlipidemia; hypertension.            Plan:     Continue [facility] to 7/27/20.    Plans to begin IOP at [facility] 8/3/20.  .    Continue current medications which were recently changed in the hospital.   Decrease Lamictal back to 25mg/d due to pt's concerns re: hair loss.    She may try weaning off HS Zyprexa.   Expect stabilization and completion of Partial Hospital Program.   Follow-up with current outpatient mental health providers at completion of Partial Hospital Program.  Outpatient psychiatrist is Dr. Maliha Antonio 7/23/20 at [facility] at 7:20 AM.  Therapist is Priscila Bellamy on [address] [place] [address] in [place] 7/29/20.    Car Peng MD

## 2020-07-24 NOTE — GROUP NOTE
Psychotherapy Group Note    PATIENT'S NAME: Chris Stockton  MRN:   [id number]  :   1960  ACCT. NUMBER: [id number]  DATE OF SERVICE: 20  START TIME: 10:00 AM  END TIME: 10:50 AM  FACILITATOR: Raman Turner, PhD LP  TOPIC:  EBP Group: Emotions Management  Adult Partial Hospitalization Program  TRACK:     Telemedicine Visit: The patient's condition can be safely assessed and treated via synchronous audio and visual telemedicine encounter.      Reason for Telemedicine Visit: COVID19    Originating Site (Patient Location): Patient's home    Distant Site (Provider Location): Provider Remote Setting    Consent:  The patient/guardian has verbally consented to: the potential risks and benefits of telemedicine (video visit) versus in person care; bill my insurance or make self-payment for services provided; and responsibility for payment of non-covered services.     Mode of Communication:  Video Conference via [employer]    As the provider I attest to compliance with applicable laws and regulations related to telemedicine.      NUMBER OF PARTICIPANTS: 5    Summary of Group / Topics Discussed:  Emotions Management: Check Facts: Patients participated in an interactive approach to identifying and challenging cognitive distortions that arise following an event that triggers intense emotion.  Patients choose an emotional reaction/event to work on.  The group shared their experiences and thought processes for feedback.      Patient Session Goals / Objectives:    Learn the process of identifying thoughts associated with the situation and reaction    Learn to challenge cognitive distortions and reframe the situation/event/reaction     Distinguish between facts, feelings, thoughts    Gain understanding of how to interpret an emotional reaction    Practice identification of cognitive distortions and evaluating an emotionally charged situation more rationally/objectively/mindfully      Patient Participation /  Response:  Fully participated with the group by sharing personal reflections / insights and openly received / provided feedback with other participants.    Demonstrated understanding of topics discussed through group discussion and participation, Expressed understanding of the relevance / importance of emotions management skills at distressing times in life, Self-aware of experiences with difficult emotions, and strategies to employ to manage them, Demonstrated knowledge of when to consider applying a variety of emotions management skills in daily life, Demonstrated understanding and practice strategies to manage difficult emotions and move towards healing, Identified barriers to applying emotions management strategies and Identified strategies to overcome barriers to use of emotions management skills    Treatment Plan:  Patient has a current master individualized treatment plan.  See Epic treatment plan for more information.    Raman Turner, PhD LP

## 2020-07-24 NOTE — ADDENDUM NOTE
Encounter addended by: Lizy Oh [facility] on: 7/24/2020 10:58 AM   Actions taken: Charge Capture section accepted

## 2020-07-24 NOTE — GROUP NOTE
Psychoeducation Group Note    PATIENT'S NAME: Chris Stockton  MRN:   [id number]  :   1960  ACCT. NUMBER: [id number]  DATE OF SERVICE: 20  START TIME: 11:00 AM  END TIME: 11:50 AM  FACILITATOR: Megan Quick LMFT  TOPIC:  PHP OT Group: Self- Regulation Skills  Adult Partial Hospitalization Program  TRACK: Partial one    NUMBER OF PARTICIPANTS: 4    Summary of Group / Topics Discussed:  Self-Regulation Skills: Sensory Enhanced Mindfulness: {Patients were provided with written and verbal psychoeducation on the concept of integrating mindfulness with a bottom up, sensory rich, experiential intervention activities to develop self-awareness and skills for self-regulation.  Emphasis placed on the benefits of mindfulness through bottom up (body based) activities and how they can help to emotionally ground oneself or provide a healthy distraction to self-regulate when distressed. Patients worked to increase knowledge and skills during a guided skilled structured sensory enhanced activity: focused activities, activities of daily living, quiet meditation and active meditation practices can build resiliency self-efficacy. Facilitation of reflection to reinforce taught concepts was provided.     Patient Session Goals / Objectives:    Identified how using sensory enhanced mindfulness practices can be used for grounding, stress management, and self-regulation      Improved awareness of different types of sensory enhanced mindfulness activities that assist with healthy coping of stress and symptoms      Established a plan for practice of these skills in their own environments    Practiced and reflected on how to generalize taught skills to their everyday life    Telemedicine Visit: The patient's condition can be safely assessed and treated via synchronous audio and visual telemedicine encounter.      Reason for Telemedicine Visit: Services only offered telehealth    Originating Site (Patient Location): Patient's  home    Distant Site (Provider Location): Provider Remote Setting    Consent:  The patient/guardian has verbally consented to: the potential risks and benefits of telemedicine (video visit) versus in person care; bill my insurance or make self-payment for services provided; and responsibility for payment of non-covered services.     Mode of Communication:  Video Conference via [employer]    As the provider I attest to compliance with applicable laws and regulations related to telemedicine.    Patient Participation / Response:  Moderately participated, sharing some personal reflections / insights and adequately adequately received / provided feedback with other participants.    Patient presentation: stable mood, focused attention, Demonstrated understanding of content through sharing personal experiences and understanding of topic  and Verbalized understanding of content    Treatment Plan:  Patient has a current master individualized treatment plan.  See Epic treatment plan for more information.    Megan Quick LMFT

## 2020-07-24 NOTE — GROUP NOTE
Process Group Note    PATIENT'S NAME: Chris Stockton  MRN:   [id number]  :   1960  ACCT. NUMBER: [id number]  DATE OF SERVICE: 20  START TIME:  9:00 AM  END TIME:  9:50 AM  FACILITATOR: Raman Turner, PhD LP  TOPIC:  Process Group    Diagnoses:   Major depressive disorder, recurrent, severe without psychosis, and with anxiety; attention deficit hyperactivity disorder by history       Adult Partial Hospitalization Program  TRACK:     Telemedicine Visit: The patient's condition can be safely assessed and treated via synchronous audio and visual telemedicine encounter.      Reason for Telemedicine Visit:  COVID19    Originating Site (Patient Location): Patient's home    Distant Site (Provider Location): Provider Remote Setting    Consent:  The patient/guardian has verbally consented to: the potential risks and benefits of telemedicine (video visit) versus in person care; bill my insurance or make self-payment for services provided; and responsibility for payment of non-covered services.     Mode of Communication:  Video Conference via [employer]    As the provider I attest to compliance with applicable laws and regulations related to telemedicine.      NUMBER OF PARTICIPANTS: 5          Data:    Session content: At the start of this group, patients were invited to check in by identifying themselves, describing their current emotional status, and identifying issues to address in this group.   Area(s) of treatment focus addressed in this session included Symptom Management, Personal Safety and Develop Socialization / Interpersonal Relationship Skills.    The patient shared that she had been crying a lot yesterday, largely due to the significant instability (e.g., housing, relationship difficulties) that is ongoing in her life. Despite these issues, the patient was able to express ways in which she is being gentle and compassionate towards herself.     Therapeutic Interventions/Treatment  Strategies:  Psychotherapist offered support, feedback and validation, set limits and provided redirection.    Assessment:    Patient response:   Patient responded to session by accepting feedback, giving feedback, listening, focusing on goals and being attentive    Possible barriers to participation / learning include: and no barriers identified    Health Issues:   None reported       Substance Use Review:   Substance Use: No active concerns identified.    Mental Status/Behavioral Observations  Appearance:   Appropriate   Eye Contact:   Good   Psychomotor Behavior: Normal   Attitude:   Cooperative   Orientation:   All  Speech   Rate / Production: Monotone  Normal    Volume:  Normal   Mood:    Anxious   Affect:    Blunted   Thought Content:   Rumination  Thought Form:  Coherent  Logical     Insight:    Fair     Plan:     Safety Plan: No current safety concerns identified.  Recommended that patient call 911 or go to the local ED should there be a change in any of these risk factors.     Barriers to treatment: None identified    Patient Contracts (see media tab):  None    Substance Use: Not addressed in session     Continue or Discharge: Patient will continue in Adult Partial Hospitalization Program (PHP)  as planned. Patient is likely to benefit from learning and using skills as they work toward the goals identified in their treatment plan.      Raman Turner, PhD LP  July 24, 2020

## 2020-07-24 NOTE — GROUP NOTE
Psychotherapy Group Note    PATIENT'S NAME: Chris Stockton  MRN:   [id number]  :   1960  ACCT. NUMBER: [id number]  DATE OF SERVICE: 20  START TIME:  2:00 PM  END TIME:  2:50 PM  FACILITATOR: Raman Turner, PhD LP  TOPIC:  EBP Group: Self-Awareness  Adult Partial Hospitalization Program  TRACK:     Telemedicine Visit: The patient's condition can be safely assessed and treated via synchronous audio and visual telemedicine encounter.      Reason for Telemedicine Visit:  COVID19    Originating Site (Patient Location): Patient's home    Distant Site (Provider Location): Provider Remote Setting    Consent:  The patient/guardian has verbally consented to: the potential risks and benefits of telemedicine (video visit) versus in person care; bill my insurance or make self-payment for services provided; and responsibility for payment of non-covered services.     Mode of Communication:  Video Conference via [employer]    As the provider I attest to compliance with applicable laws and regulations related to telemedicine.      NUMBER OF PARTICIPANTS: 5    Summary of Group / Topics Discussed:  Self-Awareness: Self-Compassion: Patients received overview of key concepts in developing self-compassion. Patients discussed mindfulness, self-kindness, and finding common humanity. Patients identified their current approach to problems in their lives and learned skills for increasing self-compassion. Patients identified ways they can put self-compassion skills into practice and problem solve barriers to application of skills. This group was focused in identifying and tackling myths and misconceptions of mindfulness self-compassion practices.     Patient Session Goals / Objectives:    [place] components of self-compassion    Identify ways to practice self-compassion in daily life    Problem solve barriers to self-compassion practice      Patient Participation / Response:  Fully participated with the group by sharing  personal reflections / insights and openly received / provided feedback with other participants.    Demonstrated understanding of topics discussed through group discussion and participation, Demonstrated understanding of values, strengths, and challenges to learn about themselves, Identified / Expressed readiness to act intentionally, increase self-compassion, promote personal growth, Verbalized understanding of ways to proactively manage illness and Identified plan to address barriers to practicing skills that promote self-awareness     Treatment Plan:  Patient has a current master individualized treatment plan.  See Epic treatment plan for more information.    Raman Turner, PhD LP

## 2020-07-24 NOTE — ADDENDUM NOTE
Encounter addended by: Katelynn Carr [facility] on: 7/24/2020 8:44 AM   Actions taken: Charge Capture section accepted

## 2020-07-27 ENCOUNTER — HOSPITAL ENCOUNTER (OUTPATIENT)
Dept: BEHAVIORAL HEALTH | Facility: CLINIC | Age: 60
End: 2020-07-27
Attending: PSYCHIATRY & NEUROLOGY
Payer: COMMERCIAL

## 2020-07-27 PROCEDURE — H0035 MH PARTIAL HOSP TX UNDER 24H: HCPCS | Mod: 95

## 2020-07-27 PROCEDURE — H0035 MH PARTIAL HOSP TX UNDER 24H: HCPCS | Mod: GT

## 2020-07-27 PROCEDURE — H0035 MH PARTIAL HOSP TX UNDER 24H: HCPCS | Mod: 95 | Performed by: COUNSELOR

## 2020-07-27 ASSESSMENT — PATIENT HEALTH QUESTIONNAIRE - PHQ9
5. POOR APPETITE OR OVEREATING: SEVERAL DAYS
SUM OF ALL RESPONSES TO PHQ QUESTIONS 1-9: 7

## 2020-07-27 ASSESSMENT — ANXIETY QUESTIONNAIRES
1. FEELING NERVOUS, ANXIOUS, OR ON EDGE: NEARLY EVERY DAY
5. BEING SO RESTLESS THAT IT IS HARD TO SIT STILL: NOT AT ALL
7. FEELING AFRAID AS IF SOMETHING AWFUL MIGHT HAPPEN: SEVERAL DAYS
6. BECOMING EASILY ANNOYED OR IRRITABLE: SEVERAL DAYS
GAD7 TOTAL SCORE: 11
2. NOT BEING ABLE TO STOP OR CONTROL WORRYING: NEARLY EVERY DAY
IF YOU CHECKED OFF ANY PROBLEMS ON THIS QUESTIONNAIRE, HOW DIFFICULT HAVE THESE PROBLEMS MADE IT FOR YOU TO DO YOUR WORK, TAKE CARE OF THINGS AT HOME, OR GET ALONG WITH OTHER PEOPLE: VERY DIFFICULT
3. WORRYING TOO MUCH ABOUT DIFFERENT THINGS: MORE THAN HALF THE DAYS

## 2020-07-27 NOTE — GROUP NOTE
Psychotherapy Group Note    PATIENT'S NAME: Chris Stockton  MRN:   [id number]  :   1960  ACCT. NUMBER: [id number]  DATE OF SERVICE: 20  START TIME:  2:00 PM  END TIME:  2:50 PM  FACILITATOR: Lizy Oh LPCC  TOPIC: MH EBP Group: Self-Awareness  Adult Partial Hospitalization Program  TRACK: 1    NUMBER OF PARTICIPANTS: 6    Telemedicine Visit: The patient's condition can be safely assessed and treated via synchronous audio and visual telemedicine encounter.      Reason for Telemedicine Visit: Services only offered telehealth    Originating Site (Patient Location): Patient's home    Distant Site (Provider Location): Provider Remote Setting    Consent:  The patient/guardian has verbally consented to: the potential risks and benefits of telemedicine (video visit) versus in person care; bill my insurance or make self-payment for services provided; and responsibility for payment of non-covered services.     Mode of Communication:  Video Conference via [employer]    As the provider I attest to compliance with applicable laws and regulations related to telemedicine.      Summary of Group / Topics Discussed:  Self-Awareness: Values: Patients identified personal values by examining development of their current values and how their values influence their daily functioning and life choices. Patients explored the impact of their values on their thoughts, feelings, and actions. Patients discussed definition of personal values and how they develop and change over time. The goal is to help patients reconcile value conflicts and achieve balance and flexibility to improve mood and daily functioning.     The patients used their values application to work on completing a values clarification activity. They identified ways to engage in behaviors that demonstrate their current values.    Patient Session Goals / Objectives:    Examined development of values and impact of values on functioning    Identified and prioritized  important values related to current well-being     Identified strategies to change or enhance values to positively impact symptoms    Assisted patients to find ways to adapt functioning to better fit their values        Patient Participation / Response:  Fully participated with the group by sharing personal reflections / insights and openly received / provided feedback with other participants.    Demonstrated understanding of topics discussed through group discussion and participation, Demonstrated understanding of values, strengths, and challenges to learn about themselves and Identified / Expressed readiness to act intentionally, increase self-compassion, promote personal growth    Treatment Plan:  Patient has See Epic Treatment Plan - Patient is discharging.    Lizy Oh, [facility]

## 2020-07-27 NOTE — GROUP NOTE
Psychotherapy Group Note    PATIENT'S NAME: Chris Stockton  MRN:   [id number]  :   1960  ACCT. NUMBER: [id number]  DATE OF SERVICE: 20  START TIME: 10:00 AM  END TIME: 10:50 AM  FACILITATOR: Lizy Oh LPCC  TOPIC:  EBP Group: Self-Awareness  Adult Partial Hospitalization Program  TRACK: 1    NUMBER OF PARTICIPANTS: 5    Telemedicine Visit: The patient's condition can be safely assessed and treated via synchronous audio and visual telemedicine encounter.      Reason for Telemedicine Visit: Services only offered telehealth    Originating Site (Patient Location): Patient's home    Distant Site (Provider Location): Provider Remote Setting    Consent:  The patient/guardian has verbally consented to: the potential risks and benefits of telemedicine (video visit) versus in person care; bill my insurance or make self-payment for services provided; and responsibility for payment of non-covered services.     Mode of Communication:  Video Conference via [employer]    As the provider I attest to compliance with applicable laws and regulations related to telemedicine.      Summary of Group / Topics Discussed:  Self-Awareness: Values: Patients identified personal values by examining development of their current values and how their values influence their daily functioning and life choices. Patients explored the impact of their values on their thoughts, feelings, and actions. Patients discussed definition of personal values and how they develop and change over time. The goal is to help patients reconcile value conflicts and achieve balance and flexibility to improve mood and daily functioning. They will be using the values application information in another group offered during this group day.    Patient Session Goals / Objectives:    Examined development of values and impact of values on functioning    Identified and prioritized important values related to current well-being     Identified strategies to change  or enhance values to positively impact symptoms    Assisted patients to find ways to adapt functioning to better fit their values        Patient Participation / Response:  Fully participated with the group by sharing personal reflections / insights and openly received / provided feedback with other participants.    Demonstrated understanding of topics discussed through group discussion and participation and Demonstrated understanding of values, strengths, and challenges to learn about themselves    Treatment Plan:  Patient has See Epic Treatment Plan - Patient is discharging.    Lizy Oh, [facility]

## 2020-07-27 NOTE — GROUP NOTE
Psychoeducation Group Note    PATIENT'S NAME: Chris Stockton  MRN:   [id number]  :   1960  ACCT. NUMBER: [id number]  DATE OF SERVICE: 20  START TIME: 11:00 AM  END TIME: 11:50 AM  FACILITATOR: Lavelle Wilhelm OT  TOPIC: [facility] OT Group: Lifestyle Balance and Structure  Adult Partial Hospitalization Program  TRACK: 1    Telemedicine Visit: The patient's condition can be safely assessed and treated via synchronous audio and visual telemedicine encounter.      Reason for Telemedicine Visit: Services only offered telehealth and Covid 19    Originating Site (Patient Location): Patient's home    Distant Site (Provider Location): Provider Remote Setting    Consent:  The patient/guardian has verbally consented to: the potential risks and benefits of telemedicine (video visit) versus in person care; bill my insurance or make self-payment for services provided; and responsibility for payment of non-covered services.     Mode of Communication:  Video Conference via [employer]    As the provider I attest to compliance with applicable laws and regulations related to telemedicine.      NUMBER OF PARTICIPANTS: 5    Summary of Group / Topics Discussed:  Lifestyle Balance and Structure:  OT Goal-setting & integration: To support progress towards treatment goals and psychiatric recovery, group members were led through a weekly structured process to reflect on progress, integrate new learning/skills, and emerging self-awareness into their daily and weekly life. Provided psychoeducation on the neuroscience of change, self-compassion, and the anatomy of a SMART goal to the group. Facilitated the sharing of individual goals with validation, support, and feedback provided.    Patient Session Goals / Objectives:    Reflected on and create a vision for recovery and wellbeing    Identified and wrote 3 SMART goals for the week    Identified and problem solved barriers to achieving identified goals     Identified plan to support  follow through on goals and reflection on progress made        Patient Participation / Response:  Fully participated with the group by sharing personal reflections / insights and openly received / provided feedback with other participants.    Verbalized understanding of content and Patient would benefit from additional opportunities to practice the content to be able to generalize it to their everyday life with increased intentionality, consistency, and efficacy in support of their psychiatric recovery    Treatment Plan:  Patient has a current master individualized treatment plan.  See Epic treatment plan for more information.    Lavelle Wilhelm, OT
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
## 2020-07-27 NOTE — GROUP NOTE
Process Group Note    PATIENT'S NAME: Chris Stockton  MRN:   [id number]  :   1960  ACCT. NUMBER: [id number]  DATE OF SERVICE: 20  START TIME:  9:00 AM  END TIME:  9:50 AM  FACILITATOR: Lizy Oh LPCC  TOPIC:  Process Group    Diagnoses:   Major depressive disorder, recurrent, severe without psychosis, and with anxiety; attention deficit hyperactivity disorder by history     Adult Partial Hospitalization Program  TRACK: 1    NUMBER OF PARTICIPANTS: 5    Telemedicine Visit: The patient's condition can be safely assessed and treated via synchronous audio and visual telemedicine encounter.      Reason for Telemedicine Visit: Services only offered telehealth    Originating Site (Patient Location): Patient's home    Distant Site (Provider Location): Provider Remote Setting    Consent:  The patient/guardian has verbally consented to: the potential risks and benefits of telemedicine (video visit) versus in person care; bill my insurance or make self-payment for services provided; and responsibility for payment of non-covered services.     Mode of Communication:  Video Conference via [employer]    As the provider I attest to compliance with applicable laws and regulations related to telemedicine.            Data:    Session content: At the start of this group, patients were invited to check in by identifying themselves, describing their current emotional status, and identifying issues to address in this group.   Area(s) of treatment focus addressed in this session included Symptom Management.  She reported experiencing a bunch of emotions about it being her last day of group. She took time to process saying good-bye to the group and reflected on her experience in group.    Therapeutic Interventions/Treatment Strategies:  Psychotherapist offered support, feedback and validation and reinforced use of skills.    Assessment:    Patient response:   Patient responded to session by accepting feedback, giving  feedback and being attentive    Possible barriers to participation / learning include: and no barriers identified    Health Issues:   None reported       Substance Use Review:   Substance Use: No active concerns identified.    Mental Status/Behavioral Observations  Appearance:   Appropriate   Eye Contact:   Fair   Psychomotor Behavior: Restless   Attitude:   Cooperative  Interested Pleasant  Orientation:   All  Speech   Rate / Production: Normal    Volume:  Normal   Mood:    Anxious  Depressed   Affect:    Constricted   Thought Content:   Rumination  Thought Form:  Coherent  Logical     Insight:    Fair     Plan:     Safety Plan: Recommended that patient call 911 or go to the local ED should there be a change in any of these risk factors.     Barriers to treatment: None identified    Patient Contracts (see media tab):  None    Substance Use: Not addressed in session     Continue or Discharge: Patient is being discharged today. See Treatment Plan and Discharge Summary.       Lizy Oh, [facility]  July 27, 2020

## 2020-07-27 NOTE — GROUP NOTE
Psychoeducation Group Note    PATIENT'S NAME: Chris Stockton  MRN:   [id number]  :   1960  ACCT. NUMBER: [id number]  DATE OF SERVICE: 20  START TIME:  1:00 PM  END TIME:  1:50 PM  FACILITATOR: Christina Roman RN  TOPIC: ANNA MARIE RN Group: Brain Health  Adult Partial Hospitalization Program  TRACK: 1    NUMBER OF PARTICIPANTS: 6    Summary of Group / Topics Discussed:  Brain Health:  Pathophysiology of positive and negative thinking: Patients were educated on the psychological and physiological benefits of practicing positive thinking and then discussed within the group. Patients were then guided on a positive thinking experiential to practice the skill. After experiential, the exercise was discussed in the group and each patient was encouraged to identify one method of increasing personal resilience as it relates to mental health.    Patient Session Goals / Objectives:  ? Described the psychological and physiological benefits to positive thinking  ? Practiced guided experiential exercise  ? Identified one way to increase resilience  Telemedicine Visit: The patient's condition can be safely assessed and treated via synchronous audio and visual telemedicine encounter.      Reason for Telemedicine Visit: Services only offered telehealth    Originating Site (Patient Location): Patient's home    Distant Site (Provider Location): Provider Remote Setting    Consent:  The patient/guardian has verbally consented to: the potential risks and benefits of telemedicine (video visit) versus in person care; bill my insurance or make self-payment for services provided; and responsibility for payment of non-covered services.     Mode of Communication:  Video Conference via [employer]    As the provider I attest to compliance with applicable laws and regulations related to telemedicine.       Patient Participation / Response:  Fully participated with the group by sharing personal reflections / insights and openly received /  provided feedback with other participants.    Demonstrated understanding of topics discussed through group discussion and participation    Treatment Plan:  Patient has a current master individualized treatment plan.  See Epic treatment plan for more information.    Christina Roman RN

## 2020-07-28 ASSESSMENT — ANXIETY QUESTIONNAIRES: GAD7 TOTAL SCORE: 11

## 2020-12-15 DIAGNOSIS — E03.9 ACQUIRED HYPOTHYROIDISM: ICD-10-CM

## 2020-12-15 NOTE — TELEPHONE ENCOUNTER

## 2020-12-16 ENCOUNTER — MYC MEDICAL ADVICE (OUTPATIENT)
Dept: FAMILY MEDICINE | Facility: CLINIC | Age: 60
End: 2020-12-16

## 2020-12-16 DIAGNOSIS — F64.0 GENDER DYSPHORIA IN ADULT: ICD-10-CM

## 2020-12-16 DIAGNOSIS — E03.9 ACQUIRED HYPOTHYROIDISM: Primary | ICD-10-CM

## 2020-12-16 RX ORDER — LEVOTHYROXINE SODIUM 75 UG/1
75 TABLET ORAL DAILY
Qty: 30 TABLET | Refills: 0 | Status: SHIPPED | OUTPATIENT
Start: 2020-12-16 | End: 2020-12-23

## 2020-12-21 DIAGNOSIS — F64.0 GENDER DYSPHORIA IN ADULT: Primary | ICD-10-CM

## 2020-12-22 ENCOUNTER — VIRTUAL VISIT (OUTPATIENT)
Dept: FAMILY MEDICINE | Facility: CLINIC | Age: 60
End: 2020-12-22
Payer: COMMERCIAL

## 2020-12-22 DIAGNOSIS — E03.9 ACQUIRED HYPOTHYROIDISM: ICD-10-CM

## 2020-12-22 DIAGNOSIS — E03.4 HYPOTHYROIDISM DUE TO ACQUIRED ATROPHY OF THYROID: Primary | ICD-10-CM

## 2020-12-22 DIAGNOSIS — F31.81 BIPOLAR 2 DISORDER (H): ICD-10-CM

## 2020-12-22 DIAGNOSIS — F64.0 GENDER DYSPHORIA IN ADULT: ICD-10-CM

## 2020-12-22 DIAGNOSIS — F32.A DEPRESSION, UNSPECIFIED DEPRESSION TYPE: ICD-10-CM

## 2020-12-22 DIAGNOSIS — E78.2 MIXED HYPERLIPIDEMIA: ICD-10-CM

## 2020-12-22 LAB
BUN SERPL-MCNC: 17.3 MG/DL (ref 7–21)
CALCIUM SERPL-MCNC: 9.1 MG/DL (ref 8.5–10.1)
CHLORIDE SERPLBLD-SCNC: 100.1 MMOL/L (ref 98–110)
CHOLEST SERPL-MCNC: 220 MG/DL (ref 0–200)
CHOLEST/HDLC SERPL: 4.9 {RATIO} (ref 0–5)
CO2 SERPL-SCNC: 27.3 MMOL/L (ref 20–32)
CREAT SERPL-MCNC: 0.8 MG/DL (ref 0.7–1.3)
GFR SERPL CREATININE-BSD FRML MDRD: >90 ML/MIN/1.7 M2
GLUCOSE SERPL-MCNC: 99.3 MG'DL (ref 70–99)
HDLC SERPL-MCNC: 45.1 MG/DL
LDLC SERPL CALC-MCNC: 133 MG/DL (ref 0–129)
POTASSIUM SERPL-SCNC: 3.5 MMOL/L (ref 3.3–4.5)
SODIUM SERPL-SCNC: 136.6 MMOL/L (ref 132.6–141.4)
TRIGL SERPL-MCNC: 211.3 MG/DL (ref 0–150)
TSH SERPL DL<=0.005 MIU/L-ACNC: 4.64 MU/L (ref 0.4–4)
VLDL CHOLESTEROL: 42.3 MG/DL (ref 7–32)

## 2020-12-22 PROCEDURE — 84443 ASSAY THYROID STIM HORMONE: CPT | Performed by: FAMILY MEDICINE

## 2020-12-22 PROCEDURE — 36415 COLL VENOUS BLD VENIPUNCTURE: CPT

## 2020-12-22 PROCEDURE — 80048 BASIC METABOLIC PNL TOTAL CA: CPT

## 2020-12-22 PROCEDURE — 99214 OFFICE O/P EST MOD 30 MIN: CPT | Mod: 95 | Performed by: FAMILY MEDICINE

## 2020-12-22 PROCEDURE — 80061 LIPID PANEL: CPT

## 2020-12-22 PROCEDURE — 99000 SPECIMEN HANDLING OFFICE-LAB: CPT | Performed by: FAMILY MEDICINE

## 2020-12-22 PROCEDURE — 84403 ASSAY OF TOTAL TESTOSTERONE: CPT | Performed by: FAMILY MEDICINE

## 2020-12-22 RX ORDER — GUANFACINE 2 MG/1
2 TABLET ORAL AT BEDTIME
COMMUNITY
Start: 2020-12-22 | End: 2021-10-20

## 2020-12-22 RX ORDER — BUPROPION HYDROCHLORIDE 100 MG/1
100 TABLET ORAL DAILY
COMMUNITY
Start: 2020-12-22 | End: 2021-10-20

## 2020-12-22 RX ORDER — LAMOTRIGINE 25 MG/1
50 TABLET ORAL DAILY
Qty: 63 TABLET | Refills: 0 | COMMUNITY
Start: 2020-12-22 | End: 2021-03-30

## 2020-12-22 RX ORDER — CHOLECALCIFEROL (VITAMIN D3) 50 MCG
2 TABLET ORAL DAILY
COMMUNITY
Start: 2020-12-22

## 2020-12-22 RX ORDER — LANOLIN ALCOHOL/MO/W.PET/CERES
0.5 CREAM (GRAM) TOPICAL DAILY
COMMUNITY
Start: 2020-12-22

## 2020-12-22 NOTE — PATIENT INSTRUCTIONS
Here is the plan from today's visit    1. Depression, unspecified depression type  Updating your meds  - lamoTRIgine (LAMICTAL) 25 MG tablet; Take 2 tablets (50 mg) by mouth daily  Dispense: 63 tablet; Refill: 0  - lamoTRIgine (LAMICTAL) 25 MG tablet; Take 2 tablets (50 mg) by mouth daily  Dispense: 63 tablet; Refill: 0    3. Hypothyroidism due to acquired atrophy of thyroid  We will respond to the numbers      4. Prevention  You will work on decreased carbohydrates and return for a fasting cholesterol at that time.         Please call or return to clinic if your symptoms don't go away.    Follow up plan  2 month lab testing and visit    Thank you for coming to [facility] today.  Lab Testing:  **If you had lab testing today and your results are reassuring or normal they will be mailed to you or sent through [employer] within 7 days.   **If the lab tests need quick action we will call you with the results.  The phone number we will call with results is # [phone] (home) none (work). If this is not the best number please call our clinic and change the number.  Medication Refills:  If you need any refills please call your pharmacy and they will contact us.   If you need to  your refill at a new pharmacy, please contact the new pharmacy directly. The new pharmacy will help you get your medications transferred faster.   Scheduling:  If you have any concerns about today's visit or wish to schedule another appointment please call our office during normal business hours [phone] (8-5:00 M-F)  If a referral was made to a [facility] and you don't get a call from central scheduling please call [phone].  If a Mammogram was ordered for you at [facility] call [phone] to schedule or change your appointment.  If you had an XRay/CT/Ultrasound/MRI ordered the number is [phone] to schedule or change your radiology appointment.   Medical Concerns:  If you have urgent  medical concerns please call [phone] at any time of the day.    Yanna Santizo MD
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
## 2020-12-23 DIAGNOSIS — E03.4 HYPOTHYROIDISM DUE TO ACQUIRED ATROPHY OF THYROID: Primary | ICD-10-CM

## 2020-12-23 LAB — TESTOST SERPL-MCNC: 23 NG/DL (ref 240–950)

## 2020-12-23 RX ORDER — LEVOTHYROXINE SODIUM 88 UG/1
88 TABLET ORAL DAILY
Qty: 60 TABLET | Refills: 0 | Status: SHIPPED | OUTPATIENT
Start: 2020-12-23 | End: 2021-02-16

## 2021-01-15 ENCOUNTER — HEALTH MAINTENANCE LETTER (OUTPATIENT)
Age: 61
End: 2021-01-15

## 2021-02-16 DIAGNOSIS — E03.4 HYPOTHYROIDISM DUE TO ACQUIRED ATROPHY OF THYROID: ICD-10-CM

## 2021-02-16 RX ORDER — LEVOTHYROXINE SODIUM 88 UG/1
88 TABLET ORAL DAILY
Qty: 60 TABLET | Refills: 0 | Status: SHIPPED | OUTPATIENT
Start: 2021-02-16 | End: 2021-03-29

## 2021-02-16 NOTE — TELEPHONE ENCOUNTER

## 2021-03-03 DIAGNOSIS — E03.4 HYPOTHYROIDISM DUE TO ACQUIRED ATROPHY OF THYROID: Primary | ICD-10-CM

## 2021-03-03 DIAGNOSIS — E78.2 MIXED HYPERLIPIDEMIA: ICD-10-CM

## 2021-03-03 LAB
CHOLEST SERPL-MCNC: 229.2 MG/DL (ref 0–200)
CHOLEST/HDLC SERPL: 4.9 {RATIO} (ref 0–5)
HDLC SERPL-MCNC: 46.7 MG/DL
LDLC SERPL CALC-MCNC: 139 MG/DL (ref 0–129)
TRIGL SERPL-MCNC: 218.6 MG/DL (ref 0–150)
VLDL CHOLESTEROL: 43.7 MG/DL (ref 7–32)

## 2021-03-03 PROCEDURE — 80061 LIPID PANEL: CPT

## 2021-03-03 PROCEDURE — 36415 COLL VENOUS BLD VENIPUNCTURE: CPT

## 2021-03-04 DIAGNOSIS — E78.5 HYPERLIPIDEMIA LDL GOAL <100: Primary | ICD-10-CM

## 2021-03-04 RX ORDER — ROSUVASTATIN CALCIUM 10 MG/1
10 TABLET, COATED ORAL DAILY
Qty: 90 TABLET | Refills: 1 | Status: SHIPPED | OUTPATIENT
Start: 2021-03-04 | End: 2021-08-18

## 2021-03-15 DIAGNOSIS — E03.9 ACQUIRED HYPOTHYROIDISM: ICD-10-CM

## 2021-03-15 LAB — TSH SERPL DL<=0.005 MIU/L-ACNC: 3.38 MU/L (ref 0.4–4)

## 2021-03-15 PROCEDURE — 36415 COLL VENOUS BLD VENIPUNCTURE: CPT | Performed by: FAMILY MEDICINE

## 2021-03-15 PROCEDURE — 84443 ASSAY THYROID STIM HORMONE: CPT | Performed by: FAMILY MEDICINE

## 2021-03-29 DIAGNOSIS — E03.4 HYPOTHYROIDISM DUE TO ACQUIRED ATROPHY OF THYROID: ICD-10-CM

## 2021-03-29 RX ORDER — LEVOTHYROXINE SODIUM 88 UG/1
88 TABLET ORAL DAILY
Qty: 90 TABLET | Refills: 3 | Status: SHIPPED | OUTPATIENT
Start: 2021-03-29 | End: 2021-10-01

## 2021-03-29 NOTE — TELEPHONE ENCOUNTER
"Patient requesting 90 day fill.    Request for medication refill:  levothyroxine (SYNTHROID/LEVOTHROID) 88 MCG tablet    Providers if patient needs an appointment and you are willing to give a one month supply please refill for one month and  send a letter/MyChart using \".SMILLIMITEDREFILL\" .smillimited and route chart to \"P [facility] \" (Giving one month refill in non controlled medications is strongly recommended before denial)    If refill has been denied, meaning absolutely no refills without visit, please complete the smart phrase \".smirxrefuse\" and route it to the \"P [facility] MED REFILLS\"  pool to inform the patient and the pharmacy.    Sharon Steven MA        "
Coumadin/Warfarin

## 2021-03-30 ENCOUNTER — OFFICE VISIT (OUTPATIENT)
Dept: FAMILY MEDICINE | Facility: CLINIC | Age: 61
End: 2021-03-30
Payer: COMMERCIAL

## 2021-03-30 VITALS
SYSTOLIC BLOOD PRESSURE: 120 MMHG | TEMPERATURE: 98.7 F | OXYGEN SATURATION: 95 % | WEIGHT: 225.4 LBS | HEART RATE: 61 BPM | DIASTOLIC BLOOD PRESSURE: 79 MMHG | HEIGHT: 68 IN | BODY MASS INDEX: 34.16 KG/M2

## 2021-03-30 DIAGNOSIS — N39.41 URGENCY INCONTINENCE: Primary | ICD-10-CM

## 2021-03-30 DIAGNOSIS — R10.9 FLANK PAIN: ICD-10-CM

## 2021-03-30 LAB
BILIRUBIN UR: NEGATIVE MG/DL
BLOOD UR: NEGATIVE MG/DL
GLUCOSE URINE: NEGATIVE
KETONES UR QL: NEGATIVE MG/DL
LEUKOCYTE ESTERASE UR: NEGATIVE
NITRITE UR QL STRIP: NEGATIVE MG/DL
PH UR STRIP: 5 [PH] (ref 4.5–8)
PROTEIN UR: NEGATIVE MG/DL
PSA SERPL-ACNC: 0.14 UG/L (ref 0–4)
SP GR UR STRIP: >=1.03 (ref 1–1.03)
UROBILINOGEN UR STRIP-ACNC: NORMAL E.U./DL

## 2021-03-30 PROCEDURE — 81003 URINALYSIS AUTO W/O SCOPE: CPT | Performed by: FAMILY MEDICINE

## 2021-03-30 PROCEDURE — G0103 PSA SCREENING: HCPCS | Performed by: FAMILY MEDICINE

## 2021-03-30 PROCEDURE — 36415 COLL VENOUS BLD VENIPUNCTURE: CPT | Performed by: FAMILY MEDICINE

## 2021-03-30 PROCEDURE — 99214 OFFICE O/P EST MOD 30 MIN: CPT | Performed by: FAMILY MEDICINE

## 2021-03-30 RX ORDER — NYSTATIN 100000 U/G
CREAM TOPICAL 4 TIMES DAILY PRN
Qty: 30 G | Refills: 0 | Status: SHIPPED | OUTPATIENT
Start: 2021-03-30 | End: 2024-07-29

## 2021-03-30 ASSESSMENT — MIFFLIN-ST. JEOR: SCORE: 1803.66

## 2021-03-30 NOTE — PATIENT INSTRUCTIONS
Patient Education   Here is the plan from today's visit    1. Flank pain and incontinence  Do Kegels!! All day long.   Use the Nystatin cream if you are noticing redness and pain and go to the ED if the foreskin gets too swollen.     - Prostate spec antigen screen  - Urinalysis, Micro If (UA) ([facility])  - US Renal Complete; Future      Please call or return to clinic if your symptoms don't go away.    Follow up plan  As needed     Thank you for coming to [facility] today.  Lab Testing:  **If you had lab testing today and your results are reassuring or normal they will be mailed to you or sent through [employer] within 7 days.   **If the lab tests need quick action we will call you with the results.  The phone number we will call with results is # [phone] (home) none (work). If this is not the best number please call our clinic and change the number.  Medication Refills:  If you need any refills please call your pharmacy and they will contact us.   If you need to  your refill at a new pharmacy, please contact the new pharmacy directly. The new pharmacy will help you get your medications transferred faster.   Scheduling:  If you have any concerns about today's visit or wish to schedule another appointment please call our office during normal business hours [phone] (8-5:00 M-F)  If a referral was made to a [facility] and you don't get a call from central scheduling please call [phone].  If a Mammogram was ordered for you at [facility] call [phone] to schedule or change your appointment.  If you had an XRay/CT/Ultrasound/MRI ordered the number is [phone] to schedule or change your radiology appointment.   Medical Concerns:  If you have urgent medical concerns please call [phone] at any time of the day.    Yanna Santizo MD

## 2021-04-02 ENCOUNTER — HOSPITAL ENCOUNTER (OUTPATIENT)
Dept: CT IMAGING | Facility: CLINIC | Age: 61
Discharge: HOME | End: 2021-04-02
Attending: FAMILY MEDICINE | Admitting: FAMILY MEDICINE
Payer: COMMERCIAL

## 2021-04-02 DIAGNOSIS — N20.0 KIDNEY STONE: ICD-10-CM

## 2021-04-02 DIAGNOSIS — R10.9 FLANK PAIN: Primary | ICD-10-CM

## 2021-04-02 DIAGNOSIS — R10.9 FLANK PAIN: ICD-10-CM

## 2021-04-02 PROCEDURE — 74176 CT ABD & PELVIS W/O CONTRAST: CPT

## 2021-04-02 RX ORDER — TAMSULOSIN HYDROCHLORIDE 0.4 MG/1
0.4 CAPSULE ORAL DAILY
Qty: 30 CAPSULE | Refills: 1 | Status: SHIPPED | OUTPATIENT
Start: 2021-04-02 | End: 2021-04-29

## 2021-04-03 ENCOUNTER — HOSPITAL ENCOUNTER (EMERGENCY)
Facility: CLINIC | Age: 61
Discharge: HOME | End: 2021-04-03
Attending: EMERGENCY MEDICINE | Admitting: EMERGENCY MEDICINE
Payer: COMMERCIAL

## 2021-04-03 VITALS
HEIGHT: 68 IN | SYSTOLIC BLOOD PRESSURE: 145 MMHG | DIASTOLIC BLOOD PRESSURE: 78 MMHG | BODY MASS INDEX: 34.04 KG/M2 | HEART RATE: 60 BPM | OXYGEN SATURATION: 98 % | RESPIRATION RATE: 16 BRPM | WEIGHT: 224.6 LBS | TEMPERATURE: 98.4 F

## 2021-04-03 DIAGNOSIS — N20.0 KIDNEY STONE: ICD-10-CM

## 2021-04-03 PROCEDURE — 99283 EMERGENCY DEPT VISIT LOW MDM: CPT | Performed by: EMERGENCY MEDICINE

## 2021-04-03 ASSESSMENT — ENCOUNTER SYMPTOMS
COLOR CHANGE: 0
BRUISES/BLEEDS EASILY: 0
ARTHRALGIAS: 0
VOMITING: 1
NAUSEA: 1
DIFFICULTY URINATING: 0
ADENOPATHY: 0
EYE REDNESS: 0
POLYDIPSIA: 0
HEADACHES: 0
SHORTNESS OF BREATH: 0
FEVER: 0
CHILLS: 0
ABDOMINAL PAIN: 1
NECK STIFFNESS: 0
CONFUSION: 0

## 2021-04-03 ASSESSMENT — MIFFLIN-ST. JEOR: SCORE: 1798.28

## 2021-04-03 NOTE — DISCHARGE INSTRUCTIONS
Please make an appointment to follow up with Urology Clinic (phone: [phone]) in 3-5 days for further evaluation and recommendations.  It is reassuring that your pain has significantly subsided since last night.  There is a chance that your kidney stone passed into your bladder by now.  Please monitor your symptoms at home and if your symptoms worsen or you develop a fever 100.4  F or higher come back to the emergency department.

## 2021-04-03 NOTE — ED PROVIDER NOTES
[facility] EMERGENCY DEPARTMENT ([facility])  4/03/21    History     Chief Complaint   Patient presents with     Flank Pain     Right sided flank pain that started about 10 days ago.  Off and on N and V.     Suicidal     No plan or intent but thoughts.     The history is provided by the patient and medical records.     iLz is a 60 year old adult with a medical history significant for anxiety, depression, and kidney stones who presents to the ED for evaluation of right-sided flank pain that began about 10 days ago, nausea, and vomiting. Patient received a CT scan yesterday at [facility].  Patient notes severe pain last week, causing nausea and vomiting. Patient endorses moderate to severe, sharp, nonradiating, right flank and right abdominal pain last night, which has become more tolerable throughout the morning today and it has resolved by the time of ED arrival.  Patient took hydrocodone at 10 PM last night and 4 AM this morning with some relief of his pain.  No chills, sweating, burning with urination, and urinary frequency.      CT Abdomen and Pelvis without Contrast  IMPRESSION  1.  Obstructing 0.5 cm stone in the distal right ureter causes mild  right hydronephrosis.    2.  Several nonobstructing stones in both kidneys measure 0.5 cm or  smaller. Overall stone burden in both kidneys has increased compared  to 11/23/2011.  3.  There is a 1 cm angiomyolipoma in the lower pole of the left  kidney.  4.  Colonic diverticulosis, without convincing evidence for  diverticulitis.  Past Medical History:   Diagnosis Date     Anxiety      Depressive disorder      Esophageal stricture      Kidney stone     nov.       Past Surgical History:   Procedure Laterality Date     APPENDECTOMY       COLONOSCOPY  12/9/2011    Procedure:COLONOSCOPY; COLONOSCOPY; Surgeon:MARILY MENA; Location: GI     ESOPHAGOSCOPY, GASTROSCOPY, DUODENOSCOPY (EGD), COMBINED  11/23/2012    Procedure: COMBINED ESOPHAGOSCOPY, GASTROSCOPY,  DUODENOSCOPY (EGD), BIOPSY SINGLE OR MULTIPLE;;  Surgeon: Yehuda Tomlinson MD;  Location:  GI     VASECTOMY         Family History   Problem Relation Age of Onset     Melanoma Brother      Prostate Cancer Brother      Heart Surgery Father      Prostate Cancer Father      Neuropathy Father      Heart Disease Mother        Social History     Tobacco Use     Smoking status: Never Smoker     Smokeless tobacco: Never Used   Substance Use Topics     Alcohol use: Yes     Comment: rare       No current facility-administered medications for this encounter.      Current Outpatient Medications   Medication     buPROPion (WELLBUTRIN) 100 MG tablet     estradiol (VIVELLE-DOT) 0.1 MG/24HR bi-weekly patch     guanFACINE (TENEX) 2 MG tablet     levothyroxine (SYNTHROID/LEVOTHROID) 88 MCG tablet     melatonin 3 MG tablet     nystatin (MYCOSTATIN) 715132 UNIT/GM external cream     progesterone (PROMETRIUM) 200 MG capsule     rosuvastatin (CRESTOR) 10 MG tablet     spironolactone (ALDACTONE) 25 MG tablet     tamsulosin (FLOMAX) 0.4 MG capsule     vitamin D3 (CHOLECALCIFEROL) 50 mcg (2000 units) tablet        Allergies   Allergen Reactions     Finasteride      Mental health problems     Tetracycline Unknown     Doxycycline Rash        Review of Systems   Constitutional: Negative for chills and fever.   HENT: Negative for congestion.    Eyes: Negative for redness.   Respiratory: Negative for shortness of breath.    Cardiovascular: Negative for chest pain.   Gastrointestinal: Positive for abdominal pain ( resolved), nausea ( resolved) and vomiting ( resolved).   Endocrine: Negative for polydipsia and polyuria.   Genitourinary: Negative for difficulty urinating.   Musculoskeletal: Negative for arthralgias and neck stiffness.   Skin: Negative for color change.   Neurological: Negative for headaches.   Hematological: Negative for adenopathy. Does not bruise/bleed easily.   Psychiatric/Behavioral: Negative for confusion.   All other  "systems reviewed and are negative.    A complete review of systems was performed with pertinent positives and negatives noted in the HPI, and all other systems negative.    Physical Exam   BP: (!) 145/78  Pulse: 60  Temp: 98.4  F (36.9  C)  Resp: 16  Height: 172.7 cm (5' 8\")  Weight: 101.9 kg (224 lb 9.6 oz)  SpO2: 98 %  Physical Exam  Vitals signs and nursing note reviewed.   Constitutional:       General: She is not in acute distress.     Appearance: Normal appearance. She is not ill-appearing, toxic-appearing or diaphoretic.   HENT:      Head: Normocephalic and atraumatic.      Mouth/Throat:      Pharynx: No oropharyngeal exudate.   Eyes:      General: No scleral icterus.     Extraocular Movements: Extraocular movements intact.      Conjunctiva/sclera: Conjunctivae normal.   Neck:      Musculoskeletal: Normal range of motion and neck supple.   Cardiovascular:      Rate and Rhythm: Normal rate.      Pulses: Normal pulses.      Heart sounds: Normal heart sounds.   Pulmonary:      Effort: Pulmonary effort is normal. No respiratory distress.      Breath sounds: Normal breath sounds.   Abdominal:      General: There is no distension.      Palpations: Abdomen is soft.      Tenderness: There is no abdominal tenderness. There is no right CVA tenderness, left CVA tenderness, guarding or rebound.   Musculoskeletal: Normal range of motion.         General: No tenderness.   Skin:     General: Skin is warm.      Findings: No rash.   Neurological:      General: No focal deficit present.      Mental Status: She is alert and oriented to person, place, and time.      Cranial Nerves: No cranial nerve deficit.      Coordination: Coordination normal.   Psychiatric:         Mood and Affect: Mood normal.         Behavior: Behavior normal.         Thought Content: Thought content normal.         Judgment: Judgment normal.      Comments: Passive suicidal thoughts.  No homicidal ideations.  No suicidal plan.  Linear thought process.  No " hallucinations.         ED Course      Procedures               No results found for any visits on 04/03/21.  Medications - No data to display     Assessments & Plan (with Medical Decision Making)    60-year-old woman presenting with kidney stone and abdominal pain, however, over the last several hours the symptoms have resolved.  I reviewed patient's medical records, more specifically the  CT scan from yesterday which revealed 0.5 cm distal, right ureteral kidney stone with mild hydronephrosis.  However, at this time patient is asymptomatic and states that symptoms have resolved approximately 2 hours ago.  She is not nauseous anymore and not in pain.  There is no fever.  We discussed the options for evaluation such as obtaining laboratory studies to evaluate renal function and hydration status and perform additional imaging for evaluation of stone passage into the bladder.  We also discussed an option of being discharged without any further work-up and monitoring for return of symptoms at home with the plan to come back to the emergency department if symptoms recur.  Given that there is a possibility that her stone has migrated to the bladder and she is pain and symptom-free she decided to forego any additional work-up today and go home and monitor closely for return of symptoms.  I believe this is a reasonable option.  Patient seems reliable and will return if symptoms recur.  Patient had no acute mental health complaints at today's visit and feels that she does not need her mental health addressed at this time.  She is stable for discharge.        This part of the medical record was transcribed by Verna Lewis, Medical Scribe, from a dictation done by Krystle Joyce MD.         I have reviewed the nursing notes. I have reviewed the findings, diagnosis, plan and need for follow up with the patient.    Discharge Medication List as of 4/3/2021  1:44 PM          Final diagnoses:   Kidney stone     I was physically  present and have reviewed and verified the accuracy of this note documented by my scribe.    Param Rosenthal MD      --    [facility] EMERGENCY DEPARTMENT  4/3/2021     Param Rosenthal MD  04/03/21 [id number]

## 2021-04-29 DIAGNOSIS — N20.0 KIDNEY STONE: ICD-10-CM

## 2021-04-29 RX ORDER — TAMSULOSIN HYDROCHLORIDE 0.4 MG/1
0.4 CAPSULE ORAL DAILY
Qty: 90 CAPSULE | Refills: 1 | Status: SHIPPED | OUTPATIENT
Start: 2021-04-29 | End: 2021-10-01

## 2021-04-29 NOTE — TELEPHONE ENCOUNTER
"Request for medication refill:  tamsulosin (FLOMAX) 0.4 MG capsule  Providers if patient needs an appointment and you are willing to give a one month supply please refill for one month and  send a letter/MyChart using \".SMILLIMITEDREFILL\" .smillimited and route chart to \"[facility] \" (Giving one month refill in non controlled medications is strongly recommended before denial)    If refill has been denied, meaning absolutely no refills without visit, please complete the smart phrase \".smirxrefuse\" and route it to the \"P [facility] MED REFILLS\"  pool to inform the patient and the pharmacy.    Josephine Fuentes        "

## 2021-05-25 ENCOUNTER — HOSPITAL ENCOUNTER (EMERGENCY)
Facility: CLINIC | Age: 61
Discharge: HOME | End: 2021-05-25
Attending: EMERGENCY MEDICINE | Admitting: EMERGENCY MEDICINE
Payer: COMMERCIAL

## 2021-05-25 ENCOUNTER — APPOINTMENT (OUTPATIENT)
Dept: CT IMAGING | Facility: CLINIC | Age: 61
End: 2021-05-25
Attending: EMERGENCY MEDICINE
Payer: COMMERCIAL

## 2021-05-25 VITALS
RESPIRATION RATE: 18 BRPM | SYSTOLIC BLOOD PRESSURE: 146 MMHG | TEMPERATURE: 97.7 F | OXYGEN SATURATION: 95 % | HEART RATE: 55 BPM | DIASTOLIC BLOOD PRESSURE: 90 MMHG

## 2021-05-25 DIAGNOSIS — N20.1 URETEROLITHIASIS: ICD-10-CM

## 2021-05-25 DIAGNOSIS — R10.9 RIGHT FLANK PAIN: ICD-10-CM

## 2021-05-25 DIAGNOSIS — N20.0 KIDNEY STONE: ICD-10-CM

## 2021-05-25 LAB
ALBUMIN UR-MCNC: NEGATIVE MG/DL
ANION GAP SERPL CALCULATED.3IONS-SCNC: 9 MMOL/L (ref 3–14)
APPEARANCE UR: CLEAR
BACTERIA #/AREA URNS HPF: ABNORMAL /HPF
BASOPHILS # BLD AUTO: 0.1 10E9/L (ref 0–0.2)
BASOPHILS NFR BLD AUTO: 0.8 %
BILIRUB UR QL STRIP: NEGATIVE
BUN SERPL-MCNC: 18 MG/DL (ref 7–30)
CALCIUM SERPL-MCNC: 8.8 MG/DL (ref 8.5–10.1)
CHLORIDE SERPL-SCNC: 110 MMOL/L (ref 94–109)
CO2 SERPL-SCNC: 22 MMOL/L (ref 20–32)
COLOR UR AUTO: ABNORMAL
CREAT SERPL-MCNC: 1.09 MG/DL (ref 0.66–1.25)
DIFFERENTIAL METHOD BLD: ABNORMAL
DIFFERENTIAL METHOD BLD: NORMAL
EOSINOPHIL # BLD AUTO: 0.2 10E9/L (ref 0–0.7)
EOSINOPHIL NFR BLD AUTO: 0.9 %
ERYTHROCYTE [DISTWIDTH] IN BLOOD BY AUTOMATED COUNT: 12.8 % (ref 10–15)
ERYTHROCYTE [DISTWIDTH] IN BLOOD BY AUTOMATED COUNT: NORMAL % (ref 10–15)
GFR SERPL CREATININE-BSD FRML MDRD: 72 ML/MIN/{1.73_M2}
GLUCOSE SERPL-MCNC: 102 MG/DL (ref 70–99)
GLUCOSE UR STRIP-MCNC: NEGATIVE MG/DL
HCT VFR BLD AUTO: 45.6 % (ref 40–53)
HCT VFR BLD AUTO: NORMAL % (ref 40–53)
HGB BLD-MCNC: 15.4 G/DL (ref 13.3–17.7)
HGB BLD-MCNC: NORMAL G/DL (ref 13.3–17.7)
HGB UR QL STRIP: ABNORMAL
HYALINE CASTS #/AREA URNS LPF: 1 /LPF (ref 0–2)
IMM GRANULOCYTES # BLD: 0.3 10E9/L (ref 0–0.4)
IMM GRANULOCYTES NFR BLD: 1.6 %
KETONES UR STRIP-MCNC: NEGATIVE MG/DL
LEUKOCYTE ESTERASE UR QL STRIP: NEGATIVE
LYMPHOCYTES # BLD AUTO: 1.9 10E9/L (ref 0.8–5.3)
LYMPHOCYTES NFR BLD AUTO: 11.6 %
MCH RBC QN AUTO: 30 PG (ref 26.5–33)
MCH RBC QN AUTO: NORMAL PG (ref 26.5–33)
MCHC RBC AUTO-ENTMCNC: 33.8 G/DL (ref 31.5–36.5)
MCHC RBC AUTO-ENTMCNC: NORMAL G/DL (ref 31.5–36.5)
MCV RBC AUTO: 89 FL (ref 78–100)
MCV RBC AUTO: NORMAL FL (ref 78–100)
MONOCYTES # BLD AUTO: 1.1 10E9/L (ref 0–1.3)
MONOCYTES NFR BLD AUTO: 6.6 %
MUCOUS THREADS #/AREA URNS LPF: PRESENT /LPF
NEUTROPHILS # BLD AUTO: 13.1 10E9/L (ref 1.6–8.3)
NEUTROPHILS NFR BLD AUTO: 78.5 %
NITRATE UR QL: NEGATIVE
NRBC # BLD AUTO: 0 10*3/UL
NRBC BLD AUTO-RTO: 0 /100
PH UR STRIP: 5.5 PH (ref 5–7)
PLATELET # BLD AUTO: 205 10E9/L (ref 150–450)
PLATELET # BLD AUTO: NORMAL 10E9/L (ref 150–450)
POTASSIUM SERPL-SCNC: 4.3 MMOL/L (ref 3.4–5.3)
RBC # BLD AUTO: 5.13 10E12/L (ref 4.4–5.9)
RBC # BLD AUTO: NORMAL 10E12/L (ref 4.4–5.9)
RBC #/AREA URNS AUTO: 6 /HPF (ref 0–2)
SODIUM SERPL-SCNC: 141 MMOL/L (ref 133–144)
SOURCE: ABNORMAL
SP GR UR STRIP: 1.02 (ref 1–1.03)
SQUAMOUS #/AREA URNS AUTO: 2 /HPF (ref 0–1)
UROBILINOGEN UR STRIP-MCNC: NORMAL MG/DL (ref 0–2)
WBC # BLD AUTO: 16.6 10E9/L (ref 4–11)
WBC # BLD AUTO: NORMAL 10E9/L (ref 4–11)
WBC #/AREA URNS AUTO: 1 /HPF (ref 0–5)

## 2021-05-25 PROCEDURE — 99284 EMERGENCY DEPT VISIT MOD MDM: CPT | Performed by: EMERGENCY MEDICINE

## 2021-05-25 PROCEDURE — 99285 EMERGENCY DEPT VISIT HI MDM: CPT | Mod: 25 | Performed by: EMERGENCY MEDICINE

## 2021-05-25 PROCEDURE — 96374 THER/PROPH/DIAG INJ IV PUSH: CPT | Performed by: EMERGENCY MEDICINE

## 2021-05-25 PROCEDURE — 96375 TX/PRO/DX INJ NEW DRUG ADDON: CPT | Performed by: EMERGENCY MEDICINE

## 2021-05-25 PROCEDURE — 96361 HYDRATE IV INFUSION ADD-ON: CPT | Performed by: EMERGENCY MEDICINE

## 2021-05-25 PROCEDURE — 250N000013 HC RX MED GY IP 250 OP 250 PS 637: Performed by: EMERGENCY MEDICINE

## 2021-05-25 PROCEDURE — 80048 BASIC METABOLIC PNL TOTAL CA: CPT | Performed by: EMERGENCY MEDICINE

## 2021-05-25 PROCEDURE — 74176 CT ABD & PELVIS W/O CONTRAST: CPT

## 2021-05-25 PROCEDURE — 250N000011 HC RX IP 250 OP 636: Performed by: EMERGENCY MEDICINE

## 2021-05-25 PROCEDURE — 81001 URINALYSIS AUTO W/SCOPE: CPT | Performed by: EMERGENCY MEDICINE

## 2021-05-25 PROCEDURE — 85025 COMPLETE CBC W/AUTO DIFF WBC: CPT | Performed by: EMERGENCY MEDICINE

## 2021-05-25 PROCEDURE — 96376 TX/PRO/DX INJ SAME DRUG ADON: CPT | Performed by: EMERGENCY MEDICINE

## 2021-05-25 PROCEDURE — 258N000003 HC RX IP 258 OP 636: Performed by: EMERGENCY MEDICINE

## 2021-05-25 RX ORDER — IBUPROFEN 600 MG/1
600 TABLET, FILM COATED ORAL EVERY 6 HOURS PRN
Qty: 28 TABLET | Refills: 0 | Status: SHIPPED | OUTPATIENT
Start: 2021-05-25 | End: 2021-05-25

## 2021-05-25 RX ORDER — TAMSULOSIN HYDROCHLORIDE 0.4 MG/1
0.4 CAPSULE ORAL ONCE
Status: COMPLETED | OUTPATIENT
Start: 2021-05-25 | End: 2021-05-25

## 2021-05-25 RX ORDER — OXYCODONE HYDROCHLORIDE 5 MG/1
10 TABLET ORAL ONCE
Status: COMPLETED | OUTPATIENT
Start: 2021-05-25 | End: 2021-05-25

## 2021-05-25 RX ORDER — ONDANSETRON 4 MG/1
4 TABLET, ORALLY DISINTEGRATING ORAL EVERY 6 HOURS PRN
Qty: 10 TABLET | Refills: 0 | Status: SHIPPED | OUTPATIENT
Start: 2021-05-25 | End: 2021-10-01

## 2021-05-25 RX ORDER — KETOROLAC TROMETHAMINE 30 MG/ML
30 INJECTION, SOLUTION INTRAMUSCULAR; INTRAVENOUS ONCE
Status: DISCONTINUED | OUTPATIENT
Start: 2021-05-25 | End: 2021-05-25

## 2021-05-25 RX ORDER — IBUPROFEN 600 MG/1
600 TABLET, FILM COATED ORAL EVERY 6 HOURS PRN
Qty: 28 TABLET | Refills: 0 | Status: SHIPPED | OUTPATIENT
Start: 2021-05-25 | End: 2021-10-01

## 2021-05-25 RX ORDER — SODIUM CHLORIDE 9 MG/ML
INJECTION, SOLUTION INTRAVENOUS CONTINUOUS
Status: DISCONTINUED | OUTPATIENT
Start: 2021-05-25 | End: 2021-05-25 | Stop reason: HOSPADM

## 2021-05-25 RX ORDER — HYDROMORPHONE HYDROCHLORIDE 1 MG/ML
0.5 INJECTION, SOLUTION INTRAMUSCULAR; INTRAVENOUS; SUBCUTANEOUS
Status: DISCONTINUED | OUTPATIENT
Start: 2021-05-25 | End: 2021-05-25 | Stop reason: HOSPADM

## 2021-05-25 RX ORDER — TAMSULOSIN HYDROCHLORIDE 0.4 MG/1
0.4 CAPSULE ORAL DAILY
Qty: 7 CAPSULE | Refills: 0 | Status: CANCELLED | OUTPATIENT
Start: 2021-05-25 | End: 2021-06-01

## 2021-05-25 RX ORDER — HYDROCODONE BITARTRATE AND ACETAMINOPHEN 5; 325 MG/1; MG/1
1 TABLET ORAL EVERY 6 HOURS PRN
Qty: 10 TABLET | Refills: 0 | Status: SHIPPED | OUTPATIENT
Start: 2021-05-25 | End: 2021-10-01

## 2021-05-25 RX ORDER — IBUPROFEN 600 MG/1
600 TABLET, FILM COATED ORAL ONCE
Status: COMPLETED | OUTPATIENT
Start: 2021-05-25 | End: 2021-05-25

## 2021-05-25 RX ORDER — ONDANSETRON 2 MG/ML
4 INJECTION INTRAMUSCULAR; INTRAVENOUS EVERY 30 MIN PRN
Status: COMPLETED | OUTPATIENT
Start: 2021-05-25 | End: 2021-05-25

## 2021-05-25 RX ORDER — ONDANSETRON 4 MG/1
4 TABLET, ORALLY DISINTEGRATING ORAL EVERY 6 HOURS PRN
Qty: 10 TABLET | Refills: 0 | Status: SHIPPED | OUTPATIENT
Start: 2021-05-25 | End: 2021-05-25

## 2021-05-25 RX ADMIN — ONDANSETRON 4 MG: 2 INJECTION INTRAMUSCULAR; INTRAVENOUS at 08:14

## 2021-05-25 RX ADMIN — IBUPROFEN 600 MG: 600 TABLET, FILM COATED ORAL at 13:12

## 2021-05-25 RX ADMIN — SODIUM CHLORIDE 1000 ML: 9 INJECTION, SOLUTION INTRAVENOUS at 08:14

## 2021-05-25 RX ADMIN — TAMSULOSIN HYDROCHLORIDE 0.4 MG: 0.4 CAPSULE ORAL at 09:31

## 2021-05-25 RX ADMIN — SODIUM CHLORIDE: 9 INJECTION, SOLUTION INTRAVENOUS at 10:27

## 2021-05-25 RX ADMIN — ONDANSETRON 4 MG: 2 INJECTION INTRAMUSCULAR; INTRAVENOUS at 11:09

## 2021-05-25 RX ADMIN — HYDROMORPHONE HYDROCHLORIDE 1 MG: 1 INJECTION, SOLUTION INTRAMUSCULAR; INTRAVENOUS; SUBCUTANEOUS at 08:15

## 2021-05-25 RX ADMIN — HYDROMORPHONE HYDROCHLORIDE 0.5 MG: 1 INJECTION, SOLUTION INTRAMUSCULAR; INTRAVENOUS; SUBCUTANEOUS at 11:10

## 2021-05-25 RX ADMIN — OXYCODONE HYDROCHLORIDE 10 MG: 5 TABLET ORAL at 11:07

## 2021-05-25 RX ADMIN — ONDANSETRON 4 MG: 2 INJECTION INTRAMUSCULAR; INTRAVENOUS at 09:25

## 2021-05-25 RX ADMIN — HYDROMORPHONE HYDROCHLORIDE 0.5 MG: 1 INJECTION, SOLUTION INTRAMUSCULAR; INTRAVENOUS; SUBCUTANEOUS at 09:27

## 2021-05-25 ASSESSMENT — ENCOUNTER SYMPTOMS
DIARRHEA: 0
FEVER: 0
DYSURIA: 1
NAUSEA: 1
ABDOMINAL PAIN: 1
COUGH: 0
CONSTIPATION: 0
SHORTNESS OF BREATH: 0
VOMITING: 0
HEMATURIA: 0
FLANK PAIN: 1

## 2021-05-25 NOTE — ED PROVIDER NOTES
"ED Provider Note  [facility]      History     Chief Complaint   Patient presents with     Flank Pain     Right flank pain radiating down right leg. Burning and dysuria with urination. Patient took ibuprofen at 0600 with no relief.     HPI  \"Liz\" Chris Stockton is a 60 year old male to female transgender patient with a past medical history of generalized anxiety, personality disorder, hypothyroidism, esophageal stricture, sensorineural hearing loss, GERD, kidney stone disease who presents with complaint of right flank pain.  Patient stated that she developed lower abdominal/suprapubic pain and burning with urination about a week ago.  She developed acute onset of sharp right-sided flank pain this morning around 2 AM.  Denies nausea, vomiting, diarrhea.  Patient recently had similar pain with another right-sided kidney stone that was diagnosed on imaging from April 2.  ET scan from that encounter showed a 0.5 cm obstructing stone in the right distal ureter.  Also several nonobstructing stones noted in both kidneys that were 0.5 cm or smaller.  The patient has had multiple previous episodes of renal colic with stones.  She states that this current presentation is very similar to prior episodes.  She also has been seen by urology recently for urinary incontinence of unclear etiology.  She was started on Myrbetriq for overactive bladder.  Patient states that she strained her urine for quite a while after her last stone in April but did not see a stone in the filter.  She had previously had stones analyzed that were oxalic acid stones.  Patient denies constipation, diarrhea, fever, blood in urination.  She has been taking ibuprofen for pain without relief.  Does report having similar burning dysuria with prior episodes of urolithiasis and no previous history of UTI.    Past Medical History  Past Medical History:   Diagnosis Date     Anxiety      Depressive disorder      Esophageal stricture  "     Kidney stone     nov.     Past Surgical History:   Procedure Laterality Date     APPENDECTOMY       COLONOSCOPY  12/9/2011    Procedure:COLONOSCOPY; COLONOSCOPY; Surgeon:MARILY MENA; Location: GI     ESOPHAGOSCOPY, GASTROSCOPY, DUODENOSCOPY (EGD), COMBINED  11/23/2012    Procedure: COMBINED ESOPHAGOSCOPY, GASTROSCOPY, DUODENOSCOPY (EGD), BIOPSY SINGLE OR MULTIPLE;;  Surgeon: Yehuda Tomlinson MD;  Location:  GI     VASECTOMY       buPROPion (WELLBUTRIN) 100 MG tablet  estradiol (VIVELLE-DOT) 0.1 MG/24HR bi-weekly patch  guanFACINE (TENEX) 2 MG tablet  HYDROcodone-acetaminophen (NORCO) 5-325 MG tablet  ibuprofen (ADVIL/MOTRIN) 600 MG tablet  levothyroxine (SYNTHROID/LEVOTHROID) 88 MCG tablet  melatonin 3 MG tablet  nystatin (MYCOSTATIN) 282876 UNIT/GM external cream  ondansetron (ZOFRAN ODT) 4 MG ODT tab  progesterone (PROMETRIUM) 200 MG capsule  rosuvastatin (CRESTOR) 10 MG tablet  spironolactone (ALDACTONE) 25 MG tablet  vitamin D3 (CHOLECALCIFEROL) 50 mcg (2000 units) tablet  tamsulosin (FLOMAX) 0.4 MG capsule      Allergies   Allergen Reactions     Finasteride      Mental health problems     Tetracycline Unknown     Doxycycline Rash     Family History  Family History   Problem Relation Age of Onset     Melanoma Brother      Prostate Cancer Brother      Heart Surgery Father      Prostate Cancer Father      Neuropathy Father      Heart Disease Mother      Social History   Social History     Tobacco Use     Smoking status: Never Smoker     Smokeless tobacco: Never Used   Substance Use Topics     Alcohol use: Yes     Comment: rare     Drug use: No      Past medical history, past surgical history, medications, allergies, family history, and social history were reviewed with the patient. No additional pertinent items.       Review of Systems   Constitutional: Negative for fever.   Respiratory: Negative for cough and shortness of breath.    Cardiovascular: Negative for chest pain.   Gastrointestinal:  Positive for abdominal pain and nausea. Negative for constipation, diarrhea and vomiting.   Genitourinary: Positive for dysuria and flank pain. Negative for hematuria.   All other systems reviewed and are negative.    A complete review of systems was performed with pertinent positives and negatives noted in the HPI, and all other systems negative.    Physical Exam   BP: 134/81  Pulse: 52  Temp: 97.6  F (36.4  C)  Resp: 18  SpO2: 98 %  Physical Exam  Vitals signs and nursing note reviewed.   Constitutional:       General: She is not in acute distress.     Appearance: She is obese. She is not ill-appearing or diaphoretic.   HENT:      Head: Normocephalic and atraumatic.   Eyes:      General: No scleral icterus.     Conjunctiva/sclera: Conjunctivae normal.   Neck:      Musculoskeletal: Normal range of motion and neck supple. No neck rigidity.   Cardiovascular:      Rate and Rhythm: Bradycardia present.   Pulmonary:      Effort: Pulmonary effort is normal. No respiratory distress.      Breath sounds: No stridor.   Abdominal:      General: There is no distension.      Palpations: Abdomen is soft. There is no mass.      Tenderness: There is abdominal tenderness in the suprapubic area. There is no right CVA tenderness, left CVA tenderness, guarding or rebound. Negative signs include McBurney's sign.   Musculoskeletal:         General: No deformity or signs of injury.   Skin:     General: Skin is warm and dry.      Coloration: Skin is not jaundiced or pale.      Findings: No erythema or rash.   Neurological:      General: No focal deficit present.      Mental Status: She is alert and oriented to person, place, and time.   Psychiatric:         Mood and Affect: Mood normal.         Behavior: Behavior normal.         Thought Content: Thought content normal.           ED Course      Procedures               Results for orders placed or performed during the hospital encounter of 05/25/21   CT Abdomen Pelvis w/o Contrast      Status: None    Narrative    CT ABDOMEN PELVIS W/O CONTRAST 5/25/2021 9:44 AM    CLINICAL HISTORY: Flank pain, kidney stone suspected  TECHNIQUE: CT scan of the abdomen and pelvis was performed without IV  contrast. Multiplanar reformats were obtained. Dose reduction  techniques were used.  CONTRAST: None.    COMPARISON: 4/2/2021 and 11/23/2011    FINDINGS:   LOWER CHEST: There is a 5 mm left lower lobe nodule which has not  changed since at least 2011 and does not require follow-up (series 4  image 49). There is a moderate-sized hiatal hernia.    HEPATOBILIARY: Normal.    PANCREAS: Normal.    SPLEEN: A 7 mm low-attenuation splenic lesion is unchanged and is  almost certainly benign. No follow-up is required.    ADRENAL GLANDS: Normal.    KIDNEYS/BLADDER: There is a 6 mm x 5 mm x 3 mm calculus at the right  ureterovesical junction. There is mild right hydronephrosis and  hydroureter with right perinephric stranding. There are 3  nonobstructing right renal calculi and 2 nonobstructing left renal  calculi. A tiny angiomyolipoma is seen at the lower pole of the left  kidney; no follow-up is required. Normal urinary bladder.    BOWEL: Diverticulosis in the colon. No acute inflammatory change. No  obstruction.     LYMPH NODES: Normal.    VASCULATURE: Unremarkable.    PELVIC ORGANS: Normal.    OTHER: None.    MUSCULOSKELETAL: Normal.      Impression    IMPRESSION:   1.  6 mm x 5 mm x 3 mm calculus at the right ureterovesical junction.  There is mild right hydronephrosis and hydroureter with right  perinephric stranding.   2.  There are 3 nonobstructing right renal calculi and 2  nonobstructing left renal calculi.   3.  Colonic diverticulosis.  4.  Moderate sized hiatal hernia.    LESTER J FAHRNER, MD   CBC with platelets differential     Status: None   Result Value Ref Range    WBC Unsatisfactory specimen - clotted 4.0 - 11.0 10e9/L    RBC Count Unsatisfactory specimen - clotted 4.4 - 5.9 10e12/L    Hemoglobin  Unsatisfactory specimen - clotted 13.3 - 17.7 g/dL    Hematocrit Unsatisfactory specimen - clotted 40.0 - 53.0 %    MCV Unsatisfactory specimen - clotted 78 - 100 fl    MCH Unsatisfactory specimen - clotted 26.5 - 33.0 pg    MCHC Unsatisfactory specimen - clotted 31.5 - 36.5 g/dL    RDW Unsatisfactory specimen - clotted 10.0 - 15.0 %    Platelet Count Unsatisfactory specimen - clotted 150 - 450 10e9/L    Diff Method Unsatisfactory specimen - clotted    Basic metabolic panel     Status: Abnormal   Result Value Ref Range    Sodium 141 133 - 144 mmol/L    Potassium 4.3 3.4 - 5.3 mmol/L    Chloride 110 (H) 94 - 109 mmol/L    Carbon Dioxide 22 20 - 32 mmol/L    Anion Gap 9 3 - 14 mmol/L    Glucose 102 (H) 70 - 99 mg/dL    Urea Nitrogen 18 7 - 30 mg/dL    Creatinine 1.09 0.66 - 1.25 mg/dL    GFR Estimate 72 >60 mL/min/[1.73_m2]    GFR Estimate If Black 84 >60 mL/min/[1.73_m2]    Calcium 8.8 8.5 - 10.1 mg/dL   UA reflex to Microscopic and Culture     Status: Abnormal    Specimen: Urine clean catch; Midstream Urine   Result Value Ref Range    Color Urine Light Yellow     Appearance Urine Clear     Glucose Urine Negative NEG^Negative mg/dL    Bilirubin Urine Negative NEG^Negative    Ketones Urine Negative NEG^Negative mg/dL    Specific Gravity Urine 1.020 1.003 - 1.035    Blood Urine Small (A) NEG^Negative    pH Urine 5.5 5.0 - 7.0 pH    Protein Albumin Urine Negative NEG^Negative mg/dL    Urobilinogen mg/dL Normal 0.0 - 2.0 mg/dL    Nitrite Urine Negative NEG^Negative    Leukocyte Esterase Urine Negative NEG^Negative    Source Midstream Urine     RBC Urine 6 (H) 0 - 2 /HPF    WBC Urine 1 0 - 5 /HPF    Bacteria Urine None (A) NEG^Negative /HPF    Squamous Epithelial /HPF Urine 2 (H) 0 - 1 /HPF    Mucous Urine Present (A) NEG^Negative /LPF    Hyaline Casts 1 0 - 2 /LPF   CBC with platelets differential     Status: Abnormal   Result Value Ref Range    WBC 16.6 (H) 4.0 - 11.0 10e9/L    RBC Count 5.13 4.4 - 5.9 10e12/L     "Hemoglobin 15.4 13.3 - 17.7 g/dL    Hematocrit 45.6 40.0 - 53.0 %    MCV 89 78 - 100 fl    MCH 30.0 26.5 - 33.0 pg    MCHC 33.8 31.5 - 36.5 g/dL    RDW 12.8 10.0 - 15.0 %    Platelet Count 205 150 - 450 10e9/L    Diff Method Automated Method     % Neutrophils 78.5 %    % Lymphocytes 11.6 %    % Monocytes 6.6 %    % Eosinophils 0.9 %    % Basophils 0.8 %    % Immature Granulocytes 1.6 %    Nucleated RBCs 0 0 /100    Absolute Neutrophil 13.1 (H) 1.6 - 8.3 10e9/L    Absolute Lymphocytes 1.9 0.8 - 5.3 10e9/L    Absolute Monocytes 1.1 0.0 - 1.3 10e9/L    Absolute Eosinophils 0.2 0.0 - 0.7 10e9/L    Absolute Basophils 0.1 0.0 - 0.2 10e9/L    Abs Immature Granulocytes 0.3 0 - 0.4 10e9/L    Absolute Nucleated RBC 0.0      Medications   0.9% sodium chloride BOLUS (0 mLs Intravenous Stopped 5/25/21 1026)     Followed by   sodium chloride 0.9% infusion ( Intravenous Stopped 5/25/21 1438)   HYDROmorphone (PF) (DILAUDID) injection 0.5 mg (0.5 mg Intravenous Given 5/25/21 1110)   ondansetron (ZOFRAN) injection 4 mg (4 mg Intravenous Given 5/25/21 1109)   HYDROmorphone (DILAUDID) injection 1 mg (1 mg Intravenous Given 5/25/21 0815)   tamsulosin (FLOMAX) capsule 0.4 mg (0.4 mg Oral Given 5/25/21 0931)   oxyCODONE (ROXICODONE) tablet 10 mg (10 mg Oral Given 5/25/21 1107)   ibuprofen (ADVIL/MOTRIN) tablet 600 mg (600 mg Oral Given 5/25/21 1312)        Assessments & Plan (with Medical Decision Making)   \"Liz\" Chris Stockton is a 60 year old male to female transgender patient with a past medical history of generalized anxiety, personality disorder, hypothyroidism, esophageal stricture, sensorineural hearing loss, GERD, kidney stone disease who presents with complaint of right flank pain.    Ddx: Recurrent ureterolithiasis, UTI or pyelonephritis, renal colic, DEYANIRA    Patient has history of urolithiasis recent imaging showing multiple intrarenal stones.  Patient's presentation is typical and consistent with prior episodes of " ureterolithiasis.  I discussed the option to treat symptomatically and follow-up with urology versus obtaining a renal ultrasound versus obtaining a CT scan.  We discussed the risk of recurrent radiation exposure from repeated CT scans.  Patient requested that I review documentation from his urology visit as they had mentioned they were planning to get a CT in the future.  I reviewed patient's outside provider note from urology visit through care everywhere.  They did discuss obtaining additional imaging at the next visit but did not specify what type of imaging.  After I discussed this again with the patient, she elected to proceed with a CT scan because she wanted to know if she had passed some of the intrarenal stones.  Patient given IV fluids, Flomax, IV Dilaudid, Zofran.  Appeared very comfortable on exam without CVA tenderness or abdominal guarding.  Patient does not have an appendix.    Labs notable for creatinine within normal limits, electrolytes wnl, urinalysis shows no bacteria 6 RBCs, no nitrite or leukocyte esterase.  ET resulted with a 6 mm x 5 mm x 3 mm calculus at the right ureterovesicular junction.  Mild hydronephrosis and hydroureter on the right with perinephric stranding.  Also noted are 3 nonobstructing right renal calculi and 2 nonobstructing left renal calculi.  On reassessment, patient reports minimal improvement with Dilaudid but he appears relatively comfortable.  He said that he normally falls asleep and has no pain after Dilaudid but is concerned that if he goes home with oxycodone that the pain will not be controlled.  He does not want to speak to urology about procedural intervention but would like to try to go home and pass the stone on his own.  We talked about the plan for outpatient management with Zofran, Flomax, narcotic pain medications, and ibuprofen.  I reassured him that typically people do get adequate pain relief with oral narcotics in combination with ibuprofen as long as  they keep on top of the dosing and do not get behind they develop severe nausea and vomiting.  The patient would like to stay a little bit longer for pain control to transition to oral pain medications to ensure that he is able to manage at home.  He was transitioned to ibuprofen and oxycodone.  Patient was given a prescription for Zofran, Vicodin, ibuprofen for home.  He has additional Flomax at home that he can take.  He has an outpatient urologist to that he will call to schedule appropriate follow-up with.  He also understands that he should return for worsening pain, fever, dehydration, recurrent nausea and vomiting, other concerns.    Note, initial CBC was hemolyzed but repeat shows weighted white blood cell count with left shift.  I suspect this is related to stress demargination from pain and vomiting.  Patient does not have a fever and is well-appearing with normal blood pressure and heart rate.  He does not appear septic.  urinalysis also does not appear infected.    I have reviewed the nursing notes. I have reviewed the findings, diagnosis, plan and need for follow up with the patient.    New Prescriptions    HYDROCODONE-ACETAMINOPHEN (NORCO) 5-325 MG TABLET    Take 1 tablet by mouth every 6 hours as needed for severe pain    IBUPROFEN (ADVIL/MOTRIN) 600 MG TABLET    Take 1 tablet (600 mg) by mouth every 6 hours as needed for moderate pain    ONDANSETRON (ZOFRAN ODT) 4 MG ODT TAB    Take 1 tablet (4 mg) by mouth every 6 hours as needed for nausea       Final diagnoses:   Kidney stone   Ureterolithiasis   Right flank pain       --  Stacey Lorenzo  [facility] EMERGENCY DEPARTMENT  5/25/2021     Stacey Lorenzo MD  05/25/21 5459

## 2021-05-25 NOTE — DISCHARGE INSTRUCTIONS
Please make an appointment to follow up with Your Primary Care Provider in 3-7 days even if entirely better. Call your Urology Clinic as needed for ongoing pain.    Strain urine and bring stone in for testing. Drink lots of fluids to help flush out the stone.     Take Vicodin for severe pain, as needed.  Do not take this medication with alcohol or other sedating medications.  Do not drive while taking this medication as it can make you sleepy and slower to respond.  Take more of this medication than prescribed.    Take ibuprofen or Tylenol, over-the-counter, as needed for mild to moderate pain.  Take Zofran for nausea, as needed.    Return to the emergency department if you develop worsening pain, nausea or vomiting, inability to keep down your medications, fever, blood in your urine, or decreased urine output.

## 2021-05-25 NOTE — ED TRIAGE NOTES
Patient has a history of kidney stones. Patient reports pain today feeling similar. Patient reports flank pain for the past 6 hours, with burning and difficulty urinating. Patient reports having a CT scan 4/2 where several kidney stones were observed.    yes

## 2021-05-28 ENCOUNTER — TELEPHONE (OUTPATIENT)
Dept: FAMILY MEDICINE | Facility: CLINIC | Age: 61
End: 2021-05-28

## 2021-05-28 NOTE — TELEPHONE ENCOUNTER
"RN spoke to patient- currently dealing with large right kidney stone (see previous encounters and mychart). Patient states she had been feeling better yesterday but this morning has been having significant flank pain again 6/10, nausea, and feels \"dry\". Patient had not taken any  Norco yet but did take zofran this morning which helped. Denies fever, blood in urine- is able to urinate but states it is a \"weak flow\". Patient admits that she hadn't been pushing fluids this morning but is hydrating now. Patient also reported that she passed 2 small pieces of \"pinkish tissue\" and was unsure if this was part of the stone or something else entirely. Patient wondering if she should continue to monitor at home and treat symptoms with pain medications and fluids or return to ED for another scan.     RN advised it is difficult to know what is happening with the stone without imaging, therefore returning to ED for assessment due to the incrased flank pain would be reasonable. Advised that clinic visit would likely not be beneficial as we wouldn't be able to visualize the stone. Patient states she would like to avoid ED if possible- requesting Dr. Santizo review CT scan and picture sent via [facility] and call back with advice. Her plan for now is to take a pain med and push fluids in the meantime. RN advised if anything changes before hearing from provider- symptoms worsen significantly, she is unable to urinate, or has blood in urine she should return to ED right away. She verbalized understanding. Routing to PCP high priority as requested.   Michaelle Faye RN    "

## 2021-05-28 NOTE — TELEPHONE ENCOUNTER
[facility] phone call message-patient reporting a symptom:     Symptom: Kidney stone pain    When did symptoms begin? Tuesday    Characteristics: (location on body, intensity, what makes it better or worse, associated symptoms):      Additional Details: Spent a long time at ER last week      Same Day Visit Offered: No    Additional comments:     OK to leave message on voice mail? Yes    Advised patient that RN would call back within 3 hours, unless emergent   Primary language: English      needed? No    Call taken on May 28, 2021 at 10:35 AM by April Juni

## 2021-06-01 ENCOUNTER — VIRTUAL VISIT (OUTPATIENT)
Dept: FAMILY MEDICINE | Facility: CLINIC | Age: 61
End: 2021-06-01
Payer: COMMERCIAL

## 2021-06-01 DIAGNOSIS — N20.0 KIDNEY STONE: ICD-10-CM

## 2021-06-01 DIAGNOSIS — F33.2 MDD (MAJOR DEPRESSIVE DISORDER), RECURRENT SEVERE, WITHOUT PSYCHOSIS (H): ICD-10-CM

## 2021-06-01 DIAGNOSIS — E03.9 ACQUIRED HYPOTHYROIDISM: ICD-10-CM

## 2021-06-01 DIAGNOSIS — E78.5 HYPERLIPIDEMIA LDL GOAL <100: Primary | ICD-10-CM

## 2021-06-01 PROBLEM — F32.A DEPRESSION, UNSPECIFIED DEPRESSION TYPE: Status: ACTIVE | Noted: 2021-06-01

## 2021-06-01 PROCEDURE — 99215 OFFICE O/P EST HI 40 MIN: CPT | Mod: GT | Performed by: FAMILY MEDICINE

## 2021-06-01 RX ORDER — ARIPIPRAZOLE 2 MG/1
2 TABLET ORAL DAILY
COMMUNITY
Start: 2021-05-27 | End: 2021-06-01

## 2021-06-01 RX ORDER — ARIPIPRAZOLE 2 MG/1
2 TABLET ORAL DAILY
COMMUNITY
Start: 2021-06-01 | End: 2021-10-20

## 2021-06-01 RX ORDER — BUPROPION HYDROCHLORIDE 100 MG/1
100 TABLET, EXTENDED RELEASE ORAL DAILY
COMMUNITY
Start: 2021-01-12 | End: 2022-11-10

## 2021-06-01 NOTE — PROGRESS NOTES
"Liz is a 60 year old who is being evaluated via a billable video visit.      How would you like to obtain your AVS? MyChart  If the video visit is dropped, the invitation should be resent by:   Will anyone else be joining your video visit? No      Video Start Time: 4:05 PM    Assessment & Plan     Kidney stone  Plan to continue pushing fluids. Has plan to restart Flomax and Ibuprofen if the pain restarts. Has appointment with Urology at [facility] on 6/15. Will bring her stone there. Recommended she consider repeat dietary counseling    Hyperlipidemia LDL goal <100  Now 3 months on Crestor. Plan to return to \[facility]\"" for repeat lipids. Goal closer to 100.  Plan echo in July/August  - Lipid panel; Future    Acquired hypothyroidism  Last check was in range. Repeat in about 9 months    MDD (major depressive disorder), recurrent severe, without psychosis (H)  Is doing better and working closely with Dr. Sterling. Plan to start at [facility]. Fully support.         50 minutes spent on the date of the encounter doing chart review, history and exam, documentation and further activities per the note       BMI:   Estimated body mass index is 34.15 kg/m  as calculated from the following:    Height as of 4/3/21: 1.727 m (5' 8\").    Weight as of 4/3/21: 101.9 kg (224 lb 9.6 oz).           No follow-ups on file.    Yanna Santizo MD  [facility] is a 60 year old who presents for the following health issues     HPI     Now living for the last year away from her owned home.     Stone follow up:  Passed the stone - 6mm. Is planing on starting the Flomax immediately with any UTI symptoms, and then the ibuprofen and only Norco if needs it.   Has an appointment with the urologist.   Has a history of Calcium Oxalate stone in 2011. Has not been following the recommended diet strongly.     Stones 1 year ago, a few months ago and then again in the last week      MH:  Is doing a bit better " "and they are starting to wean her meds a bit. Was having more intrusive thoughts that were problematic and they are better in the last 3 months since decreased one of the meds.   Still having occasional SI thoughts but generally scoring a \"2\" for most PHQ9 questions except for question 9.  Does binge eat - emotional relationship   Has talked to Dr. Sterling about getting referral to [facility].     Hyperlipidemia:  Is not exercising much. Although did move brush outside the other day and that was a work out and did not have chest pain.   Is hoping to get on her bike more  Is planning to move items out of her house.      Sleep:   Melatonin helps her sleep solidly but then wakes up after 5 hours. Wakes up due to urinary needs.             Review of Systems         Objective       Vitals:  No vitals were obtained today due to virtual visit.    Physical Exam   GENERAL: Healthy, alert and no distress  EYES: Eyes grossly normal to inspection.  No discharge or erythema, or obvious scleral/conjunctival abnormalities.  RESP: No audible wheeze, cough, or visible cyanosis.  No visible retractions or increased work of breathing.    SKIN: Visible skin clear. No significant rash, abnormal pigmentation or lesions.  NEURO: Cranial nerves grossly intact.  Mentation and speech appropriate for age.  PSYCH: Mentation appears normal, affect normal/bright, judgement and insight intact, normal speech and appearance well-groomed.                Video-Visit Details    Type of service:  Video Visit    Video End Time:4:47 PM    Originating Location (pt. Location): Other from her \"home\" where she is renting and not from her owned home    Distant Location (provider location):  [facility]     Platform used for Video Visit: Dilip"

## 2021-06-01 NOTE — PATIENT INSTRUCTIONS
Patient Education   Here is the plan from today's visit    1. Hyperlipidemia LDL goal <100  Plan to come in for this in the next few weeks  - Lipid panel; Future    2. Heart Health  Keep working on getting out and getting more exercise on your bike  Plan to do an echo test in July or August    3. Stones  You have a plan!  Keep drinking lots of fluids    4. Mental health  You have a plan and fully agree with being seen at [facility].     5. Return in 6 months? Maybe or earlier if need be.       Please call or return to clinic if your symptoms don't go away.    Follow up plan  No follow-ups on file.    Thank you for coming to [facility] today.  COVID-19 Vaccine:  If you are eligible for the COVID-19 vaccine, you can schedule via [employer] or call [facility] at [address] [place] [address]. If you need assistance with scheduling, please speak to a Care Coordinator or your provider.   Lab Testing:  **If you had lab testing today and your results are reassuring or normal they will be mailed to you or sent through [employer] within 7 days.   **If the lab tests need quick action we will call you with the results.  **If you are having labs done on a different day, please call [phone] to schedule at hospitals [facility] or [phone] for other [facility] locations.   The phone number we will call with results is # [phone] (home) none (work). If this is not the best number please call our clinic and change the number.  Medication Refills:  If you need any refills please call your pharmacy and they will contact us.   If you need to  your refill at a new pharmacy, please contact the new pharmacy directly. The new pharmacy will help you get your medications transferred faster.   Scheduling:  If you have any concerns about today's visit or wish to schedule another appointment please call our office during normal business hours [phone] (8-5:00 M-F)  If a referral was made to a [facility] and you don't get a call from central scheduling please call [phone].  If a Mammogram was ordered for you at [facility] call [phone] to schedule or change your appointment.  If you had an EKG/XRay/CT/Ultrasound/MRI ordered the number is [phone] to schedule or change your radiology appointment.   Medical Concerns:  If you have urgent medical concerns please call [phone] at any time of the day.    Yanna Santizo MD

## 2021-06-08 NOTE — PROGRESS NOTES
" Psychotherapy Progress Note    Date: 1/30/2017      Start Time: 3  Stop Time: 4  Session #: 3    Persons Present: client here to address gener dysphoria and emotional regulation    Client Identification: Client identifies as a transgender woman,  & going through a divorce, 2 adult sons, one with autism and the other struggling psychosocially, employed full-time; she prefers female pronouns in session and in her electronic medical chart, though she is not out completely socially nor at work.    Goal (From [place] Plan): Clients reports her goal is \"to get support during her transition\".    New symptoms or complaints: None   Functional Impairment:   Personal: 3  Family: 3  Work: 2  Social:1    Mental Status Evaluation: Well groomed, appropriate, cooperative, motor activity within normal, good eye contact, she reports concerned mood and affect are congruent, thought process and content within normal, no evidence of impairment of memory or judgement, and adequate fund of knowledge and insight.  Safety:  Domestic Violence: None reported  Suicide Assessment: Patient is considered to be at a low level of risk for suicide. According to information currently available from the patient and former therapist Tobi Azul (ANGELA signed), there are few risk factors for suicide, which include but are not limited to: Hx of psychiatric diagnostic and transgender identity.   Protective factors include: Patient currently denies suicide ideation; no past suicide attempts or gestures; currently feels hopeful about the future and is future oriented; patient is dedicated to her sons; she enjoys her career; current willingness to collaborate with care providers and participate in care efforts;  and demonstrated willingness and ability to access providers/emergency resources in event of need. There currently is no imminent risk. Patient has been educated about how and when to access emergency services (i.e., 911, Emergency Department, " "National Crisis Line) in the event of any immediate risk of harm. Patient reports willingness to use available crisis resources if needed. Patient has contact information for all outpatient service providers, agrees to call or come in between appointments if indicated, and has demonstrated willingness and ability to do so in the past. No learning barriers noted  Homicide Assessment: Patient is considered to be at low risk for homicide. Client reports no thoughts of homicide and there currently is no imminent risk    Subjective Report: Client reports \"good and not so good\" events this past week    Objective Report and Session Content: Casual friendly nature, good hygiene, good posture    Assessment: Client on time for scheduled appointment. She reports good mood though some rough days. She had a conversation with her parents about being transgender; it was a nice conversation. Her wife has plans on surgery for removal of spot on a lung that is either cancer or fungal infection..Client is connecting on regular basis with a transgender community group among other social outings. She continues to plan for separation and divorce.She continues to work on skills for emotional regulation and procrastination.    Type of psychotherapeutic technique provided: Client centered, CBT    Progress toward short term goals: Progress as expected, client continues to present in her female gender in diverse places, though not in all settings.     Medications  300 mg Wellbutrin 1x daily  10 mg Adoral 1x daily    Review of long term goals: Client would like to live full-time as her female self    Diagnosis: Gender Identity Disorder, Major Depressive Disorder, recurrent, moderat    Plan: Client will return to clinic in 1 week    Discharge Criteria/Planning: Patient will continue with follow-up until therapy can be discontinued without return of signs and symptoms.    Qian Martines 1/30/2017    "

## 2021-06-08 NOTE — PROGRESS NOTES
" Psychotherapy Progress Note    Date: 1/9/2017      Start Time: 3:00   Stop Time: 3.52  Session #: 1    Persons Present: client here to address gener dysphoria and emotional regulation    Client Identification: Client identifies as a transgender woman,  & going through a divorce, 2 adult sons, one with autism and the other struggling psychosocially, employed full-time; she prefers female pronouns in session and in her electronic medical chart, though she is not out completely socially.    Goal (From [place] Plan): Clients reports her goal is \"to get support during her transition\".    New symptoms or complaints: None   Functional Impairment:   Personal: 3  Family: 3  Work: 2  Social:1    Mental Status Evaluation: Well groomed, appropriate, cooperative, motor activity within normal, good eye contact, she reports concerned mood and affect are congruent, thought process and content within normal, no evidence of impairment of memory or judgement, and adequate fund of knowledge and insight.  Safety:  Domestic Violence: None reported  Suicide Assessment: Patient is considered to be at a low level of risk for suicide. According to information currently available from the patient and former therapist Tobi Azul (ANGELA signed), there are few risk factors for suicide, which include but are not limited to: Hx of psychiatric diagnostic and transgender identity.   Protective factors include: Patient currently denies suicide ideation; no past suicide attempts or gestures; currently feels hopeful about the future and is future oriented; patient is dedicated to her sons; she enjoys her career; current willingness to collaborate with care providers and participate in care efforts;  and demonstrated willingness and ability to access providers/emergency resources in event of need. There currently is no imminent risk. Patient has been educated about how and when to access emergency services (i.e., 911, Emergency Department, National " Crisis Line) in the event of any immediate risk of harm. Patient reports willingness to use available crisis resources if needed. Patient has contact information for all outpatient service providers, agrees to call or come in between appointments if indicated, and has demonstrated willingness and ability to do so in the past. No learning barriers noted  Homicide Assessment: Patient is considered to be at low risk for homicide. Client reports no thoughts of homicide and there currently is no imminent risk    Subjective Report: Client reports good and not so good events this past week    Objective Report and Session Content: Casual friendly nature, good hygiene, good posture    Assessment: Client on time for scheduled appointment. She reports up and down mood. Her wife is getting chemo treatments for cancer and has plans on surgery for removal of spot on a lung that is either cancer or fungal infection. She has a difficult time with procrastination - though has checked off many things on her list of things to get done. Discussed the level of her depression on regular basis; she will track her mood. Discussed how her depression could be linked to negative thoughts. Considering taking a leave of absence for life transition - selling their home and divorce proceedings.    Discussed her separation anxiety and over-reliance on psychotherapy - and her current need to be in psychotherapy.    Type of psychotherapeutic technique provided: Client centered, CBT    Progress toward short term goals: Progress as expected, client continues to present in her female gender in diverse places, though not in all settings.     Medications  300 mg Wellbutrin 1x daily  10 mg Adoral 1x daily    Review of long term goals: Client would like to live full-time as her female self    Diagnosis: Gender Identity Disorder, Major Depressive Disorder, recurrent, moderat    Plan: Client will return to clinic in 1 week    Discharge Criteria/Planning:  Patient will continue with follow-up until therapy can be discontinued without return of signs and symptoms.    Qian Martines 1/9/2017

## 2021-06-08 NOTE — PROGRESS NOTES
"Psychotherapy Progress Note    2/15/2017      Start time: 11.30    Stop Time: 12.30   Session # 4    Chris Stockton is a 56 y.o. male is being seen today for gender dysphoria and reports her goal is \"to get support during her transition\"  Client identifies as a transgender woman,  & going through a divorce, 2 adult sons, one with autism and the other struggling psychosocially, employed full-time; she prefers female pronouns in session and in her electronic medical chart, though she is not out completely socially nor at work.  Chief Complaint   Patient presents with      Follow Up     New symptoms or complaints: None    Functional Impairment:   Personal: 3  Family: 3  Work: 2  Social:1    Clinical assessment of mental status:   Chris Stockton  (goes by Reece with female pronouns) presented on time.   She was oriented x3, open and cooperative, and dressed appropriately for this session and weather. Her memory was Normal cognitive functioning .  Her speech was  Within normal.  Language was appropriate.  Concentration and focus was Within normal. Psychosis is not noted or reported. She reports his mood was Congruent w/content of speech.  Affect was congruent with speech and was Congruent w/content of speech.  Fund of knowledge was adequate. Insight was adequate for therapy.    Safety:  Domestic Violence: None reported  Suicide Assessment: Patient is considered to be at a low level of risk for suicide. According to information currently available from the patient and former therapist Tobi Azul (ANGELA signed), there are few risk factors for suicide, which include but are not limited to: Hx of psychiatric diagnostic and transgender identity.   Protective factors include: Patient currently denies suicide ideation; no past suicide attempts or gestures; currently feels hopeful about the future and is future oriented; patient is dedicated to her sons; she enjoys her career; current willingness to collaborate with " care providers and participate in care efforts;  and demonstrated willingness and ability to access providers/emergency resources in event of need. There currently is no imminent risk. Patient has been educated about how and when to access emergency services (i.e., 911, Emergency Department, National Crisis Line) in the event of any immediate risk of harm. Patient reports willingness to use available crisis resources if needed. Patient has contact information for all outpatient service providers, agrees to call or come in between appointments if indicated, and has demonstrated willingness and ability to do so in the past. No learning barriers noted  Homicide Assessment: Patient is considered to be at low risk for homicide. Client reports no thoughts of homicide and there currently is no imminent risk     Patient's impression of their current status:  Client late for scheduled appointment; she called to inform that she had a flat tire and I was able to extend session to accommodate the full hour. She reports her wife had surgery and confirm collal rectal cancer. Processed how change is difficult and skills she is using to deal with the distress of change - she is dealing with changes of a new  at her [employer]. Discussed ACT and will continue to have such conversations introducing ACT.    Therapist impression of patients current state: This 56 y.o.  Transfemale, going through a divorce, and other stressors related to procrastination and state of the [place] political climate. Patient is engaging in the therapeutic process.  Her thoughts, feelings, and beliefs were processed.  She is receptive to strategies for symptom management and improvement.  Shee is receptive to exploring strategies for increased coping and symptom management.     Type of psychotherapeutic technique provided: Client centered    Progress toward short term goals:Progress as expected, she is engaged in therapy    Review of long term goals: Long-term  goals reviewed sdf . Date of last review 6.24.17. long term goals reviewed 2.15.17  plan updated 2.15.17     Diagnosis: Gender Dysphoria    Medications  300 mg Wellbutrin 1x daily  10 mg Adoral 1x daily    Plan and Follow-up: Patient plans to read handout on ACT. Patient plans to follow up with therapy in a week.     Discharge Criteria/Planning: Patient will continue with follow-up until therapy can be discontinued without return of signs and symptoms.    Performed and documented by Qian Martines M.Ed., MSW, LICSW, LADC  2/15/2017

## 2021-06-08 NOTE — PROGRESS NOTES
Outpatient Mental Health Treatment Plan    Name:  Chris Stockton  :  1960  MRN:  [id number]    Treatment Plan:  Updated Treatment Plan  Intake/initial treatment plan date:  17  Benefit and risks and alternatives have been discussed: Yes  Is this treatment appropriate with minimal intrusion/restrictions: Yes  Estimated duration of treatment:  Approximately 24 sessions.  Anticipated frequency of services:  Every 1 weeks  Necessity for frequency: This frequency is needed to establish therapeutic goals and for continuity of care in order to monitor progress.  Necessity for treatment: To address cognitive, behavioral, and/or emotional barriers in order to work toward goals and to improve quality of life.    Plan:   ? Other: Client is seeking support while she transitions from male to her female identity  Gender Dysphoria Tx  Goal:  Decrease gender dysphoria level from 4 to 3  Strategies:         Identify and learn about incongruence between their experienced/expressed gender and assigned gender    Explore gender identity issues through peer support, use of the internet, or self-directed reading, writing, and reflect in session    Explore gender identity and how to deal with pressures relating to work, family, etc.     Process timing and ways of coming out: disclosing transgender identity to family, friends, and other loved ones     Identify and learn about ways of gender integration awareness into daily living     Identify and learn about temporary and potentially reversible as well as irreversible changes to appearance     Explore reasonable expectation of treatments and body modifications    Learn and practice relaxation techniques and other coping strategies (e.g., thought stopping, reframing, meditation)    Identify and monitor safety environments    Consider introduction of biblio therapy and/or videos    Continue compliance with medical treatment plan (or explore Barriers)     Functional  Impairment:  1=Not at all/Rarely  2=Some days  3=Most Days  4=Every Day    Personal : 4  Family : 3  Social : 0   Work/school : 2    Diagnosis: Gender Dysphoria    WHODAS 2.0 12-item version   Scores presented in qualifiers to represent level of disability.   NO Problem (none, absent, negligible,...) 0-4%  H1= 0  H2= 0  H3= 0    Clinical assessments and measures completed:. CAGE-AID, PANSI     Strengths:  She is above average intelligence, has good insight  Limitations:  She reports to be a procrastinator  Cultural influences and impact on patient: Client identifies as a 54 yo transgender MtF. , English speaking, [employer] with active obi engagement, father of two sons, in the midst of transitioning from male to female and in a divorce with spouse of 33 years.   Cultural impact on health and health care: Client has been engaged in [employer] [facility] for many years. She reports strong connections to her previous providers    Persons responsible for this plan: Patient            Psychotherapist Signature           Patient Signature:              Guardian Signature             Provider: Performed and documented by SHADIA Villegas   Date:  2/15/2017      This note was created with help of Dragon dictation software.  Grammatical / typing errors are not intentional and inherent to the software.

## 2021-06-08 NOTE — PROGRESS NOTES
" Psychotherapy Progress Note    Date: 1/20/2017      Start Time: 2:00   Stop Time: 3  Session #: 2    Persons Present: client here to address gener dysphoria and emotional regulation    Client Identification: Client identifies as a transgender woman,  & going through a divorce, 2 adult sons, one with autism and the other struggling psychosocially, employed full-time; she prefers female pronouns in session and in her electronic medical chart, though she is not out completely socially nor at work.    Goal (From [place] Plan): Clients reports her goal is \"to get support during her transition\".    New symptoms or complaints: None   Functional Impairment:   Personal: 3  Family: 3  Work: 2  Social:1    Mental Status Evaluation: Well groomed, appropriate, cooperative, motor activity within normal, good eye contact, she reports concerned mood and affect are congruent, thought process and content within normal, no evidence of impairment of memory or judgement, and adequate fund of knowledge and insight.  Safety:  Domestic Violence: None reported  Suicide Assessment: Patient is considered to be at a low level of risk for suicide. According to information currently available from the patient and former therapist Tobi Azul (ANGELA signed), there are few risk factors for suicide, which include but are not limited to: Hx of psychiatric diagnostic and transgender identity.   Protective factors include: Patient currently denies suicide ideation; no past suicide attempts or gestures; currently feels hopeful about the future and is future oriented; patient is dedicated to her sons; she enjoys her career; current willingness to collaborate with care providers and participate in care efforts;  and demonstrated willingness and ability to access providers/emergency resources in event of need. There currently is no imminent risk. Patient has been educated about how and when to access emergency services (i.e., 911, Emergency Department, " "National Crisis Line) in the event of any immediate risk of harm. Patient reports willingness to use available crisis resources if needed. Patient has contact information for all outpatient service providers, agrees to call or come in between appointments if indicated, and has demonstrated willingness and ability to do so in the past. No learning barriers noted  Homicide Assessment: Patient is considered to be at low risk for homicide. Client reports no thoughts of homicide and there currently is no imminent risk    Subjective Report: Client reports \"good and not so good\" events this past week    Objective Report and Session Content: Casual friendly nature, good hygiene, good posture    Assessment: Client on time for scheduled appointment. She reports good mood though some rough days. Her wife has plans on surgery for removal of spot on a lung that is either cancer or fungal infection..Client is connecting on regular basis with a transgender community group among other social outings. She continues to plan for separation and divorce.She continues to work on skills for emotional regulation and procrastination.    Discussed her separation anxiety and over-reliance on psychotherapy - and her current need to be in psychotherapy.    Type of psychotherapeutic technique provided: Client centered, CBT    Progress toward short term goals: Progress as expected, client continues to present in her female gender in diverse places, though not in all settings.     Medications  300 mg Wellbutrin 1x daily  10 mg Adoral 1x daily    Review of long term goals: Client would like to live full-time as her female self    Diagnosis: Gender Identity Disorder, Major Depressive Disorder, recurrent, moderat    Plan: Client will return to clinic in 1 week    Discharge Criteria/Planning: Patient will continue with follow-up until therapy can be discontinued without return of signs and symptoms.    Qian Martines 1/20/2017    "

## 2021-06-09 NOTE — PROGRESS NOTES
"Psychotherapy Progress Note    2/23/2017      Start time: 11.30    Stop Time: 12.30   Session # 4    Chris Stockton (prefers Yuri) is a 56 y.o. male is being seen today for gender dysphoria and reports her goal is \"to get support during her transition\"  Client identifies as a transgender woman,  & going through a divorce, 2 adult sons, one with autism and the other struggling psychosocially, employed full-time; she prefers female pronouns in session and in her electronic medical chart, though she is not out completely socially nor at work.    Chief Complaint   Patient presents with      Follow Up     New symptoms or complaints: None    Functional Impairment:   Personal: 3  Family: 3  Work: 2  Social:1    Clinical assessment of mental status:   Chris Stockton  (goes by Reece with female pronouns) presented on time.   She was oriented x3, open and cooperative, and dressed appropriately for this session and weather. Her memory was Normal cognitive functioning .  Her speech was  Within normal.  Language was appropriate.  Concentration and focus was Within normal. Psychosis is not noted or reported. She reports his mood was Congruent w/content of speech.  Affect was congruent with speech and was Congruent w/content of speech.  Fund of knowledge was adequate. Insight was adequate for therapy.    Safety:  Domestic Violence: None reported  Suicide Assessment: Patient is considered to be at a low level of risk for suicide. According to information currently available from the patient and former therapist Tobi Azul (ANGELA signed), there are few risk factors for suicide, which include but are not limited to: Hx of psychiatric diagnostic and transgender identity.   Protective factors include: Patient currently denies suicide ideation; no past suicide attempts or gestures; currently feels hopeful about the future and is future oriented; patient is dedicated to her sons; she enjoys her career; current willingness to " "collaborate with care providers and participate in care efforts;  and demonstrated willingness and ability to access providers/emergency resources in event of need. There currently is no imminent risk. Patient has been educated about how and when to access emergency services (i.e., 911, Emergency Department, National Crisis Line) in the event of any immediate risk of harm. Patient reports willingness to use available crisis resources if needed. Patient has contact information for all outpatient service providers, agrees to call or come in between appointments if indicated, and has demonstrated willingness and ability to do so in the past. No learning barriers noted  Homicide Assessment: Patient is considered to be at low risk for homicide. Client reports no thoughts of homicide and there currently is no imminent risk     Patient's impression of their current status:  Client on time for scheduled appointment. She reports her wife had surgery and confirm collal rectal cancer. Her one son is being treated for TBI and has not been in contact with other son. Discussed ACT and will continue to have such conversations introducing ACT. Client became emotionally aroused around the words \"need and demand\" and this was discussed. Client tends to be avoidant of words - \"lists\" is another word that client tends to avoid.     Therapist impression of patients current state: This 56 y.o.  Transfemale, going through a divorce, and other stressors related to procrastination and state of the [place] political climate. Patient is engaging in the therapeutic process.  Her thoughts, feelings, and beliefs were processed.  She is receptive to strategies for symptom management and improvement.  Shee is receptive to exploring strategies for increased coping and symptom management.     Type of psychotherapeutic technique provided: Client centered    Progress toward short term goals:Progress as expected, she is engaged in therapy    Review of long " term goals: Long-term goals reviewed sdf . Date of last review 6.24.17. long term goals reviewed 2.15.17  plan updated 2.15.17     Diagnosis: Gender Dysphoria    Medications  300 mg Wellbutrin 1x daily  10 mg Adoral 1x daily    Plan and Follow-up: Patient plans to read handout on ACT. Patient plans to follow up with therapy in a week.     Discharge Criteria/Planning: Patient will continue with follow-up until therapy can be discontinued without return of signs and symptoms.    Performed and documented by Qain Martines M.Ed., MSW, LICSW, [facility]  2/23/2017

## 2021-06-09 NOTE — PROGRESS NOTES
"Psychotherapy Progress Note    4/3/2017      Start time: 4    Stop Time: 5   Session # 6    Chris Stockton (prefers Yuri) is a 56 y.o. male is being seen today for gender dysphoria and reports her goal is \"to get support during her transition\" Client identifies as a transgender woman,  & going through a divorce, 2 adult sons, one with autism and the other struggling psychosocially, employed full-time; she prefers female pronouns in session and in her electronic medical chart, though she is not out completely socially nor at work.    Chief Complaint   Patient presents with      Follow Up     New symptoms or complaints: None    Functional Impairment:   Personal: 3  Family: 3  Work: 2  Social:1    Clinical assessment of mental status:   Chris Stockton  (goes by Reece with female pronouns) presented on time.   She was oriented x3, open and cooperative, and dressed appropriately for this session and weather. Her memory was Normal cognitive functioning .  Her speech was  Within normal.  Language was appropriate.  Concentration and focus was Within normal. Psychosis is not noted or reported. She reports his mood was Congruent w/content of speech.  Affect was congruent with speech and was Congruent w/content of speech.  Fund of knowledge was adequate. Insight was adequate for therapy.    Safety:  Domestic Violence: None reported  Suicide Assessment: Patient is considered to be at a low level of risk for suicide. According to information currently available from the patient and former therapist Tobi Azul (ANGELA signed), there are few risk factors for suicide, which include but are not limited to: Hx of psychiatric diagnostic and transgender identity.   Protective factors include: Patient currently denies suicide ideation; no past suicide attempts or gestures; currently feels hopeful about the future and is future oriented; patient is dedicated to her sons; she enjoys her career; current willingness to " collaborate with care providers and participate in care efforts;  and demonstrated willingness and ability to access providers/emergency resources in event of need. There currently is no imminent risk. Patient has been educated about how and when to access emergency services (i.e., 911, Emergency Department, National Crisis Line) in the event of any immediate risk of harm. Patient reports willingness to use available crisis resources if needed. Patient has contact information for all outpatient service providers, agrees to call or come in between appointments if indicated, and has demonstrated willingness and ability to do so in the past. No learning barriers noted  Homicide Assessment: Patient is considered to be at low risk for homicide. Client reports no thoughts of homicide and there currently is no imminent risk     Patient's impression of their current status:  Client on time for scheduled appointment. She reports her wife continues to struggle with cancer. She is taking more opportunities to dress in her preferred manner, which would be considered feminine. The responses have been respectful from people she has known for some time.     Therapist impression of patients current state: This 56 y.o.  Transfemale, going through a divorce, and other stressors related to procrastination and state of the [place] political climate. Patient is engaging in the therapeutic process.  Her thoughts, feelings, and beliefs were processed.  She is receptive to strategies for symptom management and improvement.  Shee is receptive to exploring strategies for increased coping and symptom management.     Type of psychotherapeutic technique provided: Client centered    Progress toward short term goals:Progress as expected, she is engaged in therapy    Review of long term goals: Long-term goals reviewed sdf . Date of last review 6.24.17. long term goals reviewed 2.15.17  plan updated 2.15.17     Diagnosis: Gender  Dysphoria    Medications  300 mg Wellbutrin 1x daily  10 mg Adoral 1x daily    Plan and Follow-up: Patient plans to follow up with therapy in a week.     Discharge Criteria/Planning: Patient will continue with follow-up until therapy can be discontinued without return of signs and symptoms.    Performed and documented by Qian Martines M.Ed., MSW, [facility], [facility] [place]  4/3/2017

## 2021-06-09 NOTE — PROGRESS NOTES
"Psychotherapy Progress Note    3/2/2017      Start time: 8    Stop Time: 9   Session # 5    Chris Stockton (prefers Yuri) is a 56 y.o. male is being seen today for gender dysphoria and reports her goal is \"to get support during her transition\"  Client identifies as a transgender woman,  & going through a divorce, 2 adult sons, one with autism and the other struggling psychosocially, employed full-time; she prefers female pronouns in session and in her electronic medical chart, though she is not out completely socially nor at work.    Chief Complaint   Patient presents with      Follow Up     New symptoms or complaints: None    Functional Impairment:   Personal: 3  Family: 3  Work: 2  Social:1    Clinical assessment of mental status:   Chris Stockton  (goes by Reece with female pronouns) presented on time.   She was oriented x3, open and cooperative, and dressed appropriately for this session and weather. Her memory was Normal cognitive functioning .  Her speech was  Within normal.  Language was appropriate.  Concentration and focus was Within normal. Psychosis is not noted or reported. She reports his mood was Congruent w/content of speech.  Affect was congruent with speech and was Congruent w/content of speech.  Fund of knowledge was adequate. Insight was adequate for therapy.    Safety:  Domestic Violence: None reported  Suicide Assessment: Patient is considered to be at a low level of risk for suicide. According to information currently available from the patient and former therapist Tobi zAul (ANGELA signed), there are few risk factors for suicide, which include but are not limited to: Hx of psychiatric diagnostic and transgender identity.   Protective factors include: Patient currently denies suicide ideation; no past suicide attempts or gestures; currently feels hopeful about the future and is future oriented; patient is dedicated to her sons; she enjoys her career; current willingness to " collaborate with care providers and participate in care efforts;  and demonstrated willingness and ability to access providers/emergency resources in event of need. There currently is no imminent risk. Patient has been educated about how and when to access emergency services (i.e., 911, Emergency Department, National Crisis Line) in the event of any immediate risk of harm. Patient reports willingness to use available crisis resources if needed. Patient has contact information for all outpatient service providers, agrees to call or come in between appointments if indicated, and has demonstrated willingness and ability to do so in the past. No learning barriers noted  Homicide Assessment: Patient is considered to be at low risk for homicide. Client reports no thoughts of homicide and there currently is no imminent risk     Patient's impression of their current status:  Client on time for scheduled appointment. She reports her wife continues to struggle with cancer. Her one son is being treated for TBI and has not been in contact with other son. She discussed struggles with her marriage and eager to divorce her wife who tends to be quite negative view in general and not accepting of her as trans.      Therapist impression of patients current state: This 56 y.o.  Transfemale, going through a divorce, and other stressors related to procrastination and state of the [place] political climate. Patient is engaging in the therapeutic process.  Her thoughts, feelings, and beliefs were processed.  She is receptive to strategies for symptom management and improvement.  Shee is receptive to exploring strategies for increased coping and symptom management.     Type of psychotherapeutic technique provided: Client centered    Progress toward short term goals:Progress as expected, she is engaged in therapy    Review of long term goals: Long-term goals reviewed sdf . Date of last review 6.24.17. long term goals reviewed 2.15.17  plan  updated 2.15.17     Diagnosis: Gender Dysphoria    Medications  300 mg Wellbutrin 1x daily  10 mg Adoral 1x daily    Plan and Follow-up: Patient plans to read handout on ACT. Patient plans to follow up with therapy in a week.     Discharge Criteria/Planning: Patient will continue with follow-up until therapy can be discontinued without return of signs and symptoms.    Performed and documented by Qian Martines M.Ed., MSW, LICSW, LADC  3/2/2017

## 2021-06-10 DIAGNOSIS — E78.5 HYPERLIPIDEMIA LDL GOAL <100: ICD-10-CM

## 2021-06-10 DIAGNOSIS — E03.4 HYPOTHYROIDISM DUE TO ACQUIRED ATROPHY OF THYROID: ICD-10-CM

## 2021-06-10 LAB
CHOLEST SERPL-MCNC: 146 MG/DL
HDLC SERPL-MCNC: 45 MG/DL
LDLC SERPL CALC-MCNC: 62 MG/DL
NONHDLC SERPL-MCNC: 101 MG/DL
TRIGL SERPL-MCNC: 198 MG/DL
TSH SERPL DL<=0.005 MIU/L-ACNC: 2.8 MU/L (ref 0.4–4)

## 2021-06-10 PROCEDURE — 36415 COLL VENOUS BLD VENIPUNCTURE: CPT | Performed by: FAMILY MEDICINE

## 2021-06-10 PROCEDURE — 80061 LIPID PANEL: CPT | Performed by: FAMILY MEDICINE

## 2021-06-10 PROCEDURE — 84443 ASSAY THYROID STIM HORMONE: CPT | Performed by: FAMILY MEDICINE

## 2021-06-10 NOTE — PROGRESS NOTES
"Psychotherapy Progress Note    4/20/2017      Start time: 2    Stop Time: 3   Session # 8    Chris Stockton (prefers Yuri) is a 56 y.o. male is being seen today for gender dysphoria and reports her goal is \"to get support during her transition\" Client identifies as a transgender woman,  & going through a divorce, 2 adult sons, one with autism and the other struggling psychosocially, employed full-time; she prefers female pronouns in session and in her electronic medical chart, though she is not out completely socially nor at work.    Chief Complaint   Patient presents with      Follow Up     New symptoms or complaints: None    Functional Impairment:   Personal: 3  Family: 3  Work: 2  Social:1    Clinical assessment of mental status:   Chris Stockton  (goes by Reece with female pronouns) presented on time.   She was oriented x3, open and cooperative, and dressed appropriately for this session and weather. Her memory was Normal cognitive functioning .  Her speech was  Within normal.  Language was appropriate.  Concentration and focus was Within normal. Psychosis is not noted or reported. She reports his mood was Congruent w/content of speech.  Affect was congruent with speech and was Congruent w/content of speech.  Fund of knowledge was adequate. Insight was adequate for therapy.    Safety:  Domestic Violence: None reported  Suicide Assessment: Patient is considered to be at a low level of risk for suicide. According to information currently available from the patient and former therapist Tobi Azul (ANGELA signed), there are few risk factors for suicide, which include but are not limited to: Hx of psychiatric diagnostic and transgender identity.   Protective factors include: Patient currently denies suicide ideation; no past suicide attempts or gestures; currently feels hopeful about the future and is future oriented; patient is dedicated to her sons; she enjoys her career; current willingness to " collaborate with care providers and participate in care efforts;  and demonstrated willingness and ability to access providers/emergency resources in event of need. There currently is no imminent risk. Patient has been educated about how and when to access emergency services (i.e., 911, Emergency Department, National Crisis Line) in the event of any immediate risk of harm. Patient reports willingness to use available crisis resources if needed. Patient has contact information for all outpatient service providers, agrees to call or come in between appointments if indicated, and has demonstrated willingness and ability to do so in the past. No learning barriers noted  Homicide Assessment: Patient is considered to be at low risk for homicide. Client reports no thoughts of homicide and there currently is no imminent risk     Patient's impression of their current status:  Client on time for scheduled appointment. She reports feeling better today and has gotten an important project done. She has been communicating with parents about her gender and able to dress as her self in their presence.    Therapist impression of patients current state: This 56 y.o.  Transfemale, going through a divorce, and other stressors related to procrastination and state of the [place] political climate. Patient is engaging in the therapeutic process.  Her thoughts, feelings, and beliefs were processed.  She is receptive to exploring strategies for symptom management, along with increased coping and symptom management.     Type of psychotherapeutic technique provided: Client centered, CBT    Progress toward short term goals:Progress as expected, she is engaged in therapy    Review of long term goals: Long-term goals reviewed sdf . Date of last review 6.24.16. long term goals reviewed 2.15.17  plan updated 2.15.17     Diagnosis: Gender Dysphoria    Medications  300 mg Wellbutrin 1x daily  10 mg Adoral 1x daily    Plan and Follow-up: Patient plans to  follow up with therapy in a week.     Discharge Criteria/Planning: Patient will continue with follow-up until therapy can be discontinued without return of signs and symptoms.    Performed and documented by Qian Martines M.Ed., MSW, [facility], [facility]  4/20/2017

## 2021-06-10 NOTE — PROGRESS NOTES
"Psychotherapy Progress Note    4/12/2017      Start time: 4    Stop Time: 5   Session # 7    Chris Stockton (prefers Yuri) is a 56 y.o. male is being seen today for gender dysphoria and reports her goal is \"to get support during her transition\" Client identifies as a transgender woman,  & going through a divorce, 2 adult sons, one with autism and the other struggling psychosocially, employed full-time; she prefers female pronouns in session and in her electronic medical chart, though she is not out completely socially nor at work.    Chief Complaint   Patient presents with      Follow Up     New symptoms or complaints: None    Functional Impairment:   Personal: 3  Family: 3  Work: 2  Social:1    Clinical assessment of mental status:   Chris Stockton  (goes by Reece with female pronouns) presented on time.   She was oriented x3, open and cooperative, and dressed appropriately for this session and weather. Her memory was Normal cognitive functioning .  Her speech was  Within normal.  Language was appropriate.  Concentration and focus was Within normal. Psychosis is not noted or reported. She reports his mood was Congruent w/content of speech.  Affect was congruent with speech and was Congruent w/content of speech.  Fund of knowledge was adequate. Insight was adequate for therapy.    Safety:  Domestic Violence: None reported  Suicide Assessment: Patient is considered to be at a low level of risk for suicide. According to information currently available from the patient and former therapist Tobi Azul (ANGELA signed), there are few risk factors for suicide, which include but are not limited to: Hx of psychiatric diagnostic and transgender identity.   Protective factors include: Patient currently denies suicide ideation; no past suicide attempts or gestures; currently feels hopeful about the future and is future oriented; patient is dedicated to her sons; she enjoys her career; current willingness to " collaborate with care providers and participate in care efforts;  and demonstrated willingness and ability to access providers/emergency resources in event of need. There currently is no imminent risk. Patient has been educated about how and when to access emergency services (i.e., 911, Emergency Department, National Crisis Line) in the event of any immediate risk of harm. Patient reports willingness to use available crisis resources if needed. Patient has contact information for all outpatient service providers, agrees to call or come in between appointments if indicated, and has demonstrated willingness and ability to do so in the past. No learning barriers noted  Homicide Assessment: Patient is considered to be at low risk for homicide. Client reports no thoughts of homicide and there currently is no imminent risk     Patient's impression of their current status:  Client on time for scheduled appointment. She reports fatigue due to going to bed at 4 AM and getting up at 9 AM for work. She had an important meeting with a congressman related to climate change and she felt good about this meeting. She has social plans this evening. She has a tendency of not getting enough sleep - procrastination leads to late hours preparing for work the next day.    Therapist impression of patients current state: This 56 y.o.  Transfemale, going through a divorce, and other stressors related to procrastination and state of the [place] political climate. Patient is engaging in the therapeutic process.  Her thoughts, feelings, and beliefs were processed.  She is receptive to exploring strategies for symptom management, along with increased coping and symptom management.     Type of psychotherapeutic technique provided: Client centered    Progress toward short term goals:Progress as expected, she is engaged in therapy    Review of long term goals: Long-term goals reviewed sdf . Date of last review 6.24.16. long term goals reviewed 2.15.17   plan updated 2.15.17     Diagnosis: Gender Dysphoria    Medications  300 mg Wellbutrin 1x daily  10 mg Adoral 1x daily    Plan and Follow-up: Patient plans to follow up with therapy in a week.     Discharge Criteria/Planning: Patient will continue with follow-up until therapy can be discontinued without return of signs and symptoms.    Performed and documented by Qian Martines M.Ed., MSW, [facility], [facility]  4/12/2017

## 2021-06-10 NOTE — PROGRESS NOTES
"Psychotherapy Progress Note    5/1/2017      Start time: 2    Stop Time: 3   Session # 8    Chris Stockton (prefers Yuri) is a 56 y.o. male is being seen today for gender dysphoria and reports her goal is \"to get support during her transition\" Client identifies as a transgender woman,  & going through a divorce, 2 adult sons, one with autism and the other struggling psychosocially, employed full-time; she prefers female pronouns in session and in her electronic medical chart, though she is not out completely socially nor at work.    Chief Complaint   Patient presents with      Follow Up     New symptoms or complaints: None    Functional Impairment:   Personal: 3  Family: 3  Work: 2  Social:1    Clinical assessment of mental status:   Chris Stockton  (goes by Reece with female pronouns) presented on time.   She was oriented x3, open and cooperative, and dressed appropriately for this session and weather. Her memory was Normal cognitive functioning .  Her speech was  Within normal.  Language was appropriate.  Concentration and focus was Within normal. Psychosis is not noted or reported. She reports his mood was Congruent w/content of speech.  Affect was congruent with speech and was Congruent w/content of speech.  Fund of knowledge was adequate. Insight was adequate for therapy.    Safety:  Domestic Violence: None reported  Suicide Assessment: Patient is considered to be at a low level of risk for suicide. According to information currently available from the patient and former therapist Tobi Azul (ANGELA signed), there are few risk factors for suicide, which include but are not limited to: Hx of psychiatric diagnostic and transgender identity.   Protective factors include: Patient currently denies suicide ideation; no past suicide attempts or gestures; currently feels hopeful about the future and is future oriented; patient is dedicated to her sons; she enjoys her career; current willingness to " collaborate with care providers and participate in care efforts;  and demonstrated willingness and ability to access providers/emergency resources in event of need. There currently is no imminent risk. Patient has been educated about how and when to access emergency services (i.e., 911, Emergency Department, National Crisis Line) in the event of any immediate risk of harm. Patient reports willingness to use available crisis resources if needed. Patient has contact information for all outpatient service providers, agrees to call or come in between appointments if indicated, and has demonstrated willingness and ability to do so in the past. No learning barriers noted  Homicide Assessment: Patient is considered to be at low risk for homicide. Client reports no thoughts of homicide and there currently is no imminent risk     Patient's impression of their current status:  Client reports feeling better today and has gotten back from a an event in [facility]. Processed with her my departure from [facility]; she has difficulties with change and internalizes rejection. She continues to be well connected socially. She is living in female gendered self in all environment with exception of home and work.    Therapist impression of patients current state: This 56 y.o.  Transfemale, going through a divorce, and other stressors related to procrastination and state of the [place] political climate. Patient is engaging in the therapeutic process.  Her thoughts, feelings, and beliefs were processed.  She is receptive to exploring strategies for symptom management, along with increased coping and symptom management.     Type of psychotherapeutic technique provided: Client centered, CBT    Progress toward short term goals:Progress as expected, she is engaged in therapy    Review of long term goals: Long-term goals reviewed sdf . Date of last review 6.24.16. long term goals reviewed 2.15.17  plan updated 2.15.17     Diagnosis: Gender  Dysphoria    Medications  300 mg Wellbutrin 1x daily  10 mg Adoral 1x daily    Plan and Follow-up: Patient plans to follow up with therapy in 2 weeks.     Discharge Criteria/Planning: Patient will continue with follow-up until therapy can be discontinued without return of signs and symptoms.    Performed and documented by Qian Martines M.Ed., MSW, [facility], [facility]  5/1/2017

## 2021-06-10 NOTE — PROGRESS NOTES
"Psychotherapy Progress Note    5/22/2017      Start time: 4    Stop Time: 5   Session # 9    Chris Stockton (prefers Yuri) is a 56 y.o. male is being seen today for gender dysphoria and reports her goal is \"to get support during her transition\" Client identifies as a transgender woman,  & going through a divorce, 2 adult sons, one with autism and the other struggling psychosocially, employed full-time; she prefers female pronouns in session and in her electronic medical chart, though she is not out completely socially nor at work.    Chief Complaint   Patient presents with      Follow Up     New symptoms or complaints: None    Functional Impairment:   Personal: 3  Family: 3  Work: 2  Social:1    Clinical assessment of mental status:   Chris Stockton  (goes by Reece with female pronouns) presented on time.   She was oriented x3, open and cooperative, and dressed appropriately for this session and weather. Her memory was Normal cognitive functioning .  Her speech was  Within normal.  Language was appropriate.  Concentration and focus was Within normal. Psychosis is not noted or reported. She reports his mood was Congruent w/content of speech.  Affect was congruent with speech and was Congruent w/content of speech.  Fund of knowledge was adequate. Insight was adequate for therapy.    Safety:  Domestic Violence: None reported  Suicide Assessment: Patient is considered to be at a low level of risk for suicide. According to information currently available from the patient and former therapist Tobi Azul (ANGELA signed), there are few risk factors for suicide, which include but are not limited to: Hx of psychiatric diagnostic and transgender identity.   Protective factors include: Patient currently denies suicide ideation; no past suicide attempts or gestures; currently feels hopeful about the future and is future oriented; patient is dedicated to her sons; she enjoys her career; current willingness to " collaborate with care providers and participate in care efforts;  and demonstrated willingness and ability to access providers/emergency resources in event of need. There currently is no imminent risk. Patient has been educated about how and when to access emergency services (i.e., 911, Emergency Department, National Crisis Line) in the event of any immediate risk of harm. Patient reports willingness to use available crisis resources if needed. Patient has contact information for all outpatient service providers, agrees to call or come in between appointments if indicated, and has demonstrated willingness and ability to do so in the past. No learning barriers noted  Homicide Assessment: Patient is considered to be at low risk for homicide. Client reports no thoughts of homicide and there currently is no imminent risk     Patient's impression of their current status:  Client reports up and down mood based on how the relationships are going with her wife and children. 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  Processed with her my departure from [facility]; she has difficulties with change and internalizes rejection. She continues to be well connected socially. She is living in female gendered self in all environment with exception of home and work.    Therapist impression of patients current state: This 56 y.o.  Transfemale, going through a divorce, and other stressors related to procrastination and state of the [place] political climate. Patient is engaging in the therapeutic process.  Her thoughts, feelings, and beliefs were processed.  She is receptive to exploring strategies for symptom management, along with increased coping and symptom management.     Type of psychotherapeutic technique provided: Client centered, CBT    Progress toward short term goals:Progress as expected, she is engaged in therapy    Review of long term goals:  Long-term goals reviewed sdf . Date of last review 6.24.16. long term goals reviewed 2.15.17  plan updated 2.15.17     Diagnosis: Gender Dysphoria    Medications  300 mg Wellbutrin 1x daily  10 mg Adoral 1x daily    Plan/Discharge Criteria/Planning: Client is aware of my leaving [facility] with plans to follow me in my outside practice.    Performed and documented by Qian Martines M.Ed., DSW, LICSW, [facility]  5/22/2017

## 2021-07-26 DIAGNOSIS — R94.31 ABNORMAL ELECTROCARDIOGRAM: Primary | ICD-10-CM

## 2021-08-18 DIAGNOSIS — E78.5 HYPERLIPIDEMIA LDL GOAL <100: ICD-10-CM

## 2021-08-18 RX ORDER — ROSUVASTATIN CALCIUM 10 MG/1
10 TABLET, COATED ORAL DAILY
Qty: 90 TABLET | Refills: 1 | Status: SHIPPED | OUTPATIENT
Start: 2021-08-18 | End: 2021-12-30

## 2021-09-29 ENCOUNTER — HOSPITAL ENCOUNTER (OUTPATIENT)
Dept: CARDIOLOGY | Facility: CLINIC | Age: 61
Discharge: HOME | End: 2021-09-29
Attending: STUDENT IN AN ORGANIZED HEALTH CARE EDUCATION/TRAINING PROGRAM | Admitting: STUDENT IN AN ORGANIZED HEALTH CARE EDUCATION/TRAINING PROGRAM
Payer: COMMERCIAL

## 2021-09-29 DIAGNOSIS — E78.5 HYPERLIPIDEMIA LDL GOAL <100: ICD-10-CM

## 2021-09-29 LAB — LVEF ECHO: NORMAL

## 2021-09-29 PROCEDURE — 93306 TTE W/DOPPLER COMPLETE: CPT

## 2021-09-29 PROCEDURE — 93306 TTE W/DOPPLER COMPLETE: CPT | Mod: 26 | Performed by: INTERNAL MEDICINE

## 2021-10-01 ENCOUNTER — OFFICE VISIT (OUTPATIENT)
Dept: FAMILY MEDICINE | Facility: CLINIC | Age: 61
End: 2021-10-01
Payer: COMMERCIAL

## 2021-10-01 VITALS
TEMPERATURE: 97.9 F | OXYGEN SATURATION: 96 % | HEART RATE: 43 BPM | SYSTOLIC BLOOD PRESSURE: 113 MMHG | WEIGHT: 222.2 LBS | RESPIRATION RATE: 16 BRPM | HEIGHT: 69 IN | BODY MASS INDEX: 32.91 KG/M2 | DIASTOLIC BLOOD PRESSURE: 75 MMHG

## 2021-10-01 DIAGNOSIS — E03.4 HYPOTHYROIDISM DUE TO ACQUIRED ATROPHY OF THYROID: Primary | ICD-10-CM

## 2021-10-01 DIAGNOSIS — F33.2 MDD (MAJOR DEPRESSIVE DISORDER), RECURRENT SEVERE, WITHOUT PSYCHOSIS (H): Primary | ICD-10-CM

## 2021-10-01 LAB
ANION GAP SERPL CALCULATED.3IONS-SCNC: 6 MMOL/L (ref 3–14)
BUN SERPL-MCNC: 15 MG/DL (ref 7–30)
CALCIUM SERPL-MCNC: 8.9 MG/DL (ref 8.5–10.1)
CHLORIDE BLD-SCNC: 110 MMOL/L (ref 94–109)
CO2 SERPL-SCNC: 24 MMOL/L (ref 20–32)
CREAT SERPL-MCNC: 1.01 MG/DL (ref 0.52–1.25)
GFR SERPL CREATININE-BSD FRML MDRD: 79 ML/MIN/1.73M2
GLUCOSE BLD-MCNC: 103 MG/DL (ref 70–99)
HGB BLD-MCNC: 16.1 G/DL (ref 11.7–17.7)
POTASSIUM BLD-SCNC: 4.4 MMOL/L (ref 3.4–5.3)
SODIUM SERPL-SCNC: 140 MMOL/L (ref 133–144)
TSH SERPL DL<=0.005 MIU/L-ACNC: 4.24 MU/L (ref 0.4–4)

## 2021-10-01 PROCEDURE — 85018 HEMOGLOBIN: CPT | Performed by: FAMILY MEDICINE

## 2021-10-01 PROCEDURE — 80048 BASIC METABOLIC PNL TOTAL CA: CPT | Performed by: FAMILY MEDICINE

## 2021-10-01 PROCEDURE — 84443 ASSAY THYROID STIM HORMONE: CPT | Performed by: FAMILY MEDICINE

## 2021-10-01 PROCEDURE — 36415 COLL VENOUS BLD VENIPUNCTURE: CPT | Performed by: FAMILY MEDICINE

## 2021-10-01 PROCEDURE — 99214 OFFICE O/P EST MOD 30 MIN: CPT | Performed by: FAMILY MEDICINE

## 2021-10-01 RX ORDER — GUANFACINE 1 MG/1
2 TABLET, EXTENDED RELEASE ORAL
COMMUNITY
Start: 2021-06-08 | End: 2021-10-20

## 2021-10-01 RX ORDER — LEVOTHYROXINE SODIUM 100 UG/1
100 TABLET ORAL DAILY
Qty: 90 TABLET | Refills: 0 | Status: SHIPPED | OUTPATIENT
Start: 2021-10-01 | End: 2021-11-16

## 2021-10-01 ASSESSMENT — MIFFLIN-ST. JEOR: SCORE: 1792.88

## 2021-10-01 NOTE — PATIENT INSTRUCTIONS
Patient Education   Here is the plan from today's visit    1. MDD (major depressive disorder), recurrent severe, without psychosis (H)    Make sure to discuss with Dr. Sterling next week the Tenex, and if any symptoms, need to cut it in half or stop. Symptoms to watch for are dizziness.   - TSH; Future  - Basic metabolic panel; Future  - Hemoglobin; Future      Please call or return to clinic if your symptoms don't go away.    Follow up plan  No follow-ups on file.    Thank you for coming to [facility] today.  COVID-19 Vaccine:  [facility] has walk-in appointments for COVID-19 vaccines. No appointment needed!   You also have the option of receiving Pfizer vaccine during your physician appointment. Please ask your care team for more information!  Lab Testing:  **If you had lab testing today and your results are reassuring or normal they will be mailed to you or sent through [employer] within 7 days.   **If the lab tests need quick action we will call you with the results.  **If you are having labs done on a different day, please call [phone] to schedule at [facility] or [phone] for other [facility] locations.   The phone number we will call with results is # [phone] (home) none (work). If this is not the best number please call our clinic and change the number.  Medication Refills:  If you need any refills please call your pharmacy and they will contact us.   If you need to  your refill at a new pharmacy, please contact the new pharmacy directly. The new pharmacy will help you get your medications transferred faster.   Scheduling:  If you have any concerns about today's visit or wish to schedule another appointment please call our office during normal business hours [phone] (8-5:00 M-F)  If a referral was made to a [facility] and you don't get a call from central scheduling please call [phone].  If a Mammogram was ordered for you at  [facility] call [phone] to schedule or change your appointment.  If you had an EKG/XRay/CT/Ultrasound/MRI ordered the number is [phone] to schedule or change your radiology appointment.   Medical Concerns:  If you have urgent medical concerns please call [phone] at any time of the day.    Yanna Santizo MD

## 2021-10-01 NOTE — PROGRESS NOTES
"  Assessment & Plan      MDD (major depressive disorder), recurrent severe, without psychosis (H)  Depression currently worse than baseline. Seeing psychiatrist and therapist regularly. Would like to rule out possible medical causes of depression. Medical causes somewhat unlikely given recent normal TSH, no history of anemia, and absence of any other medical symptoms.   - TSH; Future  - Basic metabolic panel; Future  - Hemoglobin; Future  - TSH  - Basic metabolic panel  - Hemoglobin    Tingling in Toes   Neuropathy due to previous cold exposure vs Raynauds vs diabetic neuropathy  Less likely Raynaud with no characteristic color changes. More likely due to nerve damage from previous exposure to cold. Can rule of diabetic neuropathy due to no history of diabetes and last hemoglobin A1c 5.1.     Bradycardia   HR in 40s today. History of bradycardia with HR in 50s. Echo on 9/29/21 normal. Completely asymptomatic. It is possible that guanfacine is leading to further decreased HR.   - watch for symptoms of dizziness, fainting  - talk to Dr. Sterling about guanfacine dose and recommend decrease the dose.     FFS  Planning on getting FFS - anxious.  Stressful. Will return for pre-op. Am doing a lot of pretesting now so ready at that time.                BMI:   Estimated body mass index is 33.14 kg/m  as calculated from the following:    Height as of this encounter: 1.744 m (5' 8.66\").    Weight as of this encounter: 100.8 kg (222 lb 3.2 oz).           No follow-ups on file.     Patient seen and discussed with attending physician Dr. Sukhdev Grover   MS3, [facility]   Preceptor Attestation:   I was present with the medical student who participated in the service and in the documentation of this note. I have verified the history and personally performed the physical exam and medical decision making. I have verified the content of the note, which accurately reflects my assessment of the patient and the plan of " "care.   Supervising Physician:  Yanna Santizo MD.                     Yanna Santizo MD  [facility] ASHLEY Hill is a 61 year old who presents for the following health issues     HPI   F/up on Crestor   Make sure to med rec    Liz reports several years of tingling and burning sensation in her toes bilaterally. Toes turn red when she feels tingling. No white or purple color changes. Cold seems to trigger tingling and it gets worse during winter. During winter, this happens most days and lasts the entire day. Doesn't happen every time that feet are cold and warming does not consistently relieve tingling. 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    Lzi says that currently her depression is \"intolerably bad.\" Much worse than baseline. She regularly sees psychiatrist Dr. Sterling and see therapist weekly. She would like to rule out any potential medical causes of worsened depression today. Has talked with therapist about interventional psychiatry maybe being next options. Currently have nearly daily suicidal ideation but does not feel that she is a safety risk. Has not made any plans to harm herself. Last summer, presented at behavioral ED with depression and said she will go again if she feels that it is necessary or she is in danger or harming herself or others.     Liz is preparing for facial feminization surgery in coming weeks. She is experiencing some anxiety about the procedure and the results afterward. She feels that this may be contributingt to depression.     Liz's HR in 40s in clinic today. No dizziness or fainting. Had EKG for previous bradycardia. Normal echo 9/29/21.              Review of Systems   Constitutional, HEENT, cardiovascular, pulmonary, gi and gu systems are negative, except as otherwise noted.      Objective    /75   Pulse (!) 43   Temp 97.9  F (36.6  C) (Oral)   Resp 16   Ht " "1.744 m (5' 8.66\")   Wt 100.8 kg (222 lb 3.2 oz)   SpO2 96%   BMI 33.14 kg/m    Body mass index is 33.14 kg/m .  Physical Exam   GENERAL: healthy, alert and no distress  RESP: lungs clear to auscultation - no rales, rhonchi or wheezes  CV: Bradycardia withregular rhythm, normal S1 S2, no S3 or S4, no murmur, click or rub, no peripheral edema and peripheral pulses nl  MS: no gross musculoskeletal defects noted, no edema. Fe.et without significant changes. + vitiligo                   "

## 2021-10-04 DIAGNOSIS — E03.4 HYPOTHYROIDISM DUE TO ACQUIRED ATROPHY OF THYROID: Primary | ICD-10-CM

## 2021-10-20 ENCOUNTER — OFFICE VISIT (OUTPATIENT)
Dept: FAMILY MEDICINE | Facility: CLINIC | Age: 61
End: 2021-10-20
Payer: COMMERCIAL

## 2021-10-20 VITALS
SYSTOLIC BLOOD PRESSURE: 132 MMHG | TEMPERATURE: 98.4 F | DIASTOLIC BLOOD PRESSURE: 86 MMHG | BODY MASS INDEX: 33.59 KG/M2 | HEART RATE: 68 BPM | OXYGEN SATURATION: 96 % | HEIGHT: 68 IN | WEIGHT: 221.6 LBS

## 2021-10-20 DIAGNOSIS — F64.0 GENDER DYSPHORIA IN ADULT: ICD-10-CM

## 2021-10-20 DIAGNOSIS — Z01.818 PREOP GENERAL PHYSICAL EXAM: Primary | ICD-10-CM

## 2021-10-20 PROBLEM — F32.A DEPRESSION, UNSPECIFIED DEPRESSION TYPE: Status: RESOLVED | Noted: 2021-06-01 | Resolved: 2021-10-20

## 2021-10-20 PROBLEM — F32.5 MAJOR DEPRESSIVE DISORDER IN REMISSION (H): Status: RESOLVED | Noted: 2017-07-31 | Resolved: 2021-10-20

## 2021-10-20 PROCEDURE — 99214 OFFICE O/P EST MOD 30 MIN: CPT | Performed by: FAMILY MEDICINE

## 2021-10-20 PROCEDURE — 93000 ELECTROCARDIOGRAM COMPLETE: CPT | Performed by: FAMILY MEDICINE

## 2021-10-20 ASSESSMENT — MIFFLIN-ST. JEOR: SCORE: 1779.67

## 2021-10-20 NOTE — Clinical Note
Job Schultz  Can you send this to the correct surgery center. She just came in for labs and so I updated this pre-op and would like it faxed since the surgery is outside of our system. Thanks!! Charis

## 2021-10-20 NOTE — PATIENT INSTRUCTIONS

## 2021-10-20 NOTE — PROGRESS NOTES
[address]  [address] [place] [id number]  Phone: [phone]  Fax: [phone]  Primary Provider: Yanna Santizo  Pre-op Performing Provider: YANNA SANTIZO      PREOPERATIVE EVALUATION:  Today's date: 10/20/2021    Chris Stockton is a 61 year old adult who presents for a preoperative evaluation.    Surgical Information:  Surgery/Procedure: Multiple facial feminizing surgeries  Surgery Location: [facility]  Surgeon: Dr. Seymour  Surgery Date: 11/18/21 and 11/19/2021  Time of Surgery: 6:30am  Where patient plans to recover: Other: At home with rommates  Fax number for surgical facility: [phone]    Type of Anesthesia Anticipated: General    Assessment & Plan     The proposed surgical procedure is considered INTERMEDIATE risk.    Preop general physical exam  As below - ok to proceed. Will return in 2 weeks for her TSH so that it more accurately reflects the recent increase in dose.    - EKG 12-lead complete w/read - Clinics  - TSH with free T4 reflex  - Basic metabolic panel  - Hemoglobin    Gender dysphoria in adult  Is stable       Risks and Recommendations:  The patient has the following additional risks and recommendations for perioperative complications:  Obstructive Sleep Apnea:   Has mild sleep apnea and does not qualify for CPAP.  Uses mouth device.     Medication Instructions:  Recommend she hold her estrogen 2 weeks prior and 2 weeks post. To hold the spironolactone day of surgery    RECOMMENDATION:  APPROVAL GIVEN to proceed with proposed procedure, without further diagnostic evaluation.          Subjective     HPI related to upcoming procedure: facial feminization, 2-part operation    Preop Questions 10/20/2021   1. Have you ever had a heart attack or stroke? No   2. Have you ever had surgery on your heart or blood vessels, such as a stent placement, a coronary artery bypass, or surgery on an artery in your head, neck, heart, or legs? No    3. Do you have chest pain with activity? No   4. Do you have a history of  heart failure? No   5. Do you currently have a cold, bronchitis or symptoms of other infection? No   6. Do you have a cough, shortness of breath, or wheezing? No   7. Do you or anyone in your family have previous history of blood clots? No   8. Do you or does anyone in your family have a serious bleeding problem such as prolonged bleeding following surgeries or cuts? No   9. Have you ever had problems with anemia or been told to take iron pills? No   10. Have you had any abnormal blood loss such as black, tarry or bloody stools, or abnormal vaginal bleeding? No   10. Have you had any abnormal blood loss such as black, tarry or bloody stools? No   11. Have you ever had a blood transfusion? No   12. Are you willing to have a blood transfusion if it is medically needed before, during, or after your surgery? Yes   13. Have you or any of your relatives ever had problems with anesthesia? No   14. Do you have sleep apnea, excessive snoring or daytime drowsiness? YES - sleep apnea, dental appliance recommended, not CPAP, not currently using dental appliance.   14a. Do you have a CPAP machine? No   15. Do you have any artifical heart valves or other implanted medical devices like a pacemaker, defibrillator, or continuous glucose monitor? No   16. Do you have artificial joints? No   17. Are you allergic to latex? No       Health Care Directive:  Patient does not have a Health Care Directive or Living Will: Discussed advance care planning with patient; information given to patient to review.    Preoperative Review of :   reviewed - no record of controlled substances prescribed.      Tapered guanfacine, last dose about 10 days ago  Increased levothyroxine and her depression is a bit better but also has bad days.       Surgeon recommends she stop estradiol 2 weeks before surgery for 1 month, will continue 2 weeks after surgery at same dose  Will  take all other medications as prescribed     Review of Systems  CONSTITUTIONAL: NEGATIVE for fever, chills, change in weight  INTEGUMENTARY/SKIN: NEGATIVE for worrisome rashes, moles or lesions  EYES: NEGATIVE for vision changes or irritation  ENT/MOUTH: NEGATIVE for ear, mouth and throat problems  RESP: NEGATIVE for significant cough or SOB  CV: NEGATIVE for chest pain, palpitations or peripheral edema  GI: NEGATIVE for nausea, abdominal pain, heartburn, or change in bowel habits  : NEGATIVE for frequency, dysuria, or hematuria  MUSCULOSKELETAL: NEGATIVE for significant arthralgias or myalgia  NEURO: NEGATIVE for weakness, dizziness or paresthesias  ENDOCRINE: NEGATIVE for temperature intolerance, skin/hair changes  HEME: NEGATIVE for bleeding problems  PSYCHIATRIC: POSITIVE for SI a week ago. No plan. Sees her psychiatrist regularly for her care.     Patient Active Problem List    Diagnosis Date Noted     Ureterolithiasis 05/25/2021     Priority: Medium     Right flank pain 05/25/2021     Priority: Medium     MDD (major depressive disorder), recurrent severe, without psychosis (H) 07/09/2020     Priority: Medium     Suicidal ideations 06/25/2020     Priority: Medium     Mild obstructive sleep apnea 01/03/2020     Priority: Medium     12/2019: Sleep on side. Could use dental appliance. Option later for CPAP if no better         Melanocytic nevus of trunk 04/10/2018     Priority: Medium     Hypothyroidism 03/23/2018     Priority: Medium     Gender dysphoria in adult 11/08/2017     Priority: Medium     Gastroesophageal reflux disease without esophagitis 07/31/2017     Priority: Medium     S/p  EGD with strictures x 2 that required balooning.        Conductive hearing loss, bilateral 07/31/2017     Priority: Medium     Bilateral hearing aides       Sensorineural hearing loss, asymmetrical 03/21/2017     Priority: Medium     Tinnitus 05/31/2016     Priority: Medium     Family history of prostate cancer 08/19/2015      Priority: Medium     Overview:   In brother age 60. And father       Kidney stone 08/19/2015     Priority: Medium     Esophageal stricture 08/19/2015     Priority: Medium     Overview:   On ppi  Dilated x 2       Vitiligo 08/19/2015     Priority: Medium     ADRIAN (generalized anxiety disorder) 11/21/2012     Priority: Medium     Personality disorder (H) 11/21/2012     Priority: Medium     Problem list name updated by automated process. Provider to review       Shoulder impingement syndrome 04/28/2009     Priority: Medium     Ulnar nerve injury 04/28/2009     Priority: Medium      Past Medical History:   Diagnosis Date     Anxiety      Depressive disorder      Esophageal stricture      Kidney stone     nov.     Past Surgical History:   Procedure Laterality Date     APPENDECTOMY       COLONOSCOPY  12/9/2011    Procedure:COLONOSCOPY; COLONOSCOPY; Surgeon:MARILY MENA; Location: GI     ESOPHAGOSCOPY, GASTROSCOPY, DUODENOSCOPY (EGD), COMBINED  11/23/2012    Procedure: COMBINED ESOPHAGOSCOPY, GASTROSCOPY, DUODENOSCOPY (EGD), BIOPSY SINGLE OR MULTIPLE;;  Surgeon: Yehuda Tomlinson MD;  Location:  GI     VASECTOMY       Current Outpatient Medications   Medication Sig Dispense Refill     buPROPion (WELLBUTRIN SR) 100 MG 12 hr tablet Take 100 mg by mouth daily       estradiol (VIVELLE-DOT) 0.1 MG/24HR bi-weekly patch Place onto the skin every 72 hours Patch placement rotation every 3 days: 1 patch, 2 patch, no patch, then repeat 24 patch 1     levothyroxine (SYNTHROID/LEVOTHROID) 100 MCG tablet Take 1 tablet (100 mcg) by mouth daily 90 tablet 0     melatonin 3 MG tablet 1 or 2 tablets at bedtime       nystatin (MYCOSTATIN) 824376 UNIT/GM external cream Apply topically 4 times daily as needed for dry skin 30 g 0     progesterone (PROMETRIUM) 200 MG capsule Take 200 mg by mouth       rosuvastatin (CRESTOR) 10 MG tablet Take 1 tablet (10 mg) by mouth daily 90 tablet 1     vitamin D3 (CHOLECALCIFEROL) 50 mcg (2000 units)  "tablet Take 2 tablets (100 mcg) by mouth daily       spironolactone (ALDACTONE) 25 MG tablet Take 25 mg by mouth daily          Allergies   Allergen Reactions     Finasteride      Mental health problems     Tetracycline Unknown     Doxycycline Rash        Social History     Tobacco Use     Smoking status: Never Smoker     Smokeless tobacco: Never Used   Substance Use Topics     Alcohol use: Yes     Comment: rare     Family History   Problem Relation Age of Onset     Heart Disease Mother      Heart Surgery Father      Prostate Cancer Father      Neuropathy Father      Melanoma Brother      Prostate Cancer Brother      History   Drug Use No         Objective     /86   Pulse 68   Temp 98.4  F (36.9  C) (Oral)   Ht 1.727 m (5' 8\")   Wt 100.5 kg (221 lb 9.6 oz)   SpO2 96%   BMI 33.69 kg/m      Physical Exam    GENERAL APPEARANCE: healthy, alert and no distress     EYES: EOMI, PERRL     HENT: ear canals and TM's normal and nose and mouth without ulcers or lesions     NECK: no adenopathy, no asymmetry, masses, or scars and thyroid normal to palpation     RESP: lungs clear to auscultation - no rales, rhonchi or wheezes     CV: regular rates and rhythm, normal S1 S2, no S3 or S4 and no murmur, click or rub     ABDOMEN:  soft, nontender, no HSM or masses and bowel sounds normal     MS: extremities normal- no gross deformities noted, no evidence of inflammation in joints, FROM in all extremities.     SKIN: no suspicious lesions or rashes     NEURO: Normal strength and tone, sensory exam grossly normal, mentation intact and speech normal     PSYCH: mentation appears normal. and affect normal/bright     LYMPHATICS: No cervical adenopathy    Recent Labs     Results for JONATHON ETIENNE EMILIA (MRN [id number]) as of 11/3/2021 19:43   Ref. Range 11/3/2021 08:43   Sodium Latest Ref Range: 133 - 144 mmol/L 138   Potassium Latest Ref Range: 3.4 - 5.3 mmol/L 3.8   Chloride Latest Ref Range: 94 - 109 mmol/L 107   Carbon Dioxide " Latest Ref Range: 20 - 32 mmol/L 20   Urea Nitrogen Latest Ref Range: 7 - 30 mg/dL 15   Creatinine Latest Ref Range: 0.52 - 1.25 mg/dL 0.84   GFR Estimate Latest Ref Range: >60 mL/min/1.73m2 >90   Calcium Latest Ref Range: 8.5 - 10.1 mg/dL 8.6   Anion Gap Latest Ref Range: 3 - 14 mmol/L 11   TSH Latest Ref Range: 0.40 - 4.00 mU/L 2.76   Glucose Latest Ref Range: 70 - 99 mg/dL 124 (H)   Hemoglobin Latest Ref Range: 11.7 - 17.7 g/dL 15.1         Diagnostics:  Plan to   EKG: appears normal, NSR, normal axis, normal intervals, no acute ST/T changes c/w ischemia, no LVH by voltage criteria, unchanged from previous tracings    Revised Cardiac Risk Index (RCRI):  The patient has the following serious cardiovascular risks for perioperative complications:   - No serious cardiac risks = 0 points     RCRI Interpretation: 0 points: Class I (very low risk - 0.4% complication rate)           Signed Electronically by: Yanna Santizo MD  Copy of this evaluation report is provided to requesting physician.

## 2021-10-24 ENCOUNTER — HEALTH MAINTENANCE LETTER (OUTPATIENT)
Age: 61
End: 2021-10-24

## 2021-11-03 ENCOUNTER — LAB (OUTPATIENT)
Dept: LAB | Facility: CLINIC | Age: 61
End: 2021-11-03
Payer: COMMERCIAL

## 2021-11-03 DIAGNOSIS — Z01.818 PREOP GENERAL PHYSICAL EXAM: ICD-10-CM

## 2021-11-03 LAB
ANION GAP SERPL CALCULATED.3IONS-SCNC: 11 MMOL/L (ref 3–14)
BUN SERPL-MCNC: 15 MG/DL (ref 7–30)
CALCIUM SERPL-MCNC: 8.6 MG/DL (ref 8.5–10.1)
CHLORIDE BLD-SCNC: 107 MMOL/L (ref 94–109)
CO2 SERPL-SCNC: 20 MMOL/L (ref 20–32)
CREAT SERPL-MCNC: 0.84 MG/DL (ref 0.52–1.25)
GFR SERPL CREATININE-BSD FRML MDRD: >90 ML/MIN/1.73M2
GLUCOSE BLD-MCNC: 124 MG/DL (ref 70–99)
HGB BLD-MCNC: 15.1 G/DL (ref 11.7–17.7)
POTASSIUM BLD-SCNC: 3.8 MMOL/L (ref 3.4–5.3)
SODIUM SERPL-SCNC: 138 MMOL/L (ref 133–144)
TSH SERPL DL<=0.005 MIU/L-ACNC: 2.76 MU/L (ref 0.4–4)

## 2021-11-03 PROCEDURE — 85018 HEMOGLOBIN: CPT

## 2021-11-03 PROCEDURE — 36415 COLL VENOUS BLD VENIPUNCTURE: CPT

## 2021-11-03 PROCEDURE — 84443 ASSAY THYROID STIM HORMONE: CPT

## 2021-11-03 PROCEDURE — 80048 BASIC METABOLIC PNL TOTAL CA: CPT

## 2021-11-16 DIAGNOSIS — E03.4 HYPOTHYROIDISM DUE TO ACQUIRED ATROPHY OF THYROID: ICD-10-CM

## 2021-11-16 RX ORDER — LEVOTHYROXINE SODIUM 100 UG/1
100 TABLET ORAL DAILY
Qty: 90 TABLET | Refills: 0 | Status: SHIPPED | OUTPATIENT
Start: 2021-11-16 | End: 2022-03-24

## 2021-11-16 NOTE — TELEPHONE ENCOUNTER
"Request for medication refill:  levothyroxine (SYNTHROID/LEVOTHROID) 100 MCG tablet    Providers if patient needs an appointment and you are willing to give a one month supply please refill for one month and  send a letter/MyChart using \".SMILLIMITEDREFILL\" .smillimited and route chart to \"P SMI \" (Giving one month refill in non controlled medications is strongly recommended before denial)    If refill has been denied, meaning absolutely no refills without visit, please complete the smart phrase \".smirxrefuse\" and route it to the \"P SMI MED REFILLS\"  pool to inform the patient and the pharmacy.    Sharon Steven MA        "

## 2021-12-12 NOTE — PROGRESS NOTES
Called patient with results, she plans to activate [employer] account and then wants results and medication information on finasteride sent via [employer] message - KB 5/19/2017 3:46 PM Airway patent, Nasal mucosa clear. Mouth with normal mucosa. Throat has no vesicles, no oropharyngeal exudates and uvula is midline.

## 2021-12-30 DIAGNOSIS — E78.5 HYPERLIPIDEMIA LDL GOAL <100: ICD-10-CM

## 2021-12-30 RX ORDER — ROSUVASTATIN CALCIUM 10 MG/1
10 TABLET, COATED ORAL DAILY
Qty: 90 TABLET | Refills: 1 | Status: SHIPPED | OUTPATIENT
Start: 2021-12-30 | End: 2022-07-22

## 2022-02-13 ENCOUNTER — HEALTH MAINTENANCE LETTER (OUTPATIENT)
Age: 62
End: 2022-02-13

## 2022-03-24 DIAGNOSIS — E03.4 HYPOTHYROIDISM DUE TO ACQUIRED ATROPHY OF THYROID: ICD-10-CM

## 2022-03-24 RX ORDER — LEVOTHYROXINE SODIUM 100 UG/1
100 TABLET ORAL DAILY
Qty: 90 TABLET | Refills: 0 | Status: SHIPPED | OUTPATIENT
Start: 2022-03-24 | End: 2022-06-29

## 2022-03-24 NOTE — TELEPHONE ENCOUNTER
"Request for medication refill:  levothyroxine (SYNTHROID/LEVOTHROID) 100 MCG tablet    Providers if patient needs an appointment and you are willing to give a one month supply please refill for one month and  send a letter/MyChart using \".SMILLIMITEDREFILL\" .smillimited and route chart to \"P SMI \" (Giving one month refill in non controlled medications is strongly recommended before denial)    If refill has been denied, meaning absolutely no refills without visit, please complete the smart phrase \".smirxrefuse\" and route it to the \"P SMI MED REFILLS\"  pool to inform the patient and the pharmacy.    Lori Moore        "

## 2022-05-02 DIAGNOSIS — E03.4 HYPOTHYROIDISM DUE TO ACQUIRED ATROPHY OF THYROID: Primary | ICD-10-CM

## 2022-05-03 ENCOUNTER — LAB (OUTPATIENT)
Dept: LAB | Facility: CLINIC | Age: 62
End: 2022-05-03
Payer: COMMERCIAL

## 2022-05-03 DIAGNOSIS — E03.4 HYPOTHYROIDISM DUE TO ACQUIRED ATROPHY OF THYROID: ICD-10-CM

## 2022-05-03 LAB — TSH SERPL DL<=0.005 MIU/L-ACNC: 1.41 MU/L (ref 0.4–4)

## 2022-05-03 PROCEDURE — 84443 ASSAY THYROID STIM HORMONE: CPT

## 2022-05-03 PROCEDURE — 36415 COLL VENOUS BLD VENIPUNCTURE: CPT

## 2022-05-23 ENCOUNTER — TELEPHONE (OUTPATIENT)
Dept: PSYCHIATRY | Facility: CLINIC | Age: 62
End: 2022-05-23
Payer: COMMERCIAL

## 2022-05-23 NOTE — TELEPHONE ENCOUNTER
PSYCHIATRY CLINIC PHONE INTAKE     SERVICES REQUESTED / INTERESTED IN          Med Management       Presenting Problem and Brief History                              What would you like to be seen for? (brief description):  Med management for chronic treatment chronic med resistant depression.  Has been diagnosed with ADHD.  Pt is transgender. Is currently in the process of a divorce.  Is employed full time.        Have you received a mental health diagnosis? Yes   Which one (s): Depression, ADHD, ADRIAN, body dysphoria, currently enrolled in the [employer] binge eating program  Is there any history of developmental delay?  No   Are you currently seeing a mental health provider?  Yes            Who / month last seen:  Dr Antonio at [facility] (last appt is 5/24/22) and previous provider was Dr Barnes who is no longer practicing  Do you have mental health records elsewhere?  Yes [facility]  Will you sign a release so we can obtain them?  Yes    Have you ever been hospitalized for psychiatric reasons?  Yes  Describe:  June 24 2020 - Self admitted for SI    Do you have current thoughts of self-harm?  Generally has SI a few times a week.  Has a therapist they see 2x a week that helps.  They do have chronic pain that exacerbates thoughts    Do you currently have thoughts of harming others?  No       Substance Use History     Do you have any history of alcohol / illicit drug use?  No  Describe: None  Have you ever received treatment for this?  No    Describe:  None     Social History     Who is the patient's a guardian?   SELF       Have you had an ACT team in last 12 months?  No  Describe: has had DBT therapy approx 5 years ago   OK to leave a detailed voicemail?  No    Would you be interested in learning more about research opportunities for which you or your child may qualify? We can connect you with a team member for more information.  Yes - psychotropic (mushrooms) research is something they would like to  investigate  If yes, send an inbasket message to Linette Frank    Medical/ Surgical History                                   Patient Active Problem List   Diagnosis    Shoulder impingement syndrome    Ulnar nerve injury    ADRIAN (generalized anxiety disorder)    Personality disorder (H)    Gastroesophageal reflux disease without esophagitis    Conductive hearing loss, bilateral    Gender dysphoria in adult    Family history of prostate cancer    Kidney stone    Esophageal stricture    Sensorineural hearing loss, asymmetrical    Tinnitus    Vitiligo    Hypothyroidism    Melanocytic nevus of trunk    Mild obstructive sleep apnea    Suicidal ideations    MDD (major depressive disorder), recurrent severe, without psychosis (H)    Ureterolithiasis    Right flank pain          Medications             Current Outpatient Medications   Medication Sig Dispense Refill    buPROPion (WELLBUTRIN SR) 100 MG 12 hr tablet Take 100 mg by mouth daily      estradiol (VIVELLE-DOT) 0.1 MG/24HR bi-weekly patch Place onto the skin every 72 hours Patch placement rotation every 3 days: 1 patch, 2 patch, no patch, then repeat 24 patch 1    levothyroxine (SYNTHROID/LEVOTHROID) 100 MCG tablet Take 1 tablet (100 mcg) by mouth daily 90 tablet 0    melatonin 3 MG tablet 1 or 2 tablets at bedtime      nystatin (MYCOSTATIN) 181746 UNIT/GM external cream Apply topically 4 times daily as needed for dry skin 30 g 0    rosuvastatin (CRESTOR) 10 MG tablet Take 1 tablet (10 mg) by mouth daily 90 tablet 1    spironolactone (ALDACTONE) 25 MG tablet Take 25 mg by mouth daily       vitamin D3 (CHOLECALCIFEROL) 50 mcg (2000 units) tablet Take 2 tablets (100 mcg) by mouth daily           DISPOSITION      Completed phone screen with patient and scheduled 9/20/22 at 9:30am    Alvina Corea -

## 2022-06-07 ENCOUNTER — TELEPHONE (OUTPATIENT)
Dept: PSYCHOLOGY | Facility: CLINIC | Age: 62
End: 2022-06-07

## 2022-06-07 NOTE — TELEPHONE ENCOUNTER
PT called  wanting to get in contact with [facility] with a couple questions, understands that [facility] is not accepting new patients.     She said she will try to send a message through "[employer] directly.     Callback # is [phone].

## 2022-06-29 DIAGNOSIS — E03.4 HYPOTHYROIDISM DUE TO ACQUIRED ATROPHY OF THYROID: ICD-10-CM

## 2022-06-29 RX ORDER — LEVOTHYROXINE SODIUM 100 UG/1
100 TABLET ORAL DAILY
Qty: 90 TABLET | Refills: 0 | Status: SHIPPED | OUTPATIENT
Start: 2022-06-29 | End: 2022-12-12

## 2022-06-29 NOTE — TELEPHONE ENCOUNTER
"Request for medication refill:  Levothyroxine 100 MCG tablet     Providers if patient needs an appointment and you are willing to give a one month supply please refill for one month and  send a letter/MyChart using \".SMILLIMITEDREFILL\" .smillimited and route chart to \"P SMI \" (Giving one month refill in non controlled medications is strongly recommended before denial)    If refill has been denied, meaning absolutely no refills without visit, please complete the smart phrase \".smirxrefuse\" and route it to the \"P SMI MED REFILLS\"  pool to inform the patient and the pharmacy.    Raquel Zhang MA      "

## 2022-07-22 DIAGNOSIS — E78.5 HYPERLIPIDEMIA LDL GOAL <100: ICD-10-CM

## 2022-07-22 RX ORDER — ROSUVASTATIN CALCIUM 10 MG/1
10 TABLET, COATED ORAL DAILY
Qty: 90 TABLET | Refills: 1 | Status: SHIPPED | OUTPATIENT
Start: 2022-07-22 | End: 2023-01-23

## 2022-07-22 NOTE — TELEPHONE ENCOUNTER
"Request for medication refill:  rosuvastatin (CRESTOR) 10 MG tablet 90 tablet 1 12/30/2021       Providers if patient needs an appointment and you are willing to give a one month supply please refill for one month and  send a letter/MyChart using \".smillimitedrefill\" and route chart to \"P [facility] \" (Giving one month refill in non controlled medications is strongly recommended before denial)    If refill has been denied, meaning absolutely no refills without visit, please complete the smart phrase \".smirxrefuse\" and route it to the \"P [facility] MED REFILLS\"  pool to inform the patient and the pharmacy.    Jo Fernandez RN              "

## 2022-08-12 ENCOUNTER — VIRTUAL VISIT (OUTPATIENT)
Dept: PSYCHIATRY | Facility: CLINIC | Age: 62
End: 2022-08-12
Attending: NURSE PRACTITIONER
Payer: COMMERCIAL

## 2022-08-12 DIAGNOSIS — F32.A DEPRESSION, UNSPECIFIED DEPRESSION TYPE: ICD-10-CM

## 2022-08-12 DIAGNOSIS — F41.9 ANXIETY: Primary | ICD-10-CM

## 2022-08-12 DIAGNOSIS — F90.2 ATTENTION DEFICIT HYPERACTIVITY DISORDER (ADHD), COMBINED TYPE: ICD-10-CM

## 2022-08-12 PROCEDURE — 90792 PSYCH DIAG EVAL W/MED SRVCS: CPT | Mod: GT | Performed by: NURSE PRACTITIONER

## 2022-08-12 RX ORDER — GABAPENTIN 250 MG/5ML
50 SOLUTION ORAL
COMMUNITY
Start: 2022-04-05 | End: 2022-12-14

## 2022-08-12 ASSESSMENT — PATIENT HEALTH QUESTIONNAIRE - PHQ9
SUM OF ALL RESPONSES TO PHQ QUESTIONS 1-9: 8
SUM OF ALL RESPONSES TO PHQ QUESTIONS 1-9: 8
10. IF YOU CHECKED OFF ANY PROBLEMS, HOW DIFFICULT HAVE THESE PROBLEMS MADE IT FOR YOU TO DO YOUR WORK, TAKE CARE OF THINGS AT HOME, OR GET ALONG WITH OTHER PEOPLE: VERY DIFFICULT

## 2022-08-12 NOTE — PROGRESS NOTES
Chris Stockton is a 62 year old who has consented to receive services via billable video visit.      Pt will join video visit via: [employer]  If there are problems joining the visit, send backup video invite via: Send to preferred e-mail: [email or web address]      Originating Location (patient location): Patient's home  Distant Location (provider location): [facility]    Will anyone else be joining the video visit? No  Answers for HPI/ROS submitted by the patient on 8/12/2022  If you checked off any problems, how difficult have these problems made it for you to do your work, take care of things at home, or get along with other people?: Very difficult  PHQ9 TOTAL SCORE: 8

## 2022-08-12 NOTE — PATIENT INSTRUCTIONS
-Continue on current medication regimen.  Please retry daytime Gabapentin dose for anxiety.    Your next appointment is scheduled on 9/8/2022 (Thu) at 8:30am        **For crisis resources, please see the information at the end of this document**   Patient Education    Thank you for coming to the [facility] [place] [facility].     Lab Testing:  If you had lab testing today and your results are reassuring or normal they will be mailed to you or sent through [employer] within 7 days. If the lab tests need quick action we will call you with the results. The phone number we will call with results is # [phone]. If this is not the best number please call our clinic and change the number.     Medication Refills:  If you need any refills please call your pharmacy and they will contact us. Our fax number for refills is [phone].   Three business days of notice are needed for general medication refill requests.   Five business days of notice are needed for controlled substance refill requests.   If you need to change to a different pharmacy, please contact the new pharmacy directly. The new pharmacy will help you get your medications transferred.     Contact Us:  Please call [phone] during business hours (8-5:00 M-F).   If you have medication related questions after clinic hours, or on the weekend, please call [phone].     Financial Assistance [phone]   Medical Records [phone]       MENTAL HEALTH CRISIS RESOURCES:  For a emergency help, please call 911 or go to the nearest Emergency Department.     Emergency Walk-In Options:   EmPATH Unit @ [facility] ([place]): [phone] - Specialized mental health emergency area designed to be calming  [facility] ([place]): [phone]  [facility] ([place]): [phone]  [facility] ([place]): [phone]    [facility] Crisis Information:   [place]:  [phone]  [place]: [phone]  [place] (COPE) - Adult: [phone]     Child: [phone]  Junior - Adult: [phone]     Child: [phone]  [place]: [phone]  List of all [facility] resources:   https://[place].gov/dhs/people-we-serve/adults/health-care/mental-health/resources/crisis-contacts.jsp    National Crisis Information:   Crisis Text Line: Text  [place]  to 487912  Suicide & Crisis Lifeline: 988  National Suicide Prevention Lifeline: 5-742-009-TALK (1-762.250.7153)       For online chat options, visit https://suicidepreventionlifeline.org/chat/  Poison Control Center: 1-895-507-4422  Trans Lifeline: 1-806.222.5395 - Hotline for transgender people of all ages  The [employer] Project: 7-311-681-0180 - Hotline for LGBT youth     For Non-Emergency Support:   Fast Tracker: Mental Health & Substance Use Disorder Resources -   https://www.EcholocationckSolidariumn.org/
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
## 2022-09-08 ENCOUNTER — VIRTUAL VISIT (OUTPATIENT)
Dept: PSYCHIATRY | Facility: CLINIC | Age: 62
End: 2022-09-08
Attending: NURSE PRACTITIONER
Payer: COMMERCIAL

## 2022-09-08 DIAGNOSIS — F41.9 ANXIETY: Primary | ICD-10-CM

## 2022-09-08 DIAGNOSIS — F32.A DEPRESSION, UNSPECIFIED DEPRESSION TYPE: ICD-10-CM

## 2022-09-08 PROCEDURE — 99214 OFFICE O/P EST MOD 30 MIN: CPT | Mod: GT | Performed by: NURSE PRACTITIONER

## 2022-09-08 ASSESSMENT — ANXIETY QUESTIONNAIRES
2. NOT BEING ABLE TO STOP OR CONTROL WORRYING: MORE THAN HALF THE DAYS
5. BEING SO RESTLESS THAT IT IS HARD TO SIT STILL: SEVERAL DAYS
8. IF YOU CHECKED OFF ANY PROBLEMS, HOW DIFFICULT HAVE THESE MADE IT FOR YOU TO DO YOUR WORK, TAKE CARE OF THINGS AT HOME, OR GET ALONG WITH OTHER PEOPLE?: SOMEWHAT DIFFICULT
7. FEELING AFRAID AS IF SOMETHING AWFUL MIGHT HAPPEN: MORE THAN HALF THE DAYS
6. BECOMING EASILY ANNOYED OR IRRITABLE: SEVERAL DAYS
IF YOU CHECKED OFF ANY PROBLEMS ON THIS QUESTIONNAIRE, HOW DIFFICULT HAVE THESE PROBLEMS MADE IT FOR YOU TO DO YOUR WORK, TAKE CARE OF THINGS AT HOME, OR GET ALONG WITH OTHER PEOPLE: SOMEWHAT DIFFICULT
GAD7 TOTAL SCORE: 12
7. FEELING AFRAID AS IF SOMETHING AWFUL MIGHT HAPPEN: MORE THAN HALF THE DAYS
3. WORRYING TOO MUCH ABOUT DIFFERENT THINGS: MORE THAN HALF THE DAYS
GAD7 TOTAL SCORE: 12
1. FEELING NERVOUS, ANXIOUS, OR ON EDGE: MORE THAN HALF THE DAYS
4. TROUBLE RELAXING: MORE THAN HALF THE DAYS
GAD7 TOTAL SCORE: 12

## 2022-09-08 NOTE — PROGRESS NOTES
Chris Stockton is a 62 year old who has consented to receive services via billable video visit.      Pt will join video visit via: [employer]  If there are problems joining the visit, send backup video invite via: Send to preferred e-mail: [email or web address]      Originating Location (patient location): Patient's home  Distant Location (provider location): [facility] [place] [facility]    Will anyone else be joining the video visit? No  Answers for HPI/ROS submitted by the patient on 9/8/2022  ADRIAN 7 TOTAL SCORE: 12

## 2022-09-08 NOTE — PATIENT INSTRUCTIONS
-Increase AM Gabapentin up to 100-120 mg for anxiety.  -Continue current Wellbutrin dose.    -Please complete release of information form and return to us.    Your next appointment is scheduled on 10/13/2022 (Thu) at 8:30am.      **For crisis resources, please see the information at the end of this document**   Patient Education    Thank you for coming to the [facility] [place] [facility].     Lab Testing:  If you had lab testing today and your results are reassuring or normal they will be mailed to you or sent through Private.Me within 7 days. If the lab tests need quick action we will call you with the results. The phone number we will call with results is # [phone]. If this is not the best number please call our clinic and change the number.     Medication Refills:  If you need any refills please call your pharmacy and they will contact us. Our fax number for refills is [phone].   Three business days of notice are needed for general medication refill requests.   Five business days of notice are needed for controlled substance refill requests.   If you need to change to a different pharmacy, please contact the new pharmacy directly. The new pharmacy will help you get your medications transferred.     Contact Us:  Please call [phone] during business hours (8-5:00 M-F).   If you have medication related questions after clinic hours, or on the weekend, please call [phone].     Financial Assistance [phone]   Medical Records [phone]       MENTAL HEALTH CRISIS RESOURCES:  For a emergency help, please call 911 or go to the nearest Emergency Department.     Emergency Walk-In Options:   EmPATH Unit @ [facility] ([place]): [phone] - Specialized mental health emergency area designed to be calming  [facility] ([place]): [phone]  [facility] ([place]): [phone]  [facility] ([place]):  [phone]    [facility] Crisis Information:   [place]: [phone]  [place]: [phone]  [place] ([place]) - Adult: [phone]     Child: [phone]  [place] - Adult: [phone]     Child: [phone]  [place]: [phone]  List of all [facility] resources:   https://[place].gov/dhs/people-we-serve/adults/health-care/mental-health/resources/crisis-contacts.jsp    National Crisis Information:   Crisis Text Line: Text  [place]  to 554435  Suicide & Crisis Lifeline: 988  National Suicide Prevention Lifeline: 9-241-759-TALK (1-595.949.2573)       For online chat options, visit https://suicidepreventionlifeline.org/chat/  Poison Control Center: 1-159.180.9892  Trans Lifeline: 1-406.409.4627 - Hotline for transgender people of all ages  The [employer] Project: 3-206-095-2694 - Hotline for LGBT youth     For Non-Emergency Support:   Fast Tracker: Mental Health & Substance Use Disorder Resources -   https://www.[employer] EnergytrackHealthpointzn.org/
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
## 2022-10-06 DIAGNOSIS — F32.A DEPRESSION, UNSPECIFIED DEPRESSION TYPE: Primary | ICD-10-CM

## 2022-10-13 ENCOUNTER — VIRTUAL VISIT (OUTPATIENT)
Dept: PSYCHIATRY | Facility: CLINIC | Age: 62
End: 2022-10-13
Attending: NURSE PRACTITIONER
Payer: COMMERCIAL

## 2022-10-13 DIAGNOSIS — F32.A DEPRESSION, UNSPECIFIED DEPRESSION TYPE: ICD-10-CM

## 2022-10-13 DIAGNOSIS — F90.2 ATTENTION DEFICIT HYPERACTIVITY DISORDER (ADHD), COMBINED TYPE: ICD-10-CM

## 2022-10-13 DIAGNOSIS — F41.9 ANXIETY: Primary | ICD-10-CM

## 2022-10-13 PROCEDURE — 99214 OFFICE O/P EST MOD 30 MIN: CPT | Mod: GT | Performed by: NURSE PRACTITIONER

## 2022-10-13 ASSESSMENT — ANXIETY QUESTIONNAIRES
7. FEELING AFRAID AS IF SOMETHING AWFUL MIGHT HAPPEN: MORE THAN HALF THE DAYS
7. FEELING AFRAID AS IF SOMETHING AWFUL MIGHT HAPPEN: MORE THAN HALF THE DAYS
8. IF YOU CHECKED OFF ANY PROBLEMS, HOW DIFFICULT HAVE THESE MADE IT FOR YOU TO DO YOUR WORK, TAKE CARE OF THINGS AT HOME, OR GET ALONG WITH OTHER PEOPLE?: VERY DIFFICULT
5. BEING SO RESTLESS THAT IT IS HARD TO SIT STILL: SEVERAL DAYS
IF YOU CHECKED OFF ANY PROBLEMS ON THIS QUESTIONNAIRE, HOW DIFFICULT HAVE THESE PROBLEMS MADE IT FOR YOU TO DO YOUR WORK, TAKE CARE OF THINGS AT HOME, OR GET ALONG WITH OTHER PEOPLE: VERY DIFFICULT
GAD7 TOTAL SCORE: 12
4. TROUBLE RELAXING: MORE THAN HALF THE DAYS
GAD7 TOTAL SCORE: 12
2. NOT BEING ABLE TO STOP OR CONTROL WORRYING: MORE THAN HALF THE DAYS
6. BECOMING EASILY ANNOYED OR IRRITABLE: SEVERAL DAYS
3. WORRYING TOO MUCH ABOUT DIFFERENT THINGS: MORE THAN HALF THE DAYS
1. FEELING NERVOUS, ANXIOUS, OR ON EDGE: MORE THAN HALF THE DAYS
GAD7 TOTAL SCORE: 12

## 2022-10-13 ASSESSMENT — PATIENT HEALTH QUESTIONNAIRE - PHQ9
SUM OF ALL RESPONSES TO PHQ QUESTIONS 1-9: 16
SUM OF ALL RESPONSES TO PHQ QUESTIONS 1-9: 16
10. IF YOU CHECKED OFF ANY PROBLEMS, HOW DIFFICULT HAVE THESE PROBLEMS MADE IT FOR YOU TO DO YOUR WORK, TAKE CARE OF THINGS AT HOME, OR GET ALONG WITH OTHER PEOPLE: VERY DIFFICULT

## 2022-10-13 NOTE — PROGRESS NOTES
Chris Stockton is a 62 year old who is being evaluated via a billable video visit.      Pt will join video visit via: GLOG  If there are problems joining the visit, send backup video invite via: Text to preferred phone: [phone]    Reason for telehealth visit: Patient has requested telehealth visit    Originating location (patient location): Patient's home    Will anyone else be joining the visit? No
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
## 2022-10-13 NOTE — PATIENT INSTRUCTIONS
-Decrease bedtime Gabapentin to 100 mg while stopping AM dose.  Monitor for sedation and motivation.  -Continue current Wellbutrin regimen.    Your next appointment is scheduled on 11/10/2022 (Thu) at 8:30am.      **For crisis resources, please see the information at the end of this document**   Patient Education    Thank you for coming to the [facility] [place] [facility].     Lab Testing:  If you had lab testing today and your results are reassuring or normal they will be mailed to you or sent through [employer] within 7 days. If the lab tests need quick action we will call you with the results. The phone number we will call with results is # [phone]. If this is not the best number please call our clinic and change the number.     Medication Refills:  If you need any refills please call your pharmacy and they will contact us. Our fax number for refills is [phone].   Three business days of notice are needed for general medication refill requests.   Five business days of notice are needed for controlled substance refill requests.   If you need to change to a different pharmacy, please contact the new pharmacy directly. The new pharmacy will help you get your medications transferred.     Contact Us:  Please call [phone] during business hours (8-5:00 M-F).   If you have medication related questions after clinic hours, or on the weekend, please call [phone].     Financial Assistance [phone]   Medical Records [phone]       MENTAL HEALTH CRISIS RESOURCES:  For a emergency help, please call 911 or go to the nearest Emergency Department.     Emergency Walk-In Options:   EmPATH Unit @ [facility] ([place]): [phone] - Specialized mental health emergency area designed to be calming  [facility] ([place]): [phone]  [facility] ([place]): [phone]  [facility]):  [phone]    [facility] Crisis Information:   [place]: [phone]  [place]: [phone]  [place] ([place]) - Adult: [phone]     Child: [phone]  [place] - Adult: [phone]     Child: [phone]  [place]: [phone]  List of all [facility] resources:   https://[place].gov/dhs/people-we-serve/adults/health-care/mental-health/resources/crisis-contacts.jsp    National Crisis Information:   Crisis Text Line: Text  [place]  to 354543  Suicide & Crisis Lifeline: 988  National Suicide Prevention Lifeline: 1-126-831-TALK (1-705.844.4078)       For online chat options, visit https://suicidepreventionlifeline.org/chat/  Poison Control Center: 1-207.513.4528  Trans Lifeline: 1-578.820.1233 - Hotline for transgender people of all ages  [employer]: 4-305-663-8092 - Hotline for LGBT youth     For Non-Emergency Support:   Fast Tracker: Mental Health & Substance Use Disorder Resources -   https://www.[employer]trackKIHEITAIn.org/

## 2022-10-15 ENCOUNTER — HEALTH MAINTENANCE LETTER (OUTPATIENT)
Age: 62
End: 2022-10-15

## 2022-11-09 ENCOUNTER — MYC MEDICAL ADVICE (OUTPATIENT)
Dept: PSYCHOLOGY | Facility: CLINIC | Age: 62
End: 2022-11-09

## 2022-11-09 ENCOUNTER — TELEPHONE (OUTPATIENT)
Dept: PSYCHOLOGY | Facility: CLINIC | Age: 62
End: 2022-11-09

## 2022-11-10 ENCOUNTER — VIRTUAL VISIT (OUTPATIENT)
Dept: PSYCHIATRY | Facility: CLINIC | Age: 62
End: 2022-11-10
Attending: NURSE PRACTITIONER
Payer: COMMERCIAL

## 2022-11-10 DIAGNOSIS — F90.2 ATTENTION DEFICIT HYPERACTIVITY DISORDER (ADHD), COMBINED TYPE: ICD-10-CM

## 2022-11-10 DIAGNOSIS — F32.A DEPRESSION, UNSPECIFIED DEPRESSION TYPE: ICD-10-CM

## 2022-11-10 DIAGNOSIS — F41.9 ANXIETY: Primary | ICD-10-CM

## 2022-11-10 PROCEDURE — 90833 PSYTX W PT W E/M 30 MIN: CPT | Mod: GT | Performed by: NURSE PRACTITIONER

## 2022-11-10 PROCEDURE — 99213 OFFICE O/P EST LOW 20 MIN: CPT | Mod: GT | Performed by: NURSE PRACTITIONER

## 2022-11-10 RX ORDER — BUPROPION HYDROCHLORIDE 100 MG/1
100 TABLET, EXTENDED RELEASE ORAL DAILY
Qty: 90 TABLET | Refills: 0 | Status: SHIPPED | OUTPATIENT
Start: 2022-11-10 | End: 2022-12-22

## 2022-11-10 NOTE — PATIENT INSTRUCTIONS
-Continue on current medication regimen.    Your next appointment is scheduled on 12/22/2022 (Thu) at 8:30am.      **For crisis resources, please see the information at the end of this document**   Patient Education    Thank you for coming to the [facility] [place] [facility].     Lab Testing:  If you had lab testing today and your results are reassuring or normal they will be mailed to you or sent through [employer] within 7 days. If the lab tests need quick action we will call you with the results. The phone number we will call with results is # [phone]. If this is not the best number please call our clinic and change the number.     Medication Refills:  If you need any refills please call your pharmacy and they will contact us. Our fax number for refills is [phone].   Three business days of notice are needed for general medication refill requests.   Five business days of notice are needed for controlled substance refill requests.   If you need to change to a different pharmacy, please contact the new pharmacy directly. The new pharmacy will help you get your medications transferred.     Contact Us:  Please call [phone] during business hours (8-5:00 M-F).   If you have medication related questions after clinic hours, or on the weekend, please call [phone].     Financial Assistance [phone]   Medical Records [phone]       MENTAL HEALTH CRISIS RESOURCES:  For a emergency help, please call 911 or go to the nearest Emergency Department.     Emergency Walk-In Options:   EmPATH Unit @ [place] [facility] ([place]): [phone] - Specialized mental health emergency area designed to be calming  [facility] ([place]): [phone]  [facility] ([place]): [phone]  [facility]): [phone]    [facility] Crisis Information:   [place]: [phone]  [place]: [phone]  [place] ([place]) - Adult:  [phone]     Child: [phone]  [place] - Adult: [phone]     Child: [phone]  [place]: [phone]  List of all [facility] resources:   https://[place].gov/dhs/people-we-serve/adults/health-care/mental-health/resources/crisis-contacts.jsp    National Crisis Information:   Crisis Text [place]: Text  [place]  to 822582  Suicide & Crisis Lifeline: 988  National Suicide Prevention Lifeline: 0-046-155-TALK (1-414.886.1634)       For online chat options, visit https://suicidepreventionlifeline.org/chat/  Poison Control Center: 1-381.673.8769  Trans Lifeline: 1-488.451.8326 - Hotline for transgender people of all ages  The [employer] Project: 8-448-674-2248 - Hotline for LGBT youth     For Non-Emergency Support:   Fast Tracker: Mental Health & Substance Use Disorder Resources -   https://www.blueKiwi SoftwareckDotn.org/
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
## 2022-11-10 NOTE — PROGRESS NOTES
Chris Stockton is a 62 year old who is being evaluated via a billable video visit.      Pt will join video visit via: [employer]  If there are problems joining the visit, send backup video invite via: Text to preferred phone: [phone]    Reason for telehealth visit: Patient has requested telehealth visit    Originating location (patient location): Patient's home    Will anyone else be joining the visit? No

## 2022-11-15 ASSESSMENT — PATIENT HEALTH QUESTIONNAIRE - PHQ9
SUM OF ALL RESPONSES TO PHQ QUESTIONS 1-9: 8
SUM OF ALL RESPONSES TO PHQ QUESTIONS 1-9: 8
10. IF YOU CHECKED OFF ANY PROBLEMS, HOW DIFFICULT HAVE THESE PROBLEMS MADE IT FOR YOU TO DO YOUR WORK, TAKE CARE OF THINGS AT HOME, OR GET ALONG WITH OTHER PEOPLE: SOMEWHAT DIFFICULT

## 2022-11-16 ENCOUNTER — VIRTUAL VISIT (OUTPATIENT)
Dept: PSYCHOLOGY | Facility: CLINIC | Age: 62
End: 2022-11-16
Payer: COMMERCIAL

## 2022-11-16 ENCOUNTER — TELEPHONE (OUTPATIENT)
Dept: PSYCHOLOGY | Facility: CLINIC | Age: 62
End: 2022-11-16

## 2022-11-16 DIAGNOSIS — F64.0 GENDER DYSPHORIA IN ADULT: Primary | ICD-10-CM

## 2022-11-16 PROCEDURE — 90837 PSYTX W PT 60 MINUTES: CPT | Mod: GT | Performed by: PSYCHOLOGIST

## 2022-11-16 NOTE — TELEPHONE ENCOUNTER
Date: 2022    Client Name:  Chris Stockton  Preferred Name: Liz  MRN: [id number]   : 1960  Age:  62 year old    Presenting Problem / Reason for Assessment:     [place], gender    Suggested Program:  TG    Interested in Couples/Family Therapy?  No    Any legal charges pending?:   No    Patient wishes to be contacted regarding Insurance benefits?:  No    Insurance Benefits to be evaluated.  Note will be entered when validated.    Please Verify Registration    Please send Welcome Packet and document date sent.

## 2022-11-16 NOTE — PROGRESS NOTES
[facility] - Progress Note    Date of Service: 22   Name: Chris Stockton  : 1960  Medical Record Number: [id number]  Treating Provider: Nafisa Torres LP  Type of Session: Individual  Present in Session: patient  Session Start and Stop Time: 1:05pm-2:00pm  Number of Minutes:  55    SERVICE MODALITY:  Video Visit:      Provider verified identity through the following two step process.  Patient provided:  Patient  and Patient address    Telemedicine Visit: The patient's condition can be safely assessed and treated via synchronous audio and visual telemedicine encounter.      Reason for Telemedicine Visit: Patient has requested telehealth visit    Originating Site (Patient Location): Patient's home    Distant Site (Provider Location): [facility]    Consent:  The patient/guardian has verbally consented to: the potential risks and benefits of telemedicine (video visit) versus in person care; bill my insurance or make self-payment for services provided; and responsibility for payment of non-covered services.     Patient would like the video invitation sent by:  My Chart    Mode of Communication:  Video Conference via [email or web address]    Distant Location (Provider):  Off-site    As the provider I attest to compliance with applicable laws and regulations related to telemedicine.    DSM-5 Diagnoses:  Gender dysphoria    Current Reported Symptoms and Status update:  Changes since last session returning to care for consult for sexual health concerns    Progress Toward Treatment Goals:   New Objective established this session     Therapeutic Interventions/Treatment Strategies:    Area(s) of treatment focus addressed in this session included Symptom Management, Sexual Health and Wellness and Gender Health    Liz sees Priscila Bellamy for regular psychotherapy. Considering bottom surgery. Explored questions about sexual functioning and what to expect after  surgery. Working on divorce from partner, identifies as a lesbian. Wrote down questions for surgeon.     Psychotherapist offered support, feedback and validation and reinforced use of skills Treatment modalities used include Solution Focused Therapy Gender Affirming Care Sexual Health and Wellness Education Symptoms Management: Promoted understanding of their diagnoses and how it impacts their functioning  Support and Problem Solving    Patient Response:   Patient responded to session by accepting feedback and listening  Possible barriers to participation / learning include: N/A    Current Mental Status Exam:   Appearance:  Appropriate   Eye Contact:  Good   Attitude / Demeanor: Cooperative  Interested  Speech      Rate / Production: Normal/ Responsive      Volume:  Normal  volume  Orientation:  All  Mood:   Normal  Affect:   Appropriate   Thought Content: Clear   Insight:   Good       Plan/Need for Future Services:  Return for therapy if needed to treat diagnosed problems.    Patient has a current master individualized treatment plan.  See Epic treatment plan for more information.    Referral / Collaboration:  Referral to another professional/service is not indicated at this time..  Emergency Services Needed?  No    Assignment:  none    Interactive Complexity:  There are four specific communication difficulties that complicate the work of the primary psychiatric procedure.  Interactive complexity (+[id number]) may be reported when at least one of these difficulties is present.    Communication difficulties present during current the psychiatric procedure include:  1. None.      Signature/Title:    Nafisa Torres LP       Answers for HPI/ROS submitted by the patient on 11/15/2022  If you checked off any problems, how difficult have these problems made it for you to do your work, take care of things at home, or get along with other people?: Somewhat difficult  PHQ9 TOTAL SCORE: 8

## 2022-12-14 ENCOUNTER — OFFICE VISIT (OUTPATIENT)
Dept: FAMILY MEDICINE | Facility: CLINIC | Age: 62
End: 2022-12-14
Payer: COMMERCIAL

## 2022-12-14 ENCOUNTER — MYC MEDICAL ADVICE (OUTPATIENT)
Dept: FAMILY MEDICINE | Facility: CLINIC | Age: 62
End: 2022-12-14

## 2022-12-14 VITALS
HEIGHT: 68 IN | SYSTOLIC BLOOD PRESSURE: 116 MMHG | OXYGEN SATURATION: 95 % | RESPIRATION RATE: 18 BRPM | WEIGHT: 228 LBS | HEART RATE: 59 BPM | TEMPERATURE: 98.6 F | BODY MASS INDEX: 34.56 KG/M2 | DIASTOLIC BLOOD PRESSURE: 80 MMHG

## 2022-12-14 DIAGNOSIS — Z12.31 ENCOUNTER FOR SCREENING MAMMOGRAM FOR BREAST CANCER: Primary | ICD-10-CM

## 2022-12-14 DIAGNOSIS — K08.89 TOOTH PAIN: ICD-10-CM

## 2022-12-14 DIAGNOSIS — Z98.890 STATUS POST RHINOPLASTY: ICD-10-CM

## 2022-12-14 DIAGNOSIS — R10.13 EPIGASTRIC PAIN: Primary | ICD-10-CM

## 2022-12-14 DIAGNOSIS — K22.2 ESOPHAGEAL STRICTURE: ICD-10-CM

## 2022-12-14 DIAGNOSIS — F64.0 GENDER DYSPHORIA IN ADULT: ICD-10-CM

## 2022-12-14 DIAGNOSIS — Z12.31 ENCOUNTER FOR SCREENING MAMMOGRAM FOR BREAST CANCER: ICD-10-CM

## 2022-12-14 PROCEDURE — 99214 OFFICE O/P EST MOD 30 MIN: CPT | Performed by: FAMILY MEDICINE

## 2022-12-14 RX ORDER — GABAPENTIN 250 MG/5ML
150 SOLUTION ORAL AT BEDTIME
Qty: 470 ML | Refills: 3 | COMMUNITY
Start: 2022-12-14 | End: 2023-10-04

## 2022-12-14 RX ORDER — PSEUDOEPHEDRINE HCL 30 MG
30 TABLET ORAL EVERY 4 HOURS PRN
Qty: 30 TABLET | Refills: 0 | Status: SHIPPED | OUTPATIENT
Start: 2022-12-14 | End: 2023-07-28

## 2022-12-14 NOTE — PROGRESS NOTES
"  Assessment & Plan     Epigastric pain  Has had some of this and some intermittent food getting stuck with past history of needing dilation. Hasn't been on PPIs continuously so at risk for recurrence of stricture.  Yet with her variable symptomatology hard to know if needs EGD. I would likely err on the side of doing it. Alternative is to take the PPI regularly for the next month and if continued intermittent food sticking, needs EGD. Has yet to schedule her colonoscopy    Gender dysphoria in adult  Is seeing Dr. Pierre. Needs mammogram. Referred     Encounter for screening mammogram for breast cancer  - Screening Mammogram Digital Bilateral; Future    Tooth pain  Is managing with lower dose gabapentin.    - gabapentin (NEURONTIN) 250 MG/5ML solution; Take 3 mLs (150 mg) by mouth At Bedtime 50 mg every AM and evening and 100 mg HS    Status post rhinoplasty  Might try this to see if helps sense of breathing through her nose.    - pseudoePHEDrine (SUDAFED) 30 MG tablet; Take 1 tablet (30 mg) by mouth every 4 hours as needed for congestion      I spent a total of 35 minutes on the day of the visit.   Time spent doing chart review, history and exam, documentation and further activities per the note       BMI:   Estimated body mass index is 34.67 kg/m  as calculated from the following:    Height as of this encounter: 1.727 m (5' 8\").    Weight as of this encounter: 103.4 kg (228 lb).           No follow-ups on file.    Yanna Santizo MD  [facility] ASHLEY [facility] is a 62 year old, presenting for the following health issues:  mammogram (Rhinoplasty, teeth pain)      HPI     Reflux:     Has been on HRT for 5 years - and Dr. Pierre says needs first mammogram. Trans friendly.    Had facial surgery (Dr. 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 Seeing dental and considering whether should have redone but is not going to do this since the " "risk is worse.  Using Gabapentin at might and ibuprofen and tylenol most nights.   Surgeries were about 1 year ago. Had: rhinoplasty done, hairline, brow lift, cheek grafts, lips are rain, sliding mandible genoplasty.   Nose doesn't work as well as it used to. Deviated septum is better but cannot always breath through her nose. ENT looked and there is nothing there. Told likely smaller on the inside. Using saline spray and suggested humidifier and Claritin  Gabapentin - is taking 50 mg three times a day and then went to 100 mg at night and then started taking a bit more in the am. Stopped the morning dose completely due to trouble working on it. Now at 150 mg at night.       Prilosec - did not start. Has epigastric pain. Episodes occur when over eating - gets reflux episode. Sometimes gets stuck with the first meal of they day. Very frequent in the summer time,.  Has not happened since the MyChart a week ago.    Has started [facility] and has helped a lot. Started a year ago. Lots less sugar in her meals.   Past dilations in the past.    Family history as well - mother used to get food stuck.   Colonoscopy has not been scheduled.     Stress better with new boss.   Having ASD assessment soon.     Is now  and has moved.     Colonoscopy - every 10 years, last one was 12/9/21  FLU  COVID booster        Review of Systems         Objective    /80   Pulse 59   Temp 98.6  F (37  C)   Resp 18   Ht 1.727 m (5' 8\")   Wt 103.4 kg (228 lb)   SpO2 95%   BMI 34.67 kg/m    Body mass index is 34.67 kg/m .  Physical Exam                       "

## 2022-12-22 ENCOUNTER — MYC MEDICAL ADVICE (OUTPATIENT)
Dept: PSYCHIATRY | Facility: CLINIC | Age: 62
End: 2022-12-22

## 2022-12-22 ENCOUNTER — VIRTUAL VISIT (OUTPATIENT)
Dept: PSYCHIATRY | Facility: CLINIC | Age: 62
End: 2022-12-22
Attending: NURSE PRACTITIONER
Payer: COMMERCIAL

## 2022-12-22 DIAGNOSIS — F90.2 ATTENTION DEFICIT HYPERACTIVITY DISORDER (ADHD), COMBINED TYPE: ICD-10-CM

## 2022-12-22 DIAGNOSIS — F32.A DEPRESSION, UNSPECIFIED DEPRESSION TYPE: ICD-10-CM

## 2022-12-22 PROCEDURE — 99214 OFFICE O/P EST MOD 30 MIN: CPT | Mod: GT | Performed by: NURSE PRACTITIONER

## 2022-12-22 PROCEDURE — 90836 PSYTX W PT W E/M 45 MIN: CPT | Mod: GT | Performed by: NURSE PRACTITIONER

## 2022-12-22 RX ORDER — BUPROPION HYDROCHLORIDE 100 MG/1
100 TABLET, EXTENDED RELEASE ORAL DAILY
Qty: 90 TABLET | Refills: 0 | Status: SHIPPED | OUTPATIENT
Start: 2022-12-22 | End: 2023-02-23

## 2022-12-22 ASSESSMENT — ANXIETY QUESTIONNAIRES
6. BECOMING EASILY ANNOYED OR IRRITABLE: SEVERAL DAYS
GAD7 TOTAL SCORE: 10
5. BEING SO RESTLESS THAT IT IS HARD TO SIT STILL: NOT AT ALL
4. TROUBLE RELAXING: MORE THAN HALF THE DAYS
7. FEELING AFRAID AS IF SOMETHING AWFUL MIGHT HAPPEN: SEVERAL DAYS
GAD7 TOTAL SCORE: 10
IF YOU CHECKED OFF ANY PROBLEMS ON THIS QUESTIONNAIRE, HOW DIFFICULT HAVE THESE PROBLEMS MADE IT FOR YOU TO DO YOUR WORK, TAKE CARE OF THINGS AT HOME, OR GET ALONG WITH OTHER PEOPLE: VERY DIFFICULT
2. NOT BEING ABLE TO STOP OR CONTROL WORRYING: MORE THAN HALF THE DAYS
GAD7 TOTAL SCORE: 10
7. FEELING AFRAID AS IF SOMETHING AWFUL MIGHT HAPPEN: SEVERAL DAYS
8. IF YOU CHECKED OFF ANY PROBLEMS, HOW DIFFICULT HAVE THESE MADE IT FOR YOU TO DO YOUR WORK, TAKE CARE OF THINGS AT HOME, OR GET ALONG WITH OTHER PEOPLE?: VERY DIFFICULT
1. FEELING NERVOUS, ANXIOUS, OR ON EDGE: MORE THAN HALF THE DAYS
3. WORRYING TOO MUCH ABOUT DIFFERENT THINGS: MORE THAN HALF THE DAYS
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
## 2022-12-22 NOTE — PATIENT INSTRUCTIONS
-Continue on current medication regimen.    May take following supplements  -5HTP 100 mg daily for mood and ADHD. Monitor nausea and nightmares.  -Vincent E 400 mg 2 times a day for mood and ADHD.  -Magnesium 250 mg up to 2 times a day for anxiety and nerve pain possibly. Monitor for diarrhea.  -5MTHF 15 mg daily for mood.    -Please return release of information for [facility] psychiatry.    Your next appointment is scheduled on 2/2/2023 (Thu) at 9am.      **For crisis resources, please see the information at the end of this document**   Patient Education    Thank you for coming to the [facility] MENTAL HEALTH & ADDICTION [place] CLINIC.     Lab Testing:  If you had lab testing today and your results are reassuring or normal they will be mailed to you or sent through [employer] within 7 days. If the lab tests need quick action we will call you with the results. The phone number we will call with results is # [phone]. If this is not the best number please call our clinic and change the number.     Medication Refills:  If you need any refills please call your pharmacy and they will contact us. Our fax number for refills is [phone].   Three business days of notice are needed for general medication refill requests.   Five business days of notice are needed for controlled substance refill requests.   If you need to change to a different pharmacy, please contact the new pharmacy directly. The new pharmacy will help you get your medications transferred.     Contact Us:  Please call [phone] during business hours (8-5:00 M-F).   If you have medication related questions after clinic hours, or on the weekend, please call [phone].     Financial Assistance [phone]   Medical Records [phone]       MENTAL HEALTH CRISIS RESOURCES:  For a emergency help, please call 911 or go to the nearest Emergency Department.     Emergency Walk-In Options:   [facility] ([place]):  [phone] - Specialized mental health emergency area designed to be calming  [facility] ([place]): [phone]  [facility] ([place]): [phone]  [facility] ([place]): [phone]    [facility] Crisis Information:   [place]: [phone]  [place]: [phone]  [place] ([place]) - Adult: [phone]     Child: [phone]  [place] - Adult: [phone]     Child: [phone]  [place]: [phone]  List of all [facility] resources:   https://[place].gov/dhs/people-we-serve/adults/health-care/mental-health/resources/crisis-contacts.jsp    National Crisis Information:   Crisis Text Line: Text  [place]  to 480495  Suicide & Crisis Lifeline: 988  National Suicide Prevention Lifeline: 8-143-693-TALK (1-639.112.2620)       For online chat options, visit https://suicidepreventionlifeline.org/chat/  Poison Control Center: 1-849.489.1468  Trans Lifeline: 1-261.981.8226 - Hotline for transgender people of all ages  The Ozzie Project: 1-191.157.3322 - Hotline for LGBT youth     For Non-Emergency Support:   Fast Tracker: Mental Health & Substance Use Disorder Resources -   https://www.[place] [employer].org/

## 2022-12-22 NOTE — PROGRESS NOTES
Chris Stockton is a 62 year old who is being evaluated via a billable video visit.      Pt will join video visit via: [employer]  If there are problems joining the visit, send backup video invite via: Text to preferred phone: [phone]    Reason for telehealth visit: Patient has requested telehealth visit    Originating location (patient location): Patient's home    Will anyone else be joining the visit? No

## 2023-01-11 ENCOUNTER — TELEPHONE (OUTPATIENT)
Dept: PSYCHIATRY | Facility: CLINIC | Age: 63
End: 2023-01-11

## 2023-01-11 NOTE — TELEPHONE ENCOUNTER
On 1/10/23 the patient signed an ANGELA authorizing medical records to be exchanged between [facility] and [facility] for the purpose of disability. The request was faxed to scanning on January 11, 2023 and held a copy in Psychiatry until scanning is confirmed. Efrem Finn, EMT

## 2023-01-12 ENCOUNTER — TELEPHONE (OUTPATIENT)
Dept: PSYCHIATRY | Facility: CLINIC | Age: 63
End: 2023-01-12
Payer: COMMERCIAL

## 2023-01-12 NOTE — TELEPHONE ENCOUNTER
8 faxed pages, including signed ANGELA, was received from [employer]. The forms are being worked on in Nurse Triage.Akanksha Blevins LPN Lead

## 2023-01-18 ENCOUNTER — VIRTUAL VISIT (OUTPATIENT)
Dept: PSYCHIATRY | Facility: CLINIC | Age: 63
End: 2023-01-18
Attending: NURSE PRACTITIONER
Payer: COMMERCIAL

## 2023-01-18 DIAGNOSIS — F32.A DEPRESSION, UNSPECIFIED DEPRESSION TYPE: Primary | ICD-10-CM

## 2023-01-18 PROCEDURE — 99214 OFFICE O/P EST MOD 30 MIN: CPT | Mod: GT | Performed by: NURSE PRACTITIONER

## 2023-01-18 NOTE — TELEPHONE ENCOUNTER
Per [employer] message sent on 1/18/23 - forms are not needing to be completed at this time. A message was sent to the provider with an update.Akanksha Blevins LPN Lead

## 2023-01-18 NOTE — PATIENT INSTRUCTIONS
-Continue on current medication regimen.    -5HTP 100 mg daily for mood and ADHD. Monitor nausea and nightmares.  -Vincent E 400 mg 2 times a day for mood and ADHD.  -Magnesium 250 mg up to 2 times a day for anxiety and nerve pain possibly. Monitor for diarrhea.  -5MTHF 15 mg daily for mood.    Your next appointment is scheduled on 2/2/2023 (Thu) at 9am.    Thank you for coming to the [facility] MENTAL HEALTH & ADDICTION [place] CLIN[facility].    Lab Testing:  If you had lab testing today and your results are reassuring or normal they will be mailed to you or sent through [employer] within 7 days. If the lab tests need quick action we will call you with the results. The phone number we will call with results is # [phone] (home) . If this is not the best number please call our clinic and change the number.    Medication Refills:  If you need any refills please call your pharmacy and they will contact us. Our fax number for refills is [phone]. Please allow three business for refill processing. If you need to  your refill at a new pharmacy, please contact the new pharmacy directly. The new pharmacy will help you get your medications transferred.     Scheduling:  If you have any concerns about today's visit or wish to schedule another appointment please call our office during normal business hours [phone] (8-5:00 M-F)    Contact Us:  Please call [phone] during business hours (8-5:00 M-F).  If after clinic hours, or on the weekend, please call  [phone].    Financial Assistance [phone]  [facility] Billing [phone]  Central Billing Office, [facility]: [phone]  [place] Billing [phone]  Medical Records [phone]      MENTAL HEALTH CRISIS NUMBERS:  For a medical emergency please call  911 or go to the nearest ER.     [facility]:   [facility] -[phone]   Crisis Residence [facility] -[phone]   Walk-In Counseling Center [facility]  -[phone]   COPE 24/7 [place] Mobile Team -[phone] (adults)/[phone] (child)  CHILD: [facility] needs assessment team - [phone]      [facility]:   [facility] - [phone]   Walk-in counseling [facility] - [phone]   Walk-in counseling [facility] - [phone]   Crisis Residence [facility] Residence - [phone]  Urgent Care Adult Mental Tbscnc-[phone] mobile unit/ 24/7 crisis line    National Crisis Numbers:   National Suicide Prevention Lifeline: 2-238-243-TALK (753-085-3991)  Poison Control Center - 1-831.429.8788  [employer]/resources for a list of additional resources (SOS)  Trans Lifeline a hotline for transgender people 9-763-037-6206  The Ozzie Project a hotline for LGBT youth 1-985.625.6938  Crisis Text Line: For any crisis 24/7   To: 184051  see www.crisistextline.org  - IF MAKING A CALL FEELS TOO HARD, send a text!         Again thank you for choosing [facility] MENTAL HEALTH & ADDICTION [facility] and please let us know how we can best partner with you to improve you and your family's health.    You may be receiving a survey regarding this appointment. We would love to have your feedback, both positive and negative. The survey is done by an external company, so your answers are anonymous.
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
## 2023-01-23 DIAGNOSIS — E78.5 HYPERLIPIDEMIA LDL GOAL <100: ICD-10-CM

## 2023-01-23 RX ORDER — ROSUVASTATIN CALCIUM 10 MG/1
10 TABLET, COATED ORAL DAILY
Qty: 90 TABLET | Refills: 1 | Status: SHIPPED | OUTPATIENT
Start: 2023-01-23 | End: 2024-01-30

## 2023-01-23 NOTE — TELEPHONE ENCOUNTER
"Request for medication refill:   rosuvastatin (CRESTOR) 10 MG tablet      Providers if patient needs an appointment and you are willing to give a one month supply please refill for one month and  send a letter/MyChart using \".SMILLIMITEDREFILL\" .smillimited and route chart to \"[facility] \" (Giving one month refill in non controlled medications is strongly recommended before denial)    If refill has been denied, meaning absolutely no refills without visit, please complete the smart phrase \".smirxrefuse\" and route it to the \"P [facility] MED REFILLS\"  pool to inform the patient and the pharmacy.    Abby Joy MA      "

## 2023-01-31 ENCOUNTER — VIRTUAL VISIT (OUTPATIENT)
Dept: FAMILY MEDICINE | Facility: CLINIC | Age: 63
End: 2023-01-31
Payer: COMMERCIAL

## 2023-01-31 ENCOUNTER — LAB (OUTPATIENT)
Dept: FAMILY MEDICINE | Facility: CLINIC | Age: 63
End: 2023-01-31

## 2023-01-31 DIAGNOSIS — Z12.11 SCREENING FOR COLON CANCER: ICD-10-CM

## 2023-01-31 DIAGNOSIS — F84.0 AUTISM SPECTRUM DISORDER: Primary | ICD-10-CM

## 2023-01-31 PROCEDURE — 99215 OFFICE O/P EST HI 40 MIN: CPT | Mod: GT | Performed by: FAMILY MEDICINE

## 2023-01-31 RX ORDER — SPIRONOLACTONE 25 MG/1
1 TABLET ORAL DAILY
COMMUNITY
Start: 2022-12-21 | End: 2024-09-18

## 2023-01-31 RX ORDER — ESTRADIOL 0.1 MG/D
3 FILM, EXTENDED RELEASE TRANSDERMAL
COMMUNITY
Start: 2022-07-14 | End: 2023-07-28

## 2023-01-31 RX ORDER — PROGESTERONE 200 MG/1
200 CAPSULE ORAL DAILY
COMMUNITY
Start: 2023-01-04
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
## 2023-01-31 NOTE — PATIENT INSTRUCTIONS
Patient Education   Here is the plan from today's visit    ASD:  - you are very aware of next steps. Keep me informed of any major changes.   - in a year, if you want to continue in the program, I will need to have another visit to recertify    Colon cancer screening:  Sent you the cologaurd test      Please call or return to clinic if your symptoms don't go away.    Follow up plan  No follow-ups on file.    Thank you for coming to [facility] today.  Lab Testing:  **If you had lab testing today and your results are reassuring or normal they will be mailed to you or sent through [employer] within 7 days.   **If the lab tests need quick action we will call you with the results.  **If you are having labs done on a different day, please call [phone] to schedule at [facility] or [phone] for other [facility] Outpatient Lab locations. Labs do not offer walk-in appointments.  The phone number we will call with results is # [phone] (home) none (work). If this is not the best number please call our clinic and change the number.  Medication Refills:  If you need any refills please call your pharmacy and they will contact us.   If you need to  your refill at a new pharmacy, please contact the new pharmacy directly. The new pharmacy will help you get your medications transferred faster.   Scheduling:  If you have any concerns about today's visit or wish to schedule another appointment please call our office during normal business hours [phone] (8-5:00 M-F). If you can no longer make a scheduled visit, please cancel via [employer] or call us to cancel.   If a referral was made to an [facility] specialty provider and you do not get a call from central scheduling, please refer to directions on your visit summary or call our office during normal business hours for assistance.   If a Mammogram was ordered for you at the [facility] call [phone] to schedule or change your appointment.  If  you had an XRay/CT/Ultrasound/MRI ordered the number is [phone] to schedule or change your radiology appointment.   [facility] has limited ultrasound appointments available on Wednesdays, if you would like your ultrasound at [facility], please call [phone] to schedule.   Medical Concerns:  If you have urgent medical concerns please call [phone] at any time of the day.    Yanna Santizo MD

## 2023-02-02 ENCOUNTER — VIRTUAL VISIT (OUTPATIENT)
Dept: PSYCHIATRY | Facility: CLINIC | Age: 63
End: 2023-02-02
Attending: NURSE PRACTITIONER
Payer: COMMERCIAL

## 2023-02-02 DIAGNOSIS — F84.0 AUTISM: ICD-10-CM

## 2023-02-02 DIAGNOSIS — F41.9 ANXIETY: Primary | ICD-10-CM

## 2023-02-02 PROCEDURE — 90836 PSYTX W PT W E/M 45 MIN: CPT | Mod: GT | Performed by: NURSE PRACTITIONER

## 2023-02-02 PROCEDURE — 99214 OFFICE O/P EST MOD 30 MIN: CPT | Mod: GT | Performed by: NURSE PRACTITIONER

## 2023-02-02 ASSESSMENT — ANXIETY QUESTIONNAIRES
8. IF YOU CHECKED OFF ANY PROBLEMS, HOW DIFFICULT HAVE THESE MADE IT FOR YOU TO DO YOUR WORK, TAKE CARE OF THINGS AT HOME, OR GET ALONG WITH OTHER PEOPLE?: EXTREMELY DIFFICULT
3. WORRYING TOO MUCH ABOUT DIFFERENT THINGS: MORE THAN HALF THE DAYS
6. BECOMING EASILY ANNOYED OR IRRITABLE: SEVERAL DAYS
4. TROUBLE RELAXING: MORE THAN HALF THE DAYS
7. FEELING AFRAID AS IF SOMETHING AWFUL MIGHT HAPPEN: NOT AT ALL
GAD7 TOTAL SCORE: 9
GAD7 TOTAL SCORE: 9
5. BEING SO RESTLESS THAT IT IS HARD TO SIT STILL: NOT AT ALL
IF YOU CHECKED OFF ANY PROBLEMS ON THIS QUESTIONNAIRE, HOW DIFFICULT HAVE THESE PROBLEMS MADE IT FOR YOU TO DO YOUR WORK, TAKE CARE OF THINGS AT HOME, OR GET ALONG WITH OTHER PEOPLE: EXTREMELY DIFFICULT
7. FEELING AFRAID AS IF SOMETHING AWFUL MIGHT HAPPEN: NOT AT ALL
2. NOT BEING ABLE TO STOP OR CONTROL WORRYING: MORE THAN HALF THE DAYS
GAD7 TOTAL SCORE: 9
1. FEELING NERVOUS, ANXIOUS, OR ON EDGE: MORE THAN HALF THE DAYS

## 2023-02-02 ASSESSMENT — PATIENT HEALTH QUESTIONNAIRE - PHQ9
SUM OF ALL RESPONSES TO PHQ QUESTIONS 1-9: 11
SUM OF ALL RESPONSES TO PHQ QUESTIONS 1-9: 11
10. IF YOU CHECKED OFF ANY PROBLEMS, HOW DIFFICULT HAVE THESE PROBLEMS MADE IT FOR YOU TO DO YOUR WORK, TAKE CARE OF THINGS AT HOME, OR GET ALONG WITH OTHER PEOPLE: VERY DIFFICULT
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
## 2023-02-03 ENCOUNTER — TELEPHONE (OUTPATIENT)
Dept: PSYCHIATRY | Facility: CLINIC | Age: 63
End: 2023-02-03
Payer: COMMERCIAL

## 2023-02-03 NOTE — PATIENT INSTRUCTIONS
-Continue on current medication regimen.    Your next appointment is scheduled on 2/23/2023 (Thu) at 9am for 1 hour appt.    Thank you for coming to the [facility] [place] [facility].    Lab Testing:  If you had lab testing today and your results are reassuring or normal they will be mailed to you or sent through [facility] within 7 days. If the lab tests need quick action we will call you with the results. The phone number we will call with results is # [phone] (home) . If this is not the best number please call our clinic and change the number.    Medication Refills:  If you need any refills please call your pharmacy and they will contact us. Our fax number for refills is [phone]. Please allow three business for refill processing. If you need to  your refill at a new pharmacy, please contact the new pharmacy directly. The new pharmacy will help you get your medications transferred.     Scheduling:  If you have any concerns about today's visit or wish to schedule another appointment please call our office during normal business hours [phone] (8-5:00 M-F)    Contact Us:  Please call [phone] during business hours (8-5:00 M-F).  If after clinic hours, or on the weekend, please call  [phone].    Financial Assistance [phone]  [facility] B[facility] [phone]  Central Billing Office, [facility]: [phone]  [facility] Billing [phone]  Medical Records [phone]      MENTAL HEALTH CRISIS NUMBERS:  For a medical emergency please call  911 or go to the nearest ER.     [facility]:   [facility] -[phone]   Crisis Residence [facility] Residence -[phone]   Walk-In Counseling [facility] -[phone]   COPE 24/7 [facility] Mobile Team -[phone] (adults)/[phone] (child)  CHILD: [facility] needs assessment team - [phone]      [facility]:   [facility] - [phone]   Walk-in counseling [facility]  [facility] - [phone]   Walk-in counseling [facility] - [phone]   Crisis Residence [facility] Residence - [phone]  Urgent Care Adult Mental [facility]-[phone] mobile unit/ 24/7 crisis line    National Crisis Numbers:   National Suicide Prevention Lifeline: 0-996-742-TALK (898-969-7731)  Poison Control Center - 1-818.475.3071  [employer]/resources for a list of additional resources (SOS)  Trans Lifeline a hotline for transgender people 8-688-054-1654  [employer] a hotline for LGBT youth 1-627.332.9937  Crisis Text Line: For any crisis 24/7   To: 832749  see www.crisistextline.org  - IF MAKING A CALL FEELS TOO HARD, send a text!         Again thank you for choosing [facility]DICTION [facility] and please let us know how we can best partner with you to improve you and your family's health.    You may be receiving a survey regarding this appointment. We would love to have your feedback, both positive and negative. The survey is done by an external company, so your answers are anonymous.

## 2023-02-03 NOTE — TELEPHONE ENCOUNTER
8 faxed pages, including signed ANGELA, was received from [facility] requesting completion of STD forms. The forms are being worked on in Nurse Triage.Akanksha Blevins LPN Lead

## 2023-02-06 ENCOUNTER — OFFICE VISIT (OUTPATIENT)
Dept: FAMILY MEDICINE | Facility: CLINIC | Age: 63
End: 2023-02-06
Payer: COMMERCIAL

## 2023-02-06 VITALS
SYSTOLIC BLOOD PRESSURE: 113 MMHG | WEIGHT: 227 LBS | HEART RATE: 54 BPM | RESPIRATION RATE: 16 BRPM | DIASTOLIC BLOOD PRESSURE: 78 MMHG | HEIGHT: 68 IN | BODY MASS INDEX: 34.4 KG/M2 | OXYGEN SATURATION: 97 %

## 2023-02-06 DIAGNOSIS — F33.2 MDD (MAJOR DEPRESSIVE DISORDER), RECURRENT SEVERE, WITHOUT PSYCHOSIS (H): ICD-10-CM

## 2023-02-06 DIAGNOSIS — L30.9 ECZEMA, UNSPECIFIED TYPE: ICD-10-CM

## 2023-02-06 DIAGNOSIS — Z00.00 ROUTINE GENERAL MEDICAL EXAMINATION AT A HEALTH CARE FACILITY: Primary | ICD-10-CM

## 2023-02-06 PROBLEM — F50.811 BINGE-EATING DISORDER, MODERATE: Status: ACTIVE | Noted: 2021-04-20

## 2023-02-06 PROBLEM — F90.2 ADHD (ATTENTION DEFICIT HYPERACTIVITY DISORDER), COMBINED TYPE: Status: ACTIVE | Noted: 2019-10-28

## 2023-02-06 LAB
CHOLEST SERPL-MCNC: 150 MG/DL
HDLC SERPL-MCNC: 46 MG/DL
LDLC SERPL CALC-MCNC: 79 MG/DL
NONHDLC SERPL-MCNC: 104 MG/DL
PSA SERPL-MCNC: 0.09 NG/ML (ref 0–4.5)
TRIGL SERPL-MCNC: 126 MG/DL
TSH SERPL DL<=0.005 MIU/L-ACNC: 2.1 UIU/ML (ref 0.3–4.2)

## 2023-02-06 PROCEDURE — 36415 COLL VENOUS BLD VENIPUNCTURE: CPT | Performed by: FAMILY MEDICINE

## 2023-02-06 PROCEDURE — 99396 PREV VISIT EST AGE 40-64: CPT | Performed by: FAMILY MEDICINE

## 2023-02-06 PROCEDURE — 84443 ASSAY THYROID STIM HORMONE: CPT | Performed by: FAMILY MEDICINE

## 2023-02-06 PROCEDURE — 84153 ASSAY OF PSA TOTAL: CPT | Performed by: FAMILY MEDICINE

## 2023-02-06 PROCEDURE — 80061 LIPID PANEL: CPT | Performed by: FAMILY MEDICINE

## 2023-02-06 PROCEDURE — 99213 OFFICE O/P EST LOW 20 MIN: CPT | Mod: 25 | Performed by: FAMILY MEDICINE

## 2023-02-06 ASSESSMENT — ENCOUNTER SYMPTOMS
ARTHRALGIAS: 0
DIARRHEA: 0
DIZZINESS: 0
FREQUENCY: 0
HEMATURIA: 0
SORE THROAT: 0
PARESTHESIAS: 0
HEMATOCHEZIA: 0
PALPITATIONS: 0
EYE PAIN: 0
JOINT SWELLING: 0
SHORTNESS OF BREATH: 0
MYALGIAS: 0
ABDOMINAL PAIN: 0
HEADACHES: 0
WEAKNESS: 0
DYSURIA: 0
COUGH: 0
CHILLS: 0
FEVER: 0
NAUSEA: 0
NERVOUS/ANXIOUS: 1
HEARTBURN: 0
CONSTIPATION: 0
BREAST MASS: 0

## 2023-02-06 NOTE — PROGRESS NOTES
SUBJECTIVE:   CC: Liz is an 62 year old who presents for preventative health visit.   Patient has been advised of split billing requirements and indicates understanding: Yes  Healthy Habits:     Getting at least 3 servings of Calcium per day:  Yes    Bi-annual eye exam:  Yes    Dental care twice a year:  Yes    Sleep apnea or symptoms of sleep apnea:  Daytime drowsiness and Sleep apnea    Diet:  Low salt    Frequency of exercise:  2-3 days/week    Duration of exercise:  Less than 15 minutes    Taking medications regularly:  Yes    Medication side effects:  None    PHQ-2 Total Score: 2    Additional concerns today:  No  Walking dogs regularly    Answers for HPI/ROS submitted by the patient on 2/6/2023  Blood in stool: No  heartburn: No  peripheral edema: No  mood changes: No  Skin sensation changes: No  pelvic pain: No  vaginal bleeding: No  vaginal discharge: No  tenderness: No  breast mass: No  breast discharge: No  impotence: No  penile discharge: No    Had been having blood in her stools - once and had constipation then and nothings since then. Cologaurd is pestering her.   Looking for her mammogram location  When she wears her socks she will have lines and wondering if edema - recent BMP was normal.   Has been taking the PPI daily most of the time and has noted improvement in her reflux even if she overeats  Some difficulty urinating yesterday with decreased flow and increased pressure and has not had recurrence. Has some incontinence at times. Past PSA 3/2021 was   Rash on her left upper leg.   Going to [facility] and eating disorder is controlled.               Today's PHQ-2 Score:   PHQ-2 ( 1999 Pf[employer]) 2/6/2023   Q1: Little interest or pleasure in doing things 1   Q2: Feeling down, depressed or hopeless 1   PHQ-2 Score 2   PHQ-2 Total Score (12-17 Years)- Positive if 3 or more points; Administer PHQ-A if positive -   Q1: Little interest or pleasure in doing things Several days   Q2: Feeling down,  depressed or hopeless Several days   PHQ-2 Score 2       Have you ever done Advance Care Planning? (For example, a Health Directive, POLST, or a discussion with a medical provider or your loved ones about your wishes): No, advance care planning information given to patient to review.  Patient plans to discuss their wishes with loved ones or provider.     Would for sure want all done - has a lot of girl living  - if she were to have a stroke, she would want to fully rehab.   Feels young  Health Care decision maker in the event that she can't do so, will likely be her wife that she is  - not sure if she would be OK with that but thinking her at this point  And needs to think about it    Social History     Tobacco Use     Smoking status: Never     Smokeless tobacco: Never   Substance Use Topics     Alcohol use: Yes     Comment: rare     If you drink alcohol do you typically have >3 drinks per day or >7 drinks per week? Yes - less than 1/4 serving per month      Alcohol Use 2/6/2023   Prescreen: >3 drinks/day or >7 drinks/week? No   Prescreen: >3 drinks/day or >7 drinks/week? -       Last PSA:   PSA   Date Value Ref Range Status   03/30/2021 0.14 0 - 4 ug/L Final     Comment:     Assay Method:  Chemiluminescence using Siemens Vista analyzer     Prostate Specific Antigen Screen   Date Value Ref Range Status   02/06/2023 0.09 0.00 - 4.50 ng/mL Final       Reviewed orders with patient. Reviewed health maintenance and updated orders accordingly - Yes      Reviewed and updated as needed this visit by clinical staff   Tobacco  Allergies  Meds              Reviewed and updated as needed this visit by Provider                     Review of Systems   Constitutional: Negative for chills and fever.   HENT: Positive for congestion and hearing loss. Negative for ear pain and sore throat.    Eyes: Negative for pain and visual disturbance.   Respiratory: Negative for cough and shortness of breath.    Cardiovascular:  "Negative for chest pain and palpitations.   Gastrointestinal: Negative for abdominal pain, constipation, diarrhea and nausea.   Genitourinary: Negative for dysuria, frequency, genital sores, hematuria and urgency.   Musculoskeletal: Negative for arthralgias, joint swelling and myalgias.   Skin: Positive for rash.   Neurological: Negative for dizziness, weakness and headaches.   Psychiatric/Behavioral: The patient is nervous/anxious.          OBJECTIVE:   /78   Pulse 54   Resp 16   Ht 1.727 m (5' 8\")   Wt 103 kg (227 lb)   SpO2 97%   BMI 34.52 kg/m      Physical Exam  GENERAL: healthy, alert and no distress  EYES: Eyes grossly normal to inspection, PERRL and conjunctivae and sclerae normal  HENT: ear canals and TM's normal, hearing aides in  NECK: no adenopathy, no asymmetry, masses, or scars and thyroid normal to palpation  RESP: lungs clear to auscultation - no rales, rhonchi or wheezes  CV: regular rate and rhythm, normal S1 S2, no S3 or S4, no murmur, click or rub, no peripheral edema and peripheral pulses strong  ABDOMEN: soft, nontender, no hepatosplenomegaly, no masses and bowel sounds normal  MS: no gross musculoskeletal defects noted, tr edema  SKIN: two areas opposite each other on both her inner upper thighs that are about 1- 1.5 cm numular, not raised, hyperpigmented and slightly red patches without scale  Vitiligo        ASSESSMENT/PLAN:   Chris was seen today for physical.    Diagnoses and all orders for this visit:    Routine general medical examination at a health care facility - trans female with penis/prostate at this time. Will do PSA testing although quite low in the past (blocked). Add lipids as well since been a few years. Gets renal and liver testing by her gender physician.  ACP documents given for her to take.  Plans to get her mammogram at [facility] since trans friendly  Cologard   -     Prostate spec antigen screen; Future  -     Lipid panel; Future  -     Prostate spec antigen " "screen  -     Lipid panel    MDD (major depressive disorder), recurrent severe, without psychosis (H) - worse and so will add TSH  -     TSH; Future  -     TSH    Eczema, unspecified type - had as child and so may be due to some friction - will try topical steroids. Believes she has some at home so will MyChart me with what she has. Take BID for 2 weeks.     Other orders  -     Cancel: INFLUENZA QUAD, RECOMBINANT, P-FREE (RIV4) (FLUBLOK)        Patient has been advised of split billing requirements and indicates understanding: Yes      COUNSELING:   Nutrition, exercise, screening. Health care directives       BMI:   Estimated body mass index is 34.52 kg/m  as calculated from the following:    Height as of this encounter: 1.727 m (5' 8\").    Weight as of this encounter: 103 kg (227 lb).   Weight management plan: Has eating disorder that she is managing at [facility]. Aware that her weight is up.       She reports that she has never smoked. She has never used smokeless tobacco.            Yanna Santizo MD  [facility]  "

## 2023-02-06 NOTE — PATIENT INSTRUCTIONS
Patient Education   Here is the plan from today's visit    1. Routine general medical examination at a health care facility  Working on colon breast screen [phone] - ask for Women's Health in the [facility] on the 3rd floor.   Work on the Advanced Directives  - Prostate spec antigen screen; Future  - Lipid panel; Future    2. MDD (major depressive disorder), recurrent severe, without psychosis (H)  As you are doing and adding thyroid  - TSH; Future    3. Eczema, unspecified type  Looks like area of ongoing irritation and so apply topical steroid twice a day for 2 weeks to see if helps. Both sides.       Please call or return to clinic if your symptoms don't go away.    Follow up plan  No follow-ups on file.    Thank you for coming to [facility] today.  Lab Testing:  **If you had lab testing today and your results are reassuring or normal they will be mailed to you or sent through [employer] within 7 days.   **If the lab tests need quick action we will call you with the results.  **If you are having labs done on a different day, please call [phone] to schedule at [facility] or [phone] for other [facility] locations. Labs do not offer walk-in appointments.  The phone number we will call with results is # [phone] (home) none (work). If this is not the best number please call our clinic and change the number.  Medication Refills:  If you need any refills please call your pharmacy and they will contact us.   If you need to  your refill at a new pharmacy, please contact the new pharmacy directly. The new pharmacy will help you get your medications transferred faster.   Scheduling:  If you have any concerns about today's visit or wish to schedule another appointment please call our office during normal business hours [phone] (8-5:00 M-F). If you can no longer make a scheduled visit, please cancel via [facility] or call us to cancel.   If a referral was made to  an [facility] [place] specialty provider and you do not get a call from central scheduling, please refer to directions on your visit summary or call our office during normal business hours for assistance.   If a Mammogram was ordered for you at the [facility] Center call [phone] to schedule or change your appointment.  If you had an XRay/CT/Ultrasound/MRI ordered the number is [phone] to schedule or change your radiology appointment.   [facility] has limited ultrasound appointments available on Wednesdays, if you would like your ultrasound at [facility], please call [phone] to schedule.   Medical Concerns:  If you have urgent medical concerns please call [phone] at any time of the day.    Yanna Santizo MD

## 2023-02-09 NOTE — TELEPHONE ENCOUNTER
Writer received return call from Ammon Evangelista [phone], who asked that the past 2 Progress Notes be faxed today and the forms sent once completed. Writer agreed and faxed Progress Notes from 1/18/2023 (11 pages) and 2/2/2023 (13 pages) appointments to [phone]. A Quick Disclosure was entered.Akanksha Blevins LPN Lead

## 2023-02-09 NOTE — TELEPHONE ENCOUNTER
Writer called [facility] [phone] and left a DVM with Ammon Evangelista stating Progress notes could be sent now but the forms will not be completed until next week. Writer left direct number as CB for update.Akanksha Blevins LPN Lead

## 2023-02-21 ENCOUNTER — ANCILLARY PROCEDURE (OUTPATIENT)
Dept: MAMMOGRAPHY | Facility: CLINIC | Age: 63
End: 2023-02-21
Attending: FAMILY MEDICINE
Payer: COMMERCIAL

## 2023-02-21 DIAGNOSIS — Z12.31 ENCOUNTER FOR SCREENING MAMMOGRAM FOR BREAST CANCER: ICD-10-CM

## 2023-02-21 PROCEDURE — 77067 SCR MAMMO BI INCL CAD: CPT

## 2023-02-21 PROCEDURE — 77067 SCR MAMMO BI INCL CAD: CPT | Mod: 26

## 2023-02-21 PROCEDURE — 77063 BREAST TOMOSYNTHESIS BI: CPT | Mod: 26

## 2023-02-23 ENCOUNTER — VIRTUAL VISIT (OUTPATIENT)
Dept: PSYCHIATRY | Facility: CLINIC | Age: 63
End: 2023-02-23
Attending: NURSE PRACTITIONER
Payer: COMMERCIAL

## 2023-02-23 DIAGNOSIS — F90.2 ATTENTION DEFICIT HYPERACTIVITY DISORDER (ADHD), COMBINED TYPE: ICD-10-CM

## 2023-02-23 DIAGNOSIS — F84.0 AUTISM: Primary | ICD-10-CM

## 2023-02-23 DIAGNOSIS — F32.A DEPRESSION, UNSPECIFIED DEPRESSION TYPE: ICD-10-CM

## 2023-02-23 PROCEDURE — 90838 PSYTX W PT W E/M 60 MIN: CPT | Mod: VID | Performed by: NURSE PRACTITIONER

## 2023-02-23 PROCEDURE — 99214 OFFICE O/P EST MOD 30 MIN: CPT | Mod: VID | Performed by: NURSE PRACTITIONER

## 2023-02-23 RX ORDER — BUPROPION HYDROCHLORIDE 100 MG/1
100 TABLET, EXTENDED RELEASE ORAL DAILY
Qty: 90 TABLET | Refills: 0 | Status: SHIPPED | OUTPATIENT
Start: 2023-02-23 | End: 2023-04-06

## 2023-02-23 NOTE — NURSING NOTE
Is the patient currently in the state of [place]? YES    Visit mode:VIDEO    If the visit is dropped, the patient can be reconnected by: VIDEO VISIT: Text to cell phone: [phone]    Will anyone else be joining the visit? NO      How would you like to obtain your AVS? MyChart    Are changes needed to the allergy or medication list? NO    Refills on gabapentin

## 2023-02-23 NOTE — PATIENT INSTRUCTIONS
-Continue on current medication regimen.    Your next appointment is scheduled on 4/6/2023 (Thu) at 8:30 am for 1 hour appointment.      **For crisis resources, please see the information at the end of this document**   Patient Education    Thank you for coming to the [facility] [place] [facility].     Lab Testing:  If you had lab testing today and your results are reassuring or normal they will be mailed to you or sent through [facility] within 7 days. If the lab tests need quick action we will call you with the results. The phone number we will call with results is # [phone]. If this is not the best number please call our clinic and change the number.     Medication Refills:  If you need any refills please call your pharmacy and they will contact us. Our fax number for refills is [phone].   Three business days of notice are needed for general medication refill requests.   Five business days of notice are needed for controlled substance refill requests.   If you need to change to a different pharmacy, please contact the new pharmacy directly. The new pharmacy will help you get your medications transferred.     Contact Us:  Please call [phone] during business hours (8-5:00 M-F).   If you have medication related questions after clinic hours, or on the weekend, please call [phone].     Financial Assistance [phone]   Medical Records [phone]       MENTAL HEALTH CRISIS RESOURCES:  For a emergency help, please call 911 or go to the nearest Emergency Department.     Emergency Walk-In Options:   EmPATH Unit @ [facility] ([place]): [phone] - Specialized mental health emergency area designed to be calming  [facility] ([place]): [phone]  [facility] ([place]): [phone]  [facility]): [phone]    [facility] Crisis Information:   [place]: [phone]  [place]: [phone]  [place]  (COPE) - Adult: [phone]     Child: [phone]  Junior - Adult: [phone]     Child: [phone]  [place]: [phone]  List of all [facility] resources:   https://[place].[email or web address]/dhs/people-we-serve/adults/health-care/mental-health/resources/crisis-contacts.jsp    National Crisis Information:   Crisis Text Line: Text  [place]  to 827822  Suicide & Crisis Lifeline: 988  National Suicide Prevention Lifeline: 3-495-272-TALK (1-450.601.9783)       For online chat options, visit https://suicidepreventionlifeline.org/chat/  Poison Control Center: 1-491.642.6432  Trans Lifeline: 1-448.875.9477 - Hotline for transgender people of all ages  The [employer]: 3-034-193-0024 - Hotline for LGBT youth     For Non-Emergency Support:   Fast Tracker: Mental Health & Substance Use Disorder Resources -   https://www.[facility].org/
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
## 2023-02-23 NOTE — PROGRESS NOTES
Chris Stockton is a 62 year old who is being evaluated via a billable video visit.      Pt will join video visit via: [employer]  If there are problems joining the visit, send backup video invite via: Text to preferred phone: [phone]    Reason for telehealth visit: Patient has requested telehealth visit    Originating location (patient location): Patient's home    Will anyone else be joining the visit? No

## 2023-02-27 NOTE — TELEPHONE ENCOUNTER
Completed, signed forms were returned to this writer and faxed to [facility] attn Ammon Evangelista at [phone]. The original copies were faxed to scanning and are held in Psych until scanning is confirmed.Akanksha Blevins LPN Lead

## 2023-02-28 ENCOUNTER — MYC MEDICAL ADVICE (OUTPATIENT)
Dept: PSYCHIATRY | Facility: CLINIC | Age: 63
End: 2023-02-28
Payer: COMMERCIAL

## 2023-03-01 NOTE — TELEPHONE ENCOUNTER
"Writer called Melo Uriarte [phone] ext [phone] with an update that forms were faxed on 2/27/23 and records from 2/23/23 were faxed this morning. Melo confirmed receipt of records and explained a \"team of people\" review the forms and will forward to him once reviewed. Writer acknowledged and will send the patient a message with update.Akanksha Blevins LPN Lead    "

## 2023-03-08 LAB — NONINV COLON CA DNA+OCC BLD SCRN STL QL: NEGATIVE

## 2023-03-13 DIAGNOSIS — E03.4 HYPOTHYROIDISM DUE TO ACQUIRED ATROPHY OF THYROID: ICD-10-CM

## 2023-03-13 DIAGNOSIS — K22.2 ESOPHAGEAL STRICTURE: ICD-10-CM

## 2023-03-13 RX ORDER — LEVOTHYROXINE SODIUM 100 UG/1
TABLET ORAL
Qty: 90 TABLET | Refills: 1 | Status: SHIPPED | OUTPATIENT
Start: 2023-03-13 | End: 2023-09-11

## 2023-03-13 NOTE — TELEPHONE ENCOUNTER
"Request for medication refill: levothyroxine (SYNTHROID/LEVOTHROID) 100 MCG tablet  omeprazole (PRILOSEC) 20 MG DR capsule    Providers if patient needs an appointment and you are willing to give a one month supply please refill for one month and  send a letter/MyChart using \".SMILLIMITEDREFILL\" .smillimited and route chart to \"P SMI \" (Giving one month refill in non controlled medications is strongly recommended before denial)    If refill has been denied, meaning absolutely no refills without visit, please complete the smart phrase \".smirxrefuse\" and route it to the \"P SMI MED REFILLS\"  pool to inform the patient and the pharmacy.    Bernadine Bernardo, CMA      "

## 2023-03-17 ENCOUNTER — TELEPHONE (OUTPATIENT)
Dept: PSYCHIATRY | Facility: CLINIC | Age: 63
End: 2023-03-17

## 2023-03-23 ENCOUNTER — THERAPY VISIT (OUTPATIENT)
Dept: PHYSICAL THERAPY | Facility: CLINIC | Age: 63
End: 2023-03-23
Payer: COMMERCIAL

## 2023-03-23 DIAGNOSIS — R32 URINARY INCONTINENCE: Primary | ICD-10-CM

## 2023-03-23 DIAGNOSIS — R15.9 FECAL INCONTINENCE: ICD-10-CM

## 2023-03-23 PROCEDURE — 97535 SELF CARE MNGMENT TRAINING: CPT | Mod: GP | Performed by: PHYSICAL THERAPIST

## 2023-03-23 PROCEDURE — 97112 NEUROMUSCULAR REEDUCATION: CPT | Mod: GP | Performed by: PHYSICAL THERAPIST

## 2023-03-23 PROCEDURE — 97161 PT EVAL LOW COMPLEX 20 MIN: CPT | Mod: GP | Performed by: PHYSICAL THERAPIST

## 2023-03-23 PROCEDURE — 97530 THERAPEUTIC ACTIVITIES: CPT | Mod: GP | Performed by: PHYSICAL THERAPIST
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
## 2023-04-05 ASSESSMENT — ANXIETY QUESTIONNAIRES
GAD7 TOTAL SCORE: 7
GAD7 TOTAL SCORE: 7
2. NOT BEING ABLE TO STOP OR CONTROL WORRYING: SEVERAL DAYS
8. IF YOU CHECKED OFF ANY PROBLEMS, HOW DIFFICULT HAVE THESE MADE IT FOR YOU TO DO YOUR WORK, TAKE CARE OF THINGS AT HOME, OR GET ALONG WITH OTHER PEOPLE?: VERY DIFFICULT
1. FEELING NERVOUS, ANXIOUS, OR ON EDGE: MORE THAN HALF THE DAYS
7. FEELING AFRAID AS IF SOMETHING AWFUL MIGHT HAPPEN: SEVERAL DAYS
6. BECOMING EASILY ANNOYED OR IRRITABLE: SEVERAL DAYS
3. WORRYING TOO MUCH ABOUT DIFFERENT THINGS: SEVERAL DAYS
IF YOU CHECKED OFF ANY PROBLEMS ON THIS QUESTIONNAIRE, HOW DIFFICULT HAVE THESE PROBLEMS MADE IT FOR YOU TO DO YOUR WORK, TAKE CARE OF THINGS AT HOME, OR GET ALONG WITH OTHER PEOPLE: VERY DIFFICULT
4. TROUBLE RELAXING: SEVERAL DAYS
7. FEELING AFRAID AS IF SOMETHING AWFUL MIGHT HAPPEN: SEVERAL DAYS
5. BEING SO RESTLESS THAT IT IS HARD TO SIT STILL: NOT AT ALL
GAD7 TOTAL SCORE: 7

## 2023-04-05 ASSESSMENT — PATIENT HEALTH QUESTIONNAIRE - PHQ9
SUM OF ALL RESPONSES TO PHQ QUESTIONS 1-9: 13
SUM OF ALL RESPONSES TO PHQ QUESTIONS 1-9: 13
10. IF YOU CHECKED OFF ANY PROBLEMS, HOW DIFFICULT HAVE THESE PROBLEMS MADE IT FOR YOU TO DO YOUR WORK, TAKE CARE OF THINGS AT HOME, OR GET ALONG WITH OTHER PEOPLE: VERY DIFFICULT

## 2023-04-06 ENCOUNTER — VIRTUAL VISIT (OUTPATIENT)
Dept: PSYCHIATRY | Facility: CLINIC | Age: 63
End: 2023-04-06
Attending: NURSE PRACTITIONER
Payer: COMMERCIAL

## 2023-04-06 DIAGNOSIS — F90.2 ATTENTION DEFICIT HYPERACTIVITY DISORDER (ADHD), COMBINED TYPE: ICD-10-CM

## 2023-04-06 DIAGNOSIS — K08.89 TOOTH PAIN: ICD-10-CM

## 2023-04-06 DIAGNOSIS — F32.A DEPRESSION, UNSPECIFIED DEPRESSION TYPE: ICD-10-CM

## 2023-04-06 DIAGNOSIS — F84.0 AUTISM: Primary | ICD-10-CM

## 2023-04-06 PROCEDURE — 90836 PSYTX W PT W E/M 45 MIN: CPT | Mod: VID | Performed by: NURSE PRACTITIONER

## 2023-04-06 PROCEDURE — 99214 OFFICE O/P EST MOD 30 MIN: CPT | Mod: VID | Performed by: NURSE PRACTITIONER

## 2023-04-06 RX ORDER — BUPROPION HYDROCHLORIDE 100 MG/1
100 TABLET, EXTENDED RELEASE ORAL DAILY
Qty: 90 TABLET | Refills: 0 | Status: SHIPPED | OUTPATIENT
Start: 2023-04-06 | End: 2023-10-04

## 2023-04-06 NOTE — NURSING NOTE
Is the patient currently in the state of [place]? YES    Visit mode:VIDEO    If the visit is dropped, the patient can be reconnected by: VIDEO VISIT: Send to e-mail at: [email or web address]    Will anyone else be joining the visit? NO      How would you like to obtain your AVS? MyChart    Are changes needed to the allergy or medication list? NO    Reason for visit: Follow-up
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
## 2023-04-06 NOTE — PATIENT INSTRUCTIONS
-Continue on current medication regimen.    -Please stop 5HTP, Magnesium and CHALO E.    Your next appointment is scheduled on 5/12/2023 (Fri) at 8am.      **For crisis resources, please see the information at the end of this document**   Patient Education    Thank you for coming to the [facility] [place] [facility].     Lab Testing:  If you had lab testing today and your results are reassuring or normal they will be mailed to you or sent through [employer] within 7 days. If the lab tests need quick action we will call you with the results. The phone number we will call with results is # [phone]. If this is not the best number please call our clinic and change the number.     Medication Refills:  If you need any refills please call your pharmacy and they will contact us. Our fax number for refills is [phone].   Three business days of notice are needed for general medication refill requests.   Five business days of notice are needed for controlled substance refill requests.   If you need to change to a different pharmacy, please contact the new pharmacy directly. The new pharmacy will help you get your medications transferred.     Contact Us:  Please call [phone] during business hours (8-5:00 M-F).   If you have medication related questions after clinic hours, or on the weekend, please call [phone].     Financial Assistance [phone]   Medical Records [phone]       MENTAL HEALTH CRISIS RESOURCES:  For a emergency help, please call 911 or go to the nearest Emergency Department.     Emergency Walk-In Options:   Em[facility] Unit @ [facility] ([place]): [phone] - Specialized mental health emergency area designed to be calming  [facility] ([place]): [phone]  [facility] ([place]): [phone]  [facility] ([place]): [phone]    [facility] Crisis Information:   [place]: [phone]  Fabio:  [phone]  Elise[place] (COPE) - Adult: [phone]     Child: [phone]  Junior - Adult: [phone]     Child: [phone]  [place]: [phone]  List of all [facility] resources:   https://[place].gov/dhs/people-we-serve/adults/health-care/mental-health/resources/crisis-contacts.jsp    National Crisis Information:   Crisis Text Line: Text  [place]  to 691647  Suicide & Crisis Lifeline: 988  National Suicide Prevention Lifeline: 5-904-553-TALK (1-465.923.3089)       For online chat options, visit https://suicidepreventionlifeline.org/chat/  Poison Control Center: 1-325.968.1248  Trans Lifeline: 1-843.862.3262 - Hotline for transgender people of all ages  The [employer] Project: 8-944-492-5492 - Hotline for LGBT youth     For Non-Emergency Support:   Fast Tracker: Mental Health & Substance Use Disorder Resources -   https://www.[employer].org/

## 2023-04-12 ENCOUNTER — TELEPHONE (OUTPATIENT)
Dept: PSYCHIATRY | Facility: CLINIC | Age: 63
End: 2023-04-12
Payer: COMMERCIAL

## 2023-04-12 NOTE — TELEPHONE ENCOUNTER
"3 Faxed pages was received from [facility] requesting progress notes from \"April 1, 2023 through the present.\"  12 pages of Progress Notes from 4/6/2023 were printed and faxed to [facility] at [phone]. A Quick Disclosure was entered.Akanksha Blevins LPN Lead    "

## 2023-04-13 ENCOUNTER — THERAPY VISIT (OUTPATIENT)
Dept: PHYSICAL THERAPY | Facility: CLINIC | Age: 63
End: 2023-04-13
Payer: COMMERCIAL

## 2023-04-13 DIAGNOSIS — R32 URINARY INCONTINENCE: Primary | ICD-10-CM

## 2023-04-13 DIAGNOSIS — R15.9 FECAL INCONTINENCE: ICD-10-CM

## 2023-04-13 PROCEDURE — 97530 THERAPEUTIC ACTIVITIES: CPT | Mod: GP | Performed by: PHYSICAL THERAPIST

## 2023-04-13 PROCEDURE — 97535 SELF CARE MNGMENT TRAINING: CPT | Mod: 59 | Performed by: PHYSICAL THERAPIST

## 2023-04-25 ENCOUNTER — MYC MEDICAL ADVICE (OUTPATIENT)
Dept: PSYCHIATRY | Facility: CLINIC | Age: 63
End: 2023-04-25

## 2023-05-23 ENCOUNTER — THERAPY VISIT (OUTPATIENT)
Dept: PHYSICAL THERAPY | Facility: CLINIC | Age: 63
End: 2023-05-23
Payer: COMMERCIAL

## 2023-05-23 DIAGNOSIS — R32 URINARY INCONTINENCE: Primary | ICD-10-CM

## 2023-05-23 DIAGNOSIS — R15.9 FECAL INCONTINENCE: ICD-10-CM

## 2023-05-23 PROCEDURE — 97530 THERAPEUTIC ACTIVITIES: CPT | Mod: GP | Performed by: PHYSICAL THERAPIST

## 2023-05-23 PROCEDURE — 97535 SELF CARE MNGMENT TRAINING: CPT | Mod: GP | Performed by: PHYSICAL THERAPIST

## 2023-06-01 DIAGNOSIS — F64.0 GENDER DYSPHORIA IN ADULT: Primary | ICD-10-CM

## 2023-06-23 ENCOUNTER — THERAPY VISIT (OUTPATIENT)
Dept: PHYSICAL THERAPY | Facility: CLINIC | Age: 63
End: 2023-06-23
Payer: COMMERCIAL

## 2023-06-23 DIAGNOSIS — R15.9 FECAL INCONTINENCE: ICD-10-CM

## 2023-06-23 DIAGNOSIS — R32 URINARY INCONTINENCE: Primary | ICD-10-CM

## 2023-06-23 PROCEDURE — 97535 SELF CARE MNGMENT TRAINING: CPT | Mod: GP | Performed by: PHYSICAL THERAPIST

## 2023-06-23 PROCEDURE — 97530 THERAPEUTIC ACTIVITIES: CPT | Mod: GP | Performed by: PHYSICAL THERAPIST

## 2023-06-25 NOTE — PROGRESS NOTES
PLAN  Continue therapy per current plan of care.    Beginning/End Dates of Progress Note Reporting Period:  06/21/23 to 06/23/2023    Referring Provider:  Referred Self

## 2023-06-28 DIAGNOSIS — R15.9 INCONTINENCE OF FECES, UNSPECIFIED FECAL INCONTINENCE TYPE: ICD-10-CM

## 2023-06-28 DIAGNOSIS — F64.0 GENDER DYSPHORIA IN ADULT: Primary | ICD-10-CM

## 2023-06-28 DIAGNOSIS — R32 URINARY INCONTINENCE, UNSPECIFIED TYPE: ICD-10-CM

## 2023-07-18 NOTE — TELEPHONE ENCOUNTER
FUTURE VISIT INFORMATION      FUTURE VISIT INFORMATION:    Date: 10/13/23    Time: 3:00pm    Location: [facility]  REFERRAL INFORMATION:    Reason for visit/diagnosis  gender Care    RECORDS REQUESTED FROM:       No recs to collect

## 2023-07-21 ENCOUNTER — THERAPY VISIT (OUTPATIENT)
Dept: PHYSICAL THERAPY | Facility: CLINIC | Age: 63
End: 2023-07-21
Payer: COMMERCIAL

## 2023-07-21 DIAGNOSIS — R15.9 FECAL INCONTINENCE: ICD-10-CM

## 2023-07-21 DIAGNOSIS — R32 URINARY INCONTINENCE: Primary | ICD-10-CM

## 2023-07-21 PROCEDURE — 97112 NEUROMUSCULAR REEDUCATION: CPT | Mod: GP | Performed by: PHYSICAL THERAPIST

## 2023-07-21 PROCEDURE — 97535 SELF CARE MNGMENT TRAINING: CPT | Mod: GP | Performed by: PHYSICAL THERAPIST

## 2023-07-28 ENCOUNTER — OFFICE VISIT (OUTPATIENT)
Dept: FAMILY MEDICINE | Facility: CLINIC | Age: 63
End: 2023-07-28
Payer: COMMERCIAL

## 2023-07-28 VITALS
HEART RATE: 50 BPM | DIASTOLIC BLOOD PRESSURE: 77 MMHG | RESPIRATION RATE: 17 BRPM | SYSTOLIC BLOOD PRESSURE: 116 MMHG | OXYGEN SATURATION: 94 % | WEIGHT: 212.6 LBS | HEIGHT: 68 IN | TEMPERATURE: 97.4 F | BODY MASS INDEX: 32.22 KG/M2

## 2023-07-28 DIAGNOSIS — H81.10 BENIGN PAROXYSMAL POSITIONAL VERTIGO, UNSPECIFIED LATERALITY: Primary | ICD-10-CM

## 2023-07-28 DIAGNOSIS — E03.4 HYPOTHYROIDISM DUE TO ACQUIRED ATROPHY OF THYROID: ICD-10-CM

## 2023-07-28 DIAGNOSIS — F64.0 GENDER DYSPHORIA IN ADULT: ICD-10-CM

## 2023-07-28 DIAGNOSIS — Z11.4 SCREENING FOR HIV (HUMAN IMMUNODEFICIENCY VIRUS): ICD-10-CM

## 2023-07-28 LAB
HIV 1+2 AB+HIV1 P24 AG SERPL QL IA: NONREACTIVE
TSH SERPL DL<=0.005 MIU/L-ACNC: 3.3 UIU/ML (ref 0.3–4.2)

## 2023-07-28 PROCEDURE — 99214 OFFICE O/P EST MOD 30 MIN: CPT | Performed by: FAMILY MEDICINE

## 2023-07-28 PROCEDURE — 87389 HIV-1 AG W/HIV-1&-2 AB AG IA: CPT | Performed by: FAMILY MEDICINE

## 2023-07-28 PROCEDURE — 36415 COLL VENOUS BLD VENIPUNCTURE: CPT | Performed by: FAMILY MEDICINE

## 2023-07-28 PROCEDURE — 84443 ASSAY THYROID STIM HORMONE: CPT | Performed by: FAMILY MEDICINE

## 2023-07-28 RX ORDER — ESTRADIOL 0.1 MG/D
FILM, EXTENDED RELEASE TRANSDERMAL
COMMUNITY
Start: 2023-07-31

## 2023-07-28 ASSESSMENT — PATIENT HEALTH QUESTIONNAIRE - PHQ9: SUM OF ALL RESPONSES TO PHQ QUESTIONS 1-9: 7

## 2023-07-28 NOTE — PATIENT INSTRUCTIONS
Here is the plan from today's visit    1. Screening for HIV (human immunodeficiency virus)  Just doing this to be complete  - HIV Screening; Future    2. Hypothyroidism due to acquired atrophy of thyroid  Getting a baseline with minimal use of PPI.  After being on the omeprazole daily for at least 6 weeks, then come and get your thyroid tested again.    - TSH; Future    3. Gender dysphoria in adult  Just cleaning up your med list  - estradiol (VIVELLE-DOT) 0.1 MG/24HR bi-weekly patch; Uses about 7 patches per week using her own method    4. Benign paroxysmal positional vertigo, unspecified laterality  Get back to me if this gets worse.           Please call or return to clinic if your symptoms don't go away.    Follow up plan  Return 2/2024 for annual exam or earlier    Thank you for coming to [facility] today.  Lab Testing:  **If you had lab testing today and your results are reassuring or normal they will be mailed to you or sent through [employer] within 7 days.   **If the lab tests need quick action we will call you with the results.  The phone number we will call with results is # [phone] (home) . If this is not the best number please call our clinic and change the number.  Medication Refills:  If you need any refills please call your pharmacy and they will contact us.   If you need to  your refill at a new pharmacy, please contact the new pharmacy directly. The new pharmacy will help you get your medications transferred faster.   Scheduling:  If you have any concerns about today's visit or wish to schedule another appointment please call our office during normal business hours [phone] (8-5:00 M-F)  If a referral was made to a [facility] and you don't get a call from central scheduling please call [phone].  If a Mammogram was ordered for you at [facility] call [phone] to schedule or change your appointment.  If you had an XRay/CT/Ultrasound/MRI ordered  the number is [phone] to schedule or change your radiology appointment.   Medical Concerns:  If you have urgent medical concerns please call [phone] at any time of the day.  If you have a medical emergency please call [phone].

## 2023-09-11 DIAGNOSIS — E03.4 HYPOTHYROIDISM DUE TO ACQUIRED ATROPHY OF THYROID: ICD-10-CM

## 2023-09-11 RX ORDER — LEVOTHYROXINE SODIUM 100 UG/1
TABLET ORAL
Qty: 90 TABLET | Refills: 1 | Status: SHIPPED | OUTPATIENT
Start: 2023-09-11 | End: 2024-03-13

## 2023-09-11 NOTE — TELEPHONE ENCOUNTER
"Last visit 7/28/23    Request for medication refill:  levothyroxine (SYNTHROID/LEVOTHROID) 100 MCG tablet     Providers if patient needs an appointment and you are willing to give a one month supply please refill for one month and  send a letter/MyChart using \".SMILLIMITEDREFILL\" .smillimited and route chart to \"P SMI \" (Giving one month refill in non controlled medications is strongly recommended before denial)    If refill has been denied, meaning absolutely no refills without visit, please complete the smart phrase \".smirxrefuse\" and route it to the \"P SMI MED REFILLS\"  pool to inform the patient and the pharmacy.    John Nicholson, CMA      "

## 2023-09-16 ENCOUNTER — VIRTUAL VISIT (OUTPATIENT)
Dept: URGENT CARE | Facility: CLINIC | Age: 63
End: 2023-09-16
Payer: COMMERCIAL

## 2023-09-16 ENCOUNTER — NURSE TRIAGE (OUTPATIENT)
Dept: NURSING | Facility: CLINIC | Age: 63
End: 2023-09-16

## 2023-09-16 DIAGNOSIS — U07.1 COVID-19: Primary | ICD-10-CM

## 2023-09-16 PROCEDURE — 99443 PR PHYSICIAN TELEPHONE EVALUATION 21-30 MIN: CPT

## 2023-09-16 NOTE — TELEPHONE ENCOUNTER
COVID Positive/Requesting COVID treatment    Patient is positive for COVID and requesting treatment options.    Date of positive COVID test (PCR or at home)? 09/15/23  Current COVID symptoms: cough, headache, and congestion or runny nose  Date COVID symptoms began: 09/13/23    Primary clinic is [facility] - warm transferred to [facility] to schedule a VV for treatment.    Kory Leach RN on 9/16/2023 at 8:34 AM    Reason for Disposition   [1] HIGH RISK for severe COVID complications (e.g., weak immune system, age > 64 years, obesity with BMI > 25, pregnant, chronic lung disease or other chronic medical condition) AND [2] COVID symptoms (e.g., cough, fever)  (Exceptions: Already seen by PCP and no new or worsening symptoms.)    Additional Information   Negative: SEVERE difficulty breathing (e.g., struggling for each breath, speaks in single words)   Negative: Difficult to awaken or acting confused (e.g., disoriented, slurred speech)   Negative: Bluish (or gray) lips or face now   Negative: Shock suspected (e.g., cold/pale/clammy skin, too weak to stand, low BP, rapid pulse)   Negative: Sounds like a life-threatening emergency to the triager   Negative: SEVERE or constant chest pain or pressure  (Exception: Mild central chest pain, present only when coughing.)   Negative: MODERATE difficulty breathing (e.g., speaks in phrases, SOB even at rest, pulse 100-120)   Negative: [1] Headache AND [2] stiff neck (can't touch chin to chest)   Negative: Oxygen level (e.g., pulse oximetry) 90 percent or lower   Negative: Chest pain or pressure   Negative: Patient sounds very sick or weak to the triager   Negative: MILD difficulty breathing (e.g., minimal/no SOB at rest, SOB with walking, pulse <100)   Negative: Fever > 103 F (39.4 C)   Negative: [1] Fever > 101 F (38.3 C) AND [2] age > 60 years   Negative: [1] Fever > 100.0 F (37.8 C) AND [2] bedridden (e.g., nursing home patient, CVA, chronic illness, recovering from  surgery)    Protocols used: Coronavirus (COVID-19) Diagnosed or [id number]

## 2023-09-16 NOTE — PROGRESS NOTES
"Liz is a 63 year old who is being evaluated via a billable telephone visit.     Assessment & Plan     (U07.1) COVID-19  (primary encounter diagnosis)    Plan: nirmatrelvir and ritonavir (PAXLOVID) 300         mg/100 mg therapy pack  Normal gfr  Hold statin    BMI:   Estimated body mass index is 32.33 kg/m  as calculated from the following:    Height as of 7/28/23: 1.727 m (5' 8\").    Weight as of 7/28/23: 96.4 kg (212 lb 9.6 oz).   Weight management plan: Patient was referred to their PCP to discuss a diet and exercise plan.    SAMMIE Paez [facility]  Virtual Urgent Care  [facility] VIRTUAL URGENT CARE    Subjective   Liz is a 63 year old, presenting for the following health issues:  COVID TX    HPI     Covid positive last night  Sx started wed afternoon and consist of cold congestion sore throat headache body aches  No sob wheezing chest pain  Hydrated      Objective           Vitals:  No vitals were obtained today due to virtual visit.    Physical Exam   GENERAL: alert and no distress  RESP: No audible wheeze, cough      Telephone time spent 22 minutes    "

## 2023-09-25 ENCOUNTER — THERAPY VISIT (OUTPATIENT)
Dept: PHYSICAL THERAPY | Facility: CLINIC | Age: 63
End: 2023-09-25
Payer: COMMERCIAL

## 2023-09-25 DIAGNOSIS — R15.9 FECAL INCONTINENCE: ICD-10-CM

## 2023-09-25 DIAGNOSIS — R32 URINARY INCONTINENCE: Primary | ICD-10-CM

## 2023-09-25 PROCEDURE — 97530 THERAPEUTIC ACTIVITIES: CPT | Mod: GP | Performed by: PHYSICAL THERAPIST

## 2023-09-25 PROCEDURE — 97535 SELF CARE MNGMENT TRAINING: CPT | Mod: GP | Performed by: PHYSICAL THERAPIST

## 2023-09-25 NOTE — PROGRESS NOTES
09/25/23 0500   Appointment Info   Signing clinician's name / credentials Alvina Vee, PT   Total/Authorized Visits E+T   Visits Used 6   Medical Diagnosis UI/FI   PT Tx Diagnosis Pelvic Floor Dysfunction   Other pertinent information Gender affirming surgery in 9-12 months   Self Referred   Self Referred (PT)   (MD orders received)   Progress Note/Certification   Start of Care Date 03/23/23   Onset of illness/injury or Date of Surgery 03/23/23   Therapy Frequency 1x/week every 6 weeks   Predicted Duration 12 weeks   Progress Note Due Date 09/25/23   Progress Note Completed Date 06/21/23   GOALS   PT Goals 2   PT Goal 1   Goal Identifier Fecal Incontinence   Goal Description Pt will demonstrate no fecal incontinence over last month   Rationale to maximize safety and independence with self cares   Goal Progress exacerbation due to illness   Target Date 12/18/23   PT Goal 2   Goal Identifier Urinary Incontinence   Goal Description Pt will demonstrate <1 small leakage event per week   Rationale to maximize safety and independence with self cares   Goal Progress exacerbation due to illness   Target Date 12/18/23   Subjective Report   Subjective Report Covid illness so break in care.  Diet has been variable with social and work events.  Back to better diet; just got onto PTRx this morning I think I need more time to process things in my program.  Have not pauline bottom surgeries yet.  Cont to have leakage and bowel issues.   Objective Measures   Objective Measures Objective Measure 1;Objective Measure 2   Objective Measure 1   Objective Measure PFM coordination   Details needs cuing for relaxation during BM   Objective Measure 2   Objective Measure Bladder function   Details Leakage present daily   Treatment Interventions (PT)   Interventions Therapeutic Activity;Self Care/Home Management;Neuromuscular Re-education   Therapeutic Activity   Therapeutic Activities: dynamic activities to improve functional performance  minutes (41389) 15   Ther Act 1 - Details Education provided after review of bladder diary in normal bladder schedule.  Education in concepts of training bladder: goals are to not leak, challenge urges but stay successful, and keep overall system in control.   PTRx Ther Act 1 Toileting manuever   PTRx Ther Act 1 - Details Toilet positioning and [facility] tech for voids   Ther Act 2 Urge Suppression Activities: patient education in techniques and strategies to manage and control  urges or impulses in order to prevent or reduce unwanted movements or behaviors that cause leakage.   Ther Act 2 - Details 1. Understanding urges: Patient education about the nature of urges and the neurological processes involved. Urge is a strong desire or impulse to perform a specific action or movement.    2. Identifying triggers: recognize the specific triggers that can lead to unwanted movements or behaviors.    3. Breath control: deep breathing exercises can help reduce stress and anxiety, which can in turn help suppress urges. Practice diaphragmatic breathing and learn how to incorporate it into daily routine.    4. Distraction techniques: Divert attention away from the urge or impulse. This can include mentally stimulating activities, such as puzzles or reading, or physically engaging in activities that require focus, such as squeezing a stress ball or doing exercises.    5. Relaxation techniques: progressive muscle relaxation or guided imagery, to help reduce tension and promote a sense of calmness. These techniques can be helpful in managing and suppressing urges.    6. Cognitive strategies: identify and challenge any negative or irrational thoughts associated with their urges. Reframe thoughts and develop more positive and adaptive thinking patterns.    7. Gradual exposure and response prevention: gradually expose yourself triggers or situations that typically elicit urges, while simultaneously practicing strategies to resist or  "suppress the urge. This can help build resilience and develop better control over impulses.   Ther Act 3 Quick Flick techinique for urge:In sitting or standing perform 5 quick contractions of the pelvic floor muscle to reduce urge.  Try different positions, then with movement so you can use when you have urge and making your way to the bathroom.   Therapeutic Activities Ther Act 2;Ther Act 3   Skilled Intervention Identification of causes of urge, education and cuing   Ther Act 1 Bladder Retraining   Patient Response/Progress verb understanding   Self Care/home Management   ADL/Home Mgmt Training (05765) 18   Self Care Self Care 3   Self Care 1 Vagus Nerve Education   Self Care 1 - Details The vagus nerve is the longest cranial nerve in the body and plays a crucial role in regulating the autonomic nervous system, which controls various bodily functions. Stimulating the vagus nerve can help activate the parasympathetic nervous system, often referred to as the \"rest and digest\" response, leading to a sense of calm and relaxation. Here are a few interventions that can help calm the nervous system by stimulating the vagus nerve:    1. Deep breathing exercises: Slow, deep diaphragmatic breathing can activate the vagus nerve and promote relaxation. Breathe in deeply through your nose, allowing your belly to rise, and exhale slowly through your mouth. Aim for a longer exhale than inhale, as this helps activate the parasympathetic response.    2. Vagus nerve stimulation: stimulation behind ears- pulling or tugging skin. 3. Meditation and mindfulness: Practices such as meditation, mindfulness, and guided imagery can help activate the parasympathetic response through vagus nerve stimulation. These techniques involve focusing your attention on the present moment, promoting relaxation and reducing stress.    4. Cold exposure: Brief exposure to cold, such as splashing cold water on your face or taking a cold shower, can activate " the vagus nerve and stimulate the parasympathetic response. Cold exposure has been shown to reduce inflammation, improve mood, and enhance overall well-being.    5. Yoga and stretching: Certain yoga poses and stretching exercises can stimulate the vagus nerve and promote relaxation. Poses that involve deep diaphragmatic breathing, gentle backbends, and inversions can be particularly beneficial.    6. Singing, chanting, and humming: Engaging in activities that involve vocalizations like singing, chanting, or humming can stimulate the vagus nerve. These activities can help regulate your breathing, activate the parasympathetic response, and promote a sense of calm.   Self Care 2 Downtraining principles for the Pelvic Floor   Self Care 2 - Details Education in understanding down training for the pelvic floor.  Direct Pelvic Floor down training involves verbal relaxation cues, pelvic floor specific stretches, manual releases, reverse kegels, bearing down, biofeedback.  Indirect techniques include core muscle activation patterns, neural tension of the sciatic nerve, asymettrical gait mechanics, thoracic spine mobility, diaphgram excursion, glute activation patterns,  Central Nervous System: DB, Meditation and mindfulness, Graded exposure, vocalization, stress management, vagus nerve interventions   Skilled Intervention Cuing for in the moment tools   Patient Response/Progress goal to shift focus to bowel health   Self Care 3 Bowel program self-care Bowel Diary   Self Care 3 - Details Education and tracking form to assess bowel habits over 2 weeks to assist therapist in understanding interventions that will improve bowel movement frequency, consistency, and ease.   Education   Learner/Method Patient;Listening;Reading;Demonstration;Pictures/Video   Plan   Home program see ptrx   Updates to plan of care toilet and bowel health   Plan for next session check in with self care abilities   Total Session Time   Timed Code  Treatment Minutes 33   Total Treatment Time (sum of timed and untimed services) 33         PLAN  Continue therapy per current plan of care.    Beginning/End Dates of Progress Note Reporting Period:  06/21/23 to 09/25/2023    Referring Provider:  Dr. Yanna Santizo

## 2023-10-04 ENCOUNTER — VIRTUAL VISIT (OUTPATIENT)
Dept: PSYCHIATRY | Facility: CLINIC | Age: 63
End: 2023-10-04
Attending: NURSE PRACTITIONER
Payer: COMMERCIAL

## 2023-10-04 DIAGNOSIS — F32.A DEPRESSION, UNSPECIFIED DEPRESSION TYPE: ICD-10-CM

## 2023-10-04 DIAGNOSIS — F90.2 ATTENTION DEFICIT HYPERACTIVITY DISORDER (ADHD), COMBINED TYPE: ICD-10-CM

## 2023-10-04 PROCEDURE — 99214 OFFICE O/P EST MOD 30 MIN: CPT | Mod: VID | Performed by: NURSE PRACTITIONER

## 2023-10-04 PROCEDURE — 90836 PSYTX W PT W E/M 45 MIN: CPT | Mod: VID | Performed by: NURSE PRACTITIONER

## 2023-10-04 RX ORDER — BUPROPION HYDROCHLORIDE 100 MG/1
100 TABLET, EXTENDED RELEASE ORAL DAILY
Qty: 90 TABLET | Refills: 0 | Status: SHIPPED | OUTPATIENT
Start: 2023-10-04 | End: 2024-01-05

## 2023-10-04 ASSESSMENT — PATIENT HEALTH QUESTIONNAIRE - PHQ9
SUM OF ALL RESPONSES TO PHQ QUESTIONS 1-9: 8
10. IF YOU CHECKED OFF ANY PROBLEMS, HOW DIFFICULT HAVE THESE PROBLEMS MADE IT FOR YOU TO DO YOUR WORK, TAKE CARE OF THINGS AT HOME, OR GET ALONG WITH OTHER PEOPLE: SOMEWHAT DIFFICULT
SUM OF ALL RESPONSES TO PHQ QUESTIONS 1-9: 8

## 2023-10-04 ASSESSMENT — PAIN SCALES - GENERAL: PAINLEVEL: NO PAIN (1)

## 2023-10-04 NOTE — PATIENT INSTRUCTIONS
-Continue on current medication regimen.    -Complete Vitamin D lab    -Follow up in 1-3 months.    **For crisis resources, please see the information at the end of this document**   Patient Education    Thank you for coming to the [facility] [place] [facility].     Lab Testing:  If you had lab testing today and your results are reassuring or normal they will be mailed to you or sent through [employer] within 7 days. If the lab tests need quick action we will call you with the results. The phone number we will call with results is # [phone]. If this is not the best number please call our clinic and change the number.     Medication Refills:  If you need any refills please call your pharmacy and they will contact us. Our fax number for refills is [phone].   Three business days of notice are needed for general medication refill requests.   Five business days of notice are needed for controlled substance refill requests.   If you need to change to a different pharmacy, please contact the new pharmacy directly. The new pharmacy will help you get your medications transferred.     Contact Us:  Please call [phone] during business hours (8-5:00 M-F).   If you have medication related questions after clinic hours, or on the weekend, please call [phone].     Financial Assistance [phone]   Medical Records [phone]       MENTAL HEALTH CRISIS RESOURCES:  For a emergency help, please call 911 or go to the nearest Emergency Department.     Emergency Walk-In Options:   [facility] Unit @ [facility] ([place]): [phone] - Specialized mental health emergency area designed to be calming  [facility] ([place]): [phone]  [facility] ([place]): [phone]  [facility]): [phone]    [facility] Crisis Information:   [place]: [phone]  [place]: [phone]  [place] ([place]) - Adult: [phone]      Child: [phone]  [place] - Adult: [phone]     Child: [phone]  [place]: [phone]  List of all [facility] resources:   https://[place].gov/dhs/people-we-serve/adults/health-care/mental-health/resources/crisis-contacts.jsp    National Crisis Information:   Crisis Text Line: Text  [place]  to 241041  Suicide & Crisis Lifeline: 988  National Suicide Prevention Lifeline: 3-608-173-TALK (1-148.903.1405)       For online chat options, visit https://suicidepreventionlifeline.org/chat/  Poison Control Center: 0-502-978-8246  Trans Lifeline: 1-198.892.6508 - Hotline for transgender people of all ages  [employer]: 9-322-939-6072 - Hotline for LGBT youth     For Non-Emergency Support:   Fast Tracker: Mental Health & Substance Use Disorder Resources -   https://www.LiveAction[employer].org/

## 2023-10-04 NOTE — NURSING NOTE
Is the patient currently in the state of [place]? YES    Visit mode:VIDEO    If the visit is dropped, the patient can be reconnected by: [facility]    Will anyone else be joining the visit? NO  (If patient encounters technical issues they should call [phone]    How would you like to obtain your AVS? MyChart    Are changes needed to the allergy or medication list? No    Reason for visit: [place] [employer]
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
## 2023-10-13 ENCOUNTER — PRE VISIT (OUTPATIENT)
Dept: OTOLARYNGOLOGY | Facility: CLINIC | Age: 63
End: 2023-10-13

## 2023-10-13 ENCOUNTER — OFFICE VISIT (OUTPATIENT)
Dept: OTOLARYNGOLOGY | Facility: CLINIC | Age: 63
End: 2023-10-13
Payer: COMMERCIAL

## 2023-10-13 DIAGNOSIS — R49.9 VOICE AND RESONANCE DISORDER: ICD-10-CM

## 2023-10-13 DIAGNOSIS — F64.0 GENDER DYSPHORIA IN ADULT: ICD-10-CM

## 2023-10-13 DIAGNOSIS — R49.0 DYSPHONIA: Primary | ICD-10-CM

## 2023-10-13 PROCEDURE — 92520 LARYNGEAL FUNCTION STUDIES: CPT | Mod: 52 | Performed by: SPEECH-LANGUAGE PATHOLOGIST

## 2023-10-13 PROCEDURE — 92524 BEHAVRAL QUALIT ANALYS VOICE: CPT | Mod: GN | Performed by: SPEECH-LANGUAGE PATHOLOGIST

## 2023-10-13 NOTE — PATIENT INSTRUCTIONS
Yessenia Jimenez M.M. (voice), M.A., CCC/SLP  Speech-Language Pathologist  [facility] Trained Vocologist  [facility]  [email or web address]   she/her  [email or web address]      Order for today:  Identity affirming voice  Rogers passage with sentences  Starting pitch: D4     Identity Affirming Voice Training  Your voice plays a distinct role in embodying identity, and consequently, communication can be   a powerful tool for exploring and defining who you are and how people view you. No matter   where you identify on the gender spectrum - transfeminine, transmasculine, gender   nonconforming, genderqueer - the key to changing your communication style is to find the   right combination of speech/voice features that become what I call the three A s: authentic   (what feels true to you), acceptable (what is effective based on the norms of the outside world),   and automatic (what becomes easy to perform with less and less mental effort). Pitch is   typically the first thing many wish to consider, but additional speech features can also be   utilized, such as resonance and intonation. A more feminine voice sounds smaller, softer,   lighter, and more expressive, while a more masculine voice sounds bigger, heavier, and more   [id number].  Regardless of where you are in understanding your identity, speech/voice change is almost   always possible, and even the smallest of changes can make a difference in your life. Don t   avoid or put off your voice if you don t like it! If you are transitioning (to whatever extent), you   need to be willing to be in limbo as you work on it, similar to other parts of transition.   Behavioral voice change usually involves a period of solid practice, experimenting with your   voice in real life, and then letting it settle over time. With help, most people can make changes   that make a difference in their lives in terms of comfort, safety, and success in  "social and   professional situations.  It is also important to also realize that transgender and non-binary people are at risk for voice   problems, since manipulating your speech/voice, particularly when not seeking professional   help, may be done in a way that invites tension and overworking of the vocal folds. Additionally,   all speakers, transgender and cisgender, can be affected by overall health or certain health   conditions, such as acid reflux, environmental allergies, asthma, or even a high amount of   stress.  I provide a perceptual-acoustic speech/voice evaluation, including measurements of your exact   habitual pitch and pitch range. I can help you identify your existing communication profile, and   determine what aspects can be changed or utilized more. In training sessions, I use my trained   ear, pitch software, video, and/or a simulated piano keyboard to give you the critical immediate   feedback you need to learn new techniques and put them into practice. As a health   professional, I take a serious approach to vocal health by considering your overall health, vocal   load, and vocal habits, and I sometimes perform videostroboscopy to rule out any vocal fold   problems that may or may not affect training.    Yessenia Jimenez MM (voice), MA, CCC-SLP   she/her  Certificate of Vocology  [facility]/ [facility]  (adapted from [facility] voice and speech lab      Apps to consider downloading on your smart phone for therapy:     Voice Analyst [employer] ~$13.00  Voice tools = free    Perfect Piano  - FREE!!!      List of Hz vs. Pitch: [email or web address]      Please also bring a notebook to take your notes during our appointment-thanks!       To Do: Please record with the [place] passage and sentences (send to me, too if you want/ comfortable)  \"Ah\" for 10 sec  \"eee\" for 10 sec  glide from lowest to highest and back down              Additional information:    Trans voice " "lessons: Pitch and resonance  https://[email or web address]/[email or web address]?v=21ZfGPp-Ves    Finding a voice - Yessenia  https://www.[employer].com/watch?v=k3BRoLuf7iT     Use the \"perceptions\" handout to help think of goals for your voice (see below)     [facility] biweekly appts; ask for wait list, too  [phone]               If you are not contacted, you may try [facility] biweekly appts; ask for wait list, too  \  Rtyhjkl;  Let me know if you have questions! -Yessenia      "

## 2023-10-13 NOTE — LETTER
"10/13/2023       RE: Chris Stockton  [address]  [place] [facility] [address]     Dear Colleague,    Thank you for referring your patient, Chris Stockton, to the [facility] VOICE CLINIC [place] [facility]. Please see a copy of my visit note below.      [facility]  [facility] - Dept. of Otolaryngology   Evaluation report - IN PERSON APPOINTMENT    Clinician: Yessenia Jimenez M.M. (voice), M.A., CCC-SLP  Referring physician:  Ammon  Patient: Chris Stockton  Date of Visit: 10/13/2023    HISTORY    Chief complaint: Chris Stockton is a 63 year old presenting today for evaluation of dysphonia and voice and resonance disorder in the context of gender dysphoria.        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 Below the level - not of interest or gets attention  Voice - interest is there  Less misgendered.  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   No schedule or preexpectation  Enjoys singing in groups -folk. For years, people enjoy having her sing along. Not sure if she is in the kapoor, etc.   Dr. Pierre - recommended here and [facility].  - feminie singing voice is desired.       THROAT ISSUES:   Not particularly  PND is a common experience for ever and with mouth breathing there seems to be a lot to clear in the AM     RESPIRATION:   Sleep study - sleep apnea right before covid and they recommended a dental appliance.  Didn't pursue during Covid, and there was some risk that if you don't use it properly it could affect the jaw  Narrower chin and she has numbness in the lower front teeth.   Other had CT scan  - sometimes teeth throbbing can be invasive.    Has been intending to wear braces and help with crowded - would like her teeth to look nicer " "than they do.     SWALLOWING:   No issues    ADDITIONAL  Reflux: Well controlled - prilosec 20 mg   Non smoker  Mom lived to 97 and dad to 96  Would like to date again and working on teeth and voice would be great.   Bottom procedure will occur within the next 6 mo- 12 mo, which will involve intubation    OTHER PERTINENT HISTORY    Past Medical History:   Diagnosis Date    Anxiety     Depressive disorder     Esophageal stricture     Kidney stone     nov.     Past Surgical History:   Procedure Laterality Date    APPENDECTOMY      COLONOSCOPY  12/9/2011    Procedure:COLONOSCOPY; COLONOSCOPY; Surgeon:MARILY MENA; Location: GI    ESOPHAGOSCOPY, GASTROSCOPY, DUODENOSCOPY (EGD), COMBINED  11/23/2012    Procedure: COMBINED ESOPHAGOSCOPY, GASTROSCOPY, DUODENOSCOPY (EGD), BIOPSY SINGLE OR MULTIPLE;;  Surgeon: Yehuda Tomlinson MD;  Location:  GI    VASECTOMY         OBJECTIVE  PATIENT REPORTED MEASURES  Transgender Self-Evaluation Questionnaire (TSEQ) - 56/120  Current voice - Very male  Ideal voice - Somewhat female    Patient Supplied Answers To VHI Questionnaire      10/13/2023    12:39 PM   Voice Handicap Index (VHI-10)   My voice makes it difficult for people to hear me 0   People have difficulty understanding me in a noisy room 0   My voice difficulties restrict my personal and social life.  3   I feel left out of conversations because of my voice 2   My voice problem causes me to lose income 0   I feel as though I have to strain to produce voice 0   The clarity of my voice is unpredictable 0   My voice problem upsets me 2   My voice makes me feel handicapped 0   People ask, \"What's wrong with your voice?\" 0   VHI-10 7       Patient Supplied Answers To CSI Questionnaire       No data to display                Patient Supplied Answers To EAT Questionnaire       No data to display                PERCEPTUAL EVALUATION (CPT 35668)  POSTURE / TENSION/ PALPATION OF THE LARYNGEAL AREA:   upper body    BREATHING: "   appears within normal limits and adequate     LARYNGEAL PALPATION:   supple musculature    VOICE:  Mahin states that today is a typical voice today, with clinician observing voice quality to be characterized as:  Roughness: Mild Intermittent  Breathiness: WNL  Strain: WNL  Pitch:  F0/Habitual/Conversational speech: low  Resonance:   Conversational speech:  backward focus of resonance  Loudness  Conversational speech:  WNL  Projected speech:  WNL  Singing vs. Speech:  n/a  GLOBAL ASSESSMENT OF DYSPHONIA: 40/100    COUGH/THROAT CLEARING:  None      LARYNGEAL FUNCTION STUDIES (CPT 57019)  /a/ mean f0 = 181.369 Hz (SD = 107.760)  /i/ mean f0 = 107.677 Hz (SD = 9.787)  [place]:     Lowest f0 = 101.844 Hz      Higest f0 = 159.877 Hz      Range = 58.033 Hz   Connected Speech     Mean f0 = 145.763 Hz (.943)     Median f0 = 110.728 Hz      Lowest f0 = 100.538 Hz      Highest f0 = 493.566 Hz      Speaking Range = 393.028 Hz           LARYNGEAL EXAMINATION  Not warranted    ASSESSMENT / PLAN  IMPRESSIONS: Chris Stockton is a 63 year old, presenting today with Dysphonia (R49.0) and Voice and Resonance Disorder (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    Remarkable findings included:  Perceptual evaluation demonstrated:   Roughness: Mild Intermittent  Breathiness: WNL  Strain: WNL  Pitch:  F0/Habitual/Conversational speech: low  Laryngeal exam demonstrated:   Not warranted  Primary complaint of patient today included:   Dysphonia and voice and resonance disorder in the context of gender dysphoria.    Therefore, I recommended a course of speech therapy to address these concerns.    RECOMMENDATIONS:   A course of speech therapy is recommended to optimize vocal technique, improve voice quality, and promote reduced discomfort, effort and fatigue.  She demonstrates a Good prognosis for improvement given  adherence to therapeutic recommendations.   Positive indicators: positive response to therapy probes diagnosis is known to respond to treatment high level of comittment  Negative indicators: none  Research: This patient is not currently a candidate  DURATION / FREQUENCY: 4 one-hour sessions.  A total of 6-8 sessions may be necessary.    GOALS:  Patient goal:   To improve and maintain a healthy voice quality  To resolve the vocal fold lesions  To understand the problem and fix it as much as possible    Long-term goal(s): In 6 months, Ms. Stockton will:  Report resolution of symptoms, and use of optimal voice quality and comfort to meet personal, social, and professional needs, 90% of the time during a typical week of vocal activities  This treatment plan was developed with the patient who agreed with the recommendations.    TOTAL SERVICE TIME: 90 minutes  EVALUATION OF VOICE AND RESONANCE (60619)  LARYNGEAL FUNCTION STUDIES (39460) 52 modifier  NO CHARGE FACILITY FEE (07365)      Yessenia Jimenez M.M. (voice), M.A., CCC/SLP  Speech-Language Pathologist  I Trained Vocologist  [facility] Voice [facility]  [phone]  [email or web address]  Pronouns: she/her      *this report was created in part through the use of computerized dictation software, and though reviewed following completion, some typographic errors may persist.  If there is confusion regarding any of this notes contents, please contact me for clarification

## 2023-10-13 NOTE — PROGRESS NOTES
"  [facility]  [facility] - Dept. of Otolaryngology   Evaluation report - IN PERSON APPOINTMENT    Clinician: Yessenia Jimenez M.M. (voice), M.A., CCC-SLP  Referring physician:  Ammon  Patient: Liz Stockton  Date of Visit: 10/13/2023    HISTORY    Chief complaint: Liz Stockton is a 63 year old presenting today for evaluation of dysphonia and voice and resonance disorder in the context of gender dysphoria.        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 Below the level - not of interest or gets attention  Voice - interest is there  Less misgendered.  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   No schedule or preexpectation  Enjoys singing in groups -folk. For years, people enjoy having her sing along. Not sure if she is in the kapoor, etc.   Dr. Pierre - recommended here and [facility].  - feminie singing voice is desired.       THROAT ISSUES:   Not particularly  PND is a common experience for ever and with mouth breathing there seems to be a lot to clear in the AM     RESPIRATION:   Sleep study - sleep apnea right before covid and they recommended a dental appliance.  Didn't pursue during Covid, and there was some risk that if you don't use it properly it could affect the jaw  Narrower chin and she has numbness in the lower front teeth.   Other had CT scan  - sometimes teeth throbbing can be invasive.    Has been intending to wear braces and help with crowded - would like her teeth to look nicer than they do.     SWALLOWING:   No issues    ADDITIONAL  Reflux: Well controlled - prilosec 20 mg   Non smoker  Mom lived to 97 and dad to 96  Would like to date again and working on teeth and voice would be great.   Bottom procedure will occur within the next 6 mo- 12 mo, which will involve intubation    OTHER " "PERTINENT HISTORY    Past Medical History:   Diagnosis Date    Anxiety     Depressive disorder     Esophageal stricture     Kidney stone     nov.     Past Surgical History:   Procedure Laterality Date    APPENDECTOMY      COLONOSCOPY  12/9/2011    Procedure:COLONOSCOPY; COLONOSCOPY; Surgeon:MARILY MENA; Location: GI    ESOPHAGOSCOPY, GASTROSCOPY, DUODENOSCOPY (EGD), COMBINED  11/23/2012    Procedure: COMBINED ESOPHAGOSCOPY, GASTROSCOPY, DUODENOSCOPY (EGD), BIOPSY SINGLE OR MULTIPLE;;  Surgeon: Yehuda Tomlinson MD;  Location:  GI    VASECTOMY         OBJECTIVE  PATIENT REPORTED MEASURES  Transgender Self-Evaluation Questionnaire (TSEQ) - 56/120  Current voice - Very male  Ideal voice - Somewhat female    Patient Supplied Answers To VHI Questionnaire      10/13/2023    12:39 PM   Voice Handicap Index (VHI-10)   My voice makes it difficult for people to hear me 0   People have difficulty understanding me in a noisy room 0   My voice difficulties restrict my personal and social life.  3   I feel left out of conversations because of my voice 2   My voice problem causes me to lose income 0   I feel as though I have to strain to produce voice 0   The clarity of my voice is unpredictable 0   My voice problem upsets me 2   My voice makes me feel handicapped 0   People ask, \"What's wrong with your voice?\" 0   VHI-10 7       Patient Supplied Answers To CSI Questionnaire       No data to display                Patient Supplied Answers To EAT Questionnaire       No data to display                PERCEPTUAL EVALUATION (CPT 60987)  POSTURE / TENSION/ PALPATION OF THE LARYNGEAL AREA:   upper body    BREATHING:   appears within normal limits and adequate     LARYNGEAL PALPATION:   supple musculature    VOICE:  Stockton states that today is a typical voice today, with clinician observing voice quality to be characterized as:  Roughness: Mild Intermittent  Breathiness: WNL  Strain: WNL  Pitch:  F0/Habitual/Conversational " speech: low  Resonance:   Conversational speech:  backward focus of resonance  Loudness  Conversational speech:  WNL  Projected speech:  WNL  Singing vs. Speech:  n/a  GLOBAL ASSESSMENT OF DYSPHONIA: 40/100    COUGH/THROAT CLEARING:  None      LARYNGEAL FUNCTION STUDIES (CPT 18200)  /a/ mean f0 = 181.369 Hz (SD = 107.760)  /i/ mean f0 = 107.677 Hz (SD = 9.787)  [place]:     Lowest f0 = 101.844 Hz      Higest f0 = 159.877 Hz      Range = 58.033 Hz   Connected Speech     Mean f0 = 145.763 Hz (.943)     Median f0 = 110.728 Hz      Lowest f0 = 100.538 Hz      Highest f0 = 493.566 Hz      Speaking Range = 393.028 Hz           LARYNGEAL EXAMINATION  Not warranted    ASSESSMENT / PLAN  IMPRESSIONS: Liz Stockton is a 63 year old, presenting today with Dysphonia (R49.0) and Voice and Resonance Disorder (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    Remarkable findings included:  Perceptual evaluation demonstrated:   Roughness: Mild Intermittent  Breathiness: WNL  Strain: WNL  Pitch:  F0/Habitual/Conversational speech: low  Laryngeal exam demonstrated:   Not warranted  Primary complaint of patient today included:   Dysphonia and voice and resonance disorder in the context of gender dysphoria.    Therefore, I recommended a course of speech therapy to address these concerns.    RECOMMENDATIONS:   A course of speech therapy is recommended to optimize vocal technique, improve voice quality, and promote reduced discomfort, effort and fatigue.  She demonstrates a Good prognosis for improvement given adherence to therapeutic recommendations.   Positive indicators: positive response to therapy probes diagnosis is known to respond to treatment high level of comittment  Negative indicators: none  Research: This patient is not currently a candidate  DURATION / FREQUENCY: 4 one-hour sessions.  A total of 6-8  sessions may be necessary.    GOALS:  Patient goal:   To improve and maintain a healthy voice quality  To resolve the vocal fold lesions  To understand the problem and fix it as much as possible    Long-term goal(s): In 6 months, Ms. Stockton will:  Report resolution of symptoms, and use of optimal voice quality and comfort to meet personal, social, and professional needs, 90% of the time during a typical week of vocal activities  This treatment plan was developed with the patient who agreed with the recommendations.    TOTAL SERVICE TIME: 90 minutes  EVALUATION OF VOICE AND RESONANCE (42314)  LARYNGEAL FUNCTION STUDIES (85002) 52 modifier  NO CHARGE FACILITY FEE (30224)      Yessenia Jimenez M.M. (voice), M.A., CCC/SLP  Speech-Language Pathologist  I Trained Vocologist  [facility] Voice [facility]  [phone]  [email or web address]  Pronouns: she/her      *this report was created in part through the use of computerized dictation software, and though reviewed following completion, some typographic errors may persist.  If there is confusion regarding any of this notes contents, please contact me for clarification

## 2023-11-13 ENCOUNTER — VIRTUAL VISIT (OUTPATIENT)
Dept: OTOLARYNGOLOGY | Facility: CLINIC | Age: 63
End: 2023-11-13
Payer: COMMERCIAL

## 2023-11-13 DIAGNOSIS — R49.9 VOICE AND RESONANCE DISORDER: ICD-10-CM

## 2023-11-13 DIAGNOSIS — F64.0 GENDER DYSPHORIA IN ADULT: ICD-10-CM

## 2023-11-13 DIAGNOSIS — R49.0 DYSPHONIA: Primary | ICD-10-CM

## 2023-11-13 PROCEDURE — 92507 TX SP LANG VOICE COMM INDIV: CPT | Mod: GN | Performed by: SPEECH-LANGUAGE PATHOLOGIST

## 2023-11-13 NOTE — LETTER
"11/13/2023       RE: Chris Stockton  [address]  [place] [facility] [address]     Dear Colleague,    Thank you for referring your patient, Chris Stockton, to the [facility] VOICE CLINIC [facility]. Please see a copy of my visit note below.    Chris Stockton is a 63 year old adult who is being cared for via a billable virtual visit.        The patient has been notified and verbally consented to the following statements:   This video visit will be conducted between you and your provider.  If during the course of the call the provider feels a video visit is not appropriate, you will not be charged for this service.    Provider has received verbal consent for billable virtual visit from the patient? Yes    Preferred method for receiving information: Syncloguet     Call initiated at: 9 AM  Platform used to conduct today's virtual appointment: [employer] Well Video  Location of provider: Residence  Location of patient: [facility] VOICE [facility]  THERAPY NOTE (CPT 59940)  Patient: Liz Stockton  Note: We discussed Liz's name in her service reports.  She clarified that the use of her preferred name should not impact insurance claims, which was what I had been told in the past by leadership.  However, this was a while ago.  I informed her that I am more than happy to make this change for her and have done so in all of my reports.     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    Remarkable findings included:  Perceptual evaluation demonstrated:   Roughness: Mild Intermittent  Breathiness: WNL  Strain: " "WNL  Pitch:  F0/Habitual/Conversational speech: low  Laryngeal exam demonstrated:   Not warranted  Primary complaint of patient today included:   Dysphonia and voice and resonance disorder in the context of gender dysphoria.    Therefore, I recommended a course of speech therapy to address these concerns.    SUBJECTIVE:  Since the last appointment, Ms. Stockton reports the following:   Overall she reports that symptoms are stable    OBJECTIVE:  Ms. Stockton presents today with the following:    Voice quality:  Roughness: Mild Intermittent  Breathiness: WNL  Strain: WNL  Pitch:  F0/Habitual/Conversational speech: low  Resonance:   Conversational speech:  backward focus of resonance      PATIENT REPORTED MEASURES:  Patient Supplied Answers To SLP QOL Questionnaire       No data to display              Patient Supplied Answers To VHI Questionnaire      11/13/2023     8:37 AM   Voice Handicap Index (VHI-10)   My voice makes it difficult for people to hear me 1   People have difficulty understanding me in a noisy room 1   My voice difficulties restrict my personal and social life.  2   I feel left out of conversations because of my voice 2   My voice problem causes me to lose income 0   I feel as though I have to strain to produce voice 0   The clarity of my voice is unpredictable 0   My voice problem upsets me 2   My voice makes me feel handicapped 2   People ask, \"What's wrong with your voice?\" 2   VHI-10 12         THERAPEUTIC ACTIVITIES    Exercises to promote optimal respiratory mechanics  I provided explanation of the anatomy and physiology of respiration for speech and singing; she found this to be helpful  she demonstrated clavicular/neck/shoulder involvement in inhalation  Demonstrated difficulty allowing abdominal relaxation for inhalation  Practiced in a seated position on the massage table, with tactile cue of a hand on the low rib-cage to facilitate awareness of low respiratory engagement.    With clinician " "support, patient was able to demonstrate improved abdominal relaxation and engagement on inhalation  Optimal exhalation using inward engagement of the abdominal wall with no corresponding collapse of the upper chest cavity was trained using the pulsed \"sh\" task  acceptable improvement in airflow and respiratory mechanics    Exercises in techniques for improved airflow during phonation  Speech material with /ju/ glides was facilitating at the word level.  Progressed to easy onset/ flow, and blending phrases  Instructed with a descending 5th, as well as an arpeggio pattern; this was helpful.  Nimmons techniques to reduce glottal cummings and improve breath flow; negative practice improved awareness today.    Resonant Voice Therapy (RVT) exercises to promote forward locus of resonance and optimized pattern of laryngeal adduction  Speech material that elicits a high, forward tongue position (/n/) was most facilitating  Easy descending glide on /n/ utilized in conjunction with relaxed jaw, tongue, and lightly closed lips to facilitate forward resonant sound  Syllable level using /n/ in alternation with cardinal vowels on sustained pitches and speech inflection  Instructed with and interval of a descending pattern was facilitating, prior to progressing to comfortable speech using optimal breath flow.  Able to recognize improvement in quality and comfort    Instructed in techniques to improve length of utterance with reduced effort for optimal carryover.  Instructed with the lyrics of a song \"By Breath\" by Shayy Stuart.  Developed a mental checklist of factors to help trouble shoot moments of difficulty during daily speaking tasks.    Counseling and Education:  Asked questions about the nature of her symptoms, and I answered all of these thoroughly.  Nimmons how to find her pitch on the keyboard  A revised regimen for home practice was instructed.  I provided an AVS of today's therapeutic activities to facilitate " practice.    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 Ms. Stockton demonstrated good learning today of therapeutic activities to begin working on improving coordination between breath flow and phonation, raising pitch, and shifting to a forward resonant focus.   Ms. Stockton also continues to guide me to be more responsive to her learning needs.  I thanked her for her ramy and patience.    PLAN: I will see Ms. Stockton in December, at which point we will continue therapy.   For practice goals see AVS.     Next Clinic Appt: 12/13  Plan for SLP: continue therapy to optimize speaking voice    TOTAL SERVICE TIME:   Call Initiated at: 9 am  Call Ended at: 10 am           CPT Billing Codes:   TREATMENT OF SPEECH, LANGUAGE, VOICE, COMMUNICATION, and/or AUDITORY PROCESSING DISORDER (20772)    Yessenia Jimenez M.M. (voice), M.A., CCC/SLP  Speech-Language Pathologist  [facility] Trained Vocologist  [facility]  [phone]  [email or web address]  Pronouns: she/her      *this report was created in part through the use of computerized dictation software, and though reviewed following completion, some typographic errors may persist.  If there is confusion regarding any of this notes contents, please contact me for clarification            Again, thank you for allowing me to participate in the care of your patient.      Sincerely,    Yessenia Jimenez, SLP

## 2023-11-13 NOTE — PATIENT INSTRUCTIONS
"After Visit Summary    Patient: Liz Stockton  Date of Visit: 11/13/2023    These notes are also available in your MyChart. Please take a few moments to find them under \"Past Appointments\" in the [employer] system, as Yessenia will start to phase out e-mail communications.    \"Handouts\" that go along with today's order of activities include (below):   Frequency of practice: 3-4x/day unless marked otherwise    Order of today's appointment:     - Grounding breaths: 3-5x (shoulders down and chest relaxed)     - For flow and pitch: Random yawn and sigh patterns (Higher to lower in pitch) - Try not to focus on a particular pitch at first, but eventually try using E3 as your target pitch E3-D3 (ok to start even higher)       For resonance:              Yessenia Jimenez M.M. (voice), M.A., CCC/SLP  Speech-Language Pathologist  [facility] Certified Vocologist  [facility]  [email or web address]  she/her    "

## 2023-11-13 NOTE — PROGRESS NOTES
"EMILIA Sotckton is a 63 year old adult who is being cared for via a billable virtual visit.        The patient has been notified and verbally consented to the following statements:   This video visit will be conducted between you and your provider.  If during the course of the call the provider feels a video visit is not appropriate, you will not be charged for this service.    Provider has received verbal consent for billable virtual visit from the patient? Yes    Preferred method for receiving information: [employer]     Call initiated at: 9 AM  Platform used to conduct today's virtual appointment: [employer] Video  Location of provider: Residence  Location of patient: [facility]  THERAPY NOTE (CPT 05035)  Patient: Liz Stockton  Note: We discussed Liz's name in her service reports.  She clarified that the use of her preferred name should not impact insurance claims, which was what I had been told in the past by leadership.  However, this was a while ago.  I informed her that I am more than happy to make this change for her and have done so in all of my reports.     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    Remarkable findings included:  Perceptual evaluation demonstrated:   Roughness: Mild Intermittent  Breathiness: WNL  Strain: WNL  Pitch:  F0/Habitual/Conversational speech: low  Laryngeal exam demonstrated:   Not warranted  Primary complaint of patient today included:   Dysphonia and voice and resonance disorder in the context of gender dysphoria.    Therefore, I recommended a course of speech therapy to address these concerns.    SUBJECTIVE:  Since " "the last appointment, Ms. Stockton reports the following:   Overall she reports that symptoms are stable    OBJECTIVE:  Ms. Stockton presents today with the following:    Voice quality:  Roughness: Mild Intermittent  Breathiness: WNL  Strain: WNL  Pitch:  F0/Habitual/Conversational speech: low  Resonance:   Conversational speech:  backward focus of resonance      PATIENT REPORTED MEASURES:  Patient Supplied Answers To SLP QOL Questionnaire       No data to display              Patient Supplied Answers To VHI Questionnaire      11/13/2023     8:37 AM   Voice Handicap Index (VHI-10)   My voice makes it difficult for people to hear me 1   People have difficulty understanding me in a noisy room 1   My voice difficulties restrict my personal and social life.  2   I feel left out of conversations because of my voice 2   My voice problem causes me to lose income 0   I feel as though I have to strain to produce voice 0   The clarity of my voice is unpredictable 0   My voice problem upsets me 2   My voice makes me feel handicapped 2   People ask, \"What's wrong with your voice?\" 2   VHI-10 12         THERAPEUTIC ACTIVITIES    Exercises to promote optimal respiratory mechanics  I provided explanation of the anatomy and physiology of respiration for speech and singing; she found this to be helpful  she demonstrated clavicular/neck/shoulder involvement in inhalation  Demonstrated difficulty allowing abdominal relaxation for inhalation  Practiced in a seated position on the massage table, with tactile cue of a hand on the low rib-cage to facilitate awareness of low respiratory engagement.    With clinician support, patient was able to demonstrate improved abdominal relaxation and engagement on inhalation  Optimal exhalation using inward engagement of the abdominal wall with no corresponding collapse of the upper chest cavity was trained using the pulsed \"sh\" task  acceptable improvement in airflow and respiratory mechanics    Exercises " "in techniques for improved airflow during phonation  Speech material with /ju/ glides was facilitating at the word level.  Progressed to easy onset/ flow, and blending phrases  Instructed with a descending 5th, as well as an arpeggio pattern; this was helpful.  Between techniques to reduce glottal cummings and improve breath flow; negative practice improved awareness today.    Resonant Voice Therapy (RVT) exercises to promote forward locus of resonance and optimized pattern of laryngeal adduction  Speech material that elicits a high, forward tongue position (/n/) was most facilitating  Easy descending glide on /n/ utilized in conjunction with relaxed jaw, tongue, and lightly closed lips to facilitate forward resonant sound  Syllable level using /n/ in alternation with cardinal vowels on sustained pitches and speech inflection  Instructed with and interval of a descending pattern was facilitating, prior to progressing to comfortable speech using optimal breath flow.  Able to recognize improvement in quality and comfort    Instructed in techniques to improve length of utterance with reduced effort for optimal carryover.  Instructed with the lyrics of a song \"By Breath\" by Shayy Stuart.  Developed a mental checklist of factors to help trouble shoot moments of difficulty during daily speaking tasks.    Counseling and Education:  Asked questions about the nature of her symptoms, and I answered all of these thoroughly.  Between how to find her pitch on the keyboard  A revised regimen for home practice was instructed.  I provided an AVS of today's therapeutic activities to facilitate practice.    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 Ms. Stockton demonstrated good learning today of therapeutic activities to begin working on improving coordination " between breath flow and phonation, raising pitch, and shifting to a forward resonant focus.   Ms. Stockton also continues to guide me to be more responsive to her learning needs.  I thanked her for her ramy and patience.    PLAN: I will see Ms. Stockton in December, at which point we will continue therapy.   For practice goals see KAHLIL     Next Clinic Appt: 12/13  Plan for SLP: continue therapy to optimize speaking voice    TOTAL SERVICE TIME:   Call Initiated at: 9 am  Call Ended at: 10 am           CPT Billing Codes:   TREATMENT OF SPEECH, LANGUAGE, VOICE, COMMUNICATION, and/or AUDITORY PROCESSING DISORDER (51498)    Yessenia Jimenez M.M. (voice), M.A., CCC/SLP  Speech-Language Pathologist  [facility] Trained Vocologist  [facility]  [phone]  [email or web address]  Pronouns: she/her      *this report was created in part through the use of computerized dictation software, and though reviewed following completion, some typographic errors may persist.  If there is confusion regarding any of this notes contents, please contact me for clarification

## 2023-12-01 ENCOUNTER — THERAPY VISIT (OUTPATIENT)
Dept: PHYSICAL THERAPY | Facility: CLINIC | Age: 63
End: 2023-12-01
Payer: COMMERCIAL

## 2023-12-01 DIAGNOSIS — R15.9 FECAL INCONTINENCE: ICD-10-CM

## 2023-12-01 DIAGNOSIS — R32 URINARY INCONTINENCE: Primary | ICD-10-CM

## 2023-12-01 PROCEDURE — 97535 SELF CARE MNGMENT TRAINING: CPT | Mod: GP | Performed by: PHYSICAL THERAPIST

## 2023-12-02 ENCOUNTER — NURSE TRIAGE (OUTPATIENT)
Dept: NURSING | Facility: CLINIC | Age: 63
End: 2023-12-02
Payer: COMMERCIAL

## 2023-12-02 ENCOUNTER — OFFICE VISIT (OUTPATIENT)
Dept: URGENT CARE | Facility: URGENT CARE | Age: 63
End: 2023-12-02
Payer: COMMERCIAL

## 2023-12-02 VITALS
DIASTOLIC BLOOD PRESSURE: 73 MMHG | TEMPERATURE: 96.3 F | SYSTOLIC BLOOD PRESSURE: 146 MMHG | OXYGEN SATURATION: 97 % | RESPIRATION RATE: 18 BRPM | HEART RATE: 61 BPM

## 2023-12-02 DIAGNOSIS — S71.151A DOG BITE OF RIGHT THIGH WITHOUT COMPLICATION, INITIAL ENCOUNTER: Primary | ICD-10-CM
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
## 2023-12-02 PROCEDURE — 99212 OFFICE O/P EST SF 10 MIN: CPT | Performed by: NURSE PRACTITIONER

## 2023-12-02 NOTE — PROGRESS NOTES
Chief Complaint   Patient presents with    Trauma     Above he right knee 8 pm yesterday         ICD-10-CM    1. Dog bite of right thigh without complication, initial encounter  S71.151A     W54.0XXA         No need for tetanus update or rabies concern with superficial wound.  Patient will watch for signs of infection and return if these occur.      Subjective     Chris Stockton is an 63 year old adult who presents to clinic today for dog bite to just above the right knee that occurred at 8 PM last night.  Dog was approximately 15 or 20 pounds.  He did not ask owners about any vaccinations but reports the dog was well-kept with tags on.      Objective    BP (!) 146/73 (BP Location: Left arm, Patient Position: Sitting, Cuff Size: Adult Regular)   Pulse 61   Temp (!) 96.3  F (35.7  C) (Tympanic)   Resp 18   SpO2 97%   Nurses notes and VS have been reviewed.    Physical Exam     Alert and oriented, full range of motion of knees and bilateral legs, three 2 mm x 2 mm superficial abrasions noted above the right knee, minimal swelling surrounding these    SAMMIE Fagan, CNP  [facility] Provider    The use of [employer] dictation services may have been used to construct the content in this note; any grammatical or spelling errors are non-intentional. Please contact the author of this note directly if you are in need of any clarification.

## 2023-12-02 NOTE — TELEPHONE ENCOUNTER
Nurse Triage SBAR    Is this a 2nd Level Triage? YES, LICENSED PRACTITIONER REVIEW IS REQUIRED    Situation: Dog bite    Background: patient states that he was bit by a strangers dog last evening.  He states there are 4 welts on his lower leg and the dog had to bite through his pants.  He states it did bleed a little and he washed it well with soap and water.  He states that they welts are swollen a little today.  He does not know this dog or if it is up to date on vaccinations.      Assessment: Dog bite    Protocol Recommended Disposition:   Go to     Recommendation: Patient will go to urgent care today. Care advice given per protocol and call back precautions discussed.      N/A    JASMYNE BROWNLEE RN    Does the patient meet one of the following criteria for ADS visit consideration? No    Reason for Disposition   [1] Any break in skin from BITE (e.g., cut, puncture or scratch) AND[2] PET animal (e.g., dog, cat, or ferret) at risk for RABIES (e.g., sick, stray, unprovoked bite, developing country)    Additional Information   Negative: [1] Major bleeding (e.g., actively dripping or spurting) AND [2] can't be stopped   Negative: Sounds like a life-threatening emergency to the triager   Negative: Snake bite   Negative: Bite, wound, or sting from fish   Negative: [1] Any break in skin from BITE (e.g., cut, puncture or scratch) AND[2] WILD animal at risk for RABIES (e.g., bat, raccoon, carvajal, skunk, coyote, other carnivores; see Background for list.)    Protocols used: Animal Bite-A-

## 2023-12-18 ENCOUNTER — TELEPHONE (OUTPATIENT)
Dept: PSYCHIATRY | Facility: CLINIC | Age: 63
End: 2023-12-18
Payer: COMMERCIAL

## 2023-12-18 NOTE — TELEPHONE ENCOUNTER
On 12/18/2023 the patient signed an ANGELA authorizing the release of records to [facility] from [facility].  I sent the document to scanning and the above mentioned on 12/18/2023.     - Moustapha Galicia, Visit Facilitator

## 2023-12-18 NOTE — TELEPHONE ENCOUNTER
On 12/18/2023, the pt signed a Person-to-Person consent to communicate for Delbert Castaneda. Sent to scanning on 12/18/2023.    - Moustapha Galicia, Visit Facilitator

## 2023-12-19 ENCOUNTER — TELEPHONE (OUTPATIENT)
Dept: PSYCHIATRY | Facility: CLINIC | Age: 63
End: 2023-12-19
Payer: COMMERCIAL

## 2023-12-19 NOTE — TELEPHONE ENCOUNTER
Called and LVM to Dr Delbert Castaneda [facility] for care coordination. Future TMS repeat and medication recommendation. Luna Garcia, SANTIAGO, 12/19/2023

## 2024-01-05 ENCOUNTER — VIRTUAL VISIT (OUTPATIENT)
Dept: PSYCHIATRY | Facility: CLINIC | Age: 64
End: 2024-01-05
Attending: NURSE PRACTITIONER
Payer: COMMERCIAL

## 2024-01-05 DIAGNOSIS — F90.2 ATTENTION DEFICIT HYPERACTIVITY DISORDER (ADHD), COMBINED TYPE: ICD-10-CM

## 2024-01-05 DIAGNOSIS — F84.0 AUTISM: ICD-10-CM

## 2024-01-05 DIAGNOSIS — F41.9 ANXIETY: ICD-10-CM

## 2024-01-05 DIAGNOSIS — F32.A DEPRESSION, UNSPECIFIED DEPRESSION TYPE: Primary | ICD-10-CM

## 2024-01-05 PROCEDURE — 99214 OFFICE O/P EST MOD 30 MIN: CPT | Mod: 95 | Performed by: NURSE PRACTITIONER

## 2024-01-05 PROCEDURE — 90838 PSYTX W PT W E/M 60 MIN: CPT | Mod: 95 | Performed by: NURSE PRACTITIONER

## 2024-01-05 RX ORDER — BUPROPION HYDROCHLORIDE 100 MG/1
100 TABLET, EXTENDED RELEASE ORAL DAILY
Qty: 90 TABLET | Refills: 1 | Status: SHIPPED | OUTPATIENT
Start: 2024-01-05 | End: 2024-06-06

## 2024-01-05 ASSESSMENT — PAIN SCALES - GENERAL: PAINLEVEL: MILD PAIN (2)

## 2024-01-05 NOTE — NURSING NOTE
Is the patient currently in the state of [place]? YES    Visit mode:VIDEO    If the visit is dropped, the patient can be reconnected by: VIDEO VISIT: Text to cell phone:   Telephone Information:   Mobile [phone]       Will anyone else be joining the visit? NO  (If patient encounters technical issues they should call [phone]    How would you like to obtain your AVS? MyChart    Are changes needed to the allergy or medication list? No    Reason for visit: RECHKRISTIE SHAFFER
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
## 2024-01-05 NOTE — PATIENT INSTRUCTIONS
-Continue on current medication regimen.    Your next appointment is scheduled on 3/6/2024 (Wed) at 9am.      **For crisis resources, please see the information at the end of this document**   Patient Education    Thank you for coming to the [facility] [place] [facility].     Lab Testing:  If you had lab testing today and your results are reassuring or normal they will be mailed to you or sent through [place] within 7 days. If the lab tests need quick action we will call you with the results. The phone number we will call with results is # [phone]. If this is not the best number please call our clinic and change the number.     Medication Refills:  If you need any refills please call your pharmacy and they will contact us. Our fax number for refills is [phone].   Three business days of notice are needed for general medication refill requests.   Five business days of notice are needed for controlled substance refill requests.   If you need to change to a different pharmacy, please contact the new pharmacy directly. The new pharmacy will help you get your medications transferred.     Contact Us:  Please call [phone] during business hours (8-5:00 M-F).   If you have medication related questions after clinic hours, or on the weekend, please call [phone].     Financial Assistance [phone]   Medical Records [phone]       MENTAL HEALTH CRISIS RESOURCES:  For a emergency help, please call 911 or go to the nearest Emergency Department.     Emergency Walk-In Options:   EmPATH Unit @ [facility] ([place]): [phone] - Specialized mental health emergency area designed to be calming  [facility] ([place]): [phone]  [facility] ([place]): [phone]  [facility]): [phone]    [facility] Crisis Information:   [place]: [phone]  [place]: [phone]  [place] ([place]) - Adult:  [phone]     Child: [phone]  [place] - Adult: [phone]     Child: [phone]  [place]: [phone]  List of all [facility] resources:   https://[place].gov/dhs/people-we-serve/adults/health-care/mental-health/resources/crisis-contacts.jsp    National Crisis Information:   Crisis Text Line: Text  [place]  to 993563  Suicide & Crisis Lifeline: 988  National Suicide Prevention Lifeline: 9-613-252-TALK (1-853.655.9117)       For online chat options, visit https://suicidepreventionlifeline.org/chat/  Poison Control Center: 1-604.759.9121  Trans Lifeline: 1-872.330.7164 - Hotline for transgender people of all ages  The [place] Project: 5-140-247-0356 - Hotline for LGBT youth     For Non-Emergency Support:   Fast Tracker: Mental Health & Substance Use Disorder Resources -   https://www.MotoratorckEventpign.org/

## 2024-01-19 ENCOUNTER — OFFICE VISIT (OUTPATIENT)
Dept: OTOLARYNGOLOGY | Facility: CLINIC | Age: 64
End: 2024-01-19
Payer: COMMERCIAL

## 2024-01-19 DIAGNOSIS — R49.9 VOICE AND RESONANCE DISORDER: ICD-10-CM

## 2024-01-19 DIAGNOSIS — R49.0 DYSPHONIA: Primary | ICD-10-CM

## 2024-01-19 DIAGNOSIS — F64.0 GENDER DYSPHORIA IN ADULT: ICD-10-CM

## 2024-01-19 PROCEDURE — 92507 TX SP LANG VOICE COMM INDIV: CPT | Mod: GN | Performed by: SPEECH-LANGUAGE PATHOLOGIST

## 2024-01-19 NOTE — PROGRESS NOTES
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    Remarkable findings included:  Perceptual evaluation demonstrated:   Roughness: Mild Intermittent  Breathiness: WNL  Strain: WNL  Pitch:  F0/Habitual/Conversational speech: low  Laryngeal exam demonstrated:   Not warranted  Primary complaint of patient today included:   Dysphonia and voice and resonance disorder in the context of gender dysphoria.     Therefore, I recommended a course of speech therapy to address these concerns.     SUBJECTIVE:  Since the last appointment, Ms. Stockton reports the following:   Overall she reports that symptoms are stable    SUBJECTIVE:  Since her last session, Ms. Stockton reports the following:   Overall she reports that symptoms are improving  Hi, Yes please, thank you! Practice.       OBJECTIVE:  Ms. Stockton presents today with the following:  Voice quality:  Roughness: Mild Intermittent  Breathiness: WNL  Strain: WNL  Pitch:  F0/Habitual/Conversational speech: low  Resonance:   Conversational speech:  backward focus of resonance      PATIENT REPORTED MEASURES:  Patient Supplied Answers To SLP QOL Questionnaire       No data to display              Patient Supplied Answers To VHI Questionnaire      1/19/2024     8:26 AM   Voice Handicap Index (VHI-10)   My voice makes it difficult for people to hear me 0   People have difficulty understanding me in a noisy room 0   My voice difficulties restrict my personal and social life.  3   I feel left out of conversations because of my voice 2   My voice problem causes me to " "lose income 0   I feel as though I have to strain to produce voice 0   The clarity of my voice is unpredictable 2   My voice problem upsets me 3   My voice makes me feel handicapped 3   People ask, \"What's wrong with your voice?\" 2   VHI-10 15       THERAPEUTIC ACTIVITIES  Demonstrated previous exercises.  demonstrated improved technique  appropriate redirection provided  instruction provided for increased level of complexity/difficulty      Exercises in techniques for improved airflow during phonation  Speech material with /ju/ glides and aspirate onsets was facilitating at the word level.  Progressed to easy onset/ flow, and blending phrases  Instructed with a descending glide pattern; this was helpful.  [place] techniques to reduce glottal cummings and improve breath flow; negative practice improved awareness today.    Counseling and Education:  Asked many questions about the nature of her symptoms, and I answered all of these thoroughly.  A revised regimen for home practice was instructed.  I provided an AVS of today's therapeutic activities to facilitate practice.    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 Ms. Stockton continues to demonstrate good early morning therapeutic activities to optimize coordination between breath flow and function, and allow for breath filled, light speech that aligns with her identity.      I have reached out to Del[facility] Sanford since our last appointment and they will be working to see if we can remove Liz's old name from our records.    PLAN: I will see Ms. Stockton in 2 weeks, at which point we will continue therapy.   For practice goals see AVS.     Next Clinic Appt: 2/16    TOTAL SERVICE TIME: 60 minutes  TREATMENT (68701): 60 minutes  NO CHARGE FACILITY FEE (37428)      Yessenia Jimenez M.M. (voice), M.A., " CCC/SLP  Speech-Language Pathologist  Certificate of Vocology  [facility]  [phone]  [email or web address]  Pronouns: she/her

## 2024-01-19 NOTE — LETTER
"1/19/2024       RE: Chris Stockton  [address]  [place] [address]     Dear Colleague,    Thank you for referring your patient, Chris Stockton, to the [facility] VOICE CLINIC [place] at [facility]. Please see a copy of my visit note below.      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    Remarkable findings included:  Perceptual evaluation demonstrated:   Roughness: Mild Intermittent  Breathiness: WNL  Strain: WNL  Pitch:  F0/Habitual/Conversational speech: low  Laryngeal exam demonstrated:   Not warranted  Primary complaint of patient today included:   Dysphonia and voice and resonance disorder in the context of gender dysphoria.     Therefore, I recommended a course of speech therapy to address these concerns.     SUBJECTIVE:  Since the last appointment, Ms. Stockton reports the following:   Overall she reports that symptoms are stable    SUBJECTIVE:  Since her last session, Ms. Stockton reports the following:   Overall she reports that symptoms are improving  Hi, Yes please, thank you! Practice.       OBJECTIVE:  Ms. Stockton presents today with the following:  Voice quality:  Roughness: Mild Intermittent  Breathiness: WNL  Strain: WNL  Pitch:  F0/Habitual/Conversational speech: low  Resonance:   Conversational speech:  backward focus of resonance      PATIENT REPORTED MEASURES:  Patient Supplied Answers To SLP QOL Questionnaire       No data to display              Patient Supplied Answers To VHI Questionnaire      1/19/2024 " "    8:26 AM   Voice Handicap Index (VHI-10)   My voice makes it difficult for people to hear me 0   People have difficulty understanding me in a noisy room 0   My voice difficulties restrict my personal and social life.  3   I feel left out of conversations because of my voice 2   My voice problem causes me to lose income 0   I feel as though I have to strain to produce voice 0   The clarity of my voice is unpredictable 2   My voice problem upsets me 3   My voice makes me feel handicapped 3   People ask, \"What's wrong with your voice?\" 2   VHI-10 15       THERAPEUTIC ACTIVITIES  Demonstrated previous exercises.  demonstrated improved technique  appropriate redirection provided  instruction provided for increased level of complexity/difficulty      Exercises in techniques for improved airflow during phonation  Speech material with /ju/ glides and aspirate onsets was facilitating at the word level.  Progressed to easy onset/ flow, and blending phrases  Instructed with a descending glide pattern; this was helpful.  [place] techniques to reduce glottal cummings and improve breath flow; negative practice improved awareness today.    Counseling and Education:  Asked many questions about the nature of her symptoms, and I answered all of these thoroughly.  A revised regimen for home practice was instructed.  I provided an AVS of today's therapeutic activities to facilitate practice.    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 Ms. Stockton continues to demonstrate good early morning therapeutic activities to optimize coordination between breath flow and function, and allow for breath filled, light speech that aligns with her identity.      I have reached out to Delbert Sanford since our last appointment and they will be working to see if we can remove " Liz's old name from our records.    PLAN: I will see Ms. Stockton in 2 weeks, at which point we will continue therapy.   For practice goals see AVS.     Next Clinic Appt: 2/16    TOTAL SERVICE TIME: 60 minutes  TREATMENT (35093): 60 minutes  NO CHARGE FACILITY FEE (21420)      Yessenia Jimenez M.M. (voice), M.A., CCC/SLP  Speech-Language Pathologist  Certificate of Vocology  [facility]  [phone]  [email or web address]  Pronouns: she/her      Again, thank you for allowing me to participate in the care of your patient.      Sincerely,    Yessenia Jimenez, SLP

## 2024-01-19 NOTE — PATIENT INSTRUCTIONS
"After Visit Summary    Patient: Liz Stockton  Date of Visit: 1/19/2024    These notes are also available in your MyChart. Please take a few moments to find them under \"Past Appointments\" in the [employer] system, as Yessenia will start to phase out e-mail communications.    \"Handouts\" that go along with today's order of activities include (below):   Frequency of practice: 2-3x/day unless marked otherwise    Order of today's appointment:  Listen to your recording:  G3 to D#3 (196.0 to about 150 Hz)           Spacious Speech Phrases  Frequency of practice:    Start with an open-throated breath (like with a sniff of something wonderful or the beginning of a yawn or a surprise breath).  Let the breath do the work.  Be light, fluid, legato, and spacious.  Exaggerate inflection!  Glide over your entire pitch range.  These should feel effortless in your throat.  This is like a massage for your vocal folds, stretching and anupam the muscles, while vibrating in a spacious throat.    Heeeeeeeee   Haaaaaaaay   Hooooooooh   Huuuuuuuu   Haaaaaaaah   Use phone ziggy and complete a bunch with a yawn and sigh and see how many times you can reduplicate starting between 230-240Hz at the top, then stop around 150 Hz or a little higher.    Hi there   How are you?   Who are you?   Who is she?   Who is he?   Who are they?   Hello, hello, hello   Hi!  Yes, Please!  Thank you!         Yessenia Jimenez M.M. (voice), M.A., CCC/SLP  Speech-Language Pathologist  [facility] Certified Vocologist  [facility]  [email or web address]  she/her    "

## 2024-01-30 DIAGNOSIS — E78.5 HYPERLIPIDEMIA LDL GOAL <100: ICD-10-CM

## 2024-01-30 RX ORDER — ROSUVASTATIN CALCIUM 10 MG/1
10 TABLET, COATED ORAL DAILY
Qty: 90 TABLET | Refills: 1 | Status: SHIPPED | OUTPATIENT
Start: 2024-01-30 | End: 2024-08-06

## 2024-01-30 NOTE — TELEPHONE ENCOUNTER
"Request for medication refill:  rosuvastatin (CRESTOR) 10 MG tablet     Providers if patient needs an appointment and you are willing to give a one month supply please refill for one month and  send a letter/MyChart using \".SMILLIMITEDREFILL\" .smillimited and route chart to \"P [facility] \" (Giving one month refill in non controlled medications is strongly recommended before denial)    If refill has been denied, meaning absolutely no refills without visit, please complete the smart phrase \".smirxrefuse\" and route it to the \"P [facility] MED REFILLS\"  pool to inform the patient and the pharmacy.    John Nicholson, CMA      "

## 2024-01-31 ENCOUNTER — TELEPHONE (OUTPATIENT)
Dept: OTOLARYNGOLOGY | Facility: CLINIC | Age: 64
End: 2024-01-31
Payer: COMMERCIAL

## 2024-01-31 NOTE — TELEPHONE ENCOUNTER
This patient needs 4 In-Person Return SLP Voice appts with Yessenia Jimenez. Appts should be at least 2 weeks apart

## 2024-02-12 DIAGNOSIS — K22.2 ESOPHAGEAL STRICTURE: ICD-10-CM

## 2024-02-12 NOTE — TELEPHONE ENCOUNTER
"Request for medication refill:  omeprazole (PRILOSEC) 20 MG DR capsule     Providers if patient needs an appointment and you are willing to give a one month supply please refill for one month and  send a letter/MyChart using \".SMILLIMITEDREFILL\" .smillimited and route chart to \"P [facility] \" (Giving one month refill in non controlled medications is strongly recommended before denial)    If refill has been denied, meaning absolutely no refills without visit, please complete the smart phrase \".smirxrefuse\" and route it to the \"P [facility] MED REFILLS\"  pool to inform the patient and the pharmacy.    John Nicholson, CMA      "

## 2024-02-16 ENCOUNTER — OFFICE VISIT (OUTPATIENT)
Dept: OTOLARYNGOLOGY | Facility: CLINIC | Age: 64
End: 2024-02-16
Payer: COMMERCIAL

## 2024-02-16 DIAGNOSIS — R49.0 DYSPHONIA: Primary | ICD-10-CM

## 2024-02-16 DIAGNOSIS — F64.0 GENDER DYSPHORIA IN ADULT: ICD-10-CM

## 2024-02-16 DIAGNOSIS — R49.9 VOICE AND RESONANCE DISORDER: ICD-10-CM

## 2024-02-16 PROCEDURE — 92507 TX SP LANG VOICE COMM INDIV: CPT | Mod: GN | Performed by: SPEECH-LANGUAGE PATHOLOGIST

## 2024-02-16 NOTE — LETTER
"2/16/2024       RE: Chris Stockton  [address]  [place] [address]     Dear Colleague,    Thank you for referring your patient, Chris Stockton, to the [facility] VOICE CLINIC [place] at [facility]. Please see a copy of my visit note below.      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    Remarkable findings included:  Perceptual evaluation demonstrated:   Roughness: Mild Intermittent  Breathiness: WNL  Strain: WNL  Pitch:  F0/Habitual/Conversational speech: low  Laryngeal exam demonstrated:   Not warranted  Primary complaint of patient today included:   Dysphonia and voice and resonance disorder in the context of gender dysphoria.     Therefore, I recommended a course of speech therapy to address these concerns.    SUBJECTIVE:  Since her last session, Ms. Stockton reports the following:   Overall she reports that symptoms are stable.  She is working through many personal stressors, but willing to continue to work together in therapy.     OBJECTIVE:  Ms. Stockton presents today with the following:  Voice quality:  Roughness: Mild Intermittent  Breathiness: WNL  Strain: WNL  Pitch:  F0/Habitual/Conversational speech: low  Resonance:   Conversational speech:  backward focus of resonance      PATIENT REPORTED MEASURES:  Patient Supplied Answers To SLP QOL Questionnaire       No data to display              Patient Supplied Answers To VHI Questionnaire      2/16/2024     3:03 PM   Voice Handicap Index (VHI-10)   My voice makes it " "difficult for people to hear me 1   People have difficulty understanding me in a noisy room 1   My voice difficulties restrict my personal and social life.  2   I feel left out of conversations because of my voice 2   My voice problem causes me to lose income 0   I feel as though I have to strain to produce voice 1   The clarity of my voice is unpredictable 2   My voice problem upsets me 3   My voice makes me feel handicapped 2   People ask, \"What's wrong with your voice?\" 2   VHI-10 16         THERAPEUTIC ACTIVITIES  Demonstrated previous exercises.  demonstrated improved technique  appropriate redirection provided  instruction provided for increased level of complexity/difficulty    Exercises to promote optimal respiratory mechanics  she demonstrated clavicular/neck/shoulder involvement in inhalation  Demonstrated difficulty allowing abdominal relaxation for inhalation  Practiced in a  seated position, with tactile cue of a hand on the low rib-cage to facilitate awareness of low respiratory engagement.    With clinician support, patient was able to demonstrate improved abdominal relaxation and engagement on inhalation  Optimal exhalation using inward engagement of the abdominal wall with no corresponding collapse of the upper chest cavity was trained using the pulsed \"sh\" task  [place] a respiratory pacing exercise; this was helpful  acceptable improvement in airflow and respiratory mechanics    Exercises in techniques for improved airflow during phonation  Speech material with /ju/ glides was facilitating at the word level.  Progressed to easy onset/ flow, and blending phrases  Instructed with a descending glide; this was helpful.  [place] techniques to reduce glottal cummings and improve breath flow; negative practice improved awareness today.    Counseling and Education:  Asked many questions about the nature of her symptoms, and I answered all of these thoroughly.  A revised regimen for home practice was " instructed.  I provided an AVS of today's therapeutic activities to facilitate practice.    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 Ms. Stockton demonstrated good learning of therapeutic activities that optimize voice quality and coordination between breath flow and phonation.     PLAN: I will see Ms. Stockton in July to continue therapy, and I will keep her on my wait list for any cancellation.  For practice goals see AVS.     Next Clinic Appt: 7/12/24    TOTAL SERVICE TIME: 60 minutes  TREATMENT (49559): 60 minutes  NO CHARGE FACILITY FEE (64171)    Yessenia Jimenez M.M. (voice), M.A., CCC/SLP  Speech-Language Pathologist  Certificate of Vocology  [facility]  [phone]  [email or web address]   Pronouns: she/her

## 2024-02-16 NOTE — PATIENT INSTRUCTIONS
"After Visit Summary    Patient: Liz Stockton  Date of Visit: 2/16/2024    These notes are also available in your MyChart. Please take a few moments to find them under \"Past Appointments\" in the [employer] system, as Yessenia will start to phase out e-mail communications.    \"Handouts\" that go along with today's order of activities include (below):   Frequency of practice: 2-3x/day unless marked otherwise    Order of today's appointment:  Breathing:   In the morning and evening (twice daily) for 2-5 minutes:   Breathe while l. Hands on tummy and chest.  Take a breath in with rounded lips and exhale with a  sshhhh (3x), \"zzzz\" (3x)   Continue with a song (song of the soul or anything in the car).     3-5x in a row: sirens  that increase in range.   Ha, ha, he, he, ho, ho - [place]  Giggle all over your range    Start high and glide lower    Breath Pacing:   Coordinate sipping breath with counting:  Breathe in through rounded lips with tongue behind lower teeth or touching at the bump behind upper teeth:  Breathe in through rounded lips, then speak \"1\"  Breathe in through rounded lips, then speak  \"1-2\"  Breathe in through rounded lips, then speak  \"1-2-3\"  Breathe in through rounded lips, then speak  \"1-2-3-4\"... up to 5, and then keep going up by one number up to 10          Phrases for Blending     go into   put upon   leave on  the only  lose it   see it   the other  win it   do it   not even  that's enough   put on   not any  leave open   down under  cold as   one of   not old   the ice   she's ill   look at   he's ill   then add   The(e) (y)end  yes and  so it   high up  one at   Anna is                        Go_(w)into the store  Leave_on_the_lights                     The(e)_(y)only one  See_it_over_there                         The(e)_(y)other day  Do_it_now    Not_even her mom  Put(d)_on_your_shoes         Not_any more  Down_under_the_stairs  Cold_as ice  Not_old_enough   the ice is cold  Look_at_the_sky   he's " (z)ill with the flu  The_end_of_the_story  yes_and no  High_up_on_the_shelf  one at a time          Yessenia Jimenez M.M. (voice), M.A., CCC/SLP  Speech-Language Pathologist  [facility] Certified Vocologist  [facility]  [email or web address]  she/her

## 2024-02-16 NOTE — PROGRESS NOTES
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    Remarkable findings included:  Perceptual evaluation demonstrated:   Roughness: Mild Intermittent  Breathiness: WNL  Strain: WNL  Pitch:  F0/Habitual/Conversational speech: low  Laryngeal exam demonstrated:   Not warranted  Primary complaint of patient today included:   Dysphonia and voice and resonance disorder in the context of gender dysphoria.     Therefore, I recommended a course of speech therapy to address these concerns.    SUBJECTIVE:  Since her last session, Ms. Stockton reports the following:   Overall she reports that symptoms are stable.  She is working through many personal stressors, but willing to continue to work together in therapy.     OBJECTIVE:  Ms. Stockton presents today with the following:  Voice quality:  Roughness: Mild Intermittent  Breathiness: WNL  Strain: WNL  Pitch:  F0/Habitual/Conversational speech: low  Resonance:   Conversational speech:  backward focus of resonance      PATIENT REPORTED MEASURES:  Patient Supplied Answers To SLP QOL Questionnaire       No data to display              Patient Supplied Answers To VHI Questionnaire      2/16/2024     3:03 PM   Voice Handicap Index (VHI-10)   My voice makes it difficult for people to hear me 1   People have difficulty understanding me in a noisy room 1   My voice difficulties restrict my personal and social life.  2   I feel left out of conversations because of my voice 2   My voice problem causes me to lose income 0   I feel as though I have to strain to produce " "voice 1   The clarity of my voice is unpredictable 2   My voice problem upsets me 3   My voice makes me feel handicapped 2   People ask, \"What's wrong with your voice?\" 2   VHI-10 16         THERAPEUTIC ACTIVITIES  Demonstrated previous exercises.  demonstrated improved technique  appropriate redirection provided  instruction provided for increased level of complexity/difficulty    Exercises to promote optimal respiratory mechanics  she demonstrated clavicular/neck/shoulder involvement in inhalation  Demonstrated difficulty allowing abdominal relaxation for inhalation  Practiced in a  seated position, with tactile cue of a hand on the low rib-cage to facilitate awareness of low respiratory engagement.    With clinician support, patient was able to demonstrate improved abdominal relaxation and engagement on inhalation  Optimal exhalation using inward engagement of the abdominal wall with no corresponding collapse of the upper chest cavity was trained using the pulsed \"sh\" task  [place] a respiratory pacing exercise; this was helpful  acceptable improvement in airflow and respiratory mechanics    Exercises in techniques for improved airflow during phonation  Speech material with /ju/ glides was facilitating at the word level.  Progressed to easy onset/ flow, and blending phrases  Instructed with a descending glide; this was helpful.  [place] techniques to reduce glottal cummings and improve breath flow; negative practice improved awareness today.    Counseling and Education:  Asked many questions about the nature of her symptoms, and I answered all of these thoroughly.  A revised regimen for home practice was instructed.  I provided an AVS of today's therapeutic activities to facilitate practice.    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 Ms. " Mahin demonstrated good learning of therapeutic activities that optimize voice quality and coordination between breath flow and phonation.     PLAN: I will see Ms. Stockton in July to continue therapy, and I will keep her on my wait list for any cancellation.  For practice goals see AVS.     Next Clinic Appt: 7/12/24    TOTAL SERVICE TIME: 60 minutes  TREATMENT (93029): 60 minutes  NO CHARGE FACILITY FEE (32682)    Yessenia Jimenez M.M. (voice), M.A., CCC/SLP  Speech-Language Pathologist  Certificate of Vocology  [facility] Voice [facility]  [phone]  [email or web address]  Pronouns: she/her

## 2024-02-22 ENCOUNTER — THERAPY VISIT (OUTPATIENT)
Dept: PHYSICAL THERAPY | Facility: CLINIC | Age: 64
End: 2024-02-22
Payer: COMMERCIAL

## 2024-02-22 DIAGNOSIS — R32 URINARY INCONTINENCE: Primary | ICD-10-CM

## 2024-02-22 DIAGNOSIS — R15.9 FECAL INCONTINENCE: ICD-10-CM

## 2024-02-22 PROCEDURE — 97110 THERAPEUTIC EXERCISES: CPT | Mod: GP | Performed by: PHYSICAL THERAPIST

## 2024-02-22 PROCEDURE — 97140 MANUAL THERAPY 1/> REGIONS: CPT | Mod: GP | Performed by: PHYSICAL THERAPIST

## 2024-02-23 NOTE — PROGRESS NOTES
PLAN  Continue therapy per current plan of care.    Beginning/End Dates of Progress Note Reporting Period:  09/25/23 to 02/22/2024    Referring Provider:  Yanna Santizo

## 2024-03-06 ENCOUNTER — VIRTUAL VISIT (OUTPATIENT)
Dept: PSYCHIATRY | Facility: CLINIC | Age: 64
End: 2024-03-06
Attending: NURSE PRACTITIONER
Payer: COMMERCIAL

## 2024-03-06 DIAGNOSIS — F84.0 AUTISM: ICD-10-CM

## 2024-03-06 DIAGNOSIS — F32.A DEPRESSION, UNSPECIFIED DEPRESSION TYPE: Primary | ICD-10-CM

## 2024-03-06 DIAGNOSIS — F90.2 ATTENTION DEFICIT HYPERACTIVITY DISORDER (ADHD), COMBINED TYPE: ICD-10-CM

## 2024-03-06 PROCEDURE — 90838 PSYTX W PT W E/M 60 MIN: CPT | Mod: 95 | Performed by: NURSE PRACTITIONER

## 2024-03-06 PROCEDURE — 99214 OFFICE O/P EST MOD 30 MIN: CPT | Mod: 95 | Performed by: NURSE PRACTITIONER

## 2024-03-06 PROCEDURE — G2211 COMPLEX E/M VISIT ADD ON: HCPCS | Mod: 95 | Performed by: NURSE PRACTITIONER

## 2024-03-06 NOTE — PATIENT INSTRUCTIONS
-Continue on current medication regimen.    Your next appointment is scheduled on 5/15/2024 (Wed) at 8am.      **For crisis resources, please see the information at the end of this document**   Patient Education    Thank you for coming to the [facility] MENTAL HEALTH [facility] ADDI[facility] [place] CLINIC.     Lab Testing:  If you had lab testing today and your results are reassuring or normal they will be mailed to you or sent through [employer] within 7 days. If the lab tests need quick action we will call you with the results. The phone number we will call with results is # [phone]. If this is not the best number please call our clinic and change the number.     Medication Refills:  If you need any refills please call your pharmacy and they will contact us. Our fax number for refills is [phone].   Three business days of notice are needed for general medication refill requests.   Five business days of notice are needed for controlled substance refill requests.   If you need to change to a different pharmacy, please contact the new pharmacy directly. The new pharmacy will help you get your medications transferred.     Contact Us:  Please call [phone] during business hours (8-5:00 M-F).   If you have medication related questions after clinic hours, or on the weekend, please call [phone].     Financial Assistance [phone]   Medical Records [phone]       MENTAL HEALTH CRISIS RESOURCES:  For a emergency help, please call 911 or go to the nearest Emergency Department.     Emergency Walk-In Options:   EmPATH Unit @ [facility] ([place]): [phone] - Specialized mental health emergency area designed to be calming  [facility] ([place]): [phone]  [facility] ([place]): [phone]  [facility]): [phone]    [facility] Crisis Information:   [place]: [phone]  [place]: [phone]  [place] ([place]) - Adult:  [phone]     Child: [phone]  [place] - Adult: [phone]     Child: [phone]  [place]: [phone]  List of all [facility] resources:   https://[place].gov/dhs/people-we-serve/adults/health-care/mental-health/resources/crisis-contacts.jsp    National Crisis Information:   Crisis Text Line: Text  [place]  to 794664  Suicide & Crisis Lifeline: 988  National Suicide Prevention Lifeline: 7-370-361-TALK (1-554.910.4032)       For online chat options, visit https://suicidepreventionlifeline.org/chat/  Poison Control Center: 1-641.808.8199  Trans Lifeline: 1-896.894.3626 - Hotline for transgender people of all ages  The [employer] Project: 5-880-636-9761 - Hotline for LGBT youth     For Non-Emergency Support:   Fast Tracker: Mental Health & Substance Use Disorder Resources -   https://www.[employer]op[employer]Mobblesn.org/
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
## 2024-03-06 NOTE — NURSING NOTE
Is the patient currently in the state of [place]? YES    Visit mode:VIDEO    If the visit is dropped, the patient can be reconnected by: VIDEO VISIT: Text to cell phone:   Telephone Information:   Mobile [phone]       Will anyone else be joining the visit? NO  (If patient encounters technical issues they should call [phone])    How would you like to obtain your AVS? MyChart    Are changes needed to the allergy or medication list? Pt stated no changes to allergies and Pt stated no med changes    Reason for visit: [place] SHAFFER

## 2024-03-13 DIAGNOSIS — E03.4 HYPOTHYROIDISM DUE TO ACQUIRED ATROPHY OF THYROID: ICD-10-CM

## 2024-03-13 RX ORDER — LEVOTHYROXINE SODIUM 100 UG/1
TABLET ORAL
Qty: 90 TABLET | Refills: 1 | Status: SHIPPED | OUTPATIENT
Start: 2024-03-13 | End: 2024-09-25

## 2024-03-13 NOTE — TELEPHONE ENCOUNTER
"Request for medication refill:  levothyroxine (SYNTHROID/LEVOTHROID) 100 MCG tablet     Providers if patient needs an appointment and you are willing to give a one month supply please refill for one month and  send a letter/MyChart using \".SMILLIMITEDREFILL\" .smillimited and route chart to \"P SMI \" (Giving one month refill in non controlled medications is strongly recommended before denial)    If refill has been denied, meaning absolutely no refills without visit, please complete the smart phrase \".smirxrefuse\" and route it to the \"P SMI MED REFILLS\"  pool to inform the patient and the pharmacy.    John Nicholson, CMA      "

## 2024-03-17 ENCOUNTER — HEALTH MAINTENANCE LETTER (OUTPATIENT)
Age: 64
End: 2024-03-17

## 2024-04-09 ENCOUNTER — OFFICE VISIT (OUTPATIENT)
Dept: OTOLARYNGOLOGY | Facility: CLINIC | Age: 64
End: 2024-04-09
Payer: COMMERCIAL

## 2024-04-09 DIAGNOSIS — R49.0 DYSPHONIA: Primary | ICD-10-CM

## 2024-04-09 DIAGNOSIS — R49.9 VOICE AND RESONANCE DISORDER: ICD-10-CM

## 2024-04-09 DIAGNOSIS — F64.0 GENDER DYSPHORIA IN ADULT: ICD-10-CM

## 2024-04-09 PROCEDURE — 92507 TX SP LANG VOICE COMM INDIV: CPT | Mod: GN | Performed by: SPEECH-LANGUAGE PATHOLOGIST

## 2024-04-09 NOTE — PROGRESS NOTES
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    Remarkable findings included:  Perceptual evaluation demonstrated:   Roughness: Mild Intermittent  Breathiness: WNL  Strain: WNL  Pitch:  F0/Habitual/Conversational speech: low  Laryngeal exam demonstrated:   Not warranted  Primary complaint of patient today included:   Dysphonia and voice and resonance disorder in the context of gender dysphoria.     Therefore, I recommended a course of speech therapy to address these concerns.    SUBJECTIVE:  Since her last session, Ms. Stockton reports the following:   Overall she reports that symptoms are stable    OBJECTIVE:  Ms. Stockton presents today with the following:  Voice quality:  Roughness: Mild Intermittent  Breathiness: WNL  Strain: WNL  Pitch:  F0/Habitual/Conversational speech: low  Resonance:   Conversational speech:  backward focus of resonance      PATIENT REPORTED MEASURES:  Patient Supplied Answers To SLP QOL Questionnaire       No data to display              Patient Supplied Answers To VHI Questionnaire      2/16/2024     3:03 PM   Voice Handicap Index (VHI-10)   My voice makes it difficult for people to hear me 1   People have difficulty understanding me in a noisy room 1   My voice difficulties restrict my personal and social life.  2   I feel left out of conversations because of my voice 2   My voice problem causes me to lose income 0   I feel as though I have to strain to produce voice 1   The clarity of my voice is unpredictable 2   My voice problem upsets me 3   My voice makes me feel " "handicapped 2   People ask, \"What's wrong with your voice?\" 2   VHI-10 16       THERAPEUTIC ACTIVITIES    Exercises to promote optimal respiratory mechanics  With clinician support, patient was able to demonstrate improved abdominal relaxation and engagement on inhalation  Optimal exhalation using inward engagement of the abdominal wall with no corresponding collapse of the upper chest cavity was trained using the pulsed \"sh\" task  [place] a respiratory pacing exercise; this was helpful  acceptable improvement in airflow and respiratory mechanics    Semi-Occluded Vocal Tract (SOVT) exercises instructed to reduce laryngeal tension, promote vocal fold pliability, and coordinate respiration and phonation  Sustained /z/ was found to be most facilitating   Sustained phonation, and voice vs. voiceless productions used to promote easy voicing and raise awareness of laryngeal tension  Ascending and descending glides utilized to promote vocal fold pliability  \"Messa di voce\", gradual crescendo and decrescendo to vary medial compression was also utilized to promote vocal fold pliability.  Instructed on the benefits of using these exercises for improved coordination of breath flow with phonation and tissue mobilization.  Instructed on the importance of using these exercises as a warm-up / cool down,  and to re-calibrate the voice throughout the day.    Resonant Voice Therapy (RVT) exercises to promote forward locus of resonance and optimized pattern of laryngeal adduction  Syllable level using /m/ in alternation with cardinal vowels on sustained pitches and speech inflection  Word level exercises featuring nasal continuant loaded stimuli  Phrase level exercises featuring nasal continuants in more complex phonemic contexts were employed  Instructed with and interval of a descending 5th, as well as an arpeggio pattern was facilitating, prior to progressing to comfortable speech using optimal breath flow.  Able to recognize " improvement in quality and comfort    Counseling and Education:  Asked many questions about the nature of her symptoms, and I answered all of these thoroughly.  A revised regimen for home practice was instructed.  I provided an AVS of today's therapeutic activities to facilitate practice.    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 Ms. Stockton demonstrated very good learning today. We recorded a short set of exercises on her phone to help facilitate daily practice including techniques to optimize breath flow and improve and maintain a forward resonance.     PLAN: I will see Ms. Stockton in  3 weeks, at which point we will continue thearpy.   For practice goals see AVS.     Next Clinic Appt: 5/1    TOTAL SERVICE TIME: 60 minutes  TREATMENT (01225): 60 minutes  NO CHARGE FACILITY FEE (46278)      Yessenia Jimenez M.M. (voice) MQUIRINO., CCC/SLP  Speech-Language Pathologist  Certificate of Vocology  [facility]  [phone]  [email or web address]  Pronouns: she/her

## 2024-04-09 NOTE — LETTER
"4/9/2024       RE: Chris Stockton  [address] [place]  [facility] [address]     Dear Colleague,    Thank you for referring your patient, Chris Stockton, to the [facility] VOICE CLINIC [place] at [facility]. Please see a copy of my visit note below.      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    Remarkable findings included:  Perceptual evaluation demonstrated:   Roughness: Mild Intermittent  Breathiness: WNL  Strain: WNL  Pitch:  F0/Habitual/Conversational speech: low  Laryngeal exam demonstrated:   Not warranted  Primary complaint of patient today included:   Dysphonia and voice and resonance disorder in the context of gender dysphoria.     Therefore, I recommended a course of speech therapy to address these concerns.    SUBJECTIVE:  Since her last session, Ms. Stockton reports the following:   Overall she reports that symptoms are stable    OBJECTIVE:  Ms. Stockton presents today with the following:  Voice quality:  Roughness: Mild Intermittent  Breathiness: WNL  Strain: WNL  Pitch:  F0/Habitual/Conversational speech: low  Resonance:   Conversational speech:  backward focus of resonance      PATIENT REPORTED MEASURES:  Patient Supplied Answers To SLP QOL Questionnaire       No data to display              Patient Supplied Answers To VHI Questionnaire      2/16/2024     3:03 PM   Voice Handicap Index (VHI-10)   My voice makes it difficult for people to hear me 1   People have difficulty understanding me in a noisy room 1   My voice " "difficulties restrict my personal and social life.  2   I feel left out of conversations because of my voice 2   My voice problem causes me to lose income 0   I feel as though I have to strain to produce voice 1   The clarity of my voice is unpredictable 2   My voice problem upsets me 3   My voice makes me feel handicapped 2   People ask, \"What's wrong with your voice?\" 2   VHI-10 16       THERAPEUTIC ACTIVITIES    Exercises to promote optimal respiratory mechanics  With clinician support, patient was able to demonstrate improved abdominal relaxation and engagement on inhalation  Optimal exhalation using inward engagement of the abdominal wall with no corresponding collapse of the upper chest cavity was trained using the pulsed \"sh\" task  [place] a respiratory pacing exercise; this was helpful  acceptable improvement in airflow and respiratory mechanics    Semi-Occluded Vocal Tract (SOVT) exercises instructed to reduce laryngeal tension, promote vocal fold pliability, and coordinate respiration and phonation  Sustained /z/ was found to be most facilitating   Sustained phonation, and voice vs. voiceless productions used to promote easy voicing and raise awareness of laryngeal tension  Ascending and descending glides utilized to promote vocal fold pliability  \"Messa di voce\", gradual crescendo and decrescendo to vary medial compression was also utilized to promote vocal fold pliability.  Instructed on the benefits of using these exercises for improved coordination of breath flow with phonation and tissue mobilization.  Instructed on the importance of using these exercises as a warm-up / cool down,  and to re-calibrate the voice throughout the day.    Resonant Voice Therapy (RVT) exercises to promote forward locus of resonance and optimized pattern of laryngeal adduction  Syllable level using /m/ in alternation with cardinal vowels on sustained pitches and speech inflection  Word level exercises featuring nasal " continuant loaded stimuli  Phrase level exercises featuring nasal continuants in more complex phonemic contexts were employed  Instructed with and interval of a descending 5th, as well as an arpeggio pattern was facilitating, prior to progressing to comfortable speech using optimal breath flow.  Able to recognize improvement in quality and comfort    Counseling and Education:  Asked many questions about the nature of her symptoms, and I answered all of these thoroughly.  A revised regimen for home practice was instructed.  I provided an AVS of today's therapeutic activities to facilitate practice.    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 Ms. Stockton demonstrated very good learning today. We recorded a short set of exercises on her phone to help facilitate daily practice including techniques to optimize breath flow and improve and maintain a forward resonance.     PLAN: I will see Ms. Stockton in  3 weeks, at which point we will continue thearpy.   For practice goals see AVS.     Next Clinic Appt: 5/1    TOTAL SERVICE TIME: 60 minutes  TREATMENT (96869): 60 minutes  NO CHARGE FACILITY FEE (39879)      Yessenia Jimenez M.M. (voice), M.A., CCC/SLP  Speech-Language Pathologist  Certificate of Vocology  [facility]  [phone]  [email or web address]  Pronouns: she/her      Again, thank you for allowing me to participate in the care of your patient.      Sincerely,    Yessenia Jimenez, SLP     [phone]

## 2024-04-09 NOTE — PATIENT INSTRUCTIONS
"After Visit Summary    Patient: Liz Stockton  Date of Visit: 2024    These notes are also available in your MyChart. Please take a few moments to find them under \"Past Appointments\" in the [employer] system, as Yessenia will start to phase out e-mail communications.    \"Handouts\" that go along with today's order of activities include (below):   Frequency of practice: 2-3x/day unless marked otherwise    Order of today's appointment:  Breathinx  Z    Yeah, Yeah, Yeah (yawn and sigh pattern)    Words stuff    Cinda  Sandy  PLAY!!!              Yessenia Jimenez M.M. (voice), M.A., CCC/SLP  Speech-Language Pathologist  [facility] Certified Vocologist  [facility]  [email or web address]  she/her    "

## 2024-04-26 ENCOUNTER — THERAPY VISIT (OUTPATIENT)
Dept: PHYSICAL THERAPY | Facility: CLINIC | Age: 64
End: 2024-04-26
Payer: COMMERCIAL

## 2024-04-26 DIAGNOSIS — R15.9 FECAL INCONTINENCE: ICD-10-CM

## 2024-04-26 DIAGNOSIS — R32 URINARY INCONTINENCE: Primary | ICD-10-CM

## 2024-04-26 PROCEDURE — 97110 THERAPEUTIC EXERCISES: CPT | Mod: GP | Performed by: PHYSICAL THERAPIST

## 2024-04-26 PROCEDURE — 97530 THERAPEUTIC ACTIVITIES: CPT | Mod: GP | Performed by: PHYSICAL THERAPIST

## 2024-04-26 NOTE — PROGRESS NOTES
PLAN  Continue therapy per current plan of care.    Beginning/End Dates of Progress Note Reporting Period:  02/22/24 to 04/26/2024    Referring Provider:  Referred Self

## 2024-05-01 ENCOUNTER — VIRTUAL VISIT (OUTPATIENT)
Dept: OTOLARYNGOLOGY | Facility: CLINIC | Age: 64
End: 2024-05-01
Payer: COMMERCIAL

## 2024-05-01 DIAGNOSIS — F64.0 GENDER DYSPHORIA IN ADULT: ICD-10-CM

## 2024-05-01 DIAGNOSIS — R49.9 VOICE AND RESONANCE DISORDER: ICD-10-CM

## 2024-05-01 DIAGNOSIS — R49.0 DYSPHONIA: Primary | ICD-10-CM

## 2024-05-01 PROCEDURE — 92507 TX SP LANG VOICE COMM INDIV: CPT | Mod: GN | Performed by: SPEECH-LANGUAGE PATHOLOGIST

## 2024-05-01 NOTE — NURSING NOTE
Patient stated - will complete questionnaire prior to appointment.     Zelda Talbert, Virtual Facilitator

## 2024-05-01 NOTE — LETTER
"5/1/2024       RE: Chris Stockton  [address]  [facility] [address]     Dear Colleague,    Thank you for referring your patient, Chris Stockton, to the [facility] VOICE CLINIC [place] at [facility]. Please see a copy of my visit note below.    Mahin is a 63 year old adult who is being cared for via a billable virtual visit.        The patient/client has been notified and verbally consented to the following statements:   This video visit will be conducted between you and your provider.  If during the course of the call the provider feels a video visit is not appropriate, you will not be charged for this service.    Provider has received verbal consent for billable virtual visit from the patient? 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    Remarkable findings included:  Perceptual evaluation demonstrated:   Roughness: Mild Intermittent  Breathiness: WNL  Strain: WNL  Pitch:  F0/Habitual/Conversational speech: low  Laryngeal exam demonstrated:   Not warranted  Primary complaint of patient today included:   Dysphonia and voice and resonance disorder in the context of gender dysphoria.     Therefore, I recommended a course of speech therapy to address these concerns.    SUBJECTIVE:  Since the last appointment, Ms. Stockton " "reports the following:   Overall she reports that symptoms are stable  Didn't get back to the recoring, but found that the exericses were helpful  Breathing and practice of resonant sounds have been helpful.  More practice with the work we had done is needed.      OBJECTIVE:  Ms. Stockton presents today with the following:  Voice quality:  Roughness: Mild Intermittent  Breathiness: WNL  Strain: WNL  Pitch:  F0/Habitual/Conversational speech: low  Resonance:   Conversational speech:  backward focus of resonance      PATIENT REPORTED MEASURES:  Patient Supplied Answers To SLP QOL Questionnaire       No data to display              Patient Supplied Answers To VHI Questionnaire      5/1/2024     9:57 AM   Voice Handicap Index (VHI-10)   My voice makes it difficult for people to hear me 0   People have difficulty understanding me in a noisy room 0   My voice difficulties restrict my personal and social life.  2   I feel left out of conversations because of my voice 2   My voice problem causes me to lose income 1   I feel as though I have to strain to produce voice 2   The clarity of my voice is unpredictable 0   My voice problem upsets me 3   My voice makes me feel handicapped 2   People ask, \"What's wrong with your voice?\" 0   VHI-10 12       THERAPEUTIC ACTIVITIES  Demonstrated previous exercises.  demonstrated improved technique  appropriate redirection provided  instruction provided for increased level of complexity/difficulty    Modified Exercises to promote optimal respiratory mechanics  she demonstrated clavicular/neck/shoulder involvement in inhalation  Demonstrated difficulty allowing abdominal relaxation for inhalation  Practiced in a seated position, with tactile cue of a hand on the low rib-cage to facilitate awareness of low respiratory engagement.  With clinician support, patient was able to demonstrate improved abdominal relaxation and engagement on inhalation  Optimal exhalation using inward engagement of " "the abdominal wall with no corresponding collapse of the upper chest cavity was trained using the pulsed \"sh\" task  acceptable improvement in airflow and respiratory mechanics    Resonant Voice Therapy (RVT) exercises to promote forward locus of resonance and optimized pattern of laryngeal adduction  Speech material that elicits a high, forward tongue position (/n/) was most facilitating  Easy descending glide on /n/ utilized in conjunction with relaxed jaw, tongue, and lightly closed lips to facilitate forward resonant sound  Syllable level using /m/ in alternation with cardinal vowels on sustained pitches and speech inflection  Word level exercises featuring nasal continuant loaded stimuli  Phrase level exercises featuring nasal continuants in more complex phonemic contexts were employed  Instructed with and interval of a descending glide pattern was facilitating, prior to progressing to comfortable speech using optimal breath flow.  Able to recognize improvement in quality and comfort    Counseling and Education:  Asked many questions about the nature of her symptoms, and I answered all of these thoroughly.  A revised regimen for home practice was instructed.  I provided an AVS of today's therapeutic activities to facilitate practice.      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  Ms. Stockton demonstrated good learning of therapeutic activities to     PLAN: I will see Ms. Stockton in July, at which point we will continue therapy.   For practice goals see AVS.     Next Clinic Appt: 7/12/24    TOTAL SERVICE TIME:   Call Initiated at: 10:07 AM  Call Ended at: 11am           CPT Billing Codes:   TREATMENT OF SPEECH, LANGUAGE, VOICE, COMMUNICATION, and/or AUDITORY PROCESSING DISORDER (50431)    Yessenia Jimenez M.M. (voice), M.A., " CCC/SLP  Speech-Language Pathologist  [employer] Trained Vocologist  [facility] Voice [facility]  [phone]  [email or web address]  Pronouns: she/her      *this report was created in part through the use of computerized dictation software, and though reviewed following completion, some typographic errors may persist.  If there is confusion regarding any of this notes contents, please contact me for clarification

## 2024-05-01 NOTE — PROGRESS NOTES
"Mahin is a 63 year old adult who is being cared for via a billable virtual visit.        The patient/client has been notified and verbally consented to the following statements:   This video visit will be conducted between you and your provider.  If during the course of the call the provider feels a video visit is not appropriate, you will not be charged for this service.    Provider has received verbal consent for billable virtual visit from the patient? 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    Remarkable findings included:  Perceptual evaluation demonstrated:   Roughness: Mild Intermittent  Breathiness: WNL  Strain: WNL  Pitch:  F0/Habitual/Conversational speech: low  Laryngeal exam demonstrated:   Not warranted  Primary complaint of patient today included:   Dysphonia and voice and resonance disorder in the context of gender dysphoria.     Therefore, I recommended a course of speech therapy to address these concerns.    SUBJECTIVE:  Since the last appointment, Ms. Stockton reports the following:   Overall she reports that symptoms are stable  Didn't get back to the recoring, but found that the exericses were helpful  Breathing and practice of resonant sounds have been helpful.  More practice with the work we had done is needed.      OBJECTIVE:  Ms. Stockton presents today with " "the following:  Voice quality:  Roughness: Mild Intermittent  Breathiness: WNL  Strain: WNL  Pitch:  F0/Habitual/Conversational speech: low  Resonance:   Conversational speech:  backward focus of resonance      PATIENT REPORTED MEASURES:  Patient Supplied Answers To SLP QOL Questionnaire       No data to display              Patient Supplied Answers To VHI Questionnaire      5/1/2024     9:57 AM   Voice Handicap Index (VHI-10)   My voice makes it difficult for people to hear me 0   People have difficulty understanding me in a noisy room 0   My voice difficulties restrict my personal and social life.  2   I feel left out of conversations because of my voice 2   My voice problem causes me to lose income 1   I feel as though I have to strain to produce voice 2   The clarity of my voice is unpredictable 0   My voice problem upsets me 3   My voice makes me feel handicapped 2   People ask, \"What's wrong with your voice?\" 0   VHI-10 12       THERAPEUTIC ACTIVITIES  Demonstrated previous exercises.  demonstrated improved technique  appropriate redirection provided  instruction provided for increased level of complexity/difficulty    Modified Exercises to promote optimal respiratory mechanics  she demonstrated clavicular/neck/shoulder involvement in inhalation  Demonstrated difficulty allowing abdominal relaxation for inhalation  Practiced in a seated position, with tactile cue of a hand on the low rib-cage to facilitate awareness of low respiratory engagement.  With clinician support, patient was able to demonstrate improved abdominal relaxation and engagement on inhalation  Optimal exhalation using inward engagement of the abdominal wall with no corresponding collapse of the upper chest cavity was trained using the pulsed \"sh\" task  acceptable improvement in airflow and respiratory mechanics    Resonant Voice Therapy (RVT) exercises to promote forward locus of resonance and optimized pattern of laryngeal adduction  Speech " material that elicits a high, forward tongue position (/n/) was most facilitating  Easy descending glide on /n/ utilized in conjunction with relaxed jaw, tongue, and lightly closed lips to facilitate forward resonant sound  Syllable level using /m/ in alternation with cardinal vowels on sustained pitches and speech inflection  Word level exercises featuring nasal continuant loaded stimuli  Phrase level exercises featuring nasal continuants in more complex phonemic contexts were employed  Instructed with and interval of a descending glide pattern was facilitating, prior to progressing to comfortable speech using optimal breath flow.  Able to recognize improvement in quality and comfort    Counseling and Education:  Asked many questions about the nature of her symptoms, and I answered all of these thoroughly.  A revised regimen for home practice was instructed.  I provided an AVS of today's therapeutic activities to facilitate practice.      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  Ms. Stockton demonstrated good learning of therapeutic activities to     PLAN: I will see Ms. Stockton in July, at which point we will continue therapy.   For practice goals see AVS.     Next Clinic Appt: 7/12/24    TOTAL SERVICE TIME:   Call Initiated at: 10:07 AM  Call Ended at: 11am           CPT Billing Codes:   TREATMENT OF SPEECH, LANGUAGE, VOICE, COMMUNICATION, and/or AUDITORY PROCESSING DISORDER (79706)    Yessenia Jimenez M.M. (voice), M.A., CCC/SLP  Speech-Language Pathologist  [facility] Trained Vocologist  [facility]  [phone]  [email or web address]  Pronouns: she/her      *this report was created in part through the use of computerized dictation software, and though reviewed following completion, some typographic errors may persist.  If  there is confusion regarding any of this notes contents, please contact me for clarification

## 2024-05-01 NOTE — PATIENT INSTRUCTIONS
- Nee, nee, nee, nee   - Nee, + another vowel and repeat 5-6x      Marla named her  haris Reece knitted from noon till night  Nine o'clock news is on next

## 2024-05-17 NOTE — PLAN OF CARE
"Pt is active in the milieu. Pleasant, polite, social to few peers and staff. Seen by Radha Mejias PA-C for HR 50-51 asymptomatic.  Pt endorses \" feeling groggy\" from medications?  Otherwise pt verbalizes doing okay. Mood good, affect full range.  Slept better last ( note on the door helped?) . Denies any MHS. HA batteries changed. Will try Melatonin for sleep tonight.  " Plan: Continue ketoconaozle as prescribed. Continue T-Sal shampoo. Significant improvement since last visit. Fungal culture and KOH negative. No palpable cervical nodes. Otc Regimen: Tar shampoo for scalp QW for 15 minutes. Vaseline on nails. Detail Level: Zone Discontinue Regimen: fluocinolone Oil

## 2024-06-05 ASSESSMENT — PATIENT HEALTH QUESTIONNAIRE - PHQ9
10. IF YOU CHECKED OFF ANY PROBLEMS, HOW DIFFICULT HAVE THESE PROBLEMS MADE IT FOR YOU TO DO YOUR WORK, TAKE CARE OF THINGS AT HOME, OR GET ALONG WITH OTHER PEOPLE: SOMEWHAT DIFFICULT
SUM OF ALL RESPONSES TO PHQ QUESTIONS 1-9: 7
SUM OF ALL RESPONSES TO PHQ QUESTIONS 1-9: 7

## 2024-06-06 ENCOUNTER — MYC MEDICAL ADVICE (OUTPATIENT)
Dept: OTOLARYNGOLOGY | Facility: CLINIC | Age: 64
End: 2024-06-06
Payer: COMMERCIAL

## 2024-06-06 ENCOUNTER — VIRTUAL VISIT (OUTPATIENT)
Dept: PSYCHIATRY | Facility: CLINIC | Age: 64
End: 2024-06-06
Attending: NURSE PRACTITIONER
Payer: COMMERCIAL

## 2024-06-06 DIAGNOSIS — F32.A DEPRESSION, UNSPECIFIED DEPRESSION TYPE: Primary | ICD-10-CM

## 2024-06-06 DIAGNOSIS — F90.2 ATTENTION DEFICIT HYPERACTIVITY DISORDER (ADHD), COMBINED TYPE: ICD-10-CM

## 2024-06-06 PROCEDURE — 99214 OFFICE O/P EST MOD 30 MIN: CPT | Mod: 95 | Performed by: NURSE PRACTITIONER

## 2024-06-06 PROCEDURE — 90838 PSYTX W PT W E/M 60 MIN: CPT | Mod: 95 | Performed by: NURSE PRACTITIONER

## 2024-06-06 PROCEDURE — G2211 COMPLEX E/M VISIT ADD ON: HCPCS | Mod: 95 | Performed by: NURSE PRACTITIONER

## 2024-06-06 RX ORDER — BUPROPION HYDROCHLORIDE 100 MG/1
100 TABLET, EXTENDED RELEASE ORAL DAILY
Qty: 90 TABLET | Refills: 1 | Status: SHIPPED | OUTPATIENT
Start: 2024-06-06

## 2024-06-06 ASSESSMENT — PAIN SCALES - GENERAL: PAINLEVEL: NO PAIN (0)

## 2024-06-06 NOTE — NURSING NOTE
Is the patient currently in the state of [place]? YES    Visit mode:VIDEO    If the visit is dropped, the patient can be reconnected by: VIDEO VISIT: Text to cell phone:   Telephone Information:   Mobile [phone]       Will anyone else be joining the visit? NO  (If patient encounters technical issues they should call [phone])    How would you like to obtain your AVS? MyChart    Are changes needed to the allergy or medication list? No    Are refills needed on medications prescribed by this physician? NO    Reason for visit: [place] SHAFFER

## 2024-06-06 NOTE — PATIENT INSTRUCTIONS
-Continue on current medication regimen.    Your next appointment is scheduled on 8/6/2024 (Tue) at 8:30am. For now, this is in person appointment, but if insurance does not cover facility fee, will change to video visit.      **For crisis resources, please see the information at the end of this document**   Patient Education    Thank you for coming to the [facility] [place] [facility].     Lab Testing:  If you had lab testing today and your results are reassuring or normal they will be mailed to you or sent through [facility] within 7 days. If the lab tests need quick action we will call you with the results. The phone number we will call with results is # [phone]. If this is not the best number please call our clinic and change the number.     Medication Refills:  If you need any refills please call your pharmacy and they will contact us. Our fax number for refills is [phone].   Three business days of notice are needed for general medication refill requests.   Five business days of notice are needed for controlled substance refill requests.   If you need to change to a different pharmacy, please contact the new pharmacy directly. The new pharmacy will help you get your medications transferred.     Contact Us:  Please call [phone] during business hours (8-5:00 M-F).   If you have medication related questions after clinic hours, or on the weekend, please call [phone].     Financial Assistance [phone]   Medical Records [phone]       MENTAL HEALTH CRISIS RESOURCES:  For a emergency help, please call 911 or go to the nearest Emergency Department.     Emergency Walk-In Options:   [facility] Unit @ [facility] ([place]): [phone] - Specialized mental health emergency area designed to be calming  [facility] ([place]): [phone]  [facility] ([place]): [phone]  [facility] ([place]):  [phone]    [facility] Crisis Information:   [place]: [phone]  [place]: [phone]  [place] ([place]) - Adult: [phone]     Child: [phone]  [place] - Adult: [phone]     Child: [phone]  [place]: [phone]  List of all [facility] resources:   https://[place].gov/dhs/people-we-serve/adults/health-care/mental-health/resources/crisis-contacts.jsp    National Crisis Information:   Crisis Text Line: Text  [place]  to 982251  Suicide & Crisis Lifeline: 988  National Suicide Prevention Lifeline: 7-012-581-TALK (1-258.156.4716)       For online chat options, visit https://suicidepreventionlifeline.org/chat/  Poison Control Center: 1-627.482.8941  Trans Lifeline: 1-559.889.1371 - Hotline for transgender people of all ages  The [employer] Project: 4-812-688-1658 - Hotline for LGBT youth     For Non-Emergency Support:   Fast Tracker: Mental Health & Substance Use Disorder Resources -   https://www.[employer].org/
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
## 2024-06-07 ENCOUNTER — THERAPY VISIT (OUTPATIENT)
Dept: PHYSICAL THERAPY | Facility: CLINIC | Age: 64
End: 2024-06-07
Payer: COMMERCIAL

## 2024-06-07 ENCOUNTER — TELEPHONE (OUTPATIENT)
Dept: PLASTIC SURGERY | Facility: CLINIC | Age: 64
End: 2024-06-07

## 2024-06-07 DIAGNOSIS — R15.9 FECAL INCONTINENCE: ICD-10-CM

## 2024-06-07 DIAGNOSIS — R32 URINARY INCONTINENCE: Primary | ICD-10-CM

## 2024-06-07 PROCEDURE — 97110 THERAPEUTIC EXERCISES: CPT | Mod: GP | Performed by: PHYSICAL THERAPIST

## 2024-06-07 PROCEDURE — 97530 THERAPEUTIC ACTIVITIES: CPT | Mod: GP | Performed by: PHYSICAL THERAPIST

## 2024-06-07 NOTE — TELEPHONE ENCOUNTER
Pt is having gender affirming vaginoplasty with Dr. Farris in [place] on 9/3/24. Pt's provider Luna contacted the [facility] team asking if Dr. Conley is avalable to see pt for post-op emergencies if needed.    Spoke with patient about our team and how we can support her if needed after vaginoplasty. She understands that she would first be scheduled with Yeimi Brown NP to assess concerns. She requested that writer send a [employer] message with contact information for our team. Pt thanked writer for calling and follow up if additional questions arise.

## 2024-06-10 ENCOUNTER — TELEPHONE (OUTPATIENT)
Dept: OTOLARYNGOLOGY | Facility: CLINIC | Age: 64
End: 2024-06-10
Payer: COMMERCIAL

## 2024-06-10 NOTE — TELEPHONE ENCOUNTER
Patient confirmed scheduled appointment:  Date: 10/11 and 10/28  Time: 8:00am   Visit type: Return SLP Voice  Provider: Yessenia RANDALL  Location: Virtual   Testing/imaging:   Additional notes:

## 2024-06-12 ENCOUNTER — TELEPHONE (OUTPATIENT)
Dept: PSYCHIATRY | Facility: CLINIC | Age: 64
End: 2024-06-12
Payer: COMMERCIAL

## 2024-06-12 NOTE — TELEPHONE ENCOUNTER
Start 0759 End 0809  [facility].    Reasonable to return to repeat TMS if needed from 6-18 months after 1st trial if depression worsens. 6 months is typical insurance requirement. Also want to make sure pt has enough baseline to bounce back from worsening depression. No controlled studies available for people with ASD and depression for TMS treatment, but appears some improvement on obsessive behavior (likely close to OCD improvement by TMS), but could feel more feelings which may overwhelm pt with ASD.  Pt can return to his care if depression worsens. Luna Garcia, CNP, 6/12/2024

## 2024-07-18 ENCOUNTER — THERAPY VISIT (OUTPATIENT)
Dept: PHYSICAL THERAPY | Facility: CLINIC | Age: 64
End: 2024-07-18
Payer: COMMERCIAL

## 2024-07-18 DIAGNOSIS — R32 URINARY INCONTINENCE: Primary | ICD-10-CM

## 2024-07-18 DIAGNOSIS — R15.9 FECAL INCONTINENCE: ICD-10-CM

## 2024-07-18 PROCEDURE — 97530 THERAPEUTIC ACTIVITIES: CPT | Mod: GP | Performed by: PHYSICAL THERAPIST

## 2024-07-18 PROCEDURE — 97110 THERAPEUTIC EXERCISES: CPT | Mod: GP | Performed by: PHYSICAL THERAPIST

## 2024-07-26 ENCOUNTER — VIRTUAL VISIT (OUTPATIENT)
Dept: OTOLARYNGOLOGY | Facility: CLINIC | Age: 64
End: 2024-07-26
Payer: COMMERCIAL

## 2024-07-26 DIAGNOSIS — F64.0 GENDER DYSPHORIA IN ADULT: ICD-10-CM

## 2024-07-26 DIAGNOSIS — R49.0 DYSPHONIA: Primary | ICD-10-CM

## 2024-07-26 DIAGNOSIS — R49.9 VOICE AND RESONANCE DISORDER: ICD-10-CM

## 2024-07-26 PROCEDURE — 92507 TX SP LANG VOICE COMM INDIV: CPT | Mod: GN | Performed by: SPEECH-LANGUAGE PATHOLOGIST

## 2024-07-26 NOTE — PROGRESS NOTES
"Breathing  N multi syllable words    Mahin is a 64 year old adult who is being cared for via a billable virtual visit.        The patient/client has been notified and verbally consented to the following statements:   This video visit will be conducted between you and your provider.  If during the course of the call the provider feels a video visit is not appropriate, you will not be charged for this service.    Provider has received verbal consent for billable virtual visit from the patient? Yes    Preferred method for receiving information: Technimarkhart     Call initiated at: 8AM  Platform used to conduct today's virtual appointment: AM Elías Video  Location of provider: Residence  Location of patient: Residence. 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    Remarkable findings included:  Perceptual evaluation demonstrated:   Roughness: Mild Intermittent  Breathiness: WNL  Strain: WNL  Pitch:  F0/Habitual/Conversational speech: low  Laryngeal exam demonstrated:   Not warranted  Primary complaint of patient today included:   Dysphonia and voice and resonance disorder in the context of gender dysphoria.     Therefore, I recommended a course of speech therapy to address these concerns.    SUBJECTIVE:  Since the last appointment, Ms. Stockton reports the following:   Overall she reports that symptoms are stable  She is going through a time involving significant change and challenges.    OBJECTIVE:  Ms. Stockton " "presents today with the following:  Voice quality:  Roughness: Mild Intermittent  Breathiness: WNL  Strain: WNL  Pitch:  F0/Habitual/Conversational speech: low  Resonance:   Conversational speech:  backward focus of resonance      PATIENT REPORTED MEASURES:  Patient Supplied Answers To SLP QOL Questionnaire       No data to display              Patient Supplied Answers To VHI Questionnaire      5/1/2024     9:57 AM   Voice Handicap Index (VHI-10)   My voice makes it difficult for people to hear me 0   People have difficulty understanding me in a noisy room 0   My voice difficulties restrict my personal and social life.  2   I feel left out of conversations because of my voice 2   My voice problem causes me to lose income 1   I feel as though I have to strain to produce voice 2   The clarity of my voice is unpredictable 0   My voice problem upsets me 3   My voice makes me feel handicapped 2   People ask, \"What's wrong with your voice?\" 0   VHI-10 12       THERAPEUTIC ACTIVITIES  Demonstrated previous exercises.  demonstrated improved technique  appropriate redirection provided  instruction provided for increased level of complexity/difficulty    Modified Exercises to promote optimal respiratory mechanics  she demonstrated clavicular/neck/shoulder involvement in inhalation  Demonstrated difficulty allowing abdominal relaxation for inhalation  Practiced in a seated position, with tactile cue of a hand on the low rib-cage to facilitate awareness of low respiratory engagement.  With clinician support, patient was able to demonstrate improved abdominal relaxation and engagement on inhalation  Optimal exhalation using inward engagement of the abdominal wall with no corresponding collapse of the upper chest cavity was trained using the pulsed \"sh\" task  acceptable improvement in airflow and respiratory mechanics     Resonant Voice Therapy (RVT) exercises to promote forward locus of resonance and optimized pattern of " laryngeal adduction  Speech material that elicits a high, forward tongue position (/n/) was most facilitating  Easy descending glide on /n/ utilized in conjunction with relaxed jaw, tongue, and lightly closed lips to facilitate forward resonant sound  Syllable level using /m/ in alternation with cardinal vowels on sustained pitches and speech inflection  Word level exercises featuring nasal continuant loaded stimuli  Phrase level exercises featuring nasal continuants in more complex phonemic contexts were employed  Instructed with and interval of a descending glide pattern was facilitating, prior to progressing to comfortable speech using optimal breath flow.  Able to recognize improvement in quality and comfort     Counseling and Education:  Asked questions about the nature of her symptoms, and I answered all of these thoroughly.  I provided counseling regarding this and more today.  A revised regimen for home practice was instructed.  I provided an AVS of today's therapeutic activities to facilitate practice.        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  Ms. Stockton demonstrated good learning of therapeutic activities to achieve an optimal and authentic voice quality.  She will continue to apply the techniques that we have discussed when she is able.  She is going through a time that involves significant challenges and change, and is pacing her voice work as she navigates through this time.       PLAN: I will see Ms. Stockton in September to continue therapy following her surgery  For practice goals see AVS.     Next Clinic Appt: 9-23-24 for virtual appointment  Plan for SLP: Continue collaborating on techniques to optimize her voice    TOTAL SERVICE TIME:   Call Initiated at: 8 AM  Call Ended at: 9 AM           CPT Billing Codes:    TREATMENT OF SPEECH, LANGUAGE, VOICE, COMMUNICATION, and/or AUDITORY PROCESSING DISORDER (54113)    Yessenia Jimenez M.M. (voice), M.A., CCC/SLP  Speech-Language Pathologist  [place] Trained Vocologist  [facility]  [phone]  [email or web address]  Pronouns: she/her      *this report was created in part through the use of computerized dictation software, and though reviewed following completion, some typographic errors may persist.  If there is confusion regarding any of this notes contents, please contact me for clarification

## 2024-07-26 NOTE — LETTER
"7/26/2024       RE: Chris Stockton  [address]  [place] [address]     Dear Colleague,    Thank you for referring your patient, Chris Stockton, to the [facility] VOICE CLINIC [place] at [facility]. Please see a copy of my visit note below.    Breathing  N multi syllable words    Mahin is a 64 year old adult who is being cared for via a billable virtual visit.        The patient/client has been notified and verbally consented to the following statements:   This video visit will be conducted between you and your provider.  If during the course of the call the provider feels a video visit is not appropriate, you will not be charged for this service.    Provider has received verbal consent for billable virtual visit from the patient? Yes    Preferred method for receiving information: M. STEVES USAt     Call initiated at: 8AM  Platform used to conduct today's virtual appointment: AM Well Video  Location of provider: Residence  Location of patient: Residence. 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    Remarkable findings included:  Perceptual evaluation demonstrated:   Roughness: Mild Intermittent  Breathiness: WNL  Strain: WNL  Pitch:  F0/Habitual/Conversational speech: low  Laryngeal exam demonstrated:   Not warranted  Primary complaint of patient today included:   Dysphonia and voice and resonance disorder in the context of gender " "dysphoria.     Therefore, I recommended a course of speech therapy to address these concerns.    SUBJECTIVE:  Since the last appointment, Ms. Stockton reports the following:   Overall she reports that symptoms are stable  She is going through a time involving significant change and challenges.    OBJECTIVE:  Ms. Stockton presents today with the following:  Voice quality:  Roughness: Mild Intermittent  Breathiness: WNL  Strain: WNL  Pitch:  F0/Habitual/Conversational speech: low  Resonance:   Conversational speech:  backward focus of resonance      PATIENT REPORTED MEASURES:  Patient Supplied Answers To SLP QOL Questionnaire       No data to display              Patient Supplied Answers To VHI Questionnaire      5/1/2024     9:57 AM   Voice Handicap Index (VHI-10)   My voice makes it difficult for people to hear me 0   People have difficulty understanding me in a noisy room 0   My voice difficulties restrict my personal and social life.  2   I feel left out of conversations because of my voice 2   My voice problem causes me to lose income 1   I feel as though I have to strain to produce voice 2   The clarity of my voice is unpredictable 0   My voice problem upsets me 3   My voice makes me feel handicapped 2   People ask, \"What's wrong with your voice?\" 0   VHI-10 12       THERAPEUTIC ACTIVITIES  Demonstrated previous exercises.  demonstrated improved technique  appropriate redirection provided  instruction provided for increased level of complexity/difficulty    Modified Exercises to promote optimal respiratory mechanics  she demonstrated clavicular/neck/shoulder involvement in inhalation  Demonstrated difficulty allowing abdominal relaxation for inhalation  Practiced in a seated position, with tactile cue of a hand on the low rib-cage to facilitate awareness of low respiratory engagement.  With clinician support, patient was able to demonstrate improved abdominal relaxation and engagement on inhalation  Optimal exhalation " "using inward engagement of the abdominal wall with no corresponding collapse of the upper chest cavity was trained using the pulsed \"sh\" task  acceptable improvement in airflow and respiratory mechanics     Resonant Voice Therapy (RVT) exercises to promote forward locus of resonance and optimized pattern of laryngeal adduction  Speech material that elicits a high, forward tongue position (/n/) was most facilitating  Easy descending glide on /n/ utilized in conjunction with relaxed jaw, tongue, and lightly closed lips to facilitate forward resonant sound  Syllable level using /m/ in alternation with cardinal vowels on sustained pitches and speech inflection  Word level exercises featuring nasal continuant loaded stimuli  Phrase level exercises featuring nasal continuants in more complex phonemic contexts were employed  Instructed with and interval of a descending glide pattern was facilitating, prior to progressing to comfortable speech using optimal breath flow.  Able to recognize improvement in quality and comfort     Counseling and Education:  Asked questions about the nature of her symptoms, and I answered all of these thoroughly.  I provided counseling regarding this and more today.  A revised regimen for home practice was instructed.  I provided an AVS of today's therapeutic activities to facilitate practice.        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  Ms. Stockton demonstrated good learning of therapeutic activities to achieve an optimal and authentic voice quality.  She will continue to apply the techniques that we have discussed when she is able.  She is going through a time that involves significant challenges and change, and is pacing her voice work as she navigates through this time.       PLAN: I will see Ms. Stockton in " September to continue therapy following her surgery  For practice goals see KAHLIL     Next Clinic Appt: 9-23-24 for virtual appointment  Plan for SLP: Continue collaborating on techniques to optimize her voice    TOTAL SERVICE TIME:   Call Initiated at: 8 AM  Call Ended at: 9 AM           CPT Billing Codes:   TREATMENT OF SPEECH, LANGUAGE, VOICE, COMMUNICATION, and/or AUDITORY PROCESSING DISORDER (45925)    Yessenia Jimenez M.M. (voice), M.A., CCC/SLP  Speech-Language Pathologist  [place] Trained Vocologist  [facility]  [phone]  [email or web address]  Pronouns: she/her      *this report was created in part through the use of computerized dictation software, and though reviewed following completion, some typographic errors may persist.  If there is confusion regarding any of this notes contents, please contact me for clarification            Again, thank you for allowing me to participate in the care of your patient.      Sincerely,    Yessenia Jimenez, SLP

## 2024-07-29 ENCOUNTER — OFFICE VISIT (OUTPATIENT)
Dept: FAMILY MEDICINE | Facility: CLINIC | Age: 64
End: 2024-07-29
Payer: COMMERCIAL

## 2024-07-29 VITALS
HEART RATE: 56 BPM | HEIGHT: 68 IN | SYSTOLIC BLOOD PRESSURE: 99 MMHG | WEIGHT: 198.4 LBS | RESPIRATION RATE: 15 BRPM | DIASTOLIC BLOOD PRESSURE: 71 MMHG | TEMPERATURE: 97.8 F | OXYGEN SATURATION: 97 % | BODY MASS INDEX: 30.07 KG/M2

## 2024-07-29 DIAGNOSIS — Z01.818 PREOP GENERAL PHYSICAL EXAM: Primary | ICD-10-CM

## 2024-07-29 DIAGNOSIS — E03.9 ACQUIRED HYPOTHYROIDISM: ICD-10-CM

## 2024-07-29 DIAGNOSIS — F33.2 MDD (MAJOR DEPRESSIVE DISORDER), RECURRENT SEVERE, WITHOUT PSYCHOSIS (H): ICD-10-CM

## 2024-07-29 DIAGNOSIS — E78.5 HYPERLIPIDEMIA LDL GOAL <100: ICD-10-CM

## 2024-07-29 DIAGNOSIS — R00.1 BRADYCARDIA: ICD-10-CM

## 2024-07-29 DIAGNOSIS — F64.0 GENDER DYSPHORIA IN ADULT: ICD-10-CM

## 2024-07-29 LAB
ALBUMIN SERPL BCG-MCNC: 4.5 G/DL (ref 3.5–5.2)
ALP SERPL-CCNC: 50 U/L (ref 40–150)
ALT SERPL W P-5'-P-CCNC: 10 U/L (ref 0–70)
ANION GAP SERPL CALCULATED.3IONS-SCNC: 10 MMOL/L (ref 7–15)
AST SERPL W P-5'-P-CCNC: 18 U/L (ref 0–45)
BILIRUB SERPL-MCNC: 0.7 MG/DL
BUN SERPL-MCNC: 23.8 MG/DL (ref 8–23)
CALCIUM SERPL-MCNC: 9.2 MG/DL (ref 8.8–10.4)
CHLORIDE SERPL-SCNC: 105 MMOL/L (ref 98–107)
CHOLEST SERPL-MCNC: 132 MG/DL
CREAT SERPL-MCNC: 1.23 MG/DL (ref 0.51–1.17)
EGFRCR SERPLBLD CKD-EPI 2021: 65 ML/MIN/1.73M2
FASTING STATUS PATIENT QL REPORTED: YES
FASTING STATUS PATIENT QL REPORTED: YES
GLUCOSE SERPL-MCNC: 104 MG/DL (ref 70–99)
HCO3 SERPL-SCNC: 25 MMOL/L (ref 22–29)
HDLC SERPL-MCNC: 52 MG/DL
LDLC SERPL CALC-MCNC: 63 MG/DL
NONHDLC SERPL-MCNC: 80 MG/DL
POTASSIUM SERPL-SCNC: 5 MMOL/L (ref 3.4–5.3)
PROT SERPL-MCNC: 7.1 G/DL (ref 6.4–8.3)
SODIUM SERPL-SCNC: 140 MMOL/L (ref 135–145)
TRIGL SERPL-MCNC: 84 MG/DL
TSH SERPL DL<=0.005 MIU/L-ACNC: 3.92 UIU/ML (ref 0.3–4.2)

## 2024-07-29 PROCEDURE — 80061 LIPID PANEL: CPT | Performed by: FAMILY MEDICINE

## 2024-07-29 PROCEDURE — 91320 SARSCV2 VAC 30MCG TRS-SUC IM: CPT | Performed by: FAMILY MEDICINE

## 2024-07-29 PROCEDURE — 80053 COMPREHEN METABOLIC PANEL: CPT | Performed by: FAMILY MEDICINE

## 2024-07-29 PROCEDURE — 36415 COLL VENOUS BLD VENIPUNCTURE: CPT | Performed by: FAMILY MEDICINE

## 2024-07-29 PROCEDURE — 84443 ASSAY THYROID STIM HORMONE: CPT | Performed by: FAMILY MEDICINE

## 2024-07-29 PROCEDURE — 93000 ELECTROCARDIOGRAM COMPLETE: CPT | Performed by: FAMILY MEDICINE

## 2024-07-29 PROCEDURE — 99214 OFFICE O/P EST MOD 30 MIN: CPT | Mod: 25 | Performed by: FAMILY MEDICINE

## 2024-07-29 PROCEDURE — 90480 ADMN SARSCOV2 VAC 1/ONLY CMP: CPT | Performed by: FAMILY MEDICINE

## 2024-07-29 ASSESSMENT — PATIENT HEALTH QUESTIONNAIRE - PHQ9
10. IF YOU CHECKED OFF ANY PROBLEMS, HOW DIFFICULT HAVE THESE PROBLEMS MADE IT FOR YOU TO DO YOUR WORK, TAKE CARE OF THINGS AT HOME, OR GET ALONG WITH OTHER PEOPLE: SOMEWHAT DIFFICULT
SUM OF ALL RESPONSES TO PHQ QUESTIONS 1-9: 6

## 2024-07-29 ASSESSMENT — PAIN SCALES - GENERAL: PAINLEVEL: NO PAIN (0)

## 2024-07-29 NOTE — Clinical Note
Hi all  Can you make sure that her Pre-Op is faxed to the fax number included in the H&P. Will be getting surgery in [place].  Charis

## 2024-07-29 NOTE — PATIENT INSTRUCTIONS
Let's fast track your cardiology evaluation     How to Take Your Medication Before Surgery  Preoperative Medication Instructions   Antiplatelet or Anticoagulation Medication Instructions   - Patient is on no antiplatelet or anticoagulation medications.    Additional Medication Instructions   - Estrogens: DO NOT TAKE for 2 weeks prior to surgery due to high risk of VTE (Caprini Score >/= 10).         Patient Education   Preparing for Your Surgery  Getting started  A nurse will call you to review your health history and instructions. They will give you an arrival time based on your scheduled surgery time. Please be ready to share:  Your doctor's clinic name and phone number  Your medical, surgical, and anesthesia history  A list of allergies and sensitivities  A list of medicines, including herbal treatments and over-the-counter drugs  Whether the patient has a legal guardian (ask how to send us the papers in advance)  Please tell us if you're pregnant--or if there's any chance you might be pregnant. Some surgeries may injure a fetus (unborn baby), so they require a pregnancy test. Surgeries that are safe for a fetus don't always need a test, and you can choose whether to have one.   If you have a child who's having surgery, please ask for a copy of Preparing for Your Child's Surgery.    Preparing for surgery  Within 10 to 30 days of surgery: Have a pre-op exam (sometimes called an H&P, or History and Physical). This can be done at a clinic or pre-operative center.  If you're having a , you may not need this exam. Talk to your care team.  At your pre-op exam, talk to your care team about all medicines you take. If you need to stop any medicines before surgery, ask when to start taking them again.  We do this for your safety. Many medicines can make you bleed too much during surgery. Some change how well surgery (anesthesia) drugs work.  Call your insurance company to let them know you're having surgery. (If  you don't have insurance, call [phone].)  Call your clinic if there's any change in your health. This includes signs of a cold or flu (sore throat, runny nose, cough, rash, fever). It also includes a scrape or scratch near the surgery site.  If you have questions on the day of surgery, call your hospital or surgery center.  Eating and drinking guidelines  For your safety: Unless your surgeon tells you otherwise, follow the guidelines below.  Eat and drink as usual until 8 hours before you arrive for surgery. After that, no food or milk.  Drink clear liquids until 2 hours before you arrive. These are liquids you can see through, like water, Gatorade, and Propel Water. They also include plain black coffee and tea (no cream or milk), candy, and breath mints. You can spit out gum when you arrive.  If you drink alcohol: Stop drinking it the night before surgery.  If your care team tells you to take medicine on the morning of surgery, it's okay to take it with a sip of water.  Preventing infection  Shower or bathe the night before and morning of your surgery. Follow the instructions your clinic gave you. (If no instructions, use regular soap.)  Don't shave or clip hair near your surgery site. We'll remove the hair if needed.  Don't smoke or vape the morning of surgery. You may chew nicotine gum up to 2 hours before surgery. A nicotine patch is okay.  Note: Some surgeries require you to completely quit smoking and nicotine. Check with your surgeon.  Your care team will make every effort to keep you safe from infection. We will:  Clean our hands often with soap and water (or an alcohol-based hand rub).  Clean the skin at your surgery site with a special soap that kills germs.  Give you a special gown to keep you warm. (Cold raises the risk of infection.)  Wear special hair covers, masks, gowns and gloves during surgery.  Give antibiotic medicine, if prescribed. Not all surgeries need antibiotics.  What to bring on the  day of surgery  Photo ID and insurance card  Copy of your health care directive, if you have one  Glasses and hearing aids (bring cases)  You can't wear contacts during surgery  Inhaler and eye drops, if you use them (tell us about these when you arrive)  CPAP machine or breathing device, if you use them  A few personal items, if spending the night  If you have . . .  A pacemaker, ICD (cardiac defibrillator) or other implant: Bring the ID card.  An implanted stimulator: Bring the remote control.  A legal guardian: Bring a copy of the certified (court-stamped) guardianship papers.  Please remove any jewelry, including body piercings. Leave jewelry and other valuables at home.  If you're going home the day of surgery  You must have a responsible adult drive you home. They should stay with you overnight as well.  If you don't have someone to stay with you, and you aren't safe to go home alone, we may keep you overnight. Insurance often won't pay for this.  After surgery  If it's hard to control your pain or you need more pain medicine, please call your surgeon's office.  Questions?   If you have any questions for your care team, list them here: _________________________________________________________________________________________________________________________________________________________________________ ____________________________________ ____________________________________ ____________________________________  For informational purposes only. Not to replace the advice of your health care provider. Copyright   2003, 2019 [facility]. All rights reserved. Clinically reviewed by Linda Santamaria MD. [employer] 617994 - REV 12/22.

## 2024-07-29 NOTE — PROGRESS NOTES
Preoperative Evaluation  [address]  [address] [place] [id number]  Phone: [phone]  Fax: [phone]  Primary Provider: Yanna Santizo MD  Pre-op Performing Provider: Yanna Santizo MD  Jul 29, 2024 7/29/2024   Surgical Information   What procedure is being done? gender confirmation aka [facility] surgery   Facility or Hospital where procedure/surgery will be performed: [facility]   Who is doing the procedure / surgery? dr Odalys Farris   Date of surgery / procedure: sept 3rd 2024   Time of surgery / procedure: tbd   Where do you plan to recover after surgery? at home with family      Fax number for surgical facility: [phone]    Assessment & Plan     The proposed surgical procedure is considered LOW risk.    Preop general physical exam  At home her pulse is at times in the 30s when she wakes up.  Asymptomatic except for some transient lightheadedness when lies down on her back.  Will clear contingent that cardiology clears.   Addendum 8/14/2024: patient assessed by cardiology and recommended stress test to ensure can mount pulse response to exertion. Stress test 8/12/2024 was normal with max pulse of 166.  Consequently is fully cleared for upcoming surgery.  - EKG 12-lead complete w/read - Clinics  - Comprehensive metabolic panel; Future    Bradycardia in the low 40s.   Asymptomatic. Referred to cardiology to clear for surgery.      Hyperlipidemia LDL goal <100  Checking today   - Lipid panel reflex to direct LDL Non-fasting; Future  - Comprehensive metabolic panel; Future    Hypothyroidism  Checking today.   - TSH WITH FREE T4 REFLEX; Future    MDD (major depressive disorder), recurrent severe, without psychosis (H)  Much better. TMS has helped him a lot. SI is chronic and minimal, and fully supported by psychiatric team.     Depression Screening Follow Up        7/29/2024     8:24 AM   PHQ   PHQ-9 Total Score 6   Q9: Thoughts  of better off dead/self-harm past 2 weeks Several days   F/U: Thoughts of suicide or self-harm Yes   F/U: Self harm-plan No   F/U: Self-harm action No   F/U: Safety concerns No         Risks and Recommendations  The patient has the following additional risks and recommendations for perioperative complications:  Cardiovascular:   - Cardiology consulted pending (8/14/2024 -cleared).     Antiplatelet or Anticoagulation Medication Instructions  - not on any at risk medications    Additional Medication Instructions   - Estrogens: DO NOT TAKE for 2 weeks prior to surgery     Recommendation  Approval given to proceed with proposed procedure.    Abhi Hill is a 64 year old, presenting for the following:  Physical (Preop physical)        7/29/2024     8:09 AM   Additional Questions   Roomed by Ninoska   Accompanied by Self         7/29/2024   Forms   Any forms needing to be completed Yes          7/29/2024    Information    services provided? No      HPI related to upcoming procedure:     9/3 vaginoplasty - full depth stage 1 in [place].  Will have people helping her there.   Excited to have [facility] after all these years.  Is going through electrolysis.          7/29/2024   Pre-Op Questionnaire   Have you ever had a heart attack or stroke? No   Have you ever had surgery on your heart or blood vessels, such as a stent placement, a coronary artery bypass, or surgery on an artery in your head, neck, heart, or legs? No   Do you have chest pain with activity? No   Do you have a history of heart failure? No   Do you currently have a cold, bronchitis or symptoms of other infection? No   Do you have a cough, shortness of breath, or wheezing? No   Do you or anyone in your family have previous history of blood clots? No   Do you or does anyone in your family have a serious bleeding problem such as prolonged bleeding following surgeries or cuts? No   Have you ever had problems with anemia or been told to  take iron pills? No   Have you had any abnormal blood loss such as black, tarry or bloody stools? No   Have you had any abnormal blood loss such as black, tarry or bloody stools, or abnormal vaginal bleeding? No   Have you ever had a blood transfusion? No   Are you willing to have a blood transfusion if it is medically needed before, during, or after your surgery? Yes   Have you or any of your relatives ever had problems with anesthesia? No   Do you have sleep apnea, excessive snoring or daytime drowsiness? (!) YES   Do you have a CPAP machine? (!) NO had a sleep study prior to COVID and told needed dental plan but never followed through due to COVID. Does have braces and the bands are helping now.  Mild sleep apnea.    Do you have any artifical heart valves or other implanted medical devices like a pacemaker, defibrillator, or continuous glucose monitor? No   Do you have artificial joints? No   Are you allergic to latex? No      Health Care Directive  Patient does not have a Health Care Directive or Living Will: Discussed advance care planning with patient; information given to patient to review.  Would want full resuscitation     Preoperative Review of    reviewed - no record of controlled substances prescribed.      Status of Chronic Conditions:  DEPRESSION - Patient has a long history of Depression of moderate severity requiring medication for control with recent symptoms being rapidly improving.Current symptoms of depression include situational SI, under full treatment. TMS working really well.     HYPOTHYROIDISM - Patient has a longstanding history of chronic Hypothyroidism. Patient has been doing well, noting no tremor, insomnia, hair loss or changes in skin texture. Continues to take medications as directed, without adverse reactions or side effects. Last TSH   Lab Results   Component Value Date    TSH 3.30 07/28/2023   .      Patient Active Problem List    Diagnosis Date Noted    Urinary incontinence  03/23/2023     Priority: Medium    Fecal incontinence 03/23/2023     Priority: Medium    Autism spectrum disorder 01/31/2023     Priority: Medium    Ureterolithiasis 05/25/2021     Priority: Medium    Right flank pain 05/25/2021     Priority: Medium    Binge-eating disorder, moderate 04/20/2021     Priority: Medium    MDD (major depressive disorder), recurrent severe, without psychosis (H) 07/09/2020     Priority: Medium    Suicidal ideations 06/25/2020     Priority: Medium    Mild obstructive sleep apnea 01/03/2020     Priority: Medium     12/2019: Sleep on side. Could use dental appliance. Option later for CPAP if no better        ADHD (attention deficit hyperactivity disorder), combined type 10/28/2019     Priority: Medium    Melanocytic nevus of trunk 04/10/2018     Priority: Medium    Hypothyroidism 03/23/2018     Priority: Medium    Gender dysphoria in adult 11/08/2017     Priority: Medium    Gastroesophageal reflux disease without esophagitis 07/31/2017     Priority: Medium     S/p  EGD with strictures x 2 that required balooning.       Conductive hearing loss, bilateral 07/31/2017     Priority: Medium     Bilateral hearing aides      Sensorineural hearing loss, asymmetrical 03/21/2017     Priority: Medium    Tinnitus 05/31/2016     Priority: Medium    Family history of prostate cancer 08/19/2015     Priority: Medium     Overview:   In brother age 60. And father      Kidney stone 08/19/2015     Priority: Medium    Esophageal stricture 08/19/2015     Priority: Medium     Overview:   On ppi  Dilated x 2      Vitiligo 08/19/2015     Priority: Medium    ADRIAN (generalized anxiety disorder) 11/21/2012     Priority: Medium    Shoulder impingement syndrome 04/28/2009     Priority: Medium    Ulnar nerve injury 04/28/2009     Priority: Medium      Past Medical History:   Diagnosis Date    Anxiety     Depressive disorder     Esophageal stricture     Kidney stone     nov.     Past Surgical History:   Procedure Laterality  Date    APPENDECTOMY      COLONOSCOPY  12/9/2011    Procedure:COLONOSCOPY; COLONOSCOPY; Surgeon:MARILY MENA; Location: GI    ESOPHAGOSCOPY, GASTROSCOPY, DUODENOSCOPY (EGD), COMBINED  11/23/2012    Procedure: COMBINED ESOPHAGOSCOPY, GASTROSCOPY, DUODENOSCOPY (EGD), BIOPSY SINGLE OR MULTIPLE;;  Surgeon: Yehuda Tomlinson MD;  Location:  GI    VASECTOMY       Current Outpatient Medications   Medication Sig Dispense Refill    buPROPion (WELLBUTRIN SR) 100 MG 12 hr tablet Take 1 tablet (100 mg) by mouth daily 90 tablet 1    estradiol (VIVELLE-DOT) 0.1 MG/24HR bi-weekly patch Uses about 7 patches per week using her own method      levothyroxine (SYNTHROID/LEVOTHROID) 100 MCG tablet TAKE 1 TABLET BY MOUTH EVERY DAY 90 tablet 1    melatonin 3 MG tablet Take 0.5 mg by mouth daily 1 or 2 tablets at bedtime      omeprazole (PRILOSEC) 20 MG DR capsule TAKE 1 CAPSULE BY MOUTH EVERY DAY 90 capsule 1    progesterone (PROMETRIUM) 200 MG capsule Take 200 mg by mouth daily      rosuvastatin (CRESTOR) 10 MG tablet TAKE 1 TABLET (10 MG) BY MOUTH DAILY. 90 tablet 1    spironolactone (ALDACTONE) 25 MG tablet Take 1 tablet by mouth daily      vitamin D3 (CHOLECALCIFEROL) 50 mcg (2000 units) tablet Take 2 tablets (100 mcg) by mouth daily      nystatin (MYCOSTATIN) 517694 UNIT/GM external cream Apply topically 4 times daily as needed for dry skin (Patient not taking: Reported on 7/29/2024) 30 g 0       Allergies   Allergen Reactions    Finasteride      Mental health problems    Tetracycline Unknown    Doxycycline Rash        Social History     Tobacco Use    Smoking status: Never    Smokeless tobacco: Never   Substance Use Topics    Alcohol use: Yes     Comment: rare       History   Drug Use No         Review of Systems  Constitutional, HEENT, cardiovascular, pulmonary, GI, , musculoskeletal, neuro, skin, endocrine and psych systems are negative, except as otherwise noted.  Bowel incontinence resolved with pelvic floor PT.   "  Urinary incontinence is very mild when sits or stands up.   Weight is down - due to her ongoing choices to eat healthier.    When she lays down now, at times, is a bit dizzy - passes very quickly.  Not spinning.   Father with pacemaker     Objective    BP 99/71   Pulse 56   Temp 97.8  F (36.6  C) (Oral)   Resp 15   Ht 1.728 m (5' 8.03\")   Wt 90 kg (198 lb 6.4 oz)   SpO2 97%   BMI 30.14 kg/m     Estimated body mass index is 30.14 kg/m  as calculated from the following:    Height as of this encounter: 1.728 m (5' 8.03\").    Weight as of this encounter: 90 kg (198 lb 6.4 oz).  Physical Exam  GENERAL: alert and no distress  EYES: Eyes grossly normal to inspection, PERRL and conjunctivae and sclerae normal  HENT: ear canals and TM's normal, nose and mouth without ulcers or lesions  NECK: no adenopathy, no asymmetry, masses, or scars  RESP: lungs clear to auscultation - no rales, rhonchi or wheezes  CV: regular rate and rhythm, normal S1 S2, no S3 or S4, no murmur, click or rub, no peripheral edema  ABDOMEN: soft, nontender, no hepatosplenomegaly, no masses and bowel sounds normal  MS: no gross musculoskeletal defects noted, no edema  SKIN: no suspicious lesions or rashes - vitiligo   NEURO: Normal strength and tone, mentation intact and speech normal  PSYCH: mentation appears normal, affect normal/bright    No results for input(s): \"HGB\", \"PLT\", \"INR\", \"NA\", \"POTASSIUM\", \"CR\", \"A1C\" in the last 8760 hours.     Diagnostics  Labs pending at this time.  Results will be reviewed when available.   EKG: Marked sinus bradycardia at 42 bpm, normal axis, First degree block, no acute ST/T changes c/w ischemia, no LVH by voltage criteria, low voltage in limb leads, similar from previous tracings except for slower and lower voltage.     Revised Cardiac Risk Index (RCRI)  The patient has the following serious cardiovascular risks for perioperative complications:   - No serious cardiac risks = 0 points     RCRI " Interpretation: 0 points: Class I (very low risk - 0.4% complication rate)         Signed Electronically by: Yanna Santizo MD  A copy of this evaluation report is provided to the requesting physician.

## 2024-07-30 ENCOUNTER — MYC MEDICAL ADVICE (OUTPATIENT)
Dept: FAMILY MEDICINE | Facility: CLINIC | Age: 64
End: 2024-07-30
Payer: COMMERCIAL

## 2024-07-30 DIAGNOSIS — N17.9 AKI (ACUTE KIDNEY INJURY) (H): Primary | ICD-10-CM

## 2024-08-06 ENCOUNTER — VIRTUAL VISIT (OUTPATIENT)
Dept: PSYCHIATRY | Facility: CLINIC | Age: 64
End: 2024-08-06
Attending: NURSE PRACTITIONER
Payer: COMMERCIAL

## 2024-08-06 DIAGNOSIS — F90.2 ATTENTION DEFICIT HYPERACTIVITY DISORDER (ADHD), COMBINED TYPE: ICD-10-CM

## 2024-08-06 DIAGNOSIS — E78.5 HYPERLIPIDEMIA LDL GOAL <100: ICD-10-CM

## 2024-08-06 DIAGNOSIS — F41.9 ANXIETY: ICD-10-CM

## 2024-08-06 DIAGNOSIS — F32.A DEPRESSION, UNSPECIFIED DEPRESSION TYPE: ICD-10-CM

## 2024-08-06 DIAGNOSIS — F84.0 AUTISM: Primary | ICD-10-CM

## 2024-08-06 PROCEDURE — 99214 OFFICE O/P EST MOD 30 MIN: CPT | Performed by: NURSE PRACTITIONER

## 2024-08-06 PROCEDURE — 90838 PSYTX W PT W E/M 60 MIN: CPT | Mod: 93 | Performed by: NURSE PRACTITIONER

## 2024-08-06 PROCEDURE — G2211 COMPLEX E/M VISIT ADD ON: HCPCS | Mod: 93 | Performed by: NURSE PRACTITIONER

## 2024-08-06 RX ORDER — ROSUVASTATIN CALCIUM 10 MG/1
10 TABLET, COATED ORAL DAILY
Qty: 90 TABLET | Refills: 1 | Status: SHIPPED | OUTPATIENT
Start: 2024-08-06

## 2024-08-06 ASSESSMENT — PAIN SCALES - GENERAL: PAINLEVEL: NO PAIN (0)

## 2024-08-06 NOTE — NURSING NOTE
Current patient location:  work    Is the patient currently in the state [facility]? YES    Visit mode:VIDEO    If the visit is dropped, the patient can be reconnected by: VIDEO VISIT: Text to cell phone:   Telephone Information:   Mobile [phone]       Will anyone else be joining the visit? NO  (If patient encounters technical issues they should call [phone] :[phone])    How would you like to obtain your AVS? MyChart    Are changes needed to the allergy or medication list? No    Are refills needed on medications prescribed by this physician? NO    Rooming Documentation:  Assigned questionnaire(s) completed      Reason for visit: RECHECK    Priya VERDUGOF

## 2024-08-06 NOTE — PATIENT INSTRUCTIONS
-Continue on current medication regimen.    Autism Resources  [facility] [email or web address]    Your next appointment is scheduled on 10/1/2024 (Tue) at 1pm. This is scheduled in person appt.      **For crisis resources, please see the information at the end of this document**   Patient Education    Thank you for coming to the [facility].     Lab Testing:  If you had lab testing today and your results are reassuring or normal they will be mailed to you or sent through [employer] within 7 days. If the lab tests need quick action we will call you with the results. The phone number we will call with results is # [phone]. If this is not the best number please call our clinic and change the number.     Medication Refills:  If you need any refills please call your pharmacy and they will contact us. Our fax number for refills is [phone].   Three business days of notice are needed for general medication refill requests.   Five business days of notice are needed for controlled substance refill requests.   If you need to change to a different pharmacy, please contact the new pharmacy directly. The new pharmacy will help you get your medications transferred.     Contact Us:  Please call [phone] during business hours (8-5:00 M-F).   If you have medication related questions after clinic hours, or on the weekend, please call [phone].     Financial Assistance [phone]   Medical Records [phone]       MENTAL HEALTH CRISIS RESOURCES:  For a emergency help, please call 911 or go to the nearest Emergency Department.     Emergency Walk-In Options:   EmPATH Unit @ [facility] ([place]): [phone] - Specialized mental health emergency area designed to be calming  [facility] ([place]): [phone]  [facility] ([place]): [phone]  [facility]  [facility]): [phone]    [facility] Crisis Information:   [place]: [phone]  [place]: [phone]  [place] ([place]) - Adult: [phone]     Child: [phone]  [place] - Adult: [phone]     Child: [phone]  [place]: [phone]  List of all [facility] resources:   [email or web address]-contacts.[email or web address]    National Crisis Information:   Crisis Text Line: Text  [place]  to 628199  Suicide & Crisis Lifeline: 988  National Suicide Prevention Lifeline: 7-481-468-CQOT (1-704.541.8033)       For online chat options, visit https://suicidepreventionlifeline.org/chat/  Poison Control Center: 1-757.870.9025  Trans Lifeline: 1-336.686.9234 - Hotline for transgender people of all ages  [employer]: 7-284-082-9912 - Hotline for LGBT youth     For Non-Emergency Support:   Fast Tracker: Mental Health & Substance Use Disorder Resources -   https://www.[employer].org/

## 2024-08-06 NOTE — TELEPHONE ENCOUNTER
"Request for medication refill:  rosuvastatin (CRESTOR) 10 MG tablet   Providers if patient needs an appointment and you are willing to give a one month supply please refill for one month and  send a letter/MyChart using \".SMILLIMITEDREFILL\" .smillimited and route chart to \"[facility] \" (Giving one month refill in non controlled medications is strongly recommended before denial)    If refill has been denied, meaning absolutely no refills without visit, please complete the smart phrase \".smirxrefuse\" and route it to the \"[facility] MED REFILLS\"  pool to inform the patient and the pharmacy.    Carol Dye MA     "
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
## 2024-08-08 ENCOUNTER — ANCILLARY PROCEDURE (OUTPATIENT)
Dept: CARDIOLOGY | Facility: CLINIC | Age: 64
End: 2024-08-08
Attending: FAMILY MEDICINE
Payer: COMMERCIAL

## 2024-08-08 DIAGNOSIS — R00.1 BRADYCARDIA: ICD-10-CM

## 2024-08-08 LAB — LVEF ECHO: NORMAL

## 2024-08-08 PROCEDURE — 93306 TTE W/DOPPLER COMPLETE: CPT | Performed by: INTERNAL MEDICINE

## 2024-08-11 DIAGNOSIS — K22.2 ESOPHAGEAL STRICTURE: ICD-10-CM

## 2024-08-12 ENCOUNTER — OFFICE VISIT (OUTPATIENT)
Dept: CARDIOLOGY | Facility: CLINIC | Age: 64
End: 2024-08-12
Payer: COMMERCIAL

## 2024-08-12 VITALS
OXYGEN SATURATION: 97 % | BODY MASS INDEX: 30.17 KG/M2 | SYSTOLIC BLOOD PRESSURE: 110 MMHG | RESPIRATION RATE: 16 BRPM | HEART RATE: 50 BPM | DIASTOLIC BLOOD PRESSURE: 70 MMHG | WEIGHT: 198.6 LBS

## 2024-08-12 DIAGNOSIS — R00.1 BRADYCARDIA: ICD-10-CM

## 2024-08-12 DIAGNOSIS — N17.9 AKI (ACUTE KIDNEY INJURY) (H): ICD-10-CM

## 2024-08-12 DIAGNOSIS — Z01.818 PREOP GENERAL PHYSICAL EXAM: ICD-10-CM

## 2024-08-12 LAB
ANION GAP SERPL CALCULATED.3IONS-SCNC: 7 MMOL/L (ref 7–15)
BUN SERPL-MCNC: 18.1 MG/DL (ref 8–23)
CALCIUM SERPL-MCNC: 9.3 MG/DL (ref 8.8–10.4)
CHLORIDE SERPL-SCNC: 105 MMOL/L (ref 98–107)
CREAT SERPL-MCNC: 1.02 MG/DL (ref 0.51–1.17)
EGFRCR SERPLBLD CKD-EPI 2021: 82 ML/MIN/1.73M2
GLUCOSE SERPL-MCNC: 101 MG/DL (ref 70–99)
HCO3 SERPL-SCNC: 26 MMOL/L (ref 22–29)
POTASSIUM SERPL-SCNC: 4.2 MMOL/L (ref 3.4–5.3)
SODIUM SERPL-SCNC: 138 MMOL/L (ref 135–145)

## 2024-08-12 PROCEDURE — 36415 COLL VENOUS BLD VENIPUNCTURE: CPT | Performed by: INTERNAL MEDICINE

## 2024-08-12 PROCEDURE — 80048 BASIC METABOLIC PNL TOTAL CA: CPT | Performed by: INTERNAL MEDICINE

## 2024-08-12 PROCEDURE — 99204 OFFICE O/P NEW MOD 45 MIN: CPT | Performed by: INTERNAL MEDICINE

## 2024-08-12 RX ORDER — LIDOCAINE 50 MG/G
OINTMENT TOPICAL PRN
COMMUNITY
Start: 2024-06-03

## 2024-08-12 NOTE — PROGRESS NOTES
[facility]  [phone]          Assessment/Recommendations   Patient with incidental finding of bradycardia at 42 bpm and preoperative EKG.  EKG in 2020 also showed sinus bradycardia at 54 bpm.  Patient does state that  has had a slow resting heart rate for quite some time.  Patient's echocardiogram shows a structurally normal heart and patient does not have limitations with patient's current exercise with no anginal symptoms.  Cardiac exam is unremarkable.    I would like to know that patient's heart rate picks up appropriately with stress and we will get  on for a stress test today or tomorrow as patient's surgery is coming up in early September.  If patient's heart rate responds normally to exercise, I would clear him for surgery.    Thank you for allowing us to participate in his care.       History of Present Illness/Subjective    Ms. Chris Stockton is a 64 year old adult with known history of slow heartbeat at rest at least out over the last 2 or 3 years.  Patient had a preoperative physical which showed a heart rate of 42 bpm and EKG confirmed this with very mild first-degree AV block.  No other ST-T wave changes.  The patient has no anginal symptoms and denies unusual shortness of breath with activity.  Patient is cleaning out a house and carrying boxes up and down stairs without difficulty.  Denies any chest discomfort or unusual shortness of breath.  Patient denies orthopnea, paroxysmal nocturnal dyspnea, peripheral edema, syncope or near syncopal episodes.  Occasionally when patient turns his head quickly while lying down he gets a lightheaded feeling but not orthostatic changes.  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    Patient has never smoked cigarettes, is not diabetic, has not been treated for hypertension and does take a medication for his lipids.  Mother had congestive  heart failure and father had bradycardia and a pacemaker.  Patient TSH was normal recently.  Patient uses very little alcohol.    ECG: Personally reviewed.  Sinus bradycardia 42 bpm, mild first-degree AV block.       Physical Examination Review of Systems   There were no vitals taken for this visit.  There is no height or weight on file to calculate BMI.  Wt Readings from Last 3 Encounters:   07/29/24 90 kg (198 lb 6.4 oz)   07/28/23 96.4 kg (212 lb 9.6 oz)   02/06/23 103 kg (227 lb)     General Appearance:   Alert, cooperative and in no acute distress.   ENT/Mouth: Pink/moist oral mucosa   EYES:  no scleral icterus, normal conjunctivae   Neck: JVP normal. No Hepatojugular reflux. Thyroid not visualized.   Chest/Lungs:   Lungs are clear to auscultation, equal chest wall expansion.   Cardiovascular:   S1, S2 without murmur ,clicks or rubs. Brachial, radial and posterior tibial pulses are intact and symetric. No carotid bruits noted   Abdomen:  Nontender.    Extremities: No cyanosis, clubbing or edema   Skin: no xanthelasma, warm.    Neurologic: normal arm movement bilateral, no tremors     Psychiatric: Appropriate affect.                                                  Medical History  Surgical History Family History Social History   Past Medical History:   Diagnosis Date    Anxiety     Depressive disorder     Esophageal stricture     Kidney stone     nov.    Past Surgical History:   Procedure Laterality Date    APPENDECTOMY      COLONOSCOPY  12/9/2011    Procedure:COLONOSCOPY; COLONOSCOPY; Surgeon:MARILY MENA; Location: GI    ESOPHAGOSCOPY, GASTROSCOPY, DUODENOSCOPY (EGD), COMBINED  11/23/2012    Procedure: COMBINED ESOPHAGOSCOPY, GASTROSCOPY, DUODENOSCOPY (EGD), BIOPSY SINGLE OR MULTIPLE;;  Surgeon: Yehuda Tomlinson MD;  Location:  GI    VASECTOMY      Family History   Problem Relation Age of Onset    Heart Disease Mother     Heart Surgery Father     Prostate Cancer Father     Neuropathy Father      Melanoma Brother     Prostate Cancer Brother     Social History     Socioeconomic History    Marital status:      Spouse name: Not on file    Number of children: 2    Years of education: Not on file    Highest education level: Not on file   Occupational History    Occupation: Computer Planner   Tobacco Use    Smoking status: Never     Passive exposure: Never    Smokeless tobacco: Never   Vaping Use    Vaping status: Never Used   Substance and Sexual Activity    Alcohol use: Yes     Comment: rare    Drug use: No    Sexual activity: Not Currently   Other Topics Concern    Parent/sibling w/ CABG, MI or angioplasty before 65F 55M? Not Asked   Social History Narrative    Not on file     Social Determinants of Health     Financial Resource Strain: Not At Risk (1/17/2024)    Received from [employer]    Financial Resource Strain     Is it hard for you to pay for the very basics like food, housing, medical care or heating?: No   Food Insecurity: Not At Risk (1/17/2024)    Received from [employer]    Food Insecurity     Does your food run out before you have the money to buy more?: No   Transportation Needs: Not At Risk (1/17/2024)    Received from [employer]    Transportation Needs     Does a lack of transportation keep you from your medical appointments or from getting your medications?: No   Physical Activity: Not on File (11/8/2019)    Received from [facility]    Physical Activity     Physical Activity: 0   Stress: Not on File (11/8/2019)    Received from [facility]    Stress     Stress: 0   Social Connections: Not on File (11/8/2019)    Received from [facility]    Social Connections     Social Connections and Isolation: 0   Interpersonal Safety: Low Risk  (7/29/2024)    Interpersonal Safety     Do you feel physically and emotionally safe where you currently live?: Yes     Within the past 12 months, have you been hit, slapped, kicked or otherwise  physically hurt by someone?: No     Within the past 12 months, have you been humiliated or emotionally abused in other ways by your partner or ex-partner?: No   Housing Stability: Not on File (2019)    Received from [facility]    Housing Stability     Housin          Medications  Allergies   Current Outpatient Medications   Medication Sig Dispense Refill    buPROPion (WELLBUTRIN SR) 100 MG 12 hr tablet Take 1 tablet (100 mg) by mouth daily 90 tablet 1    estradiol (VIVELLE-DOT) 0.1 MG/24HR bi-weekly patch Uses about 7 patches per week using her own method      levothyroxine (SYNTHROID/LEVOTHROID) 100 MCG tablet TAKE 1 TABLET BY MOUTH EVERY DAY 90 tablet 1    lidocaine (XYLOCAINE) 5 % external ointment Apply topically as needed for moderate pain Apply to affected area 30-60 minutes before painful procedures.      melatonin 3 MG tablet Take 0.5 mg by mouth daily 1 or 2 tablets at bedtime      omeprazole (PRILOSEC) 20 MG DR capsule TAKE 1 CAPSULE BY MOUTH EVERY DAY 90 capsule 1    progesterone (PROMETRIUM) 200 MG capsule Take 200 mg by mouth daily      rosuvastatin (CRESTOR) 10 MG tablet TAKE 1 TABLET (10 MG) BY MOUTH DAILY. 90 tablet 1    vitamin D3 (CHOLECALCIFEROL) 50 mcg (2000 units) tablet Take 2 tablets (100 mcg) by mouth daily      spironolactone (ALDACTONE) 25 MG tablet Take 1 tablet by mouth daily (Patient not taking: Reported on 2024)      Allergies   Allergen Reactions    Finasteride      Mental health problems    Tetracycline Unknown    Doxycycline Rash         Lab Results    Chemistry/lipid CBC Cardiac Enzymes/BNP/TSH/INR   Lab Results   Component Value Date    CHOL 132 2024    HDL 52 2024    TRIG 84 2024    BUN 23.8 (H) 2024     2024    CO2 25 2024    Lab Results   Component Value Date    WBC 16.6 (H) 2021    HGB 15.1 2021    HCT 45.6 2021    MCV 89 2021     2021    Lab Results   Component Value Date    TSH 3.92  07/29/2024

## 2024-08-12 NOTE — LETTER
8/12/2024    Yanna Santizo MD  [address] [place] [id number]    RE: Chris Stockton       Dear Colleague,     I had the pleasure of seeing Chris Stockton in the [facility].      [facility]  [phone]          Assessment/Recommendations   Patient with incidental finding of bradycardia at 42 bpm and preoperative EKG.  EKG in 2020 also showed sinus bradycardia at 54 bpm.  Patient does state that  has had a slow resting heart rate for quite some time.  Patient's echocardiogram shows a structurally normal heart and patient does not have limitations with patient's current exercise with no anginal symptoms.  Cardiac exam is unremarkable.    I would like to know that patient's heart rate picks up appropriately with stress and we will get  on for a stress test today or tomorrow as patient's surgery is coming up in early September.  If patient's heart rate responds normally to exercise, I would clear him for surgery.    Thank you for allowing us to participate in his care.       History of Present Illness/Subjective    Ms. Chris Stockton is a 64 year old adult with known history of slow heartbeat at rest at least out over the last 2 or 3 years.  Patient had a preoperative physical which showed a heart rate of 42 bpm and EKG confirmed this with very mild first-degree AV block.  No other ST-T wave changes.  The patient has no anginal symptoms and denies unusual shortness of breath with activity.  Patient is cleaning out a house and carrying boxes up and down stairs without difficulty.  Denies any chest discomfort or unusual shortness of breath.  Patient denies orthopnea, paroxysmal nocturnal dyspnea, peripheral edema, syncope or near syncopal episodes.  Occasionally when patient turns his head quickly while lying down he gets a lightheaded feeling but not orthostatic changes.  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    Patient has never smoked cigarettes, is not diabetic, has not been treated for hypertension and does take a medication for his lipids.  Mother had congestive heart failure and father had bradycardia and a pacemaker.  Patient TSH was normal recently.  Patient uses very little alcohol.    ECG: Personally reviewed.  Sinus bradycardia 42 bpm, mild first-degree AV block.       Physical Examination Review of Systems   There were no vitals taken for this visit.  There is no height or weight on file to calculate BMI.  Wt Readings from Last 3 Encounters:   07/29/24 90 kg (198 lb 6.4 oz)   07/28/23 96.4 kg (212 lb 9.6 oz)   02/06/23 103 kg (227 lb)     General Appearance:   Alert, cooperative and in no acute distress.   ENT/Mouth: Pink/moist oral mucosa   EYES:  no scleral icterus, normal conjunctivae   Neck: JVP normal. No Hepatojugular reflux. Thyroid not visualized.   Chest/Lungs:   Lungs are clear to auscultation, equal chest wall expansion.   Cardiovascular:   S1, S2 without murmur ,clicks or rubs. Brachial, radial and posterior tibial pulses are intact and symetric. No carotid bruits noted   Abdomen:  Nontender.    Extremities: No cyanosis, clubbing or edema   Skin: no xanthelasma, warm.    Neurologic: normal arm movement bilateral, no tremors     Psychiatric: Appropriate affect.                                                  Medical History  Surgical History Family History Social History   Past Medical History:   Diagnosis Date     Anxiety      Depressive disorder      Esophageal stricture      Kidney stone     nov.    Past Surgical History:   Procedure Laterality Date     APPENDECTOMY       COLONOSCOPY  12/9/2011    Procedure:COLONOSCOPY; COLONOSCOPY; Surgeon:MARILY MENA; Location:[facility]     ESOPHAGOSCOPY, GASTROSCOPY, DUODENOSCOPY (EGD), COMBINED  11/23/2012    Procedure: COMBINED ESOPHAGOSCOPY, GASTROSCOPY, DUODENOSCOPY (EGD), BIOPSY SINGLE OR MULTIPLE;;   Surgeon: Yehuda Tomlinson MD;  Location:  GI     VASECTOMY      Family History   Problem Relation Age of Onset     Heart Disease Mother      Heart Surgery Father      Prostate Cancer Father      Neuropathy Father      Melanoma Brother      Prostate Cancer Brother     Social History     Socioeconomic History     Marital status:      Spouse name: Not on file     Number of children: 2     Years of education: Not on file     Highest education level: Not on file   Occupational History     Occupation: Computer Planner   Tobacco Use     Smoking status: Never     Passive exposure: Never     Smokeless tobacco: Never   Vaping Use     Vaping status: Never Used   Substance and Sexual Activity     Alcohol use: Yes     Comment: rare     Drug use: No     Sexual activity: Not Currently   Other Topics Concern     Parent/sibling w/ CABG, MI or angioplasty before 65F 55M? Not Asked   Social History Narrative     Not on file     Social Determinants of Health     Financial Resource Strain: Not At Risk (1/17/2024)    Received from [employer]    Financial Resource Strain      Is it hard for you to pay for the very basics like food, housing, medical care or heating?: No   Food Insecurity: Not At Risk (1/17/2024)    Received from [facility]    Food Insecurity      Does your food run out before you have the money to buy more?: No   Transportation Needs: Not At Risk (1/17/2024)    Received from [employer]    Transportation Needs      Does a lack of transportation keep you from your medical appointments or from getting your medications?: No   Physical Activity: Not on File (11/8/2019)    Received from [facility]    Physical Activity      Physical Activity: 0   Stress: Not on File (11/8/2019)    Received from [facility]    Stress      Stress: 0   Social Connections: Not on File (11/8/2019)    Received from [facility]    Social Connections      Social Connections and  Isolation: 0   Interpersonal Safety: Low Risk  (2024)    Interpersonal Safety      Do you feel physically and emotionally safe where you currently live?: Yes      Within the past 12 months, have you been hit, slapped, kicked or otherwise physically hurt by someone?: No      Within the past 12 months, have you been humiliated or emotionally abused in other ways by your partner or ex-partner?: No   Housing Stability: Not on File (2019)    Received from [facility]    Housing Stability      Housin          Medications  Allergies   Current Outpatient Medications   Medication Sig Dispense Refill     buPROPion (WELLBUTRIN SR) 100 MG 12 hr tablet Take 1 tablet (100 mg) by mouth daily 90 tablet 1     estradiol (VIVELLE-DOT) 0.1 MG/24HR bi-weekly patch Uses about 7 patches per week using her own method       levothyroxine (SYNTHROID/LEVOTHROID) 100 MCG tablet TAKE 1 TABLET BY MOUTH EVERY DAY 90 tablet 1     lidocaine (XYLOCAINE) 5 % external ointment Apply topically as needed for moderate pain Apply to affected area 30-60 minutes before painful procedures.       melatonin 3 MG tablet Take 0.5 mg by mouth daily 1 or 2 tablets at bedtime       omeprazole (PRILOSEC) 20 MG DR capsule TAKE 1 CAPSULE BY MOUTH EVERY DAY 90 capsule 1     progesterone (PROMETRIUM) 200 MG capsule Take 200 mg by mouth daily       rosuvastatin (CRESTOR) 10 MG tablet TAKE 1 TABLET (10 MG) BY MOUTH DAILY. 90 tablet 1     vitamin D3 (CHOLECALCIFEROL) 50 mcg (2000 units) tablet Take 2 tablets (100 mcg) by mouth daily       spironolactone (ALDACTONE) 25 MG tablet Take 1 tablet by mouth daily (Patient not taking: Reported on 2024)      Allergies   Allergen Reactions     Finasteride      Mental health problems     Tetracycline Unknown     Doxycycline Rash         Lab Results    Chemistry/lipid CBC Cardiac Enzymes/BNP/TSH/INR   Lab Results   Component Value Date    CHOL 132 2024    HDL 52 2024    TRIG 84 2024    BUN 23.8 (H)  07/29/2024     07/29/2024    CO2 25 07/29/2024    Lab Results   Component Value Date    WBC 16.6 (H) 05/25/2021    HGB 15.1 11/03/2021    HCT 45.6 05/25/2021    MCV 89 05/25/2021     05/25/2021    Lab Results   Component Value Date    TSH 3.92 07/29/2024                                                Thank you for allowing me to participate in the care of your patient.      Sincerely,     Chris Campoverde MD     [facility]  cc:   Yanna Santizo MD  [address] [id number]

## 2024-08-12 NOTE — TELEPHONE ENCOUNTER
"Request for medication refill:  omeprazole (PRILOSEC) 20 MG DR capsule   Providers if patient needs an appointment and you are willing to give a one month supply please refill for one month and  send a letter/MyChart using \".SMILLIMITEDREFILL\" .smillimited and route chart to \"P [facility] \" (Giving one month refill in non controlled medications is strongly recommended before denial)    If refill has been denied, meaning absolutely no refills without visit, please complete the smart phrase \".smirxrefuse\" and route it to the \"P [facility] MED REFILLS\"  pool to inform the patient and the pharmacy.    Carol Dye MA     "

## 2024-08-13 ENCOUNTER — HOSPITAL ENCOUNTER (OUTPATIENT)
Dept: CARDIOLOGY | Facility: CLINIC | Age: 64
Discharge: HOME | End: 2024-08-13
Attending: INTERNAL MEDICINE | Admitting: INTERNAL MEDICINE
Payer: COMMERCIAL

## 2024-08-13 DIAGNOSIS — R00.1 BRADYCARDIA: ICD-10-CM

## 2024-08-13 DIAGNOSIS — Z01.818 PREOP GENERAL PHYSICAL EXAM: ICD-10-CM

## 2024-08-13 LAB
CV STRESS CURRENT BP HE: NORMAL
CV STRESS CURRENT HR HE: 101
CV STRESS CURRENT HR HE: 106
CV STRESS CURRENT HR HE: 111
CV STRESS CURRENT HR HE: 115
CV STRESS CURRENT HR HE: 116
CV STRESS CURRENT HR HE: 120
CV STRESS CURRENT HR HE: 122
CV STRESS CURRENT HR HE: 126
CV STRESS CURRENT HR HE: 135
CV STRESS CURRENT HR HE: 138
CV STRESS CURRENT HR HE: 145
CV STRESS CURRENT HR HE: 148
CV STRESS CURRENT HR HE: 150
CV STRESS CURRENT HR HE: 150
CV STRESS CURRENT HR HE: 153
CV STRESS CURRENT HR HE: 156
CV STRESS CURRENT HR HE: 156
CV STRESS CURRENT HR HE: 163
CV STRESS CURRENT HR HE: 164
CV STRESS CURRENT HR HE: 56
CV STRESS CURRENT HR HE: 57
CV STRESS CURRENT HR HE: 81
CV STRESS CURRENT HR HE: 82
CV STRESS CURRENT HR HE: 84
CV STRESS CURRENT HR HE: 88
CV STRESS CURRENT HR HE: 89
CV STRESS CURRENT HR HE: 89
CV STRESS CURRENT HR HE: 92
CV STRESS CURRENT HR HE: 93
CV STRESS CURRENT HR HE: 95
CV STRESS CURRENT HR HE: 95
CV STRESS CURRENT HR HE: 96
CV STRESS CURRENT HR HE: 97
CV STRESS CURRENT HR HE: 97
CV STRESS CURRENT HR HE: 98
CV STRESS CURRENT HR HE: 99
CV STRESS DEVIATION TIME HE: NORMAL
CV STRESS ECHO PERCENT HR HE: NORMAL
CV STRESS EXERCISE STAGE HE: NORMAL
CV STRESS EXERCISE STAGE REACHED HE: NORMAL
CV STRESS FINAL RESTING BP HE: NORMAL
CV STRESS FINAL RESTING HR HE: 89
CV STRESS MAX HR HE: 166
CV STRESS MAX TREADMILL GRADE HE: 18
CV STRESS MAX TREADMILL SPEED HE: 5
CV STRESS PEAK DIA BP HE: NORMAL
CV STRESS PEAK SYS BP HE: NORMAL
CV STRESS PHASE HE: NORMAL
CV STRESS PROTOCOL HE: NORMAL
CV STRESS REASON STOPPED HE: NORMAL
CV STRESS RESTING PT POSITION HE: NORMAL
CV STRESS RESTING PT POSITION HE: NORMAL
CV STRESS ST DEVIATION AMOUNT HE: NORMAL
CV STRESS ST DEVIATION ELEVATION HE: NORMAL
CV STRESS ST EVELATION AMOUNT HE: NORMAL
CV STRESS SYMPTOMS HE: NORMAL
CV STRESS TEST TYPE HE: NORMAL
CV STRESS TOTAL STAGE TIME MIN 1 HE: NORMAL
STRESS ECHO BASELINE DIASTOLIC HE: 78
STRESS ECHO BASELINE HR: 56
STRESS ECHO BASELINE SYSTOLIC BP: 112
STRESS ECHO LAST STRESS DIASTOLIC BP: 68
STRESS ECHO LAST STRESS HR: 164
STRESS ECHO LAST STRESS SYSTOLIC BP: 164
STRESS ECHO POST ESTIMATED WORKLOAD: 12.1
STRESS ECHO POST EXERCISE DUR MIN: 12
STRESS ECHO POST EXERCISE DUR SEC: 6
STRESS ECHO TARGET HR: 132

## 2024-08-13 PROCEDURE — 93018 CV STRESS TEST I&R ONLY: CPT | Performed by: INTERNAL MEDICINE

## 2024-08-13 PROCEDURE — 93016 CV STRESS TEST SUPVJ ONLY: CPT | Performed by: INTERNAL MEDICINE

## 2024-08-13 PROCEDURE — 93017 CV STRESS TEST TRACING ONLY: CPT

## 2024-08-15 ENCOUNTER — TELEPHONE (OUTPATIENT)
Dept: FAMILY MEDICINE | Facility: CLINIC | Age: 64
End: 2024-08-15
Payer: COMMERCIAL

## 2024-08-15 NOTE — TELEPHONE ENCOUNTER
Yanna Santizo MD  P [facility] Triage Nurse  Hi all  Can you make sure that her Pre-Op is faxed to the fax number included in the H&P. Will be getting surgery in [place].  Charis

## 2024-08-15 NOTE — TELEPHONE ENCOUNTER
Addended pre-op notes faxed to [phone]. Received confirmation that fax went through.   Phoebe Merino RN

## 2024-08-23 ENCOUNTER — OFFICE VISIT (OUTPATIENT)
Dept: FAMILY MEDICINE | Facility: CLINIC | Age: 64
End: 2024-08-23
Payer: COMMERCIAL

## 2024-08-23 VITALS
HEIGHT: 68 IN | BODY MASS INDEX: 30.05 KG/M2 | WEIGHT: 198.3 LBS | HEART RATE: 53 BPM | SYSTOLIC BLOOD PRESSURE: 133 MMHG | OXYGEN SATURATION: 98 % | TEMPERATURE: 97.8 F | DIASTOLIC BLOOD PRESSURE: 84 MMHG | RESPIRATION RATE: 17 BRPM

## 2024-08-23 DIAGNOSIS — B35.3 TINEA PEDIS OF LEFT FOOT: ICD-10-CM

## 2024-08-23 DIAGNOSIS — N48.1 BALANITIS: Primary | ICD-10-CM

## 2024-08-23 PROBLEM — F33.2 MDD (MAJOR DEPRESSIVE DISORDER), RECURRENT SEVERE, WITHOUT PSYCHOSIS (H): Status: RESOLVED | Noted: 2020-07-09 | Resolved: 2024-08-23

## 2024-08-23 PROCEDURE — 99213 OFFICE O/P EST LOW 20 MIN: CPT | Performed by: FAMILY MEDICINE

## 2024-08-23 ASSESSMENT — PAIN SCALES - GENERAL: PAINLEVEL: NO PAIN (0)

## 2024-08-23 NOTE — PROGRESS NOTES
"  Assessment & Plan     Balanitis  To wash daily and keep the penis as dry as possible. Application of lotrimin TID and OK to use desitin outside the foreskin. If no better, to My Chart me since will be having gender affirming surgery early in September.      Tinea pedis of left foot  Restart daily application of lotrimin along with lamisil (works for her)., Ok to use Penlac on her toenails.      Gender care  Transfer full cares to [facility].  Will be managing her post surgery here          BMI  Estimated body mass index is 30.15 kg/m  as calculated from the following:    Height as of this encounter: 1.727 m (5' 8\").    Weight as of this encounter: 89.9 kg (198 lb 4.8 oz).             No follow-ups on file.    Subjective   Liz is a 64 year old, presenting for the following health issues:  Derm Problem (Rash on Genitals -  started 2 days ago that it was noticed for the uncomfortable. )      8/23/2024     9:53 AM   Additional Questions   Roomed by Brad   Accompanied by Self         8/23/2024    Information    services provided? No        HPI     Was seen in derm at [facility] for lesions on her forehead that is Seb Derm and getting treatment    Told there that she also had athletes foot and noted that Liz might have transferred the athletes foot to her groin.     Has noted that she has some painful tenderness of penis and shaft was bumpy and right red. 3 days ago no redness. New.    Then put on desitin and now doesn't hurt. Rash still there. No partners.      Also has incontinence and so wondering if that is related. Uses a panty liner, no change in products.     Gender care:   Dr. Pierre retired and is now seeing a new doc at .  Wondering if can transfer full care to me.     MDD - much better on the TMS                    Objective    /84   Pulse 53   Temp 97.8  F (36.6  C) (Oral)   Resp 17   Ht 1.727 m (5' 8\")   Wt 89.9 kg (198 lb 4.8 oz)   SpO2 98%   BMI 30.15 " kg/m    Body mass index is 30.15 kg/m .  Physical Exam   Skin:   Foot - left plantar surface with some superficial cracking consistent with tinea. No interdigitary changes.  Mild onychomycosis of one or two nails.   Groin - has redundant foreskin that covers the head. Upon retraction, has scattered red papules below on the shaft with some diffuse erythema. Also on the penile head. Urethra normal.    Post electrolysis of the genital area with some scabbing at the border.,                Signed Electronically by: Yanna Santizo MD

## 2024-08-23 NOTE — PATIENT INSTRUCTIONS
Patient Education   Here is the plan from today's visit    1. Balanitis  Wash daily and always dry after washing the penis and below the foreskin.  Gentle  Once dry, apply the Lotrimin, and then cover the outer area of the genital area with desitin    Repeat the gentle cleaning and drying, and lotrimin application three times a day total.   Change pad whenever wet    2. Fungal foot infection   Restart the lamisil and the lotrimin daily to the left foot (ok to do right one too).    Restart the vinegar daily soaks.     3. Toenail  Try Penlac application           Please call or return to clinic if your symptoms don't go away.    Follow up plan  No follow-ups on file.    Thank you for coming to [facility] today.  Lab Testing:  **If you had lab testing today and your results are reassuring or normal they will be mailed to you or sent through [employer] within 7 days.   **If the lab tests need quick action we will call you with the results.  **If you are having labs done on a different day, please call [phone] to schedule at [facility] or [phone] for other [facility]patient Lab locations. Labs do not offer walk-in appointments.  The phone number we will call with results is # [phone] (home) . If this is not the best number please call our clinic and change the number.  Medication Refills:  If you need any refills please call your pharmacy and they will contact us.   If you need to  your refill at a new pharmacy, please contact the new pharmacy directly. The new pharmacy will help you get your medications transferred faster.   Scheduling:  If you have any concerns about today's visit or wish to schedule another appointment please call our office during normal business hours [phone] (8-5:00 M-F). If you can no longer make a scheduled visit, please cancel via [employer] or call us to cancel.   If a referral was made to an [facility] specialty provider and you do not get a call  from central scheduling, please refer to directions on your visit summary or call our office during normal business hours for assistance.   If a Mammogram was ordered for you at the Breast Center call [phone] to schedule or change your appointment.  If you had an XRay/CT/Ultrasound/MRI ordered the number is [phone] to schedule or change your radiology appointment.   [facility] has limited ultrasound appointments available on Wednesdays, if you would like your ultrasound at [facility], please call [phone] to schedule.   Medical Concerns:  If you have urgent medical concerns please call [phone] at any time of the day.    Yanna Santizo MD

## 2024-08-30 ENCOUNTER — THERAPY VISIT (OUTPATIENT)
Dept: PHYSICAL THERAPY | Facility: CLINIC | Age: 64
End: 2024-08-30
Payer: COMMERCIAL

## 2024-08-30 DIAGNOSIS — R15.9 FECAL INCONTINENCE: ICD-10-CM

## 2024-08-30 DIAGNOSIS — R32 URINARY INCONTINENCE: Primary | ICD-10-CM

## 2024-08-30 PROCEDURE — 97110 THERAPEUTIC EXERCISES: CPT | Mod: GP | Performed by: PHYSICAL THERAPIST

## 2024-08-30 PROCEDURE — 97140 MANUAL THERAPY 1/> REGIONS: CPT | Mod: GP | Performed by: PHYSICAL THERAPIST

## 2024-09-06 ENCOUNTER — TELEPHONE (OUTPATIENT)
Dept: OTOLARYNGOLOGY | Facility: CLINIC | Age: 64
End: 2024-09-06
Payer: COMMERCIAL

## 2024-09-06 NOTE — TELEPHONE ENCOUNTER
Spoke with pateint to offer appt from waitlist with Yessenia. Pt declined as she is busy.   Carlee Rodrigues on 9/6/2024 at 10:46 AM

## 2024-09-09 ENCOUNTER — PATIENT OUTREACH (OUTPATIENT)
Dept: CARE COORDINATION | Facility: CLINIC | Age: 64
End: 2024-09-09
Payer: COMMERCIAL

## 2024-09-09 NOTE — PROGRESS NOTES
Clinical Product Navigator RN reviewed chart; patient on payer product coverage.  Review results:   CPN Initial Information Gathering  Referral Source: Health Plan    Obtaining further information to determine needs and next steps. Patient identified by Health Plan as a potential candidate for Primary Care Coordination due to recent admission. Per Health Plan patient had recent admission to [facility] 9/3 - 9/6/24 reason unknown. Unfortunately no records available via care everywhere. Patient is scheduled for physical with PCP on 9/23/24. No additional action indicated at this time.     Linda Brothers RN  Clinical Product Navigator  Ph: [phone]

## 2024-09-18 ENCOUNTER — OFFICE VISIT (OUTPATIENT)
Dept: FAMILY MEDICINE | Facility: CLINIC | Age: 64
End: 2024-09-18
Payer: COMMERCIAL

## 2024-09-18 VITALS
BODY MASS INDEX: 28.22 KG/M2 | WEIGHT: 186.2 LBS | RESPIRATION RATE: 18 BRPM | HEIGHT: 68 IN | SYSTOLIC BLOOD PRESSURE: 105 MMHG | HEART RATE: 66 BPM | DIASTOLIC BLOOD PRESSURE: 69 MMHG | TEMPERATURE: 98.1 F | OXYGEN SATURATION: 97 %

## 2024-09-18 DIAGNOSIS — F64.0 GENDER DYSPHORIA IN ADULT: Primary | ICD-10-CM

## 2024-09-18 DIAGNOSIS — T81.31XA POSTOPERATIVE WOUND DEHISCENCE, INITIAL ENCOUNTER: ICD-10-CM

## 2024-09-18 DIAGNOSIS — Z98.890 H/O VAGINOPLASTY: ICD-10-CM

## 2024-09-18 PROCEDURE — 99215 OFFICE O/P EST HI 40 MIN: CPT | Performed by: FAMILY MEDICINE

## 2024-09-18 PROCEDURE — G2211 COMPLEX E/M VISIT ADD ON: HCPCS | Performed by: FAMILY MEDICINE

## 2024-09-18 ASSESSMENT — PAIN SCALES - GENERAL: PAINLEVEL: MILD PAIN (3)

## 2024-09-18 NOTE — PROGRESS NOTES
"  Assessment & Plan     Gender dysphoria in adult  Off siddhartha, continues on estrogen (pre surgical dose) and prometrium. Will review optimal dosing at follow up visit.     H/O vaginoplasty  Is 15 days out from her surgery and has had wound dehiscence (which is not unexpected).  Is following the wound care regimen closely that she was instructed to do.  Has also been quite active since she is moving. Plan to have her see Yeimi Brown NP in the surgical clinic for ongoing post op care.     Postoperative wound dehiscence, initial encounter  As above.         I spent a total of 51 minutes on the day of the visit.   Time spent by me doing chart review, history and exam, documentation and further activities per the note  The longitudinal plan of care for the diagnosis(es)/condition(s) as documented were addressed during this visit. Due to the added complexity in care, I will continue to support Liz in the subsequent management and with ongoing continuity of care.  BMI  Estimated body mass index is 28.31 kg/m  as calculated from the following:    Height as of this encounter: 1.727 m (5' 8\").    Weight as of this encounter: 84.5 kg (186 lb 3.2 oz).   Weight management plan: working on it          No follow-ups on file.    Subjective   Liz is a 64 year old, presenting for the following health issues:  Follow Up (Nothing to add from last conversation)      9/18/2024     3:52 PM   Additional Questions   Roomed by Syl   Accompanied by Self         9/18/2024    Information    services provided? No        HPI     Penile inversion vagioplasty - 9/3. Stage 1.  Labia minora construction are Stage 2.   Discharged on 9/6.   Seen 9/10 - noticed a hematoma under the mons at that time. Same day that she started dilation.   9/11 was walking to the bathroom and sees blood trickling down. When the hematoma ruptured,.   9/11 ceja came out and the the hematoma was drained through milking the area.  Flew home on " " and post 4hours of siting on the plane, the right labia then dehisced. Told that this often happens.    Cares have included: dilation 4 times a day, bacitracin ointment BID to all incision lines and each edge of the opening. Warm pack over the mons BID.  Couple of times a week needs to douche - done once 6 days ago and will do one today.    Has not been taking any pain meds for the last few days.   Urinating fine but then has to clean up after that. Used to have little leaks all the time and these are not there.     Father  right before she flew to  for the surgery.                       Objective    /69   Pulse 66   Temp 98.1  F (36.7  C) (Oral)   Resp 18   Ht 1.727 m (5' 8\")   Wt 84.5 kg (186 lb 3.2 oz)   SpO2 97%   BMI 28.31 kg/m    Body mass index is 28.31 kg/m .  Physical Exam   No evidence of purulence, drainage, or increasing erythema.  Increased fibrinous exudate compared to picture of her vulva yesterday.  Clitoral area with a cavity that is about 3 - 4 mm deep.                            Signed Electronically by: Yanna Santizo MD    "

## 2024-09-19 ENCOUNTER — TELEPHONE (OUTPATIENT)
Dept: PLASTIC SURGERY | Facility: CLINIC | Age: 64
End: 2024-09-19
Payer: COMMERCIAL

## 2024-09-19 NOTE — CONFIDENTIAL NOTE
Pt was referred for post-vaginoplasty wound dehiscence. Scheduled for a return slot with Yeimi Brown on 9/20/24 - okay'd by Yeimi.

## 2024-09-19 NOTE — PATIENT INSTRUCTIONS
Patient Education   Here is the plan from today's visit      2. H/O vaginoplasty  I have put in a referral for Yeimi Brown, an NP who does all the post op care for Dr. Conley.  You should be getting a call from the  in the next day.  I made it an ASAP referral and I hear that she has openings.    - Comprehensive Gender Care Referral; Future    3. Postoperative wound dehiscence, initial encounter  Dr. Conley did see your pictures and thought this was the next right step. He agreed with working to stay off your feet more :)          Please call or return to clinic if your symptoms don't go away.    Follow up plan  No follow-ups on file.    Thank you for coming to [facility] today.  Lab Testing:  **If you had lab testing today and your results are reassuring or normal they will be mailed to you or sent through [employer] within 7 days.   **If the lab tests need quick action we will call you with the results.  **If you are having labs done on a different day, please call [phone] to schedule at [facility] or [phone] for other [facility] Outpatient Lab locations. Labs do not offer walk-in appointments.  The phone number we will call with results is # [phone] (home) . If this is not the best number please call our clinic and change the number.  Medication Refills:  If you need any refills please call your pharmacy and they will contact us.   If you need to  your refill at a new pharmacy, please contact the new pharmacy directly. The new pharmacy will help you get your medications transferred faster.   Scheduling:  If you have any concerns about today's visit or wish to schedule another appointment please call our office during normal business hours [phone] (8-5:00 M-F). If you can no longer make a scheduled visit, please cancel via [employer] or call us to cancel.   If a referral was made to an [facility] specialty provider and you do not get a call from [facility]  scheduling, please refer to directions on your visit summary or call our office during normal business hours for assistance.   If a Mammogram was ordered for you at the Breast Center call [phone] to schedule or change your appointment.  If you had an XRay/CT/Ultrasound/MRI ordered the number is [phone] to schedule or change your radiology appointment.   [facility] has limited ultrasound appointments available on Wednesdays, if you would like your ultrasound at [facility], please call [phone] to schedule.   Medical Concerns:  If you have urgent medical concerns please call [phone] at any time of the day.    Yanna Santizo MD

## 2024-09-20 ENCOUNTER — OFFICE VISIT (OUTPATIENT)
Dept: PLASTIC SURGERY | Facility: CLINIC | Age: 64
End: 2024-09-20
Payer: COMMERCIAL

## 2024-09-20 VITALS
OXYGEN SATURATION: 99 % | WEIGHT: 185 LBS | BODY MASS INDEX: 28.04 KG/M2 | DIASTOLIC BLOOD PRESSURE: 73 MMHG | SYSTOLIC BLOOD PRESSURE: 110 MMHG | HEART RATE: 55 BPM | HEIGHT: 68 IN

## 2024-09-20 DIAGNOSIS — T81.31XA POSTOPERATIVE WOUND DEHISCENCE, INITIAL ENCOUNTER: ICD-10-CM

## 2024-09-20 DIAGNOSIS — Z98.890 H/O VAGINOPLASTY: Primary | ICD-10-CM

## 2024-09-20 PROCEDURE — 99202 OFFICE O/P NEW SF 15 MIN: CPT | Performed by: NURSE PRACTITIONER

## 2024-09-20 ASSESSMENT — PAIN SCALES - GENERAL: PAINLEVEL: MILD PAIN (2)

## 2024-09-20 NOTE — LETTER
"9/20/2024       RE: Chris Stockton  [address]  [facility] [address]     Dear Colleague,    Thank you for referring your patient, Chris Stockton, to the [facility] [place] at [facility]. Please see a copy of my visit note below.    SUBJECTIVE:  Liz is a 64 year old transgender female who underwent PIV vaginoplasty with Dr Farris in [place] on 9/3/2024. She developed a PO hematoma which spontaneously drained on 9/10 - 9/11/24. Provider also milked some additional blood from hematoma during that visit. She flew home to [place] on 9/13/24 and began experiencing dehiscence of right labia. She has been applying bacitracin 3 x daily as instructed by her surgical team. She is also cleaning her vulva daily as instructed.  Continues to dilate 4 x day and sticking to dilation protocol. No issues with dilation beyond some irritation of the dehisced area of her vulva. She is also douching 2 x per week as instructed.  She states she feels like things are going as expected so far, despite the wounds. She saw a provider at [facility] a couple days ago for assessment of yellow drainage she noticed in area of wounds/incisions. She was told this is fibrinous tissue as part of healing. She is here today to establish with our team for any future needs related to her vaginoplasty healing. She states \"I will always take more reassurance.\"   There are pictures in chart from visit with Dr Verde at [facility] on 09/18/24.     OBJECTIVE:  /73 (BP Location: Left arm, Patient Position: Chair, Cuff Size: Adult Regular)   Pulse 55   Ht 1.727 m (5' 8\")   Wt 83.9 kg (185 lb)   SpO2 99%   BMI 28.13 kg/m     General: NAD  Vulvar incisions healing well along areas where sutures are intact. Significant wound dehiscence at posterior portion/base of vulva which extends up left labial incision several inches. Some minor tension and " dehiscence around clitoral savage. Fibrinous tissue covering areas of wound dehiscence.  Clitoris healthy and well hooded  No cellulitis noted      ASSESSMENT/PLAN:  S/P full depth vaginoplasty in [place] on 9/3/24  Post -op wound dehiscence - reassured patient that wounds are healing as expected and no signs of localized infection or surrounding cellulitis  Encouraged patient to continue recommended dilation schedule and application of bacitracin as recommended by surgical team  Discussed trial of medi-Honey to assist in healing  Reviewed how to apply Medi-Honey to areas of skin breakdown and then tuck gauze between labia.  RTC in 3-4 weeks with Yeimi Brown CNP to reassess healing  Reviewed reasons to call us or her surgical team prior to that visit      SAMMIE Razo, CNP    A total of 20 minutes was spent today with patient, reviewing records, completing charting, and other tasks as detailed above.       Again, thank you for allowing me to participate in the care of your patient.      Sincerely,    SAMMIE Calderon CNP

## 2024-09-20 NOTE — PROGRESS NOTES
"SUBJECTIVE:  Liz is a 64 year old transgender female who underwent PIV vaginoplasty with Dr Farris in [place] on 9/3/2024. She developed a PO hematoma which spontaneously drained on 9/10 - 9/11/24. Provider also milked some additional blood from hematoma during that visit. She flew home to [place] on 9/13/24 and began experiencing dehiscence of right labia. She has been applying bacitracin 3 x daily as instructed by her surgical team. She is also cleaning her vulva daily as instructed.  Continues to dilate 4 x day and sticking to dilation protocol. No issues with dilation beyond some irritation of the dehisced area of her vulva. She is also douching 2 x per week as instructed.  She states she feels like things are going as expected so far, despite the wounds. She saw a provider at [facility] a couple days ago for assessment of yellow drainage she noticed in area of wounds/incisions. She was told this is fibrinous tissue as part of healing. She is here today to establish with our team for any future needs related to her vaginoplasty healing. She states \"I will always take more reassurance.\"   There are pictures in chart from visit with Dr Verde at [facility] on 09/18/24.     OBJECTIVE:  /73 (BP Location: Left arm, Patient Position: Chair, Cuff Size: Adult Regular)   Pulse 55   Ht 1.727 m (5' 8\")   Wt 83.9 kg (185 lb)   SpO2 99%   BMI 28.13 kg/m     General: NAD  Vulvar incisions healing well along areas where sutures are intact. Significant wound dehiscence at posterior portion/base of vulva which extends up left labial incision several inches. Some minor tension and dehiscence around clitoral savage. Fibrinous tissue covering areas of wound dehiscence.  Clitoris healthy and well hooded  No cellulitis noted      ASSESSMENT/PLAN:  S/P full depth vaginoplasty in [place] on 9/3/24  Post -op wound dehiscence - reassured patient that wounds are healing as expected and no signs of localized infection or " surrounding cellulitis  Encouraged patient to continue recommended dilation schedule and application of bacitracin as recommended by surgical team  Discussed trial of medi-Honey to assist in healing  Reviewed how to apply Medi-Honey to areas of skin breakdown and then tuck gauze between labia.  RTC in 3-4 weeks with Yeimi Brown CNP to reassess healing  Reviewed reasons to call us or her surgical team prior to that visit      SAMMIE Razo CNP    A total of 20 minutes was spent today with patient, reviewing records, completing charting, and other tasks as detailed above.

## 2024-09-20 NOTE — NURSING NOTE
"Chief Complaint   Patient presents with    CHESTER iHll, is being seen today for a follow up wound dehiscence post vaginoplasty.       Vitals:    09/20/24 0930   BP: 110/73   BP Location: Left arm   Patient Position: Chair   Cuff Size: Adult Regular   Pulse: 55   SpO2: 99%   Weight: 83.9 kg (185 lb)   Height: 1.727 m (5' 8\")       Body mass index is 28.13 kg/m .      Gemma Iqbal LPN    "

## 2024-09-23 ENCOUNTER — OFFICE VISIT (OUTPATIENT)
Dept: FAMILY MEDICINE | Facility: CLINIC | Age: 64
End: 2024-09-23
Payer: COMMERCIAL

## 2024-09-23 VITALS
DIASTOLIC BLOOD PRESSURE: 72 MMHG | TEMPERATURE: 97.6 F | SYSTOLIC BLOOD PRESSURE: 111 MMHG | RESPIRATION RATE: 16 BRPM | OXYGEN SATURATION: 97 % | BODY MASS INDEX: 28.04 KG/M2 | HEIGHT: 68 IN | WEIGHT: 185 LBS | HEART RATE: 47 BPM

## 2024-09-23 DIAGNOSIS — Z00.00 ROUTINE GENERAL MEDICAL EXAMINATION AT A HEALTH CARE FACILITY: Primary | ICD-10-CM

## 2024-09-23 DIAGNOSIS — Z12.31 VISIT FOR SCREENING MAMMOGRAM: ICD-10-CM

## 2024-09-23 DIAGNOSIS — F64.0 GENDER DYSPHORIA IN ADULT: ICD-10-CM

## 2024-09-23 DIAGNOSIS — Z12.31 ENCOUNTER FOR SCREENING MAMMOGRAM FOR BREAST CANCER: ICD-10-CM

## 2024-09-23 LAB — ESTRADIOL SERPL-MCNC: 233 PG/ML

## 2024-09-23 NOTE — PATIENT INSTRUCTIONS
Patient Education   Preventive Care Advice   This is general advice given by our system to help you stay healthy. However, your care team may have specific advice just for you. Please talk to your care team about your preventive care needs.  Nutrition  Eat 5 or more servings of fruits and vegetables each day.  Try wheat bread, brown rice and whole grain pasta (instead of white bread, rice, and pasta).  Get enough calcium and vitamin D. Check the label on foods and aim for 100% of the RDA (recommended daily allowance).  Lifestyle  Exercise at least 150 minutes each week  (30 minutes a day, 5 days a week).  Do muscle strengthening activities 2 days a week. These help control your weight and prevent disease.  No smoking.  Wear sunscreen to prevent skin cancer.  Have a dental exam and cleaning every 6 months.  Yearly exams  See your health care team every year to talk about:  Any changes in your health.  Any medicines your care team has prescribed.  Preventive care, family planning, and ways to prevent chronic diseases.  Shots (vaccines)   HPV shots (up to age 26), if you've never had them before.  Hepatitis B shots (up to age 59), if you've never had them before.  COVID-19 shot: Get this shot when it's due.  Flu shot: Get a flu shot every year.  Tetanus shot: Get a tetanus shot every 10 years.  Pneumococcal, hepatitis A, and RSV shots: Ask your care team if you need these based on your risk.  Shingles shot (for age 50 and up)  General health tests  Diabetes screening:  Starting at age 35, Get screened for diabetes at least every 3 years.  If you are younger than age 35, ask your care team if you should be screened for diabetes.  Cholesterol test: At age 39, start having a cholesterol test every 5 years, or more often if advised.  Bone density scan (DEXA): At age 50, ask your care team if you should have this scan for osteoporosis (brittle bones).  Hepatitis C: Get tested at least once in your life.  STIs (sexually  transmitted infections)  Before age 24: Ask your care team if you should be screened for STIs.  After age 24: Get screened for STIs if you're at risk. You are at risk for STIs (including HIV) if:  You are sexually active with more than one person.  You don't use condoms every time.  You or a partner was diagnosed with a sexually transmitted infection.  If you are at risk for HIV, ask about PrEP medicine to prevent HIV.  Get tested for HIV at least once in your life, whether you are at risk for HIV or not.  Cancer screening tests  Cervical cancer screening: If you have a cervix, begin getting regular cervical cancer screening tests starting at age 21.  Breast cancer scan (mammogram): If you've ever had breasts, begin having regular mammograms starting at age 40. This is a scan to check for breast cancer.  Colon cancer screening: It is important to start screening for colon cancer at age 45.  Have a colonoscopy test every 10 years (or more often if you're at risk) Or, ask your provider about stool tests like a FIT test every year or Cologuard test every 3 years.  To learn more about your testing options, visit:   .  For help making a decision, visit:   https://bit.ly/yd39975.  Prostate cancer screening test: If you have a prostate, ask your care team if a prostate cancer screening test (PSA) at age 55 is right for you.  Lung cancer screening: If you are a current or former smoker ages 50 to 80, ask your care team if ongoing lung cancer screenings are right for you.  For informational purposes only. Not to replace the advice of your health care provider. Copyright   2023 [facility]. All rights reserved. Clinically reviewed by the [facility]. [employer] [id number] - REV 01/24.  Learning About Activities of Daily Living  What are activities of daily living?     Activities of daily living (ADLs) are the basic self-care tasks you do every day. These include eating, bathing, dressing,  and moving around.  As you age, and if you have health problems, you may find that it's harder to do some of these tasks. If so, your doctor can suggest ideas that may help.  To measure what kind of help you may need, your doctor will ask how well you are able to do ADLs. Let your doctor know if there are any tasks that you are having trouble doing. This is an important first step to getting help. And when you have the help you need, you can stay as independent as possible.  How will a doctor assess your ADLs?  Asking about ADLs is part of a routine health checkup your doctor will likely do as you age. Your health check might be done in a doctor's office, in your home, or at a hospital. The goal is to find out if you are having any problems that could make it hard to care for yourself or that make it unsafe for you to be on your own.  To measure your ADLs, your doctor will ask how hard it is for you to do routine tasks. Your doctor may also want to know if you have changed the way you do a task because of a health problem. Your doctor may watch how you:  Walk back and forth.  Keep your balance while you stand or walk.  Move from sitting to standing or from a bed to a chair.  Button or unbutton a shirt or sweater.  Remove and put on your shoes.  It's common to feel a little worried or anxious if you find you can't do all the things you used to be able to do. Talking with your doctor about ADLs is a way to make sure you're as safe as possible and able to care for yourself as well as you can. You may want to bring a caregiver, friend, or family member to your checkup. They can help you talk to your doctor.  Follow-up care is a key part of your treatment and safety. Be sure to make and go to all appointments, and call your doctor if you are having problems. It's also a good idea to know your test results and keep a list of the medicines you take.  Current as of: October 24, 2023  Content Version: 14.1    2689-2570  [employer], [employer].   Care instructions adapted under license by your healthcare professional. If you have questions about a medical condition or this instruction, always ask your healthcare professional. [employer] disclaims any warranty or liability for your use of this information.    Hearing Loss: Care Instructions  Overview     Hearing loss is a sudden or slow decrease in how well you hear. It can range from slight to profound. Permanent hearing loss can occur with aging. It also can happen when you are exposed long-term to loud noise. Examples include listening to loud music, riding motorcycles, or being around other loud machines.  Hearing loss can affect your work and home life. It can make you feel lonely or depressed. You may feel that you have lost your independence. But hearing aids and other devices can help you hear better and feel connected to others.  Follow-up care is a key part of your treatment and safety. Be sure to make and go to all appointments, and call your doctor if you are having problems. It's also a good idea to know your test results and keep a list of the medicines you take.  How can you care for yourself at home?  Avoid loud noises whenever possible. This helps keep your hearing from getting worse.  Always wear hearing protection around loud noises.  Wear a hearing aid as directed.  A professional can help you pick a hearing aid that will work best for you.  You can also get hearing aids over the counter for mild to moderate hearing loss.  Have hearing tests as your doctor suggests. They can show whether your hearing has changed. Your hearing aid may need to be adjusted.  Use other devices as needed. These may include:  Telephone amplifiers and hearing aids that can connect to a television, stereo, radio, or microphone.  Devices that use lights or vibrations. These alert you to the doorbell, a ringing telephone, or a baby monitor.  Television closed-captioning.  "This shows the words at the bottom of the screen. Most new TVs can do this.  TTY (text telephone). This lets you type messages back and forth on the telephone instead of talking or listening. These devices are also called TDD. When messages are typed on the keyboard, they are sent over the phone line to a receiving TTY. The message is shown on a monitor.  Use text messaging, social media, and email if it is hard for you to communicate by telephone.  Try to learn a listening technique called speechreading. It is not lipreading. You pay attention to people's gestures, expressions, posture, and tone of voice. These clues can help you understand what a person is saying. Face the person you are talking to, and have them face you. Make sure the lighting is good. You need to see the other person's face clearly.  Think about counseling if you need help to adjust to your hearing loss.  When should you call for help?  Watch closely for changes in your health, and be sure to contact your doctor if:    You think your hearing is getting worse.     You have new symptoms, such as dizziness or nausea.   Where can you learn more?  Go to https://www.[employer].net/patiented  Enter R798 in the search box to learn more about \"Hearing Loss: Care Instructions.\"  Current as of: September 27, 2023               Content Version: 14.0    2451-6910 [employer].   Care instructions adapted under license by your healthcare professional. If you have questions about a medical condition or this instruction, always ask your healthcare professional. [employer] disclaims any warranty or liability for your use of this information.      Learning About Sleeping Well  What does sleeping well mean?     Sleeping well means getting enough sleep to feel good and stay healthy. How much sleep is enough varies among people.  The number of hours you sleep and how you feel when you wake up are both important. If you do not feel " refreshed, you probably need more sleep. Another sign of not getting enough sleep is feeling tired during the day.  Experts recommend that adults get at least 7 or more hours of sleep per day. Children and older adults need more sleep.  Why is getting enough sleep important?  Getting enough quality sleep is a basic part of good health. When your sleep suffers, your physical health, mood, and your thoughts can suffer too. You may find yourself feeling more grumpy or stressed. Not getting enough sleep also can lead to serious problems, including injury, accidents, anxiety, and depression.  What might cause poor sleeping?  Many things can cause sleep problems, including:  Changes to your sleep schedule.  Stress. Stress can be caused by fear about a single event, such as giving a speech. Or you may have ongoing stress, such as worry about work or school.  Depression, anxiety, and other mental or emotional conditions.  Changes in your sleep habits or surroundings. This includes changes that happen where you sleep, such as noise, light, or sleeping in a different bed. It also includes changes in your sleep pattern, such as having jet lag or working a late shift.  Health problems, such as pain, breathing problems, and restless legs syndrome.  Lack of regular exercise.  Using alcohol, nicotine, or caffeine before bed.  How can you help yourself?  Here are some tips that may help you sleep more soundly and wake up feeling more refreshed.  Your sleeping area   Use your bedroom only for sleeping and sex. A bit of light reading may help you fall asleep. But if it doesn't, do your reading elsewhere in the house. Try not to use your TV, computer, smartphone, or tablet while you are in bed.  Be sure your bed is big enough to stretch out comfortably, especially if you have a sleep partner.  Keep your bedroom quiet, dark, and cool. Use curtains, blinds, or a sleep mask to block out light. To block out noise, use earplugs, soothing  "music, or a \"white noise\" machine.  Your evening and bedtime routine   Create a relaxing bedtime routine. You might want to take a warm shower or bath, or listen to soothing music.  Go to bed at the same time every night. And get up at the same time every morning, even if you feel tired.  What to avoid   Limit caffeine (coffee, tea, caffeinated sodas) during the day, and don't have any for at least 6 hours before bedtime.  Avoid drinking alcohol before bedtime. Alcohol can cause you to wake up more often during the night.  Try not to smoke or use tobacco, especially in the evening. Nicotine can keep you awake.  Limit naps during the day, especially close to bedtime.  Avoid lying in bed awake for too long. If you can't fall asleep or if you wake up in the middle of the night and can't get back to sleep within about 20 minutes, get out of bed and go to another room until you feel sleepy.  Avoid taking medicine right before bed that may keep you awake or make you feel hyper or energized. Your doctor can tell you if your medicine may do this and if you can take it earlier in the day.  If you can't sleep   Imagine yourself in a peaceful, pleasant scene. Focus on the details and feelings of being in a place that is relaxing.  Get up and do a quiet or boring activity until you feel sleepy.  Avoid drinking any liquids before going to bed to help prevent waking up often to use the bathroom.  Where can you learn more?  Go to https://www.BMdr.net/patiented  Enter J942 in the search box to learn more about \"Learning About Sleeping Well.\"  Current as of: July 10, 2023  Content Version: 14.1 2006-2024 [employer].   Care instructions adapted under license by your healthcare professional. If you have questions about a medical condition or this instruction, always ask your healthcare professional. [employer] disclaims any warranty or liability for your use of this information.    Bladder " Training: Care Instructions  Your Care Instructions     Bladder training is used to treat urge incontinence and stress incontinence. Urge incontinence means that the need to urinate comes on so fast that you can't get to a toilet in time. Stress incontinence means that you leak urine because of pressure on your bladder. For example, it may happen when you laugh, cough, or lift something heavy.  Bladder training can increase how long you can wait before you have to urinate. It can also help your bladder hold more urine. And it can give you better control over the urge to urinate.  It is important to remember that bladder training takes a few weeks to a few months to make a difference. You may not see results right away, but don't give up.  Follow-up care is a key part of your treatment and safety. Be sure to make and go to all appointments, and call your doctor if you are having problems. It's also a good idea to know your test results and keep a list of the medicines you take.  How can you care for yourself at home?  Work with your doctor to come up with a bladder training program that is right for you. You may use one or more of the following methods.  Delayed urination  In the beginning, try to keep from urinating for 5 minutes after you first feel the need to go.  While you wait, take deep, slow breaths to relax. Kegel exercises can also help you delay the need to go to the bathroom.  After some practice, when you can easily wait 5 minutes to urinate, try to wait 10 minutes before you urinate.  Slowly increase the waiting period until you are able to control when you have to urinate.  Scheduled urination  Empty your bladder when you first wake up in the morning.  Schedule times throughout the day when you will urinate.  Start by going to the bathroom every hour, even if you don't need to go.  Slowly increase the time between trips to the bathroom.  When you have found a schedule that works well for you, keep  "doing it.  If you wake up during the night and have to urinate, do it. Apply your schedule to waking hours only.  Kegel exercises  These tighten and strengthen pelvic muscles, which can help you control the flow of urine. (If doing these exercises causes pain, stop doing them and talk with your doctor.) To do Kegel exercises:  Squeeze your muscles as if you were trying not to pass gas. Or squeeze your muscles as if you were stopping the flow of urine. Your belly, legs, and buttocks shouldn't move.  Hold the squeeze for 3 seconds, then relax for 5 to 10 seconds.  Start with 3 seconds, then add 1 second each week until you are able to squeeze for 10 seconds.  Repeat the exercise 10 times a session. Do 3 to 8 sessions a day.  When should you call for help?  Watch closely for changes in your health, and be sure to contact your doctor if:    Your incontinence is getting worse.     You do not get better as expected.   Where can you learn more?  Go to https://www.Zapoint.net/patiented  Enter V684 in the search box to learn more about \"Bladder Training: Care Instructions.\"  Current as of: November 15, 2023               Content Version: 14.0    2788-3629 [employer].   Care instructions adapted under license by your healthcare professional. If you have questions about a medical condition or this instruction, always ask your healthcare professional. [employer] disclaims any warranty or liability for your use of this information.      Bladder Training: Care Instructions  Your Care Instructions     Bladder training is used to treat urge incontinence and stress incontinence. Urge incontinence means that the need to urinate comes on so fast that you can't get to a toilet in time. Stress incontinence means that you leak urine because of pressure on your bladder. For example, it may happen when you laugh, cough, or lift something heavy.  Bladder training can increase how long you can wait before you " have to urinate. It can also help your bladder hold more urine. And it can give you better control over the urge to urinate.  It is important to remember that bladder training takes a few weeks to a few months to make a difference. You may not see results right away, but don't give up.  Follow-up care is a key part of your treatment and safety. Be sure to make and go to all appointments, and call your doctor if you are having problems. It's also a good idea to know your test results and keep a list of the medicines you take.  How can you care for yourself at home?  Work with your doctor to come up with a bladder training program that is right for you. You may use one or more of the following methods.  Delayed urination  In the beginning, try to keep from urinating for 5 minutes after you first feel the need to go.  While you wait, take deep, slow breaths to relax. Kegel exercises can also help you delay the need to go to the bathroom.  After some practice, when you can easily wait 5 minutes to urinate, try to wait 10 minutes before you urinate.  Slowly increase the waiting period until you are able to control when you have to urinate.  Scheduled urination  Empty your bladder when you first wake up in the morning.  Schedule times throughout the day when you will urinate.  Start by going to the bathroom every hour, even if you don't need to go.  Slowly increase the time between trips to the bathroom.  When you have found a schedule that works well for you, keep doing it.  If you wake up during the night and have to urinate, do it. Apply your schedule to waking hours only.  Kegel exercises  These tighten and strengthen pelvic muscles, which can help you control the flow of urine. (If doing these exercises causes pain, stop doing them and talk with your doctor.) To do Kegel exercises:  Squeeze your muscles as if you were trying not to pass gas. Or squeeze your muscles as if you were stopping the flow of urine. Your  "belly, legs, and buttocks shouldn't move.  Hold the squeeze for 3 seconds, then relax for 5 to 10 seconds.  Start with 3 seconds, then add 1 second each week until you are able to squeeze for 10 seconds.  Repeat the exercise 10 times a session. Do 3 to 8 sessions a day.  When should you call for help?  Watch closely for changes in your health, and be sure to contact your doctor if:    Your incontinence is getting worse.     You do not get better as expected.   Where can you learn more?  Go to [email or web address]/patiented  Enter V684 in the search box to learn more about \"Bladder Training: Care Instructions.\"  Current as of: November 15, 2023               Content Version: 14.0    8150-1458 [employer].   Care instructions adapted under license by your healthcare professional. If you have questions about a medical condition or this instruction, always ask your healthcare professional. [employer] disclaims any warranty or liability for your use of this information.      Chronic Pain: Care Instructions  Your Care Instructions     Chronic pain is pain that lasts a long time (months or even years) and may or may not have a clear cause. It is different from acute pain, which usually does have a clear cause--like an injury or illness--and gets better over time. Chronic pain:  Lasts over time but may vary from day to day.  Does not go away despite efforts to end it.  May disrupt your sleep and lead to fatigue.  May cause depression or anxiety.  May make your muscles tense, causing more pain.  Can disrupt your work, hobbies, home life, and relationships with friends and family.  Chronic pain is a very real condition. It is not just in your head. Treatment can help and usually includes several methods used together, such as medicines, physical therapy, exercise, and other treatments. Learning how to relax and changing negative thought patterns can also help you cope.  Chronic pain is " complex. Taking an active role in your treatment will help you better manage your pain. Tell your doctor if you have trouble dealing with your pain. You may have to try several things before you find what works best for you.  Follow-up care is a key part of your treatment and safety. Be sure to make and go to all appointments, and call your doctor if you are having problems. It's also a good idea to know your test results and keep a list of the medicines you take.  How can you care for yourself at home?  Pace yourself. Break up large jobs into smaller tasks. Save harder tasks for days when you have less pain, or go back and forth between hard tasks and easier ones. Take rest breaks.  Relax, and reduce stress. Relaxation techniques such as deep breathing or meditation can help.  Keep moving. Gentle, daily exercise can help reduce pain over the long run. Try low- or no-impact exercises such as walking, swimming, and stationary biking. Do stretches to stay flexible.  Try heat, cold packs, and massage.  Get enough sleep. Chronic pain can make you tired and drain your energy. Talk with your doctor if you have trouble sleeping because of pain.  Think positive. Your thoughts can affect your pain level. Do things that you enjoy to distract yourself when you have pain instead of focusing on the pain. See a movie, read a book, listen to music, or spend time with a friend.  If you think you are depressed, talk to your doctor about treatment.  Keep a daily pain diary. Record how your moods, thoughts, sleep patterns, activities, and medicine affect your pain. You may find that your pain is worse during or after certain activities or when you are feeling a certain emotion. Having a record of your pain can help you and your doctor find the best ways to treat your pain.  Take pain medicines exactly as directed.  If the doctor gave you a prescription medicine for pain, take it as prescribed.  If you are not taking a prescription  "pain medicine, ask your doctor if you can take an over-the-counter medicine.  Reducing constipation caused by pain medicine  Talk to your doctor about a laxative. If a laxative doesn't work, your doctor may suggest a prescription medicine.  Include fruits, vegetables, beans, and whole grains in your diet each day. These foods are high in fiber.  If your doctor recommends it, get more exercise. Walking is a good choice. Bit by bit, increase the amount you walk every day. Try for at least 30 minutes on most days of the week.  Schedule time each day for a bowel movement. A daily routine may help. Take your time and do not strain when having a bowel movement.  When should you call for help?   Call your doctor now or seek immediate medical care if:    Your pain gets worse or is out of control.     You feel down or blue, or you do not enjoy things like you once did. You may be depressed, which is common in people with chronic pain. Depression can be treated.     You have vomiting or cramps for more than 2 hours.   Watch closely for changes in your health, and be sure to contact your doctor if:    You cannot sleep because of pain.     You are very worried or anxious about your pain.     You have trouble taking your pain medicine.     You have any concerns about your pain medicine.     You have trouble with bowel movements, such as:  No bowel movement in 3 days.  Blood in the anal area, in your stool, or on the toilet paper.  Diarrhea for more than 24 hours.   Where can you learn more?  Go to [email or web address].Brainloop.net/patiented  Enter N004 in the search box to learn more about \"Chronic Pain: Care Instructions.\"  Current as of: July 10, 2023               Content Version: 14.0    8818-1407 [employer].   Care instructions adapted under license by your healthcare professional. If you have questions about a medical condition or this instruction, always ask your healthcare professional. Healthwise, [employer] " disclaims any warranty or liability for your use of this information.

## 2024-09-23 NOTE — PROGRESS NOTES
Preventive Care Visit  [facility], Family Medicine  Sep 23, 2024      Assessment & Plan     Routine general medical examination at a health care facility  Up to date except for mammogram. Will get that  Aware needs vaccines as well but last COVID was  so needs to wait 2 months for the next one.    Lower risk for RSV.     Visit for screening mammogram  ordered    Gender dysphoria in adult  Post orchiectomy so may not need  as high estrogen. Will check level   Vaginoplasty is healing - looks improved.   - Estradiol; Future  - Testosterone total; Future  - Estradiol  - Testosterone total    Encounter for screening mammogram for breast cancer    - MA Screening Bilateral w/ Bogdan; Future            Counseling  Appropriate preventive services were addressed with this patient via screening, questionnaire, or discussion as appropriate for fall prevention, nutrition, physical activity, Tobacco-use cessation, social engagement, weight loss and cognition.  Checklist reviewing preventive services available has been given to the patient.  Reviewed patient's diet, addressing concerns and/or questions.   Discussed possible causes of fatigue. Updated plan of care.  Patient reported difficulty with activities of daily living were addressed today.Patient reported safety concerns were addressed today.The patient was provided with written information regarding signs of hearing loss.   Information on urinary incontinence and treatment options given to patient.   I have reviewed Opioid Use Disorder and Substance Use Disorder risk factors and made any needed referrals.           Return in about 53 weeks (around 2025) for Annual Wellness Visit.    Abhi Hill is a 64 year old, presenting for the followin+ wellness  (No new concerns)        2024    10:18 AM   Additional Questions   Roomed by Syl   Accompanied by Self         2024    Information     services provided? No           Health Care Directive  Patient does not have a Health Care Directive or Living Will: Discussed advance care planning with patient; however, patient declined at this time.    HPI    Is stable from a mental health perspective.  Not wanting to do the PHQ9 since it is triggering.     Seen by surgical team regarding her vaginoplasty.  Continues with daily wash and twice a week douche.  Noted that the yellow stuff was still there.  Is using the medi-honey. Skin surface feels a bit raw. Has spent more times without any underwear on.  Is continuing to struggle with standing too much due to the moving things needed to do.                 9/23/2024   General Health   How would you rate your overall physical health? Good   Feel stress (tense, anxious, or unable to sleep) Not at all            9/23/2024   Nutrition   Diet: Other   If other, please elaborate: antiinflammatory/ autoimmune prodicol, low/no sugar-whete-dairy-soy            9/23/2024   Exercise   Days per week of moderate/strenous exercise 0 days   Average minutes spent exercising at this level 0 min      (!) EXERCISE CONCERN      9/23/2024   Social Factors   Frequency of gathering with friends or relatives Three times a week   Worry food won't last until get money to buy more No   Food not last or not have enough money for food? No   Do you have housing? (Housing is defined as stable permanent housing and does not include staying ouside in a car, in a tent, in an abandoned building, in an overnight shelter, or couch-surfing.) No   Are you worried about losing your housing? No   Lack of transportation? No   Unable to get utilities (heat,electricity)? No   Want help with housing or utility concern? No      (!) HOUSING CONCERN PRESENT      9/23/2024   Fall Risk   Fallen 2 or more times in the past year? No   Trouble with walking or balance? No             9/23/2024   Activities of Daily Living- Home Safety   Needs help  with the following daily activites Preparing meals    Money management   Safety concerns in the home Throw rugs in the hallway    No grab bars in the bathroom       Multiple values from one day are sorted in reverse-chronological order         9/23/2024   Dental   Dentist two times every year? Yes            9/23/2024   Hearing Screening   Hearing concerns? (!) TROUBLE UNDERSTANDING SOFT OR WHISPERED SPEECH.            9/23/2024   Driving Risk Screening   Patient/family members have concerns about driving No            9/23/2024   General Alertness/Fatigue Screening   Have you been more tired than usual lately? (!) YES - post surgery             9/23/2024   Urinary Incontinence Screening   Bothered by leaking urine in past 6 months Yes            9/23/2024   TB Screening   Were you born outside of the [place]? No        Is not OK doing PHQ9 routinely since it triggers her depression. Is doing reasonably well right now.           9/23/2024   Substance Use   Alcohol more than 3/day or more than 7/wk No   Do you have a current opioid prescription? (!) YES - did stop them but wondering if might need to use for increased dilation size.    How severe/bad is pain from 1 to 10? 1/10   Do you use any other substances recreationally? No          She was given post op oxycodone but has hardly used it.  Plans to use it just prior increasing the size of the dilator. No evidence of concern.     Social History     Tobacco Use    Smoking status: Never     Passive exposure: Never    Smokeless tobacco: Never   Vaping Use    Vaping status: Never Used   Substance Use Topics    Alcohol use: Yes     Comment: rare    Drug use: No           9/23/2024   AAA Screening   Family history of Abdominal Aortic Aneurysm (AAA)? No      Last PSA:   PSA   Date Value Ref Range Status   03/30/2021 0.14 0 - 4 ug/L Final     Comment:     Assay Method:  Chemiluminescence using Siemens Vista analyzer     Prostate Specific Antigen Screen   Date Value Ref Range  Status   02/06/2023 0.09 0.00 - 4.50 ng/mL Final     ASCVD Risk   The 10-year ASCVD risk score (Sharri PÉREZ, et al., 2019) is: 7.3%    Values used to calculate the score:      Age: 64 years      Sex: Male      Is Non- : No      Diabetic: No      Tobacco smoker: No      Systolic Blood Pressure: 111 mmHg      Is BP treated: No      HDL Cholesterol: 52 mg/dL      Total Cholesterol: 132 mg/dL            Reviewed and updated as needed this visit by Provider   Tobacco  Allergies  Meds  Problems  Med Hx  Surg Hx  Fam Hx              Current providers sharing in care for this patient include:  Patient Care Team:  Yanna Santizo MD as PCP - General (Family Practice)  Dudley Whittington MD as MD (Internal Medicine)  Lamonte Baker as MD (Dermatology)  Rody Hansen LP as Psychologist  Yanna Santizo MD as Assigned PCP  Nafisa Torres LP as Psychologist (Psychology)  Luna Garcia APRN CNP as Assigned Behavioral Health Provider  Yessenia Jimenez, SLP as Speech Language Pathologist (Speech Language/Path)  Chris Campoverde MD as MD (Cardiovascular Disease)  Chris Campoverde MD as Assigned Heart and Vascular Provider    The following health maintenance items are reviewed in Epic and correct as of today:  Health Maintenance   Topic Date Due    RSV VACCINE (1 - Risk 60-74 years 1-dose series) Never done    MAMMO SCREENING  02/21/2024    INFLUENZA VACCINE (1) 09/01/2024    Pneumococcal Vaccine: 65+ Years (1 of 1 - PCV) 07/09/2024    COVID-19 Vaccine (7 - 2024-25 season) 09/23/2024    PHQ-9  10/29/2024    LIPID  07/29/2025    TSH W/FREE T4 REFLEX  07/29/2025    MEDICARE ANNUAL WELLNESS VISIT  09/23/2025    FALL RISK ASSESSMENT  09/23/2025    COLORECTAL CANCER SCREENING  03/02/2026    DTAP/TDAP/TD IMMUNIZATION (3 - Td or Tdap) 06/03/2026    GLUCOSE  08/12/2027    ADVANCE CARE PLANNING  07/29/2029    HEPATITIS C SCREENING  Completed    HIV SCREENING  Completed    ZOSTER  "IMMUNIZATION  Completed    HPV IMMUNIZATION  Aged Out    MENINGITIS IMMUNIZATION  Aged Out    RSV MONOCLONAL ANTIBODY  Aged Out            Objective    Exam  /72   Pulse (!) 47   Temp 97.6  F (36.4  C) (Oral)   Resp 16   Ht 1.728 m (5' 8.03\")   Wt 83.9 kg (185 lb)   SpO2 97%   BMI 28.10 kg/m     Estimated body mass index is 28.1 kg/m  as calculated from the following:    Height as of this encounter: 1.728 m (5' 8.03\").    Weight as of this encounter: 83.9 kg (185 lb).    Physical Exam  GENERAL: alert and no distress  CV: regular rate and rhythm, normal S1 S2, no S3 or S4, no murmur, click or rub, no peripheral edema  ABDOMEN: soft, nontender, no hepatosplenomegaly, no masses and bowel sounds normal  MS: no gross musculoskeletal defects noted, no edema   exam: healing by secondary intention. Continued fibrounous material but not much.           9/23/2024   Mini Cog   Clock Draw Score 2 Normal   3 Item Recall 3 objects recalled   Mini Cog Total Score 5                Signed Electronically by: Yanna Santizo MD    "

## 2024-09-25 DIAGNOSIS — E03.4 HYPOTHYROIDISM DUE TO ACQUIRED ATROPHY OF THYROID: ICD-10-CM

## 2024-09-25 LAB — TESTOST SERPL-MCNC: 4 NG/DL (ref 8–950)

## 2024-09-25 RX ORDER — LEVOTHYROXINE SODIUM 100 UG/1
TABLET ORAL
Qty: 90 TABLET | Refills: 3 | Status: SHIPPED | OUTPATIENT
Start: 2024-09-25

## 2024-09-25 NOTE — TELEPHONE ENCOUNTER
"Request for medication refill:levothyroxine (SYNTHROID/LEVOTHROID) 100 MCG tablet     Providers if patient needs an appointment and you are willing to give a one month supply please refill for one month and  send a letter/MyChart using \".SMILLIMITEDREFILL\" .smillimited and route chart to \"P SMI \" (Giving one month refill in non controlled medications is strongly recommended before denial)    If refill has been denied, meaning absolutely no refills without visit, please complete the smart phrase \".smirxrefuse\" and route it to the \"P SMI MED REFILLS\"  pool to inform the patient and the pharmacy.    Bernadine Bernardo, [facility]    "

## 2024-09-30 ENCOUNTER — OFFICE VISIT (OUTPATIENT)
Dept: PLASTIC SURGERY | Facility: CLINIC | Age: 64
End: 2024-09-30
Payer: COMMERCIAL

## 2024-09-30 VITALS
OXYGEN SATURATION: 98 % | DIASTOLIC BLOOD PRESSURE: 75 MMHG | HEART RATE: 53 BPM | WEIGHT: 186 LBS | HEIGHT: 68 IN | SYSTOLIC BLOOD PRESSURE: 112 MMHG | BODY MASS INDEX: 28.19 KG/M2

## 2024-09-30 DIAGNOSIS — Z98.890 H/O VAGINOPLASTY: Primary | ICD-10-CM

## 2024-09-30 DIAGNOSIS — T81.31XA POSTOPERATIVE WOUND DEHISCENCE, INITIAL ENCOUNTER: ICD-10-CM

## 2024-09-30 ASSESSMENT — PAIN SCALES - GENERAL: PAINLEVEL: MODERATE PAIN (4)

## 2024-09-30 NOTE — NURSING NOTE
"Chief Complaint   Patient presents with    CHESTER Hill, is being seen today for a follow up wound check FD vaginoplasty.       Vitals:    09/30/24 1226   BP: 112/75   BP Location: Left arm   Patient Position: Chair   Cuff Size: Adult Regular   Pulse: 53   SpO2: 98%   Weight: 84.4 kg (186 lb)   Height: 1.728 m (5' 8.03\")       Body mass index is 28.26 kg/m .      Gemma Iqbal LPN    "

## 2024-09-30 NOTE — LETTER
9/30/2024       RE: Chris Stockton  [address]  [facility] [address]     Dear Colleague,    Thank you for referring your patient, Chris Stockton, to the [facility] [place] [facility]. Please see a copy of my visit note below.    SUBJECTIVE:  Liz is a 64 year old transgender female who underwent PIV vaginoplasty with Dr Farris in [place] on 9/3/2024. She developed a PO hematoma which spontaneously drained on 9/10 - 9/11/24. Provider also milked some additional blood from hematoma during that visit. She flew home to [place] on 9/13/24 and began experiencing dehiscence of right labia. She has been applying bacitracin 3 x daily as instructed by her surgical team. She is also cleaning her vulva daily as instructed with soap and water.     She was first seen by our team on 09/20/24 to establish care with a local surgical team for ongoing follow-up and needs. She has been applying the Medi-Honey daily in addition to also applying bacitracin ointment 1-2 times per day. She reports that the ointment application is very triggering for her sensory issues and autism, but she is working hard to make sure she gets it applied regularly.    She did see her PCP at [facility] again since our previous visit, and all photos show an ongoing healing. She has noticed some more areas of redness along the edges of her wounds, and wants to make sure this is normal. She has noticed some areas bleed easily, but no sustained or active bleeding.     She is still dilating 4 times daily. She has started to move up to second dilator. She still uses first dilator occasionally, but otherwise doing as instructions told her to do taper to stretch muscles, then use second dilator. She gets fewer dots inside with second dilator, though, so wonders if she should use first dilator still too.    OBJECTIVE:  /75 (BP Location: Left arm, Patient  "Position: Chair, Cuff Size: Adult Regular)   Pulse 53   Ht 1.728 m (5' 8.03\")   Wt 84.4 kg (186 lb)   SpO2 98%   BMI 28.26 kg/m     General: NAD  Vulvar incisions from mons down to lower vulva c/d/I without wounds or drainage. Areas of previous wound dehiscence still present with fibrinous tissue at base of wounds. Overall size of wounds has decreased with new granulation tissue along edges of wounds where previous deeper wound bed once was.   Clitoris healthy and hooded  Urethral plate healing well      ASSESSMENT/PLAN:  S/P full depth vaginoplasty in [place] on 9/3/24  Post - op wound dehiscence - reassured patient that wounds are healing as expected and no signs of localized infection or surrounding cellulitis  Encouraged patient to continue recommended dilation schedule and application of bacitracin as recommended by surgical team  Continue using Medi-Honey on wounds to encourage ongoing healing  Reassurance provided that she is doing a great job caring for her body and things are proceeding as expected.   Discussed possible silver nitrate treatment at next visit if the granulation tissue persists.    SAMMIE Razo, CNP    A total of 30 minutes was spent today with patient, reviewing records, completing charting, and other tasks as detailed above.       Again, thank you for allowing me to participate in the care of your patient.      Sincerely,    SAMMIE Calderon CNP    "

## 2024-09-30 NOTE — PROGRESS NOTES
"SUBJECTIVE:  Liz is a 64 year old transgender female who underwent PIV vaginoplasty with Dr Farris in [place] on 9/3/2024. She developed a PO hematoma which spontaneously drained on 9/10 - 9/11/24. Provider also milked some additional blood from hematoma during that visit. She flew home to [place] on 9/13/24 and began experiencing dehiscence of right labia. She has been applying bacitracin 3 x daily as instructed by her surgical team. She is also cleaning her vulva daily as instructed with soap and water.     She was first seen by our team on 09/20/24 to establish care with a local surgical team for ongoing follow-up and needs. She has been applying the Medi-Honey daily in addition to also applying bacitracin ointment 1-2 times per day. She reports that the ointment application is very triggering for her sensory issues and autism, but she is working hard to make sure she gets it applied regularly.    She did see her PCP at [facility] again since our previous visit, and all photos show an ongoing healing. She has noticed some more areas of redness along the edges of her wounds, and wants to make sure this is normal. She has noticed some areas bleed easily, but no sustained or active bleeding.     She is still dilating 4 times daily. She has started to move up to second dilator. She still uses first dilator occasionally, but otherwise doing as instructions told her to do taper to stretch muscles, then use second dilator. She gets fewer dots inside with second dilator, though, so wonders if she should use first dilator still too.    OBJECTIVE:  /75 (BP Location: Left arm, Patient Position: Chair, Cuff Size: Adult Regular)   Pulse 53   Ht 1.728 m (5' 8.03\")   Wt 84.4 kg (186 lb)   SpO2 98%   BMI 28.26 kg/m     General: NAD  Vulvar incisions from mons down to lower vulva c/d/I without wounds or drainage. Areas of previous wound dehiscence still present with fibrinous tissue at base of wounds. Overall size of " wounds has decreased with new granulation tissue along edges of wounds where previous deeper wound bed once was.   Clitoris healthy and hooded  Urethral plate healing well      ASSESSMENT/PLAN:  S/P full depth vaginoplasty in [place] on 9/3/24  Post - op wound dehiscence - reassured patient that wounds are healing as expected and no signs of localized infection or surrounding cellulitis  Encouraged patient to continue recommended dilation schedule and application of bacitracin as recommended by surgical team  Continue using Medi-Honey on wounds to encourage ongoing healing  Reassurance provided that she is doing a great job caring for her body and things are proceeding as expected.   Discussed possible silver nitrate treatment at next visit if the granulation tissue persists.    Yeimi Brown, APRN, CNP    A total of 30 minutes was spent today with patient, reviewing records, completing charting, and other tasks as detailed above.

## 2024-10-01 ENCOUNTER — VIRTUAL VISIT (OUTPATIENT)
Dept: PSYCHIATRY | Facility: CLINIC | Age: 64
End: 2024-10-01
Attending: NURSE PRACTITIONER
Payer: COMMERCIAL

## 2024-10-01 VITALS — SYSTOLIC BLOOD PRESSURE: 112 MMHG | DIASTOLIC BLOOD PRESSURE: 75 MMHG | BODY MASS INDEX: 28.26 KG/M2 | WEIGHT: 186 LBS

## 2024-10-01 DIAGNOSIS — F32.A DEPRESSION, UNSPECIFIED DEPRESSION TYPE: ICD-10-CM

## 2024-10-01 DIAGNOSIS — F84.0 AUTISM: ICD-10-CM

## 2024-10-01 DIAGNOSIS — F41.9 ANXIETY: Primary | ICD-10-CM

## 2024-10-01 PROCEDURE — 90836 PSYTX W PT W E/M 45 MIN: CPT | Mod: 95 | Performed by: NURSE PRACTITIONER

## 2024-10-01 PROCEDURE — G2211 COMPLEX E/M VISIT ADD ON: HCPCS | Mod: 95 | Performed by: NURSE PRACTITIONER

## 2024-10-01 PROCEDURE — 99214 OFFICE O/P EST MOD 30 MIN: CPT | Mod: 95 | Performed by: NURSE PRACTITIONER

## 2024-10-01 ASSESSMENT — PAIN SCALES - GENERAL: PAINLEVEL: MILD PAIN (3)

## 2024-10-01 NOTE — NURSING NOTE
Is the patient currently in the state of [place]? YES    Current patient location: [address] [place] [id number]    Visit mode:VIDEO    If the visit is dropped, the patient can be reconnected by: VIDEO VISIT: Text to cell phone:   Telephone Information:   Mobile [phone]       Will anyone else be joining the visit? No  (If patient encounters technical issues they should call [phone])    Are changes needed to the allergy or medication list? No    Are refills needed on medications prescribed by this physician? No    Rooming Documentation: Questionnaire(s) completed.    Reason for visit: RECHDEENA Patton

## 2024-10-01 NOTE — PATIENT INSTRUCTIONS
-Continue on current medication regimen.    Your next appointment is scheduled on 11/26/2024 (Tue) at 8:30am. This is scheduled for in person appointment. You may change to virtual appointment if needed.      **For crisis resources, please see the information at the end of this document**   Patient Education    Thank you for coming to the [facility] [place] [facility].     Lab Testing:  If you had lab testing today and your results are reassuring or normal they will be mailed to you or sent through Cash[employer] within 7 days. If the lab tests need quick action we will call you with the results. The phone number we will call with results is # [phone]. If this is not the best number please call our clinic and change the number.     Medication Refills:  If you need any refills please call your pharmacy and they will contact us. Our fax number for refills is [phone].   Three business days of notice are needed for general medication refill requests.   Five business days of notice are needed for controlled substance refill requests.   If you need to change to a different pharmacy, please contact the new pharmacy directly. The new pharmacy will help you get your medications transferred.     Contact Us:  Please call [phone] during business hours (8-5:00 M-F).   If you have medication related questions after clinic hours, or on the weekend, please call [phone].     Financial Assistance [phone]   Medical Records [phone]       MENTAL HEALTH CRISIS RESOURCES:  For a emergency help, please call 911 or go to the nearest Emergency Department.     Emergency Walk-In Options:   EmPATH Unit @ [facility] ([place]): [phone] - Specialized mental health emergency area designed to be calming  [facility] ([place]): [phone]  [facility] ([place]): [phone]  [facility]): [phone]    [facility]  Crisis Information:   [place]: [phone]  [place]: [phone]  [place] ([place]) - Adult: [phone]     Child: [phone]  [place] - Adult: [phone]     Child: [phone]  [place]: [phone]  List of all [facility] resources:   https://[place].gov/dhs/people-we-serve/adults/health-care/mental-health/resources/crisis-contacts.jsp    National Crisis Information:   Crisis Text Line: Text  [place]  to 325980  Suicide & Crisis Lifeline: 988  National Suicide Prevention Lifeline: 4-735-687-TALK (1-415.104.8720)       For online chat options, visit https://suicidepreventionlifeline.org/chat/  Poison Control Center: 1-255.657.1773  Trans Lifeline: 1-621.721.7072 - Hotline for transgender people of all ages  The [employer]: 6-943-027-9030 - Hotline for LGBT youth     For Non-Emergency Support:   Fast Tracker: Mental Health & Substance Use Disorder Resources -   https://www.[employer].org/
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
## 2024-10-11 ENCOUNTER — VIRTUAL VISIT (OUTPATIENT)
Dept: OTOLARYNGOLOGY | Facility: CLINIC | Age: 64
End: 2024-10-11
Payer: COMMERCIAL

## 2024-10-11 DIAGNOSIS — F64.0 GENDER DYSPHORIA IN ADULT: ICD-10-CM

## 2024-10-11 DIAGNOSIS — R49.9 VOICE AND RESONANCE DISORDER: ICD-10-CM

## 2024-10-11 DIAGNOSIS — R49.0 DYSPHONIA: Primary | ICD-10-CM

## 2024-10-11 PROCEDURE — 92507 TX SP LANG VOICE COMM INDIV: CPT | Mod: GN | Performed by: SPEECH-LANGUAGE PATHOLOGIST

## 2024-10-11 NOTE — PROGRESS NOTES
"Mahin is a 64 year old adult who is being cared for via a billable virtual visit.        The patient/client has been notified and verbally consented to the following statements:   This video visit will be conducted between you and your provider.  If during the course of the call the provider feels a video visit is not appropriate, you will not be charged for this service.    Provider has received verbal consent for billable virtual visit from the patient? Yes    Preferred method for receiving information: [email or web address]     Call initiated at: 8 am  Platform used to conduct today's virtual appointment: [facility] Video  Location of provider: Residence  Location of patient: Residence. 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    Remarkable findings included:  Perceptual evaluation demonstrated:   Roughness: Mild Intermittent  Breathiness: WNL  Strain: WNL  Pitch:  F0/Habitual/Conversational speech: low  Laryngeal exam demonstrated:   Not warranted  Primary complaint of patient today included:   Dysphonia and voice and resonance disorder in the context of gender dysphoria.     Therefore, I recommended a course of speech therapy to address these concerns.    SUBJECTIVE:  Since the last appointment, Ms. Stockton reports the following:   Overall she reports that symptoms have mildly increased with limited practice [place]  Successes: her surgery went well.  Hurdles:  dysphoric and avoiding " "communication outside of her [place] of support.  Trying to anchor practice with healing    OBJECTIVE:  Ms. Stockton presents today with the following:  Voice quality:  Roughness: Mild Intermittent  Breathiness: WNL  Strain: WNL  Pitch:  F0/Habitual/Conversational speech: low  Resonance:   Conversational speech:  backward focus of resonance    PATIENT REPORTED MEASURES:  Patient Supplied Answers To SLP QOL Questionnaire       No data to display              Patient Supplied Answers To VHI Questionnaire      5/1/2024     9:57 AM   Voice Handicap Index (VHI-10)   My voice makes it difficult for people to hear me 0   People have difficulty understanding me in a noisy room 0   My voice difficulties restrict my personal and social life.  2   I feel left out of conversations because of my voice 2   My voice problem causes me to lose income 1   I feel as though I have to strain to produce voice 2   The clarity of my voice is unpredictable 0   My voice problem upsets me 3   My voice makes me feel handicapped 2   People ask, \"What's wrong with your voice?\" 0   VHI-10 12       THERAPEUTIC ACTIVITIES  Demonstrated previous exercises.  demonstrated improved technique  appropriate redirection provided    Instruction provided for increased level of complexity/difficultyModified Exercises to promote optimal respiratory mechanics  she demonstrated clavicular/neck/shoulder involvement in inhalation  Demonstrated difficulty allowing abdominal relaxation for inhalation  Practiced in a seated position, with tactile cue of a hand on the low rib-cage to facilitate awareness of low respiratory engagement.  With clinician support, patient was able to demonstrate improved abdominal relaxation and engagement on inhalation  Optimal exhalation using inward engagement of the abdominal wall with no corresponding collapse of the upper chest cavity was trained using the pulsed \"sh\" task  acceptable improvement in airflow and respiratory mechanics   "   Resonant Voice Therapy (RVT) exercises to promote forward locus of resonance and optimized pattern of laryngeal adduction  Speech material that elicits a high, forward tongue position (/n/) was most facilitating  Easy descending glide on /n/ utilized in conjunction with relaxed jaw, tongue, and lightly closed lips to facilitate forward resonant sound  Syllable level using /m/ in alternation with cardinal vowels on sustained pitches and speech inflection  Word level exercises featuring nasal continuant loaded stimuli  Phrase level exercises featuring nasal continuants in more complex phonemic contexts were employed  Instructed with and interval of a descending glide pattern was facilitating, prior to progressing to comfortable speech using optimal breath flow.  Able to recognize improvement in quality and comfort     Counseling and Education:  Asked questions about the nature of her symptoms, and I answered all of these thoroughly.  I provided counseling regarding this and more today.  A revised regimen for home practice was instructed.  I provided an AVS of today's therapeutic activities to facilitate practice.        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  Ms. Stockton demonstrated good learning of therapeutic activities to achieve an optimal and authentic voice quality.  She will continue to apply the techniques that we have discussed when she is able.  She is going through a time that involves significant challenges and change, and is pacing her voice work as she navigates through this time.        PLAN: I will see Ms. Stockton in three weeks  For practice goals see AVS.      Next Clinic Appt: 10/28/24 for virtual appointment  Plan for SLP: Continue collaborating on techniques to optimize her voice     TOTAL SERVICE TIME:   Call  Initiated at: 8 AM  Call Ended at: 9 AM            CPT Billing Codes:   TREATMENT OF SPEECH, LANGUAGE, VOICE, COMMUNICATION, and/or AUDITORY PROCESSING DISORDER (54692)     Yessenia Jimenez M.M. (voice), M.A., CCC/SLP  Speech-Language Pathologist  [employer] Trained Vocologist  [facility] Voice [facility]  [phone]  [email or web address]  Pronouns: she/her        *this report was created in part through the use of computerized dictation software, and though reviewed following completion, some typographic errors may persist.  If there is confusion regarding any of this notes contents, please contact me for clarification

## 2024-10-11 NOTE — LETTER
"10/11/2024       RE: Chris Stockton  [address]  [facility] [address]     Dear Colleague,    Thank you for referring your patient, Chris Stockton, to the [facility] VOICE CLINIC [place] at [facility]. Please see a copy of my visit note below.    Mahin is a 64 year old adult who is being cared for via a billable virtual visit.        The patient/client has been notified and verbally consented to the following statements:   This video visit will be conducted between you and your provider.  If during the course of the call the provider feels a video visit is not appropriate, you will not be charged for this service.    Provider has received verbal consent for billable virtual visit from the patient? Yes    Preferred method for receiving information: ComplyMDhart     Call initiated at: 8 am  Platform used to conduct today's virtual appointment: AM Well Video  Location of provider: Residence  Location of patient: Residence. State: [place]  # of Visits: 5  # of Therapy sessions: 5    Benefit & Certification period: n/a      [facility] VOICE 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    Remarkable findings included:  Perceptual evaluation demonstrated:   Roughness: Mild Intermittent  Breathiness: WNL  Strain: WNL  Pitch:  F0/Habitual/Conversational speech: low  Laryngeal exam demonstrated:   Not warranted  Primary complaint of patient today included:   Dysphonia and voice and resonance disorder in the context of gender dysphoria.     Therefore, I recommended a course " "of speech therapy to address these concerns.    SUBJECTIVE:  Since the last appointment, Ms. Stockton reports the following:   Overall she reports that symptoms have mildly increased with limited practice [place]  Successes: her surgery went well.  Hurdles:  dysphoric and avoiding communication outside of her [place] of support.  Trying to anchor practice with healing    OBJECTIVE:  Ms. Stockton presents today with the following:  Voice quality:  Roughness: Mild Intermittent  Breathiness: WNL  Strain: WNL  Pitch:  F0/Habitual/Conversational speech: low  Resonance:   Conversational speech:  backward focus of resonance    PATIENT REPORTED MEASURES:  Patient Supplied Answers To SLP QOL Questionnaire       No data to display              Patient Supplied Answers To VHI Questionnaire      5/1/2024     9:57 AM   Voice Handicap Index (VHI-10)   My voice makes it difficult for people to hear me 0   People have difficulty understanding me in a noisy room 0   My voice difficulties restrict my personal and social life.  2   I feel left out of conversations because of my voice 2   My voice problem causes me to lose income 1   I feel as though I have to strain to produce voice 2   The clarity of my voice is unpredictable 0   My voice problem upsets me 3   My voice makes me feel handicapped 2   People ask, \"What's wrong with your voice?\" 0   VHI-10 12       THERAPEUTIC ACTIVITIES  Demonstrated previous exercises.  demonstrated improved technique  appropriate redirection provided    Instruction provided for increased level of complexity/difficultyModified Exercises to promote optimal respiratory mechanics  she demonstrated clavicular/neck/shoulder involvement in inhalation  Demonstrated difficulty allowing abdominal relaxation for inhalation  Practiced in a seated position, with tactile cue of a hand on the low rib-cage to facilitate awareness of low respiratory engagement.  With clinician support, patient was " "able to demonstrate improved abdominal relaxation and engagement on inhalation  Optimal exhalation using inward engagement of the abdominal wall with no corresponding collapse of the upper chest cavity was trained using the pulsed \"sh\" task  acceptable improvement in airflow and respiratory mechanics     Resonant Voice Therapy (RVT) exercises to promote forward locus of resonance and optimized pattern of laryngeal adduction  Speech material that elicits a high, forward tongue position (/n/) was most facilitating  Easy descending glide on /n/ utilized in conjunction with relaxed jaw, tongue, and lightly closed lips to facilitate forward resonant sound  Syllable level using /m/ in alternation with cardinal vowels on sustained pitches and speech inflection  Word level exercises featuring nasal continuant loaded stimuli  Phrase level exercises featuring nasal continuants in more complex phonemic contexts were employed  Instructed with and interval of a descending glide pattern was facilitating, prior to progressing to comfortable speech using optimal breath flow.  Able to recognize improvement in quality and comfort     Counseling and Education:  Asked questions about the nature of her symptoms, and I answered all of these thoroughly.  I provided counseling regarding this and more today.  A revised regimen for home practice was instructed.  I provided an AVS of today's therapeutic activities to facilitate practice.        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  Ms. Stockton demonstrated good learning of therapeutic activities to achieve an optimal and authentic voice quality.  She will continue to apply the techniques that we have discussed when she is able.  She is going through a time that involves significant challenges and change, and is " pacing her voice work as she navigates through this time.        PLAN: I will see Ms. Stockton in three weeks  For practice goals see AVS.      Next Clinic Appt: 10/28/24 for virtual appointment  Plan for SLP: Continue collaborating on techniques to optimize her voice     TOTAL SERVICE TIME:   Call Initiated at: 8 AM  Call Ended at: 9 AM            CPT Billing Codes:   TREATMENT OF SPEECH, LANGUAGE, VOICE, COMMUNICATION, and/or AUDITORY PROCESSING DISORDER (87038)     Yessenia Jimenez M.M. (voice), M.A., CCC/SLP  Speech-Language Pathologist  [place] Trained Vocologist  [facility]  [phone]  [email or web address]  Pronouns: she/her        *this report was created in part through the use of computerized dictation software, and though reviewed following completion, some typographic errors may persist.  If there is confusion regarding any of this notes contents, please contact me for clarification        Again, thank you for allowing me to participate in the care of your patient.      Sincerely,    Yessenia Jimenez, SLP

## 2024-10-13 ENCOUNTER — HEALTH MAINTENANCE LETTER (OUTPATIENT)
Age: 64
End: 2024-10-13

## 2024-10-14 ENCOUNTER — OFFICE VISIT (OUTPATIENT)
Dept: PLASTIC SURGERY | Facility: CLINIC | Age: 64
End: 2024-10-14
Payer: COMMERCIAL

## 2024-10-14 VITALS
DIASTOLIC BLOOD PRESSURE: 78 MMHG | WEIGHT: 189.1 LBS | BODY MASS INDEX: 28.66 KG/M2 | OXYGEN SATURATION: 98 % | TEMPERATURE: 97.5 F | SYSTOLIC BLOOD PRESSURE: 115 MMHG | HEIGHT: 68 IN | HEART RATE: 48 BPM

## 2024-10-14 DIAGNOSIS — L92.9 GRANULATION TISSUE: ICD-10-CM

## 2024-10-14 DIAGNOSIS — Z98.890 H/O VAGINOPLASTY: Primary | ICD-10-CM

## 2024-10-14 DIAGNOSIS — T81.31XD POSTOPERATIVE WOUND DEHISCENCE, SUBSEQUENT ENCOUNTER: ICD-10-CM

## 2024-10-14 PROCEDURE — 99213 OFFICE O/P EST LOW 20 MIN: CPT | Performed by: NURSE PRACTITIONER

## 2024-10-14 ASSESSMENT — PAIN SCALES - GENERAL: PAINLEVEL: MILD PAIN (3)

## 2024-10-14 NOTE — NURSING NOTE
"Chief Complaint   Patient presents with    Surgical Followup     Post-op wound check, DOS 8/3/24.       Vitals:    10/14/24 1026   BP: 115/78   BP Location: Left arm   Patient Position: Sitting   Cuff Size: Adult Large   Pulse: (!) 48   Temp: 97.5  F (36.4  C)   TempSrc: Oral   SpO2: 98%   Weight: 189 lb 1.6 oz   Height: 5' 8\"       Body mass index is 28.75 kg/m .    Patient reports mild genital pain (3/10).    Cooper Lockwood, EMT    "

## 2024-10-14 NOTE — LETTER
10/14/2024       RE: Chris Stockton  [address]  [facility] [address]     Dear Colleague,    Thank you for referring your patient, Chris Stockton, to the [facility] PLASTIC AND RECONSTRUCTIVE SURGERY CLINIC [place] at [facility]. Please see a copy of my visit note below.    SUBJECTIVE:SUBJECTIVE:  Liz is a 64 year old transgender female who underwent PIV vaginoplasty with Dr Farris in [place] on 9/3/2024. She developed a PO hematoma which spontaneously drained on 9/10 - 9/11/24. Provider also milked some additional blood from hematoma during that visit. She flew home to [place] on 9/13/24 and began experiencing dehiscence of right labia. She has been applying bacitracin 3 x daily as instructed by her surgical team. She is also cleaning her vulva daily as instructed with soap and water.     She was first seen by our team on 09/20/24 to establish care with a local surgical team for ongoing follow-up and needs. She continues to apply the Medi-Honey almost daily in addition to also applying bacitracin ointment 1-2 times per day. She reports that the ointment application is very triggering for her sensory issues and autism, but she is working hard to make sure she gets it applied regularly. She reports only missing an occasional day of application.     She is now dilating 2 x daily and is using 2nd and 3rd dilators. She got some topical lidocaine from her surgical team in  to help with the pain and discomfort of stretching she feels when using dilator 3.     She has noticed that even short 5 minutes rides in the car tend to cause worsening of her pain and dehiscence. She is slotted to return to work 25% time from home the final week of October, but she is considering extending that since sitting is still quite challenging for any longer amount of time.     She spoke with Dr Farris's nurse who said it would be okay to start doing silver nitrate for any areas  "of granulation tissue now that the dehisced areas are healing more. She is open to doing this today if we recommend it.        OBJECTIVE:  /78 (BP Location: Left arm, Patient Position: Sitting, Cuff Size: Adult Large)   Pulse (!) 48   Temp 97.5  F (36.4  C) (Oral)   Ht 1.727 m (5' 8\")   Wt 85.8 kg (189 lb 1.6 oz)   SpO2 98%   BMI 28.75 kg/m     General: NAD  Vulvar incisions from mons down to lower vulva c/d/I without wounds or drainage. Areas of previous wound dehiscence still present with fibrinous tissue at base of wounds on left side of lower labia. Overall size of wounds has continued to decrease with new granulation tissue along edges of wounds where previous deeper wound bed once was. Granulation tissue at posterior aspect of vulva extending up left labia.  Clitoris healthy and hooded  Urethral plate healing well      ASSESSMENT/PLAN:  S/P full depth vaginoplasty in [place] on 9/3/24  Post - op wound dehiscence - reassured patient that wounds are healing as expected and no signs of localized infection or surrounding cellulitis  Encouraged patient to continue recommended dilation schedule and application of bacitracin as recommended by surgical team  Continue using Medi-Honey on wounds to encourage ongoing healing  Reassurance provided that she is doing a great job caring for her body and things are proceeding as expected.   Granulation tissue - areas of granulation treated with silver nitrate today in clinic after reviewing process  RTC in 4 weeks for reassessment of wound and possible repeat silver nitrate treatment      SAMMIE Razo, CNP    A total of 25 minutes was spent today with patient, reviewing records, completing charting, and other tasks as detailed above.       Again, thank you for allowing me to participate in the care of your patient.      Sincerely,    SAMMIE Calderon CNP    "

## 2024-10-14 NOTE — PROGRESS NOTES
"SUBJECTIVE:SUBJECTIVE:  Liz is a 64 year old transgender female who underwent PIV vaginoplasty with Dr Farris in [place] on 9/3/2024. She developed a PO hematoma which spontaneously drained on 9/10 - 9/11/24. Provider also milked some additional blood from hematoma during that visit. She flew home to [place] on 9/13/24 and began experiencing dehiscence of right labia. She has been applying bacitracin 3 x daily as instructed by her surgical team. She is also cleaning her vulva daily as instructed with soap and water.     She was first seen by our team on 09/20/24 to establish care with a local surgical team for ongoing follow-up and needs. She continues to apply the Medi-Honey almost daily in addition to also applying bacitracin ointment 1-2 times per day. She reports that the ointment application is very triggering for her sensory issues and autism, but she is working hard to make sure she gets it applied regularly. She reports only missing an occasional day of application.     She is now dilating 2 x daily and is using 2nd and 3rd dilators. She got some topical lidocaine from her surgical team in  to help with the pain and discomfort of stretching she feels when using dilator 3.     She has noticed that even short 5 minutes rides in the car tend to cause worsening of her pain and dehiscence. She is slotted to return to work 25% time from home the final week of October, but she is considering extending that since sitting is still quite challenging for any longer amount of time.     She spoke with Dr Farris's nurse who said it would be okay to start doing silver nitrate for any areas of granulation tissue now that the dehisced areas are healing more. She is open to doing this today if we recommend it.        OBJECTIVE:  /78 (BP Location: Left arm, Patient Position: Sitting, Cuff Size: Adult Large)   Pulse (!) 48   Temp 97.5  F (36.4  C) (Oral)   Ht 1.727 m (5' 8\")   Wt 85.8 kg (189 lb 1.6 oz)   SpO2 98%   " BMI 28.75 kg/m     General: NAD  Vulvar incisions from mons down to lower vulva c/d/I without wounds or drainage. Areas of previous wound dehiscence still present with fibrinous tissue at base of wounds on left side of lower labia. Overall size of wounds has continued to decrease with new granulation tissue along edges of wounds where previous deeper wound bed once was. Granulation tissue at posterior aspect of vulva extending up left labia.  Clitoris healthy and hooded  Urethral plate healing well      ASSESSMENT/PLAN:  S/P full depth vaginoplasty in [place] on 9/3/24  Post - op wound dehiscence - reassured patient that wounds are healing as expected and no signs of localized infection or surrounding cellulitis  Encouraged patient to continue recommended dilation schedule and application of bacitracin as recommended by surgical team  Continue using Medi-Honey on wounds to encourage ongoing healing  Reassurance provided that she is doing a great job caring for her body and things are proceeding as expected.   Granulation tissue - areas of granulation treated with silver nitrate today in clinic after reviewing process  RTC in 4 weeks for reassessment of wound and possible repeat silver nitrate treatment      Yeimi Brown, APRN, CNP    A total of 25 minutes was spent today with patient, reviewing records, completing charting, and other tasks as detailed above.

## 2024-10-28 ENCOUNTER — VIRTUAL VISIT (OUTPATIENT)
Dept: OTOLARYNGOLOGY | Facility: CLINIC | Age: 64
End: 2024-10-28
Payer: COMMERCIAL

## 2024-10-28 DIAGNOSIS — R49.9 VOICE AND RESONANCE DISORDER: ICD-10-CM

## 2024-10-28 DIAGNOSIS — F64.0 GENDER DYSPHORIA IN ADULT: ICD-10-CM

## 2024-10-28 DIAGNOSIS — R49.0 DYSPHONIA: Primary | ICD-10-CM

## 2024-10-28 PROCEDURE — 92507 TX SP LANG VOICE COMM INDIV: CPT | Mod: GN | Performed by: SPEECH-LANGUAGE PATHOLOGIST

## 2024-10-28 NOTE — LETTER
"10/28/2024       RE: Chris Stockton  [address]  [facility] [address]     Dear Colleague,    Thank you for referring your patient, Chris Stockton, to the [facility] VOICE CLINIC [place] at [facility]. Please see a copy of my visit note below.    Mahin is a 64 year old adult who is being cared for via a billable virtual visit.        The patient/client has been notified and verbally consented to the following statements:   This video visit will be conducted between you and your provider.  If during the course of the call the provider feels a video visit is not appropriate, you will not be charged for this service.    Provider has received verbal consent for billable virtual visit from the patient? Yes    Preferred method for receiving information: [employer] CCAt     Call initiated at: 8:04 AM  Platform used to conduct today's virtual appointment: AM Well Video  Location of provider: Residence  Location of patient: Residence. 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    Remarkable findings included:  Perceptual evaluation demonstrated:   Roughness: Mild Intermittent  Breathiness: WNL  Strain: WNL  Pitch:  F0/Habitual/Conversational speech: low  Laryngeal exam demonstrated:   Not warranted  Primary complaint of patient today included:   Dysphonia and voice and resonance disorder in the context of gender dysphoria.     Therefore, I recommended a " "course of speech therapy to address these concerns.    SUBJECTIVE:  Since the last appointment, Ms. Stockton reports the following:   Overall she reports that symptoms are stable  She is going through a time involving significant change and challenges.  Continuing to experience significant vocal dysphoria.    OBJECTIVE:  Ms. Stockton presents today with the following:  Voice quality:  Roughness: Mild Intermittent  Breathiness: WNL  Strain: WNL  Pitch:  F0/Habitual/Conversational speech: low  Resonance:   Conversational speech:  backward focus of resonance      PATIENT REPORTED MEASURES:  Patient Supplied Answers To SLP QOL Questionnaire       No data to display              Patient Supplied Answers To VHI Questionnaire      5/1/2024     9:57 AM   Voice Handicap Index (VHI-10)   My voice makes it difficult for people to hear me 0    People have difficulty understanding me in a noisy room 0    My voice difficulties restrict my personal and social life.  2    I feel left out of conversations because of my voice 2    My voice problem causes me to lose income 1    I feel as though I have to strain to produce voice 2    The clarity of my voice is unpredictable 0    My voice problem upsets me 3    My voice makes me feel handicapped 2    People ask, \"What's wrong with your voice?\" 0    VHI-10 12       Patient-reported       THERAPEUTIC ACTIVITIES  Demonstrated previous exercises.  demonstrated improved technique  appropriate redirection provided  instruction provided for increased level of complexity/difficulty    Modified Exercises to promote optimal respiratory mechanics  she demonstrated clavicular/neck/shoulder involvement in inhalation  Demonstrated difficulty allowing abdominal relaxation for inhalation  Practiced in a seated position, with tactile cue of a hand on the low rib-cage to facilitate awareness of low respiratory engagement.  With clinician support, patient was able to demonstrate improved abdominal relaxation " "and engagement on inhalation  Optimal exhalation using inward engagement of the abdominal wall with no corresponding collapse of the upper chest cavity was trained using the pulsed \"sh\" task  acceptable improvement in airflow and respiratory mechanics     Resonant Voice Therapy (RVT) exercises to promote forward locus of resonance and optimized pattern of laryngeal adduction  Speech material that elicits a high, forward tongue position (/n/) was most facilitating  Easy descending glide on /n/ utilized in conjunction with relaxed jaw, tongue, and lightly closed lips to facilitate forward resonant sound  Syllable level using /m/ in alternation with cardinal vowels on sustained pitches and speech inflection  Word level exercises featuring nasal continuant loaded stimuli  Phrase level exercises featuring nasal continuants in more complex phonemic contexts were employed  Instructed with and interval of a descending glide pattern was facilitating, prior to progressing to comfortable speech using optimal breath flow.  Able to recognize improvement in quality and comfort     Instructed in techniques to improve length of utterance with reduced effort for optimal carryover.  Instructed with semi-scripted conversation tasks  Developed a mental checklist of factors to help trouble shoot moments of difficulty during daily speaking tasks.    Counseling and Education:  Asked questions about the nature of her symptoms, and I answered all of these thoroughly.  I provided counseling regarding this and more today.  A revised regimen for home practice was instructed.  I provided an AVS of today's therapeutic activities to facilitate practice.        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  Ms. Stockton demonstrated good learning of therapeutic activities to " achieve an optimal and authentic voice quality.  She will continue to apply the techniques that we have discussed when she is able.  She is going through a time that involves significant challenges and change, and is pacing her voice work as she navigates through this time.        PLAN: I will see Ms. Stockton in 1 week, at which point we will continue therapy.   For practice goals see AVS.     Next Clinic Appt: 11/4    TOTAL SERVICE TIME:   Call Initiated at: 8:04 AM  Call Ended at: 9am           CPT Billing Codes:   TREATMENT OF SPEECH, LANGUAGE, VOICE, COMMUNICATION, and/or AUDITORY PROCESSING DISORDER (10375)    CROW [email or web address], M.M., CCC-SLP  Speech-Language Pathologist  [facility] Trained Vocologist  [facility]  [email or web address]  Pronouns: she/her      *this report was created in part through the use of computerized dictation software, and though reviewed following completion, some typographic errors may persist.  If there is confusion regarding any of this notes contents, please contact me for clarification            Again, thank you for allowing me to participate in the care of your patient.      Sincerely,    Yessenia Jimenez, SLP

## 2024-10-28 NOTE — PROGRESS NOTES
"Mahin is a 64 year old adult who is being cared for via a billable virtual visit.        The patient/client has been notified and verbally consented to the following statements:   This video visit will be conducted between you and your provider.  If during the course of the call the provider feels a video visit is not appropriate, you will not be charged for this service.    Provider has received verbal consent for billable virtual visit from the patient? Yes    Preferred method for receiving information: PlaceFirsthart     Call initiated at: 8:04 AM  Platform used to conduct today's virtual appointment: AM Well Video  Location of provider: Residence  Location of patient: Residence. State: [place]  # of Visits: 5  # of Therapy sessions: 5    Benefit & Certification period: n/a      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    Remarkable findings included:  Perceptual evaluation demonstrated:   Roughness: Mild Intermittent  Breathiness: WNL  Strain: WNL  Pitch:  F0/Habitual/Conversational speech: low  Laryngeal exam demonstrated:   Not warranted  Primary complaint of patient today included:   Dysphonia and voice and resonance disorder in the context of gender dysphoria.     Therefore, I recommended a course of speech therapy to address these concerns.    SUBJECTIVE:  Since the last appointment, Ms. Stockton reports the following:   Overall she reports that symptoms are stable  She is going through a time involving significant change and challenges.  Continuing to experience significant vocal " "dysphoria.    OBJECTIVE:  Ms. Stockton presents today with the following:  Voice quality:  Roughness: Mild Intermittent  Breathiness: WNL  Strain: WNL  Pitch:  F0/Habitual/Conversational speech: low  Resonance:   Conversational speech:  backward focus of resonance      PATIENT REPORTED MEASURES:  Patient Supplied Answers To SLP QOL Questionnaire       No data to display              Patient Supplied Answers To VHI Questionnaire      5/1/2024     9:57 AM   Voice Handicap Index (VHI-10)   My voice makes it difficult for people to hear me 0    People have difficulty understanding me in a noisy room 0    My voice difficulties restrict my personal and social life.  2    I feel left out of conversations because of my voice 2    My voice problem causes me to lose income 1    I feel as though I have to strain to produce voice 2    The clarity of my voice is unpredictable 0    My voice problem upsets me 3    My voice makes me feel handicapped 2    People ask, \"What's wrong with your voice?\" 0    VHI-10 12       Patient-reported       THERAPEUTIC ACTIVITIES  Demonstrated previous exercises.  demonstrated improved technique  appropriate redirection provided  instruction provided for increased level of complexity/difficulty    Modified Exercises to promote optimal respiratory mechanics  she demonstrated clavicular/neck/shoulder involvement in inhalation  Demonstrated difficulty allowing abdominal relaxation for inhalation  Practiced in a seated position, with tactile cue of a hand on the low rib-cage to facilitate awareness of low respiratory engagement.  With clinician support, patient was able to demonstrate improved abdominal relaxation and engagement on inhalation  Optimal exhalation using inward engagement of the abdominal wall with no corresponding collapse of the upper chest cavity was trained using the pulsed \"sh\" task  acceptable improvement in airflow and respiratory mechanics     Resonant Voice Therapy (RVT) exercises " to promote forward locus of resonance and optimized pattern of laryngeal adduction  Speech material that elicits a high, forward tongue position (/n/) was most facilitating  Easy descending glide on /n/ utilized in conjunction with relaxed jaw, tongue, and lightly closed lips to facilitate forward resonant sound  Syllable level using /m/ in alternation with cardinal vowels on sustained pitches and speech inflection  Word level exercises featuring nasal continuant loaded stimuli  Phrase level exercises featuring nasal continuants in more complex phonemic contexts were employed  Instructed with and interval of a descending glide pattern was facilitating, prior to progressing to comfortable speech using optimal breath flow.  Able to recognize improvement in quality and comfort     Instructed in techniques to improve length of utterance with reduced effort for optimal carryover.  Instructed with semi-scripted conversation tasks  Developed a mental checklist of factors to help trouble shoot moments of difficulty during daily speaking tasks.    Counseling and Education:  Asked questions about the nature of her symptoms, and I answered all of these thoroughly.  I provided counseling regarding this and more today.  A revised regimen for home practice was instructed.  I provided an AVS of today's therapeutic activities to facilitate practice.        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  Ms. Stockton demonstrated good learning of therapeutic activities to achieve an optimal and authentic voice quality.  She will continue to apply the techniques that we have discussed when she is able.  She is going through a time that involves significant challenges and change, and is pacing her voice work as she navigates through this time.        PLAN: I will  see Ms. Stockton in 1 week, at which point we will continue therapy.   For practice goals see KAHLIL     Next Clinic Appt: 11/4    TOTAL SERVICE TIME:   Call Initiated at: 8:04 AM  Call Ended at: 9am           CPT Billing Codes:   TREATMENT OF SPEECH, LANGUAGE, VOICE, COMMUNICATION, and/or AUDITORY PROCESSING DISORDER (04540)    YOLANDA Chávez MLoidaM., CCC-SLP  Speech-Language Pathologist  [facility] Trained Vocologist  [facility]  [email or web address]  Pronouns: she/her      *this report was created in part through the use of computerized dictation software, and though reviewed following completion, some typographic errors may persist.  If there is confusion regarding any of this notes contents, please contact me for clarification

## 2024-10-28 NOTE — PATIENT INSTRUCTIONS
"After Visit Summary    Patient: Liz Stockton  Date of Visit: 10/28/2024    These notes are also available in your MyChart. Please take a few moments to find them under \"Past Appointments\" in the "[employer]" system.    \"Handouts\" that go along with today's order of activities include (below):     Frequency of practice: 2-3x/day unless marked otherwise    Order of today's appointment:  Ingredients added to the voice \"farris\":   - Dash of forward resonance   - Dash of breath   - Increased intonation & vocal range at the sentence level   - Start higher so there is travel room and you help elevate the overall pitch that your listener receives     - speak aloud to yourself 3-4 times per day for 1-2 min to help improve your voice awareness and practice.    Ramez named her  Yue  Nature is nice to be near.  Nine zipcode numbers were on the note  Jaymie knitted from noon 'till night.  Do you need a note from Nurse Jo?  She and Jo needed a nap because of the knots.  The ninety neon newts were neatly nuzzling   It's now or never said Elaine  My newlywed neighbor had news of their nylons      YOLANDA Chávez, M.M., CCC-SLP  Speech-Language Pathologist - [facility] Voice [facility] Supervisor  [facility] Certified Vocologist  [facility]  [email or web address]  she/her    "

## 2024-11-04 ENCOUNTER — VIRTUAL VISIT (OUTPATIENT)
Dept: OTOLARYNGOLOGY | Facility: CLINIC | Age: 64
End: 2024-11-04
Payer: COMMERCIAL

## 2024-11-04 DIAGNOSIS — R49.0 DYSPHONIA: Primary | ICD-10-CM

## 2024-11-04 DIAGNOSIS — F64.0 GENDER DYSPHORIA IN ADULT: ICD-10-CM

## 2024-11-04 DIAGNOSIS — R49.9 VOICE AND RESONANCE DISORDER: ICD-10-CM

## 2024-11-04 PROCEDURE — 92507 TX SP LANG VOICE COMM INDIV: CPT | Mod: GN | Performed by: SPEECH-LANGUAGE PATHOLOGIST

## 2024-11-04 NOTE — PATIENT INSTRUCTIONS
"After Visit Summary    Patient: Liz Stockton  Date of Visit: 11/4/2024    These notes are also available in your MyChart. Please take a few moments to find them under \"Past Appointments\" in the [employer] system.    \"Handouts\" that go along with today's order of activities include (below):     Frequency of practice: 2-3x/day unless marked otherwise    Order of today's appointment:    Bubbles  2-3x per day: Talk to yourself about something fun in the present, and also the past.   Smile, and intermittently try \"nnnnnhnnn\" to start your phrase to draw the voice forward  Continue practicing your N sentences        CROW Chávez.RANDY, M.M., CCC-SLP  Speech-Language Pathologist - [facility] Voice Clinic Supervisor  [facility] Certified Vocologist  [facility]  [email or web address]  she/her    "

## 2024-11-04 NOTE — LETTER
"11/4/2024       RE: Chris Stockton  [address]  [place] [address]     Dear Colleague,    Thank you for referring your patient, Chris Stockton, to the [facility] VOICE CLINIC [place] at [facility]. Please see a copy of my visit note below.    Mahin is a 64 year old adult who is being cared for via a billable virtual visit.        The patient/client has been notified and verbally consented to the following statements:   This video visit will be conducted between you and your provider.  If during the course of the call the provider feels a video visit is not appropriate, you will not be charged for this service.    Provider has received verbal consent for billable virtual visit from the patient? Yes    Preferred method for receiving information: [employer]Grokhart     Call initiated at: 8 AM  Platform used to conduct today's virtual appointment: AM Well Video  Location of provider: Residence  Location of patient: Residence. 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    Remarkable findings included:  Perceptual evaluation demonstrated:   Roughness: Mild Intermittent  Breathiness: WNL  Strain: WNL  Pitch:  F0/Habitual/Conversational speech: low  Laryngeal exam demonstrated:   Not warranted  Primary complaint of patient today included:   Dysphonia and voice and resonance disorder in the context of gender dysphoria.     Therefore, I recommended a course of " "speech therapy to address these concerns.    SUBJECTIVE:  Since the last appointment, Ms. Stockton reports the following:   Overall she reports that symptoms are stable  Little discombobulated anxious with election and time change  Continuing to be sore  Following up with the nurse on the 11th      OBJECTIVE:  Ms. Stockton presents today with the following:    Voice quality:  Roughness: Mild Intermittent  Breathiness: WNL  Strain: WNL  Pitch:  F0/Habitual/Conversational speech: low  Resonance:   Conversational speech:  backward focus of resonance      PATIENT REPORTED MEASURES:  Patient Supplied Answers To SLP QOL Questionnaire       No data to display              Patient Supplied Answers To VHI Questionnaire      5/1/2024     9:57 AM   Voice Handicap Index (VHI-10)   My voice makes it difficult for people to hear me 0    People have difficulty understanding me in a noisy room 0    My voice difficulties restrict my personal and social life.  2    I feel left out of conversations because of my voice 2    My voice problem causes me to lose income 1    I feel as though I have to strain to produce voice 2    The clarity of my voice is unpredictable 0    My voice problem upsets me 3    My voice makes me feel handicapped 2    People ask, \"What's wrong with your voice?\" 0    VHI-10 12       Patient-reported       THERAPEUTIC ACTIVITIES  Demonstrated previous exercises.  demonstrated improved technique  appropriate redirection provided  instruction provided for increased level of complexity/difficulty  Modified semi-occluded vocal tract exercises and resonant voice exercises    Counseling and Education:  Asked many questions about the nature of her symptoms, and I answered all of these thoroughly.  A revised regimen for home practice was instructed.  I provided an AVS of today's therapeutic activities to facilitate practice.      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 Ms. Stockton demonstrated acceptable learning of therapeutic modifications to optimize her voice quality.  There is a lot going on currently in the world and with her healing, but she continue to practice when able. I encouraged her to continue to practice with the modifications provided today.     PLAN: I will see Ms. Stockton in 2 weeks, at which point we will continue thearpy.   For practice goals see AVS.     Next Clinic Appt: 11/22    TOTAL SERVICE TIME:   Call Initiated at: 8 AM  Call Ended at: 9am           CPT Billing Codes:   TREATMENT OF SPEECH, LANGUAGE, VOICE, COMMUNICATION, and/or AUDITORY PROCESSING DISORDER (72454)    CROW Chávez.RANDY, M.M., CCC-SLP  Speech-Language Pathologist  [facility] Trained Vocologist  [facility]  [email or web address]  Pronouns: she/her      *this report was created in part through the use of computerized dictation software, and though reviewed following completion, some typographic errors may persist.  If there is confusion regarding any of this notes contents, please contact me for clarification            Again, thank you for allowing me to participate in the care of your patient.      Sincerely,    Yessenia Jimenez SLP

## 2024-11-04 NOTE — PROGRESS NOTES
"Mahin is a 64 year old adult who is being cared for via a billable virtual visit.        The patient/client has been notified and verbally consented to the following statements:   This video visit will be conducted between you and your provider.  If during the course of the call the provider feels a video visit is not appropriate, you will not be charged for this service.    Provider has received verbal consent for billable virtual visit from the patient? Yes    Preferred method for receiving information: StratusLIVEhart     Call initiated at: 8 AM  Platform used to conduct today's virtual appointment: AM Well Video  Location of provider: Residence  Location of patient: Residence. 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    Remarkable findings included:  Perceptual evaluation demonstrated:   Roughness: Mild Intermittent  Breathiness: WNL  Strain: WNL  Pitch:  F0/Habitual/Conversational speech: low  Laryngeal exam demonstrated:   Not warranted  Primary complaint of patient today included:   Dysphonia and voice and resonance disorder in the context of gender dysphoria.     Therefore, I recommended a course of speech therapy to address these concerns.    SUBJECTIVE:  Since the last appointment, Ms. Stockton reports the following:   Overall she reports that symptoms are stable  Little discombobulated anxious with election and time change  Continuing to be sore  Following up with the nurse on the " "11th      OBJECTIVE:  Ms. Stockton presents today with the following:    Voice quality:  Roughness: Mild Intermittent  Breathiness: WNL  Strain: WNL  Pitch:  F0/Habitual/Conversational speech: low  Resonance:   Conversational speech:  backward focus of resonance      PATIENT REPORTED MEASURES:  Patient Supplied Answers To SLP QOL Questionnaire       No data to display              Patient Supplied Answers To VHI Questionnaire      5/1/2024     9:57 AM   Voice Handicap Index (VHI-10)   My voice makes it difficult for people to hear me 0    People have difficulty understanding me in a noisy room 0    My voice difficulties restrict my personal and social life.  2    I feel left out of conversations because of my voice 2    My voice problem causes me to lose income 1    I feel as though I have to strain to produce voice 2    The clarity of my voice is unpredictable 0    My voice problem upsets me 3    My voice makes me feel handicapped 2    People ask, \"What's wrong with your voice?\" 0    VHI-10 12       Patient-reported       THERAPEUTIC ACTIVITIES  Demonstrated previous exercises.  demonstrated improved technique  appropriate redirection provided  instruction provided for increased level of complexity/difficulty  Modified semi-occluded vocal tract exercises and resonant voice exercises    Counseling and Education:  Asked many questions about the nature of her symptoms, and I answered all of these thoroughly.  A revised regimen for home practice was instructed.  I provided an AVS of today's therapeutic activities to facilitate practice.      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 Ms. Stockton demonstrated acceptable learning of therapeutic modifications to optimize her voice quality.  There is " a lot going on currently in the world and with her healing, but she continue to practice when able. I encouraged her to continue to practice with the modifications provided today.     PLAN: I will see Ms. Stockton in 2 weeks, at which point we will continue thearpy.   For practice goals see AVS.     Next Clinic Appt: 11/22    TOTAL SERVICE TIME:   Call Initiated at: 8 AM  Call Ended at: 9am           CPT Billing Codes:   TREATMENT OF SPEECH, LANGUAGE, VOICE, COMMUNICATION, and/or AUDITORY PROCESSING DISORDER (47755)    YOLANDA Chávez, M.M., CCC-SLP  Speech-Language Pathologist  [facility] Trained Vocologist  [facility]  [email or web address]  Pronouns: she/her      *this report was created in part through the use of computerized dictation software, and though reviewed following completion, some typographic errors may persist.  If there is confusion regarding any of this notes contents, please contact me for clarification

## 2024-11-11 ENCOUNTER — OFFICE VISIT (OUTPATIENT)
Dept: PLASTIC SURGERY | Facility: CLINIC | Age: 64
End: 2024-11-11
Payer: COMMERCIAL

## 2024-11-11 VITALS
HEIGHT: 68 IN | BODY MASS INDEX: 29.13 KG/M2 | WEIGHT: 192.2 LBS | SYSTOLIC BLOOD PRESSURE: 99 MMHG | OXYGEN SATURATION: 98 % | HEART RATE: 63 BPM | DIASTOLIC BLOOD PRESSURE: 73 MMHG

## 2024-11-11 DIAGNOSIS — T81.31XD POSTOPERATIVE WOUND DEHISCENCE, SUBSEQUENT ENCOUNTER: ICD-10-CM

## 2024-11-11 DIAGNOSIS — Z98.890 H/O VAGINOPLASTY: Primary | ICD-10-CM

## 2024-11-11 DIAGNOSIS — L92.9 GRANULATION TISSUE: ICD-10-CM

## 2024-11-11 ASSESSMENT — PAIN SCALES - GENERAL: PAINLEVEL_OUTOF10: MILD PAIN (3)

## 2024-11-11 NOTE — NURSING NOTE
"Chief Complaint   Patient presents with    Surgical Followup     Post-op DOS 9/3/24. Assess for silver nitrate treatment.       Vitals:    11/11/24 0825   BP: 99/73   BP Location: Right arm   Patient Position: Sitting   Cuff Size: Adult Large   Pulse: 63   SpO2: 98%   Weight: 87.2 kg (192 lb 3.2 oz)   Height: 1.727 m (5' 8\")       Body mass index is 29.22 kg/m .    Patient reports mild genital pain (3/10).    Cooper Lockwood, EMT    "

## 2024-11-11 NOTE — LETTER
11/11/2024       RE: Chris Stockton  [address]  [facility] [address]     Dear Colleague,    Thank you for referring your patient, Chris Stockton, to the [facility]GERY [facility]IN[facility] [place] at [facility]. Please see a copy of my visit note below.    SUBJECTIVE:  Liz is a 64 year old transgender female who underwent PIV vaginoplasty with Dr Farris in [place] on 9/3/2024. She developed a PO hematoma which spontaneously drained on 9/10 - 9/11/24. Provider also milked some additional blood from hematoma during that visit. She flew home to [place] on 9/13/24 and began experiencing dehiscence of right labia. Dehiscence was extensive and she was initially instructed to apply bacitracin 3 x daily and clean her vulva daily as instructed with soap and water.     She was first seen by our team on 09/20/24 to establish care with a local surgical team for ongoing follow-up and needs. We initially added Medi-honey to her wound care regimen. She continues to apply the Medi-Honey daily, but was instructed to cease bacitracin by her surgical team to avoid irritation of skin around healing tissue.     She is now dilating 2x daily and is using 2nd and 3rd dilators. She got some topical lidocaine from her surgical team in  to help with the pain and discomfort of stretching she feels when using dilator 3.    She was last seen on 10/14/2024 for ongoing assessment of her healing wounds and to complete a first round of silver nitrate treatment. She is here today for ongoing evaluation and monitoring of her wounds, as well as repeat treatment of residual granulation tissue.    She continues to struggle with pain. Her pain has been prolonged due to the extent of her wound dehiscence, which has prolonged her overall healing. She reports pain with sitting, standing, or driving. The pain begins within 10-15 minutes of any activity with rapid ramping up  "of discomfort. After an hour, the pain becomes intense enough to curtail any ongoing activity. She has spent a large majority of her time resting on her side in bed to avoid worsening of pain and interference with ongoing healing of her wound. She has been communicating with her surgical team who support her ongoing extended leave from work and gradual return once ready. Liz is limiting activities to as few trips or work as possible, such as groceries, appointments, or care for her autistic son.     Her current plan is to remain 100% full time off work until 11/15/24, start back to work quarter time (2 hours per day) until the 11/22, then increase to 4 hours per day through 11/29. This return to work is planned as long as healing progress advances, but if there is a setback, we will reevaluate for possible reduction in work hours. She and the care teams hope Liz is off leave starting 12/2/2024, but reevaluation will occur as needed.    OBJECTIVE:  BP 99/73 (BP Location: Right arm, Patient Position: Sitting, Cuff Size: Adult Large)   Pulse 63   Ht 1.727 m (5' 8\")   Wt 87.2 kg (192 lb 3.2 oz)   SpO2 98%   BMI 29.22 kg/m     General: NAD  Vulvar incisions from mons down to lower vulva c/d/I without wounds or drainage. Areas of previous wound dehiscence still present at posterior most aspect of vulva/introitus with granulation tissue at base of wound. Overall size of wounds has continued to decrease with new granulation tissue along edges of wounds where previous deeper wound bed once was.   Clitoris healthy and hooded  Urethral plate healing well.  Lower labia majora/vulvar skin remains tender to even light palpation/touch, but no erythema or cellulitis detected      ASSESSMENT/PLAN:  S/P full depth vaginoplasty in [place] on 9/3/24  Post - op wound dehiscence - reassured patient that wounds are healing as expected and no signs of localized infection or surrounding cellulitis  Encouraged patient to " continue recommended dilation schedule as recommended by surgical team  Continue using Medi-Honey on wounds to encourage ongoing healing  Granulation tissue - areas of granulation treated with silver nitrate today in clinic after reviewing process  Reassurance provided that she is doing a great job caring for her body and things are proceeding as expected.   We discussed that new skin/tissue in areas of previous wounds are likely hypersensitive and can take time to desensitize.   Reassured patient that although her healing has been prolonged due to her extensive wound dehiscence, she is making good progress.   We reviewed her plan for a gradual return to work. RTC on 12/2/24 for reevaluation of wounds and healing progress.    Total of 20 minutes spent reviewing, charting, examining, discussing, and assessing as listed above.    SAMMIE Razo, CNP      Again, thank you for allowing me to participate in the care of your patient.      Sincerely,    SAMMIE Calderon CNP

## 2024-11-11 NOTE — PROGRESS NOTES
SUBJECTIVE:  Liz is a 64 year old transgender female who underwent PIV vaginoplasty with Dr Farris in [place] on 9/3/2024. She developed a PO hematoma which spontaneously drained on 9/10 - 9/11/24. Provider also milked some additional blood from hematoma during that visit. She flew home to [place] on 9/13/24 and began experiencing dehiscence of right labia. Dehiscence was extensive and she was initially instructed to apply bacitracin 3 x daily and clean her vulva daily as instructed with soap and water.     She was first seen by our team on 09/20/24 to establish care with a local surgical team for ongoing follow-up and needs. We initially added Medi-honey to her wound care regimen. She continues to apply the Medi-Honey daily, but was instructed to cease bacitracin by her surgical team to avoid irritation of skin around healing tissue.     She is now dilating 2x daily and is using 2nd and 3rd dilators. She got some topical lidocaine from her surgical team in  to help with the pain and discomfort of stretching she feels when using dilator 3.    She was last seen on 10/14/2024 for ongoing assessment of her healing wounds and to complete a first round of silver nitrate treatment. She is here today for ongoing evaluation and monitoring of her wounds, as well as repeat treatment of residual granulation tissue.    She continues to struggle with pain. Her pain has been prolonged due to the extent of her wound dehiscence, which has prolonged her overall healing. She reports pain with sitting, standing, or driving. The pain begins within 10-15 minutes of any activity with rapid ramping up of discomfort. After an hour, the pain becomes intense enough to curtail any ongoing activity. She has spent a large majority of her time resting on her side in bed to avoid worsening of pain and interference with ongoing healing of her wound. She has been communicating with her surgical team who support her ongoing extended leave from  "work and gradual return once ready. Liz is limiting activities to as few trips or work as possible, such as groceries, appointments, or care for her autistic son.     Her current plan is to remain 100% full time off work until 11/15/24, start back to work quarter time (2 hours per day) until the 11/22, then increase to 4 hours per day through 11/29. This return to work is planned as long as healing progress advances, but if there is a setback, we will reevaluate for possible reduction in work hours. She and the care teams hope Liz is off leave starting 12/2/2024, but reevaluation will occur as needed.    OBJECTIVE:  BP 99/73 (BP Location: Right arm, Patient Position: Sitting, Cuff Size: Adult Large)   Pulse 63   Ht 1.727 m (5' 8\")   Wt 87.2 kg (192 lb 3.2 oz)   SpO2 98%   BMI 29.22 kg/m     General: NAD  Vulvar incisions from mons down to lower vulva c/d/I without wounds or drainage. Areas of previous wound dehiscence still present at posterior most aspect of vulva/introitus with granulation tissue at base of wound. Overall size of wounds has continued to decrease with new granulation tissue along edges of wounds where previous deeper wound bed once was.   Clitoris healthy and hooded  Urethral plate healing well.  Lower labia majora/vulvar skin remains tender to even light palpation/touch, but no erythema or cellulitis detected      ASSESSMENT/PLAN:  S/P full depth vaginoplasty in [place] on 9/3/24  Post - op wound dehiscence - reassured patient that wounds are healing as expected and no signs of localized infection or surrounding cellulitis  Encouraged patient to continue recommended dilation schedule as recommended by surgical team  Continue using Medi-Honey on wounds to encourage ongoing healing  Granulation tissue - areas of granulation treated with silver nitrate today in clinic after reviewing process  Reassurance provided that she is doing a great job caring for her body and things are " proceeding as expected.   We discussed that new skin/tissue in areas of previous wounds are likely hypersensitive and can take time to desensitize.   Reassured patient that although her healing has been prolonged due to her extensive wound dehiscence, she is making good progress.   We reviewed her plan for a gradual return to work. RTC on 12/2/24 for reevaluation of wounds and healing progress.    Total of 20 minutes spent reviewing, charting, examining, discussing, and assessing as listed above.    SAMMIE Razo, CNP

## 2024-11-21 ENCOUNTER — VIRTUAL VISIT (OUTPATIENT)
Dept: PLASTIC SURGERY | Facility: CLINIC | Age: 64
End: 2024-11-21
Payer: COMMERCIAL

## 2024-11-21 DIAGNOSIS — T81.31XD POSTOPERATIVE WOUND DEHISCENCE, SUBSEQUENT ENCOUNTER: ICD-10-CM

## 2024-11-21 DIAGNOSIS — Z98.890 H/O VAGINOPLASTY: Primary | ICD-10-CM

## 2024-11-21 ASSESSMENT — PAIN SCALES - GENERAL: PAINLEVEL_OUTOF10: MILD PAIN (3)

## 2024-11-21 NOTE — PROGRESS NOTES
Virtual Visit Details    Type of service:  Video Visit     Originating Location (pt. Location): Home    Distant Location (provider location):  Off-site  Platform used for Video Visit: Dilip    SUBJECTIVE:  Liz is a 64 year old who underwent FD vaginoplasty in [place] on 9/3/2024. She started back to work this week at 1/4 time and feels like 2 hours per day is challenging. She originally planned to start back at half time starting next week (11/25), but has concerns about this increase given her ongoing symptoms. She reports that she has completed three days of 2 hours per day of work. This has proved very challenging and she reports a distracting and distressing increase in discomfort. Even with repositioning, the stress on her surgical site proves challenging. The level of pain and fatigue after only 2 hours of work have proved that returning to 4 hour days next week would be impossible to achieve without worsening of pain, fatigue, and setbacks in her healing. Liz continues to report ongoing wound dehiscence/open wounds along incisions, which make prolonged pressure on her bottom painful, highly distracting, and aggravating to her overall symptoms.   She reports that any sustained sitting, including on soft surfaces like her bed, provoke increased bleeding from her wounds/ongoing healing surgical sites.    OBJECTIVE:  There were no vitals taken for this visit.   General: NAD  : deferred due to video format    ASSESSMENT/PLAN:  S/P FD vaginoplasty in [place]  Wound dehiscence - ongoing follow-up  Ongoing healing necessitating increased leave from work.  Our recommendations would be for Liz to remain at quarter-time work for at least an additional week prior to increasing to 4 hour work days/half time. We have an in person follow-up scheduled for 12/2/24, and will reevaluate the plan and recommendations at that time. Liz will keep this team updated of any changes in her symptoms prior to  scheduled follow-up.    SAMMIE Razo, CNP    A total of 30 minutes was spent today with patient, reviewing records, completing charting, and other tasks as detailed above.

## 2024-11-21 NOTE — LETTER
11/21/2024       RE: Chris Stockton  [address]  [facility] [address]     Dear Colleague,    Thank you for referring your patient, Chris Stockton, to the [facility] PLASTIC [facility] RECONSTRUCT[facility] SURGERY CLINIC [place] at [facility]. Please see a copy of my visit note below.    Virtual Visit Details    Type of service:  Video Visit     Originating Location (pt. Location): Home    Distant Location (provider location):  Off-site  Platform [facility] for Video Visit: [facility]    SUBJECTIVE:  Liz is a 64 year old who underwent FD vaginoplasty in [place] on 9/3/2024. She started back to work this week at 1/4 time and feels like 2 hours per day is challenging. She originally planned to start back at half time starting next week (11/25), but has concerns about this increase given her ongoing symptoms. She reports that she has completed three days of 2 hours per day of work. This has proved very challenging and she reports a distracting and distressing increase in discomfort. Even with repositioning, the stress on her surgical site proves challenging. The level of pain and fatigue after only 2 hours of work have proved that returning to 4 hour days next week would be impossible to achieve without worsening of pain, fatigue, and setbacks in her healing. Liz continues to report ongoing wound dehiscence/open wounds along incisions, which make prolonged pressure on her bottom painful, highly distracting, and aggravating to her overall symptoms.   She reports that any sustained sitting, including on soft surfaces like her bed, provoke increased bleeding from her wounds/ongoing healing surgical sites.    OBJECTIVE:  There were no vitals taken for this visit.   General: NAD  : deferred due to video format    ASSESSMENT/PLAN:  S/P FD vaginoplasty in [place]  Wound dehiscence - ongoing follow-up  Ongoing healing necessitating increased leave from work.  Our  recommendations would be for Liz to remain at quarter-time work for at least an additional week prior to increasing to 4 hour work days/half time. We have an in person follow-up scheduled for 12/2/24, and will reevaluate the plan and recommendations at that time. Liz will keep this team updated of any changes in her symptoms prior to scheduled follow-up.    SAMMIE Razo, CNP    A total of 30 minutes was spent today with patient, reviewing records, completing charting, and other tasks as detailed above.       Again, thank you for allowing me to participate in the care of your patient.      Sincerely,    SAMMIE Calderon CNP

## 2024-11-21 NOTE — NURSING NOTE
Current patient location: [address] [place] [address]    Is the patient currently in the state of [place]? YES    Visit mode:VIDEO    If the visit is dropped, the patient can be reconnected by:VIDEO VISIT: Send to e-mail at: [email or web address]    Will anyone else be joining the visit? NO  (If patient encounters technical issues they should call [phone])    Are changes needed to the allergy or medication list? No, Pt stated no changes to allergies, and Pt stated no med changes    Are refills needed on medications prescribed by this physician? Discuss with provider    Rooming Documentation:  Not applicable    Reason for visit: [facility]FFER

## 2024-11-26 ENCOUNTER — VIRTUAL VISIT (OUTPATIENT)
Dept: PSYCHIATRY | Facility: CLINIC | Age: 64
End: 2024-11-26
Attending: NURSE PRACTITIONER
Payer: COMMERCIAL

## 2024-11-26 VITALS — HEIGHT: 68 IN | BODY MASS INDEX: 29.1 KG/M2 | WEIGHT: 192 LBS

## 2024-11-26 DIAGNOSIS — F90.2 ATTENTION DEFICIT HYPERACTIVITY DISORDER (ADHD), COMBINED TYPE: ICD-10-CM

## 2024-11-26 DIAGNOSIS — F32.A DEPRESSION, UNSPECIFIED DEPRESSION TYPE: ICD-10-CM

## 2024-11-26 RX ORDER — BUPROPION HYDROCHLORIDE 100 MG/1
100 TABLET, EXTENDED RELEASE ORAL DAILY
Qty: 90 TABLET | Refills: 1 | Status: SHIPPED | OUTPATIENT
Start: 2024-11-26

## 2024-11-26 NOTE — PATIENT INSTRUCTIONS
-Continue on current medication regimen.    -Frederic Walter was the  who was working for [employer].  -[employer] https://interconnect.support/ International support/resources for latoya arguelles.    Your next appointment is scheduled on 12/24/2024 (Tue) at 10am. This is currently scheduled as an in-person appointment.      **For crisis resources, please see the information at the end of this document**   Patient Education    Thank you for coming to the [facility]ENT[facility] HEALTH [facility] [place] [facility].     Lab Testing:  If you had lab testing today and your results are reassuring or normal they will be mailed to you or sent through [employer] within 7 days. If the lab tests need quick action we will call you with the results. The phone number we will call with results is # [phone]. If this is not the best number please call our clinic and change the number.     Medication Refills:  If you need any refills please call your pharmacy and they will contact us. Our fax number for refills is [phone].   Three business days of notice are needed for general medication refill requests.   Five business days of notice are needed for controlled substance refill requests.   If you need to change to a different pharmacy, please contact the new pharmacy directly. The new pharmacy will help you get your medications transferred.     Contact Us:  Please call [phone] during business hours (8-5:00 M-F).   If you have medication related questions after clinic hours, or on the weekend, please call [phone].     Financial Assistance [phone]   Medical Records [phone]       MENTAL HEALTH CRISIS RESOURCES:  For a emergency help, please call 911 or go to the nearest Emergency Department.     Emergency Walk-In Options:   EmPATH Unit @ [facility] ([place]): [phone] - Specialized mental health emergency area designed to be calming  [facility] ([place]):  [phone]  [facility] ([place]): [phone]  [facility] ([place]): [phone]    [facility] Crisis Information:   [place]: [phone]  [place]: [phone]  [place] ([place]) - Adult: [phone]     Child: [phone]  Junior - Adult: [phone]     Child: [phone]  [place]: [phone]  List of all [facility] resources:   https://[place].gov/dhs/people-we-serve/adults/health-care/mental-health/resources/crisis-contacts.jsp    National Crisis Information:   Crisis Text Line: Text  [place]  to 677074  Suicide & Crisis Lifeline: 988  National Suicide Prevention Lifeline: 5-697-930-TALK (1-973.930.1221)       For online chat options, visit https://suicidepreventionlifeline.org/chat/  Poison Control Center: 1-705.803.8755  Trans Lifeline: 8-332-608-9915 - Hotline for transgender people of all ages  The [employer]: 4-466-836-4525 - Hotline for LGBT youth     For Non-Emergency Support:   Fast Tracker: Mental Health & Substance Use Disorder Resources -   https://www.wst.cnn.org/
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
## 2024-11-26 NOTE — NURSING NOTE
Current patient location: [address] [place] [id number]    Is the patient currently in the state of [place]? YES    Visit mode:VIDEO    If the visit is dropped, the patient can be reconnected by:VIDEO VISIT: Text to cell phone:   Telephone Information:   Mobile [phone]       Will anyone else be joining the visit? NO  (If patient encounters technical issues they should call [phone]    Are changes needed to the allergy or medication list? No    Are refills needed on medications prescribed by this physician? NO    Rooming Documentation:  Questionnaire(s) completed    Reason for visit: RECHECK    Monique SHAFFER

## 2024-12-02 ENCOUNTER — OFFICE VISIT (OUTPATIENT)
Dept: PLASTIC SURGERY | Facility: CLINIC | Age: 64
End: 2024-12-02
Payer: COMMERCIAL

## 2024-12-02 VITALS
SYSTOLIC BLOOD PRESSURE: 95 MMHG | WEIGHT: 193 LBS | OXYGEN SATURATION: 95 % | HEIGHT: 68 IN | HEART RATE: 76 BPM | BODY MASS INDEX: 29.25 KG/M2 | DIASTOLIC BLOOD PRESSURE: 70 MMHG

## 2024-12-02 DIAGNOSIS — L92.9 GRANULATION TISSUE: ICD-10-CM

## 2024-12-02 DIAGNOSIS — T81.31XD POSTOPERATIVE WOUND DEHISCENCE, SUBSEQUENT ENCOUNTER: ICD-10-CM

## 2024-12-02 DIAGNOSIS — Z98.890 H/O VAGINOPLASTY: Primary | ICD-10-CM

## 2024-12-02 ASSESSMENT — PAIN SCALES - GENERAL: PAINLEVEL_OUTOF10: MILD PAIN (3)

## 2024-12-02 NOTE — PROGRESS NOTES
"  SUBJECTIVE:  Liz is a 64 year old who underwent FD vaginoplasty in [place] on 9/3/2024.  Last seen virtually on 11/21/24 to check in on healing and discuss RTW plan. She has now been back to work for two weeks at 1/4 time and feels like 2 hours per day is still challenging. Liz reports an ongoing amount of discomfort that is distracting and distressing. She reports that even with repositioning, the stress on her surgical site proves to be increased if sitting for longer than 30 minutes. She has an ongoing amount of difficulty focusing due to the discomfort and ongoing healing of her wound dehiscence. She continues to have a level of pain and fatigue after only 2 hours of work that suggest that returning to 4 hour days of work would be impossible to achieve without worsening of pain, fatigue, and setbacks in her healing. Liz continues to report ongoing wound dehiscence/open wounds along incisions, which make prolonged pressure on her bottom painful, highly distracting, and aggravating to her overall symptoms. She reports that any sustained sitting, including on soft surfaces like her bed, provoke increased bleeding from her wounds/ongoing healing surgical sites.    OBJECTIVE:  BP 95/70 (BP Location: Right arm, Patient Position: Standing, Cuff Size: Adult Regular)   Pulse 76   Ht 1.727 m (5' 8\")   Wt 87.5 kg (193 lb)   SpO2 95%   BMI 29.35 kg/m     General: NAD  External vulvar incisions c/d/I along upper portion of vulva. Ongoing wound dehiscence at posterior aspect of vulva with red, raw granulation tissue. Areas of tender pink tissue along lower vulva in areas of previous wounds. Mild amount ongoing swelling      ASSESSMENT/PLAN:  S/P FD vaginoplasty in [place]  Wound dehiscence - ongoing follow-up and repeat treatment of granulation tissue with silver nitrate  Ongoing healing necessitating increased leave from work.  We discussed that given her extensive post-op wound dehiscence, her entire " post-op healing course has been greatly extended. She continues to make progress, but reassured her that this slower pace is normal given the level of wound dehiscence and other post-op complications she is experiencing.  Our recommendations would be for Liz to remain at quarter-time work for at least an additional week prior to increasing to 4 hour work days/half time. We discussed meeting weekly to reassess for ongoing needs as she continues to heal. We have a virtual follow-up scheduled for 12/9/24, and will reevaluate the plan and recommendations at that time. Liz will keep this team updated of any changes in her symptoms prior to scheduled follow-up.    SAMMIE Razo, CNP    A total of 25 minutes was spent today with patient, reviewing records, completing charting, and other tasks as detailed above.

## 2024-12-02 NOTE — NURSING NOTE
"Chief Complaint   Patient presents with    [place], is being seen today for a follow up wound assessment and silver nitrate treatment.       Vitals:    12/02/24 0834   BP: 95/70   BP Location: Right arm   Patient Position: Standing   Cuff Size: Adult Regular   Pulse: 76   SpO2: 95%   Weight: 87.5 kg (193 lb)   Height: 1.727 m (5' 8\")       Body mass index is 29.35 kg/m .      Gemma Iqbal LPN    "

## 2024-12-02 NOTE — LETTER
"12/2/2024       RE: Chris Stockton  [address]  [facility] [address]     Dear Colleague,    Thank you for referring your patient, Chris Stockton, to the [facility] [place] [facility]. Please see a copy of my visit note below.      SUBJECTIVE:  Liz is a 64 year old who underwent FD vaginoplasty in [place] on 9/3/2024.  Last seen virtually on 11/21/24 to check in on healing and discuss RTW plan. She has now been back to work for two weeks at 1/4 time and feels like 2 hours per day is still challenging. Liz reports an ongoing amount of discomfort that is distracting and distressing. She reports that even with repositioning, the stress on her surgical site proves to be increased if sitting for longer than 30 minutes. She has an ongoing amount of difficulty focusing due to the discomfort and ongoing healing of her wound dehiscence. She continues to have a level of pain and fatigue after only 2 hours of work that suggest that returning to 4 hour days of work would be impossible to achieve without worsening of pain, fatigue, and setbacks in her healing. Liz continues to report ongoing wound dehiscence/open wounds along incisions, which make prolonged pressure on her bottom painful, highly distracting, and aggravating to her overall symptoms. She reports that any sustained sitting, including on soft surfaces like her bed, provoke increased bleeding from her wounds/ongoing healing surgical sites.    OBJECTIVE:  BP 95/70 (BP Location: Right arm, Patient Position: Standing, Cuff Size: Adult Regular)   Pulse 76   Ht 1.727 m (5' 8\")   Wt 87.5 kg (193 lb)   SpO2 95%   BMI 29.35 kg/m     General: NAD  External vulvar incisions c/d/I along upper portion of vulva. Ongoing wound dehiscence at posterior aspect of vulva with red, raw granulation tissue. Areas of tender pink tissue along lower vulva in areas of " previous wounds. Mild amount ongoing swelling      ASSESSMENT/PLAN:  S/P FD vaginoplasty in [place]  Wound dehiscence - ongoing follow-up and repeat treatment of granulation tissue with silver nitrate  Ongoing healing necessitating increased leave from work.  We discussed that given her extensive post-op wound dehiscence, her entire post-op healing course has been greatly extended. She continues to make progress, but reassured her that this slower pace is normal given the level of wound dehiscence and other post-op complications she is experiencing.  Our recommendations would be for Liz to remain at quarter-time work for at least an additional week prior to increasing to 4 hour work days/half time. We discussed meeting weekly to reassess for ongoing needs as she continues to heal. We have a virtual follow-up scheduled for 12/9/24, and will reevaluate the plan and recommendations at that time. Liz will keep this team updated of any changes in her symptoms prior to scheduled follow-up.    SAMMIE Razo, CNP    A total of 25 minutes was spent today with patient, reviewing records, completing charting, and other tasks as detailed above.       Again, thank you for allowing me to participate in the care of your patient.      Sincerely,    SAMMIE Calderon CNP

## 2024-12-09 ENCOUNTER — VIRTUAL VISIT (OUTPATIENT)
Dept: PLASTIC SURGERY | Facility: CLINIC | Age: 64
End: 2024-12-09
Payer: COMMERCIAL

## 2024-12-09 DIAGNOSIS — T81.31XD POSTOPERATIVE WOUND DEHISCENCE, SUBSEQUENT ENCOUNTER: ICD-10-CM

## 2024-12-09 DIAGNOSIS — Z98.890 H/O VAGINOPLASTY: Primary | ICD-10-CM

## 2024-12-09 ASSESSMENT — PAIN SCALES - GENERAL: PAINLEVEL_OUTOF10: MILD PAIN (3)

## 2024-12-09 NOTE — NURSING NOTE
[place] [address] [facility] [address]    Is the patient currently in the state of [place]? YES    Visit mode:VIDEO    If the visit is dropped, the patient can be reconnected by:VIDEO VISIT: Send to e-mail at: [email or web address]    Will anyone else be joining the visit? NO  (If patient encounters technical issues they should call [phone]    Are changes needed to the allergy or medication list? No    Are refills needed on medications prescribed by this physician? NO    Rooming Documentation:  Questionnaire(s) not pre-assigned    Reason for visit: RECHECK    Pt states 3/10 pain at surgery site today.    Tobi SHAFFER

## 2024-12-09 NOTE — PROGRESS NOTES
"  SUBJECTIVE:  Liz is a 64 year old who underwent FD vaginoplasty in [place] on 9/3/2024.  Last seen in person on 12/02/24 to check in on healing and discuss RTW plan. She continues to work for 2 hours per day, but feels this is still challenging. She feels like the pain and discomfort have actually gotten worse over the last week. Pain and discomfort are worse without any change in her level of activity. A new symptom is a reported increased \"heaviness and pulling\" of labia majora if standing for 45 min or longer, and worse discomfort if sitting in car, chair, bed, or any surface. She reports that even with repositioning, the stress on her surgical site proves to be increased if sitting for longer than 30 minutes. Liz reports this ongoing amount of discomfort is distracting and distressing. She has an ongoing amount of difficulty focusing due to the discomfort and ongoing healing of her wound dehiscence. She is concerned that her pain is worse as her nerves continue to regrow with healing, because the worsening pain feels like \"heightened nerve sensitivity.\"  She also has ongoing skin irritation from moisture and drainage. She continues to work on finding better pads and other things to help minimize irritation from this, but it contributes to her ongoing hypersensitivity and skin discomfort.    She continues to have a level of pain and fatigue after only 2 hours of work that suggest that returning to 4 hour days of work would be impossible to achieve without worsening of pain, fatigue, and setbacks in her healing. Liz continues to report ongoing wound dehiscence/open wounds along incisions, which make prolonged pressure on her bottom painful, highly distracting, and aggravating to her overall symptoms. She reports that any sustained sitting, including on soft surfaces like her bed, provoke increased bleeding from her wounds/ongoing healing surgical sites and increased irritation from nerve " hypersensitivity. She is planning to schedule with a pelvic floor PT to explore options for nerve desensitization and other proactive healing strategies.       OBJECTIVE:  There were no vitals taken for this visit.   General: NAD  Respiratory: respiration unlabored  Gyn exam deferred due to virtual platform      ASSESSMENT/PLAN:  S/P FD vaginoplasty in [place]    Wound dehiscence - patient reports ongoing wound healing and sensitivity of surrounding tissue/scarred area from previous wound dehiscence  We reviewed that given her extensive post-op wound dehiscence, her entire post-op healing course has been greatly extended. She continues to make progress, but reassured her that this slower pace is normal given the level of wound dehiscence and other post-op complications she is experiencing.    Nerve hypersensitivity - we discussed that tissue can be hypersensitive while healing, especially after extensive wound dehiscence. Nerve hypersensitivity is not uncommon as nerves regenerate and reconnect during healing. This is often more common with extensive wounds as well, so not unexpected for her. Reassured patient that this usually improves over time. Encouraged trial of pelvic floor PT and possible benefits of this. Patient plans to schedule with pelvic floor PT they saw prior to their surgery.    Ongoing healing necessitating increased leave from work.  Our recommendations would be for Liz to remain at quarter-time work for at least two additional weeks prior to increasing to 4 hour work days/half time.   Reviewed plan to stay at quarter time (2 hours per day) for this current week (12/9/2024 - 12/13/2024), and next week (12/16 - 12/20/2024). Plan will then be to increase to half time work (4 hours per day) starting week of 12/23/24. Plan would then be to do at least two weeks at half time prior to increasing work hours.   We have in person appointment to meet on Monday 12/23/24 to reevaluate her healing and  return to work plan.  Liz will keep this team updated of any changes in her symptoms prior to scheduled follow-up.    SAMMIE Razo, CNP    A total of 30 minutes was spent today with patient, reviewing records, completing charting, and other tasks as detailed above.

## 2024-12-09 NOTE — LETTER
"12/9/2024       RE: Chris Stockton  [address]  [place] [address]     Dear Colleague,    Thank you for referring your patient, Chris Stockton, to the [facility] PLASTIC AND RECONSTRUCTIVE SURGERY CLINIC [place] at [facility]. Please see a copy of my visit note below.      SUBJECTIVE:  Liz is a 64 year old who underwent FD vaginoplasty in [place] on 9/3/2024.  Last seen in person on 12/02/24 to check in on healing and discuss RTW plan. She continues to work for 2 hours per day, but feels this is still challenging. She feels like the pain and discomfort have actually gotten worse over the last week. Pain and discomfort are worse without any change in her level of activity. A new symptom is a reported increased \"heaviness and pulling\" of labia majora if standing for 45 min or longer, and worse discomfort if sitting in car, chair, bed, or any surface. She reports that even with repositioning, the stress on her surgical site proves to be increased if sitting for longer than 30 minutes. Liz reports this ongoing amount of discomfort is distracting and distressing. She has an ongoing amount of difficulty focusing due to the discomfort and ongoing healing of her wound dehiscence. She is concerned that her pain is worse as her nerves continue to regrow with healing, because the worsening pain feels like \"heightened nerve sensitivity.\"  She also has ongoing skin irritation from moisture and drainage. She continues to work on finding better pads and other things to help minimize irritation from this, but it contributes to her ongoing hypersensitivity and skin discomfort.    She continues to have a level of pain and fatigue after only 2 hours of work that suggest that returning to 4 hour days of work would be impossible to achieve without worsening of pain, fatigue, and setbacks in her healing. Liz continues to report ongoing wound dehiscence/open " wounds along incisions, which make prolonged pressure on her bottom painful, highly distracting, and aggravating to her overall symptoms. She reports that any sustained sitting, including on soft surfaces like her bed, provoke increased bleeding from her wounds/ongoing healing surgical sites and increased irritation from nerve hypersensitivity. She is planning to schedule with a pelvic floor PT to explore options for nerve desensitization and other proactive healing strategies.       OBJECTIVE:  There were no vitals taken for this visit.   General: NAD  Respiratory: respiration unlabored  Gyn exam deferred due to virtual platform      ASSESSMENT/PLAN:  S/P FD vaginoplasty in [place]    Wound dehiscence - patient reports ongoing wound healing and sensitivity of surrounding tissue/scarred area from previous wound dehiscence  We reviewed that given her extensive post-op wound dehiscence, her entire post-op healing course has been greatly extended. She continues to make progress, but reassured her that this slower pace is normal given the level of wound dehiscence and other post-op complications she is experiencing.    Nerve hypersensitivity - we discussed that tissue can be hypersensitive while healing, especially after extensive wound dehiscence. Nerve hypersensitivity is not uncommon as nerves regenerate and reconnect during healing. This is often more common with extensive wounds as well, so not unexpected for her. Reassured patient that this usually improves over time. Encouraged trial of pelvic floor PT and possible benefits of this. Patient plans to schedule with pelvic floor PT they saw prior to their surgery.    Ongoing healing necessitating increased leave from work.  Our recommendations would be for Liz to remain at quarter-time work for at least two additional weeks prior to increasing to 4 hour work days/half time.   Reviewed plan to stay at quarter time (2 hours per day) for this current week  (12/9/2024 - 12/13/2024), and next week (12/16 - 12/20/2024). Plan will then be to increase to half time work (4 hours per day) starting week of 12/23/24. Plan would then be to do at least two weeks at half time prior to increasing work hours.   We have in person appointment to meet on Monday 12/23/24 to reevaluate her healing and return to work plan.  Liz will keep this team updated of any changes in her symptoms prior to scheduled follow-up.    SAMMIE Razo, CNP    A total of 30 minutes was spent today with patient, reviewing records, completing charting, and other tasks as detailed above.     Virtual Visit Details    Type of service:  Video Visit     Originating Location (pt. Location): Home    Distant Location (provider location):  On-site  Platform used for Video Visit: AmWell      Again, thank you for allowing me to participate in the care of your patient.      Sincerely,    SAMMIE Calderon CNP

## 2024-12-09 NOTE — PROGRESS NOTES
Virtual Visit Details    Type of service:  Video Visit     Originating Location (pt. Location): Home    Distant Location (provider location):  On-site  Platform used for Video Visit: Dilip

## 2024-12-16 ENCOUNTER — VIRTUAL VISIT (OUTPATIENT)
Dept: OTOLARYNGOLOGY | Facility: CLINIC | Age: 64
End: 2024-12-16
Payer: COMMERCIAL

## 2024-12-16 DIAGNOSIS — R49.0 DYSPHONIA: Primary | ICD-10-CM

## 2024-12-16 DIAGNOSIS — R49.9 VOICE AND RESONANCE DISORDER: ICD-10-CM

## 2024-12-16 DIAGNOSIS — F64.0 GENDER DYSPHORIA IN ADULT: ICD-10-CM

## 2024-12-16 PROCEDURE — 92507 TX SP LANG VOICE COMM INDIV: CPT | Mod: GN | Performed by: SPEECH-LANGUAGE PATHOLOGIST

## 2024-12-16 NOTE — LETTER
"12/16/2024       RE: Chris Stockton  [address]  [facility] [address]     Dear Colleague,    Thank you for referring your patient, Chris Stockton, to the [facility] VOICE CLINIC [place] at [facility]. Please see a copy of my visit note below.    Mahin is a 64 year old adult who is being cared for via a billable virtual visit.        The patient/client has been notified and verbally consented to the following statements:   This video visit will be conducted between you and your provider.  If during the course of the call the provider feels a video visit is not appropriate, you will not be charged for this service.    Provider has received verbal consent for billable virtual visit from the patient? Yes    Preferred method for receiving information: [employer] Ath[employer]chshart     Call initiated at: 8 AM  Platform used to conduct today's virtual appointment: AM Well Video  Location of provider: Residence  Location of patient: Residence. 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    Remarkable findings included:  Perceptual evaluation demonstrated:   Roughness: Mild Intermittent  Breathiness: WNL  Strain: WNL  Pitch:  F0/Habitual/Conversational speech: low  Laryngeal exam demonstrated:   Not warranted  Primary complaint of patient today included:   Dysphonia and voice and resonance disorder in the context of gender dysphoria.     Therefore, I recommended a course " "of speech therapy to address these concerns.      SUBJECTIVE:  Since the last appointment, Ms. Stockton reports the following:   Overall she reports that symptoms are stable  She continues to work on her healing from bottom surgery    OBJECTIVE:  Ms. Stockton presents today with the following:  Voice quality:  Roughness: Mild Intermittent  Breathiness: WNL  Strain: WNL  Pitch:  F0/Habitual/Conversational speech: low  Resonance:   Conversational speech:  backward focus of resonance      PATIENT REPORTED MEASURES:  Patient Supplied Answers To SLP QOL Questionnaire       No data to display              Patient Supplied Answers To VHI Questionnaire      5/1/2024     9:57 AM   Voice Handicap Index (VHI-10)   My voice makes it difficult for people to hear me 0   People have difficulty understanding me in a noisy room 0   My voice difficulties restrict my personal and social life.  2   I feel left out of conversations because of my voice 2   My voice problem causes me to lose income 1   I feel as though I have to strain to produce voice 2   The clarity of my voice is unpredictable 0   My voice problem upsets me 3   My voice makes me feel handicapped 2   People ask, \"What's wrong with your voice?\" 0   VHI-10 12       THERAPEUTIC ACTIVITIES  Demonstrated previous exercises.  demonstrated improved technique  appropriate redirection provided  instruction provided for increased level of complexity/difficulty    Exercises to promote optimal respiratory mechanics  With clinician support, patient was able to demonstrate improved abdominal relaxation and engagement on inhalation  Optimal exhalation using inward engagement of the abdominal wall with no corresponding collapse of the upper chest cavity was trained using the pulsed \"sh\" task  [place] a respiratory pacing exercise; this was helpful  acceptable improvement in airflow and respiratory mechanics    Counseling and Education:  Asked many questions about the nature of her symptoms, " and I answered all of these thoroughly.   [place] concepts of post-operative voice use and care, to optimize healing.  A revised regimen for home practice was instructed.  I provided an AVS of today's therapeutic activities to facilitate practice.    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 Ms. Stockton demonstrated acceptable learning of therapeutic modifications to optimize her voice quality.  There is a lot going on currently in the world and with her healing, but she continue to practice when able. I encouraged her to continue to practice with the modifications provided today.      PLAN: I will see Ms. Stockton in 4 weeks, at which point we will continue thearpy.   For practice goals see AVS.     Next Clinic Appt: 1/20  Plan for SLP: reassess progress    TOTAL SERVICE TIME:   Call Initiated at: 8 AM  Call Ended at: 9am           CPT Billing Codes:   TREATMENT OF SPEECH, LANGUAGE, VOICE, COMMUNICATION, and/or AUDITORY PROCESSING DISORDER (32652)    CROW [email or web address], M.M., CCC-SLP  Speech-Language Pathologist  [facility] Trained Vocologist  [facility] Voice [facility]  [email or web address]  Pronouns: she/her      *this report was created in part through the use of computerized dictation software, and though reviewed following completion, some typographic errors may persist.  If there is confusion regarding any of this notes contents, please contact me for clarification            Again, thank you for allowing me to participate in the care of your patient.      Sincerely,    Yessenia Jimenez SLP

## 2024-12-16 NOTE — NURSING NOTE
Current patient location: [address] [place] [id number]    Is the patient currently in the state of [place]? YES    Visit mode:VIDEO    If the visit is dropped, the patient can be reconnected by:VIDEO VISIT: Text to cell phone:   Telephone Information:   Mobile [phone]       Will anyone else be joining the visit? NO  (If patient encounters technical issues they should call [phone]    Are changes needed to the allergy or medication list? No    Are refills needed on medications prescribed by this physician? NO    Rooming Documentation:  Questionnaire(s) completed Patient declined to complete questionnaire(s)    Reason for visit: RECHECK    Cristiane Velazquez VVF

## 2024-12-16 NOTE — PATIENT INSTRUCTIONS
"After Visit Summary    Patient: Liz Stockton  Date of Visit: 12/16/2024    These notes are also available in your MyChart. Please take a few moments to find them under \"Past Appointments\" in the [employer] system.    \"Handouts\" that go along with today's order of activities include (below):     Frequency of practice: 2-3x/day unless marked otherwise    Order of today's appointment:  Take in calming breaths  Move from calming breath to yawn and sighs that finish with different vowels  Have some books (especially children's books) nearby to read 1-2 pages aloud morning, noon, and evening        YOLANDA Chávez, M.M., CCC-SLP  Speech-Language Pathologist - [facility] Voice [facility] Supervisor  [facility] Certified Vocologist  [facility]  [email or web address]  she/her    "

## 2024-12-16 NOTE — PROGRESS NOTES
"Mahin is a 64 year old adult who is being cared for via a billable virtual visit.        The patient/client has been notified and verbally consented to the following statements:   This video visit will be conducted between you and your provider.  If during the course of the call the provider feels a video visit is not appropriate, you will not be charged for this service.    Provider has received verbal consent for billable virtual visit from the patient? Yes    Preferred method for receiving information: Catacelhart     Call initiated at: 8 AM  Platform used to conduct today's virtual appointment: AM Well Video  Location of provider: Residence  Location of patient: Residence. 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    Remarkable findings included:  Perceptual evaluation demonstrated:   Roughness: Mild Intermittent  Breathiness: WNL  Strain: WNL  Pitch:  F0/Habitual/Conversational speech: low  Laryngeal exam demonstrated:   Not warranted  Primary complaint of patient today included:   Dysphonia and voice and resonance disorder in the context of gender dysphoria.     Therefore, I recommended a course of speech therapy to address these concerns.      SUBJECTIVE:  Since the last appointment, Ms. Stockton reports the following:   Overall she reports that symptoms are stable  She continues to work on her healing from bottom surgery    OBJECTIVE:  Ms. Stockton presents today with the following:  Voice " "quality:  Roughness: Mild Intermittent  Breathiness: WNL  Strain: WNL  Pitch:  F0/Habitual/Conversational speech: low  Resonance:   Conversational speech:  backward focus of resonance      PATIENT REPORTED MEASURES:  Patient Supplied Answers To SLP QOL Questionnaire       No data to display              Patient Supplied Answers To VHI Questionnaire      5/1/2024     9:57 AM   Voice Handicap Index (VHI-10)   My voice makes it difficult for people to hear me 0   People have difficulty understanding me in a noisy room 0   My voice difficulties restrict my personal and social life.  2   I feel left out of conversations because of my voice 2   My voice problem causes me to lose income 1   I feel as though I have to strain to produce voice 2   The clarity of my voice is unpredictable 0   My voice problem upsets me 3   My voice makes me feel handicapped 2   People ask, \"What's wrong with your voice?\" 0   VHI-10 12       THERAPEUTIC ACTIVITIES  Demonstrated previous exercises.  demonstrated improved technique  appropriate redirection provided  instruction provided for increased level of complexity/difficulty    Exercises to promote optimal respiratory mechanics  With clinician support, patient was able to demonstrate improved abdominal relaxation and engagement on inhalation  Optimal exhalation using inward engagement of the abdominal wall with no corresponding collapse of the upper chest cavity was trained using the pulsed \"sh\" task  [place] a respiratory pacing exercise; this was helpful  acceptable improvement in airflow and respiratory mechanics    Counseling and Education:  Asked many questions about the nature of her symptoms, and I answered all of these thoroughly.   [place] concepts of post-operative voice use and care, to optimize healing.  A revised regimen for home practice was instructed.  I provided an AVS of today's therapeutic activities to facilitate practice.    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 Ms. Stockton demonstrated acceptable learning of therapeutic modifications to optimize her voice quality.  There is a lot going on currently in the world and with her healing, but she continue to practice when able. I encouraged her to continue to practice with the modifications provided today.      PLAN: I will see Ms. Stockton in 4 weeks, at which point we will continue thearpy.   For practice goals see AVS.     Next Clinic Appt: 1/20  Plan for SLP: reassess progress    TOTAL SERVICE TIME:   Call Initiated at: 8 AM  Call Ended at: 9am           CPT Billing Codes:   TREATMENT OF SPEECH, LANGUAGE, VOICE, COMMUNICATION, and/or AUDITORY PROCESSING DISORDER (59490)    YOLANDA Chávez, M.M., CCC-SLP  Speech-Language Pathologist  [facility] Trained Vocologist  [facility]  [email or web address]  Pronouns: she/her      *this report was created in part through the use of computerized dictation software, and though reviewed following completion, some typographic errors may persist.  If there is confusion regarding any of this notes contents, please contact me for clarification

## 2024-12-23 ENCOUNTER — OFFICE VISIT (OUTPATIENT)
Dept: PLASTIC SURGERY | Facility: CLINIC | Age: 64
End: 2024-12-23
Payer: COMMERCIAL

## 2024-12-23 VITALS
OXYGEN SATURATION: 98 % | DIASTOLIC BLOOD PRESSURE: 69 MMHG | BODY MASS INDEX: 28.79 KG/M2 | HEART RATE: 77 BPM | SYSTOLIC BLOOD PRESSURE: 103 MMHG | HEIGHT: 68 IN | WEIGHT: 190 LBS

## 2024-12-23 DIAGNOSIS — T81.31XD POSTOPERATIVE WOUND DEHISCENCE, SUBSEQUENT ENCOUNTER: ICD-10-CM

## 2024-12-23 DIAGNOSIS — L92.9 GRANULATION TISSUE: ICD-10-CM

## 2024-12-23 DIAGNOSIS — Z98.890 H/O VAGINOPLASTY: Primary | ICD-10-CM

## 2024-12-23 ASSESSMENT — PAIN SCALES - GENERAL: PAINLEVEL_OUTOF10: MODERATE PAIN (4)

## 2024-12-23 NOTE — PROGRESS NOTES
"  SUBJECTIVE:  Liz is a 64 year old who underwent FD vaginoplasty in [place] on 9/3/2024.  Last seen in person on 12/02/24 to check in on healing and discuss RTW plan.   She is here today to discuss her ongoing healing and RTW plan.  She continues to have pain that is keeping her from working more than two hours per day. She continues to limit her work and other activities to minimize added discomfort and pain, but still struggles with skin irritation and pain despite these precautions. She continues to have pain that worsens with prolonged sitting or activity. She continues have bleeding with extended sitting and after post-op maintenance of dilation twice daily. She is also having ongoing moisture from discharge that continues to be constant. This contributes to sensitivity and irritation of her vulva.  She continues to work for 2 hours per day, but this still feels challenging. She no longer has to complete the entire 2 hours of work from bed, but her ability to stand and work is limited to 45 minutes. Sitting directly on her vulva is not an option that she can tolerate without extensive distraction and discomfort. She has an ongoing amount of difficulty focusing due to the discomfort and ongoing healing of her wound dehiscence. She is concerned that her pain is worse as her nerves continue to regrow with healing, because the worsening pain feels like \"heightened nerve sensitivity.\"  She also has ongoing skin irritation from moisture and drainage. She continues to work on finding better pads and other things to help minimize irritation from this, but it contributes to her ongoing hypersensitivity and skin discomfort.    She continues to have a level of pain and fatigue after only 2 hours of work that suggest that returning to 4 hour days of work would be impossible to achieve without worsening of pain, fatigue, and setbacks in her healing. She continues to feel frustrated by the extended healing timeline, " "but after reassurance, she understands that her extended timeline is normal for someone who had extensive wound dehiscence like she did. Liz continues to report ongoing wound dehiscence/open wounds along incisions, which make prolonged pressure on her bottom painful, highly distracting, and aggravating to her overall symptoms. She is still planning to schedule with a pelvic floor PT to explore options for nerve desensitization and other proactive healing strategies.       OBJECTIVE:  /69 (BP Location: Right arm, Patient Position: Standing, Cuff Size: Adult Regular)   Pulse 77   Ht 1.727 m (5' 8\")   Wt 86.2 kg (190 lb)   SpO2 98%   BMI 28.89 kg/m     General: NAD  Respiratory: respiration unlabored  Vulva: vulvar incisions c/d/I except for 1cm area of ongoing wound dehiscence at posterior aspect of canal/ vulva. 2cm tag of granulation tissue at posterior of canal introitus.   Internal exam with lighted clear speculum completed: extensive area of granulation tissue at apex of canal extending to posterior wall of canal  Silver nitrate performed internally and externally      ASSESSMENT/PLAN:  S/P FD vaginoplasty in [place]    Granulation tissue - external tag of granulation tissue and internal granulation tissue at apex of canal. These areas treated with silver nitrate in clinic. We discussed that this granulation tissue is likely contributing to her bleeding and ongoing pain/discomfort. The presence of granulation tissue explains some of the increased bleeding she experiences with sitting/activity. Plan to retreat and evaluate these areas at her next visit. We reviewed that several treatments are often needed to fully treat.    Wound dehiscence - patient reports ongoing wound healing and sensitivity of surrounding tissue/scarred area from previous wound dehiscence  We reviewed that given her extensive post-op wound dehiscence, her entire post-op healing course has been greatly extended. She continues " to make progress, but reassured her that this slower pace is normal given the level of wound dehiscence and other post-op complications she is experiencing.    Nerve hypersensitivity - we discussed that tissue can be hypersensitive while healing, especially after extensive wound dehiscence. Nerve hypersensitivity is not uncommon as nerves regenerate and reconnect during healing. This is often more common with extensive wounds as well, so not unexpected for her. Reassured patient that this usually improves over time. Encouraged trial of pelvic floor PT and possible benefits of this. Patient plans to schedule with pelvic floor PT they saw prior to their surgery.    We discussed trial of a topical barrier cream to help protect her skin from ongoing drainage and moisture.    Ongoing healing necessitating increased leave from work.  Our recommendations would be for Liz to remain at quarter-time work for at least two additional weeks prior to increasing to 4 hour work days/half time.   Liz will continue to work 2 hours per day through [employer], 2025. If symptoms improve enough to allow longer work hours prior to 1/10/2025, Liz will notify myself and her manager about planning to increase hours starting earliest 1/6/2025.  We have an in person appointment to meet on Friday 01/10/2025 to reevaluate her healing, continue to do silver nitrate and other healing treatments as part of ongoing care. We will also reevaluate the RTW plan at that visit.  Liz will keep this team updated of any changes in her symptoms prior to scheduled follow-up.    SAMMIE Razo, CNP    A total of 40 minutes was spent today with patient, reviewing records, completing charting, and other tasks as detailed above.

## 2024-12-23 NOTE — NURSING NOTE
"Chief Complaint   Patient presents with    RECHECK     2 week follow-up       Vitals:    12/23/24 1437   BP: 103/69   BP Location: Right arm   Patient Position: Standing   Cuff Size: Adult Regular   Pulse: 77   SpO2: 98%   Weight: 86.2 kg (190 lb)   Height: 1.727 m (5' 8\")       Body mass index is 28.89 kg/m .    Patient reports moderate vaginal pain (4/10).    Cooper Lockwood, EMT    "

## 2024-12-23 NOTE — LETTER
"12/23/2024       RE: Chris Stockton  [address]  [place] [address]     Dear Colleague,    Thank you for referring your patient, Chris Stockton, to the [facility] [place] [facility]. Please see a copy of my visit note below.      SUBJECTIVE:  Liz is a 64 year old who underwent FD vaginoplasty in [place] on 9/3/2024.  Last seen in person on 12/02/24 to check in on healing and discuss RTW plan.   She is here today to discuss her ongoing healing and RTW plan.  She continues to have pain that is keeping her from working more than two hours per day. She continues to limit her work and other activities to minimize added discomfort and pain, but still struggles with skin irritation and pain despite these precautions. She continues to have pain that worsens with prolonged sitting or activity. She continues have bleeding with extended sitting and after post-op maintenance of dilation twice daily. She is also having ongoing moisture from discharge that continues to be constant. This contributes to sensitivity and irritation of her vulva.  She continues to work for 2 hours per day, but this still feels challenging. She no longer has to complete the entire 2 hours of work from bed, but her ability to stand and work is limited to 45 minutes. Sitting directly on her vulva is not an option that she can tolerate without extensive distraction and discomfort. She has an ongoing amount of difficulty focusing due to the discomfort and ongoing healing of her wound dehiscence. She is concerned that her pain is worse as her nerves continue to regrow with healing, because the worsening pain feels like \"heightened nerve sensitivity.\"  She also has ongoing skin irritation from moisture and drainage. She continues to work on finding better pads and other things to help minimize irritation from this, but it contributes to " "her ongoing hypersensitivity and skin discomfort.    She continues to have a level of pain and fatigue after only 2 hours of work that suggest that returning to 4 hour days of work would be impossible to achieve without worsening of pain, fatigue, and setbacks in her healing. She continues to feel frustrated by the extended healing timeline, but after reassurance, she understands that her extended timeline is normal for someone who had extensive wound dehiscence like she did. Liz continues to report ongoing wound dehiscence/open wounds along incisions, which make prolonged pressure on her bottom painful, highly distracting, and aggravating to her overall symptoms. She is still planning to schedule with a pelvic floor PT to explore options for nerve desensitization and other proactive healing strategies.       OBJECTIVE:  /69 (BP Location: Right arm, Patient Position: Standing, Cuff Size: Adult Regular)   Pulse 77   Ht 1.727 m (5' 8\")   Wt 86.2 kg (190 lb)   SpO2 98%   BMI 28.89 kg/m     General: NAD  Respiratory: respiration unlabored  Vulva: vulvar incisions c/d/I except for 1cm area of ongoing wound dehiscence at posterior aspect of canal/ vulva. 2cm tag of granulation tissue at posterior of canal introitus.   Internal exam with lighted clear speculum completed: extensive area of granulation tissue at apex of canal extending to posterior wall of canal  Silver nitrate performed internally and externally      ASSESSMENT/PLAN:  S/P FD vaginoplasty in [place]    Granulation tissue - external tag of granulation tissue and internal granulation tissue at apex of canal. These areas treated with silver nitrate in clinic. We discussed that this granulation tissue is likely contributing to her bleeding and ongoing pain/discomfort. The presence of granulation tissue explains some of the increased bleeding she experiences with sitting/activity. Plan to retreat and evaluate these areas at her next visit. We " reviewed that several treatments are often needed to fully treat.    Wound dehiscence - patient reports ongoing wound healing and sensitivity of surrounding tissue/scarred area from previous wound dehiscence  We reviewed that given her extensive post-op wound dehiscence, her entire post-op healing course has been greatly extended. She continues to make progress, but reassured her that this slower pace is normal given the level of wound dehiscence and other post-op complications she is experiencing.    Nerve hypersensitivity - we discussed that tissue can be hypersensitive while healing, especially after extensive wound dehiscence. Nerve hypersensitivity is not uncommon as nerves regenerate and reconnect during healing. This is often more common with extensive wounds as well, so not unexpected for her. Reassured patient that this usually improves over time. Encouraged trial of pelvic floor PT and possible benefits of this. Patient plans to schedule with pelvic floor PT they saw prior to their surgery.    We discussed trial of a topical barrier cream to help protect her skin from ongoing drainage and moisture.    Ongoing healing necessitating increased leave from work.  Our recommendations would be for Liz to remain at quarter-time work for at least two additional weeks prior to increasing to 4 hour work days/half time.   Liz will continue to work 2 hours per day through [employer], 2025. If symptoms improve enough to allow longer work hours prior to 1/10/2025, Liz will notify myself and her manager about planning to increase hours starting earliest 1/6/2025.  We have an in person appointment to meet on Friday 01/10/2025 to reevaluate her healing, continue to do silver nitrate and other healing treatments as part of ongoing care. We will also reevaluate the RTW plan at that visit.  Liz will keep this team updated of any changes in her symptoms prior to scheduled follow-up.    Yeimi Brown, APRN, CNP    A total  of 40 minutes was spent today with patient, reviewing records, completing charting, and other tasks as detailed above.       Again, thank you for allowing me to participate in the care of your patient.      Sincerely,    SAMMIE Calderon CNP

## 2024-12-24 ENCOUNTER — VIRTUAL VISIT (OUTPATIENT)
Dept: PSYCHIATRY | Facility: CLINIC | Age: 64
End: 2024-12-24
Attending: NURSE PRACTITIONER
Payer: COMMERCIAL

## 2024-12-24 DIAGNOSIS — F41.9 ANXIETY: ICD-10-CM

## 2024-12-24 DIAGNOSIS — F84.0 AUTISM: ICD-10-CM

## 2024-12-24 DIAGNOSIS — F32.A DEPRESSION, UNSPECIFIED DEPRESSION TYPE: Primary | ICD-10-CM

## 2024-12-24 ASSESSMENT — PAIN SCALES - GENERAL: PAINLEVEL_OUTOF10: MILD PAIN (2)
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
## 2024-12-24 NOTE — NURSING NOTE
Current patient location: [address] [place] [address]    Is the patient currently in the state of [place]? YES    Visit mode:VIDEO    If the visit is dropped, the patient can be reconnected by:VIDEO VISIT: Text to cell phone:   Telephone Information:   Mobile [phone]       Will anyone else be joining the visit? NO  (If patient encounters technical issues they should call [phone]    Are changes needed to the allergy or medication list? No    Are refills needed on medications prescribed by this physician? NO    Rooming Documentation:  Patient declined to complete questionnaire(s)    Reason for visit: RECHECK    Priya SHAFFER

## 2024-12-24 NOTE — PATIENT INSTRUCTIONS
-Continue on current medication regimen.    Your next appointment is scheduled on 3/11/2025 (Tue) at 8am. This is scheduled as an in-person appointment.      **For crisis resources, please see the information at the end of this document**   Patient Education    Thank you for coming to the [facility] [place] [facility].     Lab Testing:  If you had lab testing today and your results are reassuring or normal they will be mailed to you or sent through [employer] within 7 days. If the lab tests need quick action we will call you with the results. The phone number we will call with results is # [phone]. If this is not the best number please call our clinic and change the number.     Medication Refills:  If you need any refills please call your pharmacy and they will contact us. Our fax number for refills is [phone].   Three business days of notice are needed for general medication refill requests.   Five business days of notice are needed for controlled substance refill requests.   If you need to change to a different pharmacy, please contact the new pharmacy directly. The new pharmacy will help you get your medications transferred.     Contact Us:  Please call [phone] during business hours (8-5:00 M-F).   If you have medication related questions after clinic hours, or on the weekend, please call [phone].     Financial Assistance [phone]   Medical Records [phone]       MENTAL HEALTH CRISIS RESOURCES:  For a emergency help, please call 911 or go to the nearest Emergency Department.     Emergency Walk-In Options:   Em[facility] Unit @ [facility] ([place]): [phone] - Specialized mental health emergency area designed to be calming  [facility] ([place]): [phone]  [facility] ([place]): [phone]  [facility] ([place]): [phone]    [facility] Crisis Information:   [place]: [phone]  Fabio:  [phone]  Eliseo (COPE) - Adult: [phone]     Child: [phone]  Junior - Adult: [phone]     Child: [phone]  [place]: [phone]  List of all [employer] resources:   https://[place].gov/dhs/people-we-serve/adults/health-care/mental-health/resources/crisis-contacts.jsp    National Crisis Information:   Crisis Text Line: Text  [place]  to 317078  Suicide & Crisis Lifeline: 988  National Suicide Prevention Lifeline: 3-220-813-TALK (1-585.367.2357)       For online chat options, visit https://suicidepreventionlifeline.org/chat/  Poison Control Center: 1-684.297.3584  Trans Lifeline: 1-832.659.2934 - Hotline for transgender people of all ages  The [employer]: 4-069-948-1264 - Hotline for LGBT youth     For Non-Emergency Support:   Fast Tracker: Mental Health & Substance Use Disorder Resources -   https://www.[employer].org/

## 2024-12-27 ENCOUNTER — TELEPHONE (OUTPATIENT)
Dept: FAMILY MEDICINE | Facility: CLINIC | Age: 64
End: 2024-12-27
Payer: COMMERCIAL

## 2024-12-27 NOTE — TELEPHONE ENCOUNTER
Prior Authorization Retail Medication Request    Medication/Dose: estradiol (VIVELLE-DOT) 0.1 MG/24HR bi-weekly patch, Place 3 patches twice a week     Diagnosis and ICD code (if different than what is on RX):    New/renewal/insurance change PA/secondary ins. PA:  Previously Tried and Failed:    Rationale:      Insurance   Primary: [employer]   Insurance ID:  [id number]     Secondary (if applicable):  Insurance ID:      Pharmacy Information (if different than what is on RX)  Name:    Phone:    Fax:    Clinic Information  Preferred routing pool for dept communication: keisha tracy prior auth

## 2024-12-30 NOTE — TELEPHONE ENCOUNTER
Central Prior Authorization Team   Phone: [phone]    PA Initiation    Medication: estradiol (VIVELLE-DOT) 0.1 MG/24HR bi-weekly patch  Insurance Company: [employer] Non-Specialty PA's - Phone [phone] Fax [phone]  Pharmacy Filling the Rx: [id number] [place], [place] - [address]  Filling Pharmacy Phone: [phone]  Filling Pharmacy Fax:    Start Date: 12/30/2024

## 2025-01-02 NOTE — TELEPHONE ENCOUNTER
Prior Authorization Approval          Authorization Effective Date: 11/30/2024  Authorization Expiration Date: 12/30/2025  Medication: estradiol (VIVELLE-DOT) 0.1 MG/24HR bi-weekly patch-PA APPROVED   Approved Dose/Quantity:   Reference #:     Insurance Company: [employer] Non-Specialty PA's - Phone [phone] Fax [phone]  Expected CoPay:       CoPay Card Available:      Foundation Assistance Needed:    Which Pharmacy is filling the prescription (Not needed for infusion/clinic administered): [id number] [place] [facility], [place] - [address] [place] [address]  Pharmacy Notified:  Yes- **Instructed pharmacy to notify patient when script is ready to /ship.**  Patient Notified:  Yes

## 2025-01-20 ENCOUNTER — VIRTUAL VISIT (OUTPATIENT)
Dept: OTOLARYNGOLOGY | Facility: CLINIC | Age: 65
End: 2025-01-20
Payer: COMMERCIAL

## 2025-01-20 DIAGNOSIS — R49.9 VOICE AND RESONANCE DISORDER: ICD-10-CM

## 2025-01-20 DIAGNOSIS — R49.0 DYSPHONIA: Primary | ICD-10-CM

## 2025-01-20 DIAGNOSIS — F64.0 GENDER DYSPHORIA IN ADULT: ICD-10-CM

## 2025-01-20 NOTE — LETTER
"1/20/2025       RE: Chris Stockton  [address]  [facility] [address]     Dear Colleague,    Thank you for referring your patient, Chris Stockton, to the [facility] [place] [facility]. Please see a copy of my visit note below.    Mahin is a 64 year old adult who is being cared for via a billable virtual visit.        The patient/client has been notified and verbally consented to the following statements:   This video visit will be conducted between you and your provider.  If during the course of the call the provider feels a video visit is not appropriate, you will not be charged for this service.    Provider has received verbal consent for billable virtual visit from the patient? Yes    Preferred method for receiving information: [employer]     Call initiated at: 3 PM  Platform used to conduct today's virtual appointment: AM Well Video  Location of provider: Residence  Location of patient: Residence. 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    Remarkable findings included:  Perceptual evaluation demonstrated:   Roughness: Mild Intermittent  Breathiness: WNL  Strain: WNL  Pitch:  F0/Habitual/Conversational speech: low  Laryngeal exam demonstrated:   Not warranted  Primary complaint of patient today included:   Dysphonia and voice and resonance disorder in the context of gender dysphoria.     Therefore, I recommended a course of " "speech therapy to address these concerns.       SUBJECTIVE:  Since the last appointment, Ms. Stockton reports the following:   Overall she reports that symptoms are stable  She is supporting her family.    OBJECTIVE:  Ms. Stockton presents today with the following:  Voice quality:  Roughness: Mild Intermittent  Breathiness: WNL  Strain: WNL  Pitch:  F0/Habitual/Conversational speech: low  Resonance:   Conversational speech:  backward focus of resonance      PATIENT REPORTED MEASURES:  Patient Supplied Answers To SLP QOL Questionnaire       No data to display              Patient Supplied Answers To VHI Questionnaire      5/1/2024     9:57 AM   Voice Handicap Index (VHI-10)   My voice makes it difficult for people to hear me 0   People have difficulty understanding me in a noisy room 0   My voice difficulties restrict my personal and social life.  2   I feel left out of conversations because of my voice 2   My voice problem causes me to lose income 1   I feel as though I have to strain to produce voice 2   The clarity of my voice is unpredictable 0   My voice problem upsets me 3   My voice makes me feel handicapped 2   People ask, \"What's wrong with your voice?\" 0   VHI-10 12       THERAPEUTIC ACTIVITIES  Demonstrated previous exercises.  demonstrated improved technique  appropriate redirection provided  instruction provided for increased level of complexity/difficulty    Exercises to promote optimal respiratory mechanics  With clinician support, patient was able to demonstrate improved abdominal relaxation and engagement on inhalation  Optimal exhalation using inward engagement of the abdominal wall with no corresponding collapse of the upper chest cavity was trained using the pulsed \"sh\" task  Worked with singign to optimize coordination between breath flow and phonation.  acceptable improvement in airflow and respiratory mechanics     Counseling and Education:  Asked many questions about the nature of her symptoms, and " I answered all of these thoroughly.          Axis concepts of post-operative voice use and care, to optimize healing.  A revised regimen for home practice was instructed.  I provided an AVS of today's therapeutic activities to facilitate practice.     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 Ms. Stockton demonstrated acceptable learning of therapeutic modifications to optimize her voice quality.  There is a lot going on currently in the world and with her healing, but she continue to practice when able. I encouraged her to continue to practice with the modifications provided today.      PLAN: I will see Ms. Stockton in 4 weeks, at which point we will continue thearpy.   For practice goals see AVS.     Next Clinic Appt: 2/26    TOTAL SERVICE TIME:   Call Initiated at: 3PM  Call Ended at: 4p           CPT Billing Codes:   TREATMENT OF SPEECH, LANGUAGE, VOICE, COMMUNICATION, and/or AUDITORY PROCESSING DISORDER (03366)    CROW [email or web address], M.M., CCC-SLP  Speech-Language Pathologist  [facility] Trained Vocologist  [facility] Voice [facility]  [email or web address]  Pronouns: she/her      *this report was created in part through the use of computerized dictation software, and though reviewed following completion, some typographic errors may persist.  If there is confusion regarding any of this notes contents, please contact me for clarification            Again, thank you for allowing me to participate in the care of your patient.      Sincerely,    Yessenia Jimenez SLP

## 2025-01-20 NOTE — PROGRESS NOTES
"Mahin is a 64 year old adult who is being cared for via a billable virtual visit.        The patient/client has been notified and verbally consented to the following statements:   This video visit will be conducted between you and your provider.  If during the course of the call the provider feels a video visit is not appropriate, you will not be charged for this service.    Provider has received verbal consent for billable virtual visit from the patient? Yes    Preferred method for receiving information: Naubohart     Call initiated at: 3 PM  Platform used to conduct today's virtual appointment: AM Elías Video  Location of provider: Residence  Location of patient: Residence. 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    Remarkable findings included:  Perceptual evaluation demonstrated:   Roughness: Mild Intermittent  Breathiness: WNL  Strain: WNL  Pitch:  F0/Habitual/Conversational speech: low  Laryngeal exam demonstrated:   Not warranted  Primary complaint of patient today included:   Dysphonia and voice and resonance disorder in the context of gender dysphoria.     Therefore, I recommended a course of speech therapy to address these concerns.       SUBJECTIVE:  Since the last appointment, Ms. Stockton reports the following:   Overall she reports that symptoms are stable  She is supporting her family.    OBJECTIVE:  Ms. Stockton presents today with the following:  Voice quality:  Roughness: Mild " "Intermittent  Breathiness: WNL  Strain: WNL  Pitch:  F0/Habitual/Conversational speech: low  Resonance:   Conversational speech:  backward focus of resonance      PATIENT REPORTED MEASURES:  Patient Supplied Answers To SLP QOL Questionnaire       No data to display              Patient Supplied Answers To VHI Questionnaire      5/1/2024     9:57 AM   Voice Handicap Index (VHI-10)   My voice makes it difficult for people to hear me 0   People have difficulty understanding me in a noisy room 0   My voice difficulties restrict my personal and social life.  2   I feel left out of conversations because of my voice 2   My voice problem causes me to lose income 1   I feel as though I have to strain to produce voice 2   The clarity of my voice is unpredictable 0   My voice problem upsets me 3   My voice makes me feel handicapped 2   People ask, \"What's wrong with your voice?\" 0   VHI-10 12       THERAPEUTIC ACTIVITIES  Demonstrated previous exercises.  demonstrated improved technique  appropriate redirection provided  instruction provided for increased level of complexity/difficulty    Exercises to promote optimal respiratory mechanics  With clinician support, patient was able to demonstrate improved abdominal relaxation and engagement on inhalation  Optimal exhalation using inward engagement of the abdominal wall with no corresponding collapse of the upper chest cavity was trained using the pulsed \"sh\" task  Worked with singign to optimize coordination between breath flow and phonation.  acceptable improvement in airflow and respiratory mechanics     Counseling and Education:  Asked many questions about the nature of her symptoms, and I answered all of these thoroughly.          Spaulding concepts of post-operative voice use and care, to optimize healing.  A revised regimen for home practice was instructed.  I provided an AVS of today's therapeutic activities to facilitate practice.     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 Ms. Stockton demonstrated acceptable learning of therapeutic modifications to optimize her voice quality.  There is a lot going on currently in the world and with her healing, but she continue to practice when able. I encouraged her to continue to practice with the modifications provided today.      PLAN: I will see Ms. Stockton in 4 weeks, at which point we will continue thearpy.   For practice goals see AVS.     Next Clinic Appt: 2/26    TOTAL SERVICE TIME:   Call Initiated at: 3PM  Call Ended at: 4p           CPT Billing Codes:   TREATMENT OF SPEECH, LANGUAGE, VOICE, COMMUNICATION, and/or AUDITORY PROCESSING DISORDER (45392)    YOLANDA Chávez, M.M., CCC-SLP  Speech-Language Pathologist  [facility] Trained Vocologist  [facility]  [email or web address]  Pronouns: she/her      *this report was created in part through the use of computerized dictation software, and though reviewed following completion, some typographic errors may persist.  If there is confusion regarding any of this notes contents, please contact me for clarification

## 2025-01-20 NOTE — PATIENT INSTRUCTIONS
"After Visit Summary    Patient: Liz Stockton  Date of Visit: 1/20/2025    These notes are also available in your MyChart. Please take a few moments to find them under \"Past Appointments\" in the [employer] system.    \"Handouts\" that go along with today's order of activities include (below):     Frequency of practice: 2-3x/day unless marked otherwise    Order of today's appointment:  Buzzy \"zzz\"  Sing: Sandy Hand a little garcia  Pick songs that you can practice with your \"girl voice\"  \"Flower Marisabel\" - (Good ranjith  Free to be you and me -   Jeremias Perez + [place] of friends:   https://www.youtube.com/[email or web address]?v=[email or web address]        YOLANDA Chávez, M.M., CCC-SLP  Speech-Language Pathologist - [facility] Supervisor  [facility] Certified Vocologist  [facility]  [email or web address]  she/her    "

## 2025-01-21 ENCOUNTER — MYC MEDICAL ADVICE (OUTPATIENT)
Dept: FAMILY MEDICINE | Facility: CLINIC | Age: 65
End: 2025-01-21
Payer: COMMERCIAL

## 2025-02-05 DIAGNOSIS — K22.2 ESOPHAGEAL STRICTURE: ICD-10-CM

## 2025-02-05 DIAGNOSIS — E78.5 HYPERLIPIDEMIA LDL GOAL <100: ICD-10-CM

## 2025-02-05 RX ORDER — OMEPRAZOLE 20 MG/1
CAPSULE, DELAYED RELEASE ORAL
Qty: 90 CAPSULE | Refills: 1 | Status: SHIPPED | OUTPATIENT
Start: 2025-02-05

## 2025-02-05 RX ORDER — ROSUVASTATIN CALCIUM 10 MG/1
10 TABLET, COATED ORAL DAILY
Qty: 90 TABLET | Refills: 1 | Status: SHIPPED | OUTPATIENT
Start: 2025-02-05

## 2025-02-05 NOTE — TELEPHONE ENCOUNTER
"Request for medication refill:    omeprazole (PRILOSEC) 20 MG DR capsule       rosuvastatin (CRESTOR) 10 MG tablet     Providers if patient needs an appointment and you are willing to give a one month supply please refill for one month and  send a letter/MyChart using \".SMILLIMITEDREFILL\" .smillimited and route chart to \"[facility] \" (Giving one month refill in non controlled medications is strongly recommended before denial)    If refill has been denied, meaning absolutely no refills without visit, please complete the smart phrase \".smirxrefuse\" and route it to the \"[facility] MED REFILLS\"  pool to inform the patient and the pharmacy.    Carol Dye MA     "

## 2025-03-11 ENCOUNTER — VIRTUAL VISIT (OUTPATIENT)
Dept: PSYCHIATRY | Facility: CLINIC | Age: 65
End: 2025-03-11
Attending: NURSE PRACTITIONER
Payer: COMMERCIAL

## 2025-03-11 DIAGNOSIS — F32.A DEPRESSION, UNSPECIFIED DEPRESSION TYPE: ICD-10-CM

## 2025-03-11 DIAGNOSIS — F90.2 ATTENTION DEFICIT HYPERACTIVITY DISORDER (ADHD), COMBINED TYPE: ICD-10-CM

## 2025-03-11 RX ORDER — BUPROPION HYDROCHLORIDE 100 MG/1
100 TABLET, EXTENDED RELEASE ORAL DAILY
Qty: 90 TABLET | Refills: 1 | Status: SHIPPED | OUTPATIENT
Start: 2025-03-11
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
## 2025-03-11 NOTE — NURSING NOTE
Current patient location: [address] [place] [id number]    Is the patient currently in the state of [place]? YES    Visit mode: VIDEO    If the visit is dropped, the patient can be reconnected by:VIDEO VISIT: Text to cell phone:   Telephone Information:   Mobile [phone]       Will anyone else be joining the visit? NO  (If patient encounters technical issues they should call [phone]    Are changes needed to the allergy or medication list? Pt stated no changes to allergies and Pt stated no med changes    Are refills needed on medications prescribed by this physician? Discuss with provider    Rooming Documentation:  Questionnaire(s) completed    Reason for visit: [place] SHAFFER

## 2025-03-11 NOTE — PATIENT INSTRUCTIONS
-Continue on current medication regimen.    Your next appointment is scheduled on 5/14/2025 (Wed) at 8am.      **For crisis resources, please see the information at the end of this document**   Patient Education    Thank you for coming to the [facility] [place] [facility].     Lab Testing:  If you had lab testing today and your results are reassuring or normal they will be mailed to you or sent through [employer] within 7 days. If the lab tests need quick action we will call you with the results. The phone number we will call with results is # [phone]. If this is not the best number please call our clinic and change the number.     Medication Refills:  If you need any refills please call your pharmacy and they will contact us. Our fax number for refills is [phone].   Three business days of notice are needed for general medication refill requests.   Five business days of notice are needed for controlled substance refill requests.   If you need to change to a different pharmacy, please contact the new pharmacy directly. The new pharmacy will help you get your medications transferred.     Contact Us:  Please call [phone] during business hours (8-5:00 M-F).   If you have medication related questions after clinic hours, or on the weekend, please call [phone].     Financial Assistance [phone]   Medical Records [phone]       MENTAL HEALTH CRISIS RESOURCES:  For a emergency help, please call 911 or go to the nearest Emergency Department.     Emergency Walk-In Options:   EmPATH Unit @ [facility] ([place]): [phone] - Specialized mental health emergency area designed to be calming  [facility] ([place]): [phone]  [facility] ([place]): [phone]  [facility]): [phone]    [facility] Crisis Information:   [place]: [phone]  [place]: [phone]  [place] ([place]) - Adult:  [phone]     Child: [phone]  [place] - Adult: [phone]     Child: [phone]  [place]: [phone]  List of all [facility] resources:   https://[place].gov/dhs/people-we-serve/adults/health-care/mental-health/resources/crisis-contacts.jsp    National Crisis Information:   Crisis Text Line: Text  [place]  to 252532  Suicide & Crisis Lifeline: 988  National Suicide Prevention Lifeline: 2-579-820-TALK (1-976.756.2361)       For online chat options, visit https://suicidepreventionlifeline.org/chat/  Poison Control Center: 1-377.978.1550  Trans Lifeline: 1-648.354.7245 - Hotline for transgender people of all ages  The [employer]: 8-021-637-7673 - Hotline for LGBT youth     For Non-Emergency Support:   Fast Tracker: Mental Health & Substance Use Disorder Resources -   https://www.[employer].org/

## 2025-03-19 ENCOUNTER — VIRTUAL VISIT (OUTPATIENT)
Dept: OTOLARYNGOLOGY | Facility: CLINIC | Age: 65
End: 2025-03-19
Payer: COMMERCIAL

## 2025-03-19 DIAGNOSIS — R49.0 DYSPHONIA: Primary | ICD-10-CM

## 2025-03-19 DIAGNOSIS — R49.9 VOICE AND RESONANCE DISORDER: ICD-10-CM

## 2025-03-19 DIAGNOSIS — F64.0 GENDER DYSPHORIA IN ADULT: ICD-10-CM

## 2025-03-19 NOTE — LETTER
"3/19/2025       RE: Chris Stockton  [address]  [facility] [address]     Dear Colleague,    Thank you for referring your patient, Chris Stockton, to the [facility] [place] [facility]. Please see a copy of my visit note below.    Singign free to be   C3 - 1,2,3,2,1 - my oh my and 1-3-1 - the patterns were difficult  Pauses to focus on the balloon - voice dysphoria  Adding nasality was helpful  Talked about italians smiling  Free to be you and me a few times, and added pitch and resonance with different trials.   Pick a new kavita Stockton is a 64 year old adult who is being cared for via a billable virtual visit.        The patient/client has been notified and verbally consented to the following statements:   This video visit will be conducted between you and your provider.  If during the course of the call the provider feels a video visit is not appropriate, you will not be charged for this service.    Provider has received verbal consent for billable virtual visit from the patient? Yes    Preferred method for receiving information: Simplilearnt     Call initiated at: 1 PM  Platform used to conduct today's virtual appointment: AM Well Video  Location of provider: Residence  Location of patient: Residence. 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    Remarkable findings included:  Perceptual evaluation demonstrated:   Roughness: Mild " "Intermittent  Breathiness: WNL  Strain: WNL  Pitch:  F0/Habitual/Conversational speech: low  Laryngeal exam demonstrated:   Not warranted  Primary complaint of patient today included:   Dysphonia and voice and resonance disorder in the context of gender dysphoria.     Therefore, I recommended a course of speech therapy to address these concerns.    SUBJECTIVE:  Since the last appointment, Ms. Stockton reports the following:   Overall she reports that symptoms are stable  OBJECTIVE:  Ms. Stockton presents today with the following:  Voice quality:  Roughness: Mild Intermittent  Breathiness: WNL  Strain: WNL  Pitch:  F0/Habitual/Conversational speech: low  Resonance:   Conversational speech:  backward focus of resonance    PATIENT REPORTED MEASURES:  Patient Supplied Answers To SLP QOL Questionnaire       No data to display              Patient Supplied Answers To VHI Questionnaire      3/19/2025     1:00 PM   Voice Handicap Index (VHI-10)   My voice makes it difficult for people to hear me 4   People have difficulty understanding me in a noisy room 4   My voice difficulties restrict my personal and social life.  4   I feel left out of conversations because of my voice 4   My voice problem causes me to lose income 4   I feel as though I have to strain to produce voice 4   The clarity of my voice is unpredictable 4   My voice problem upsets me 4   My voice makes me feel handicapped 4   People ask, \"What's wrong with your voice?\" 4   VHI-10 40        Patient-reported     Patient Supplied Answers To CSI Questionnaire       No data to display              THERAPEUTIC ACTIVITIES  Demonstrated previous exercises.  demonstrated improved technique  appropriate redirection provided  instruction provided for increased level of complexity/difficulty    Instructed in techniques to improve length of utterance with reduced effort for optimal carryover.  Instructed with a familiar song in a comfortable range; this was helpful.  "    Recommended that she find another activity.  Breaks needed.  The use of a balloon was helpful to calm.  Developed a mental checklist of factors to help trouble shoot moments of difficulty during daily speaking tasks.    Counseling and Education:  Asked many questions about the nature of her symptoms, and I answered all of these thoroughly.   [place] concepts of post-operative voice use and care, to optimize healing.  A revised regimen for home practice was instructed.    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 Ms. Stockton demonstrated good learning of therapeutic activites    PLAN: I will see Ms. Stockton in 2 weeks, at which point we will continue therapy.   For practice goals see AVS.     Next Clinic Appt: 4/7    TOTAL SERVICE TIME:   Call Initiated at: 1 PM  Call Ended at: 2pm           CPT Billing Codes:   TREATMENT OF SPEECH, LANGUAGE, VOICE, COMMUNICATION, and/or AUDITORY PROCESSING DISORDER (72264)    CROW Chávez.RANDY, M.M., CCC-SLP  Speech-Language Pathologist  [facility] Trained Vocologist  [facility]  [email or web address]  Pronouns: she/her      *this report was created in part through the use of computerized dictation software, and though reviewed following completion, some typographic errors may persist.  If there is confusion regarding any of this notes contents, please contact me for clarification            Again, thank you for allowing me to participate in the care of your patient.      Sincerely,    Yessenia Jimenez SLP

## 2025-03-19 NOTE — PROGRESS NOTES
"Singign free to be   C3 - 1,2,3,2,1 - my oh my and 1-3-1 - the patterns were difficult  Pauses to focus on the balloon - voice dysphoria  Adding nasality was helpful  Talked about italians smiling  Free to be you and me a few times, and added pitch and resonance with different trials.   Pick a new kavita Stockton is a 64 year old adult who is being cared for via a billable virtual visit.        The patient/client has been notified and verbally consented to the following statements:   This video visit will be conducted between you and your provider.  If during the course of the call the provider feels a video visit is not appropriate, you will not be charged for this service.    Provider has received verbal consent for billable virtual visit from the patient? Yes    Preferred method for receiving information: [place]     Call initiated at: 1 PM  Platform used to conduct today's virtual appointment: AM Well Video  Location of provider: Residence  Location of patient: Residence. 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    Remarkable findings included:  Perceptual evaluation demonstrated:   Roughness: Mild Intermittent  Breathiness: WNL  Strain: WNL  Pitch:  F0/Habitual/Conversational speech: low  Laryngeal exam demonstrated:   Not warranted  Primary complaint of patient today included:   Dysphonia and voice and resonance disorder in the context of gender dysphoria.     Therefore, I recommended a course of " "speech therapy to address these concerns.    SUBJECTIVE:  Since the last appointment, Ms. Stockton reports the following:   Overall she reports that symptoms are stable  OBJECTIVE:  Ms. Stockton presents today with the following:  Voice quality:  Roughness: Mild Intermittent  Breathiness: WNL  Strain: WNL  Pitch:  F0/Habitual/Conversational speech: low  Resonance:   Conversational speech:  backward focus of resonance    PATIENT REPORTED MEASURES:  Patient Supplied Answers To SLP QOL Questionnaire       No data to display              Patient Supplied Answers To VHI Questionnaire      3/19/2025     1:00 PM   Voice Handicap Index (VHI-10)   My voice makes it difficult for people to hear me 4   People have difficulty understanding me in a noisy room 4   My voice difficulties restrict my personal and social life.  4   I feel left out of conversations because of my voice 4   My voice problem causes me to lose income 4   I feel as though I have to strain to produce voice 4   The clarity of my voice is unpredictable 4   My voice problem upsets me 4   My voice makes me feel handicapped 4   People ask, \"What's wrong with your voice?\" 4   VHI-10 40        Patient-reported     Patient Supplied Answers To CSI Questionnaire       No data to display              THERAPEUTIC ACTIVITIES  Demonstrated previous exercises.  demonstrated improved technique  appropriate redirection provided  instruction provided for increased level of complexity/difficulty    Instructed in techniques to improve length of utterance with reduced effort for optimal carryover.  Instructed with a familiar song in a comfortable range; this was helpful.     Recommended that she find another activity.  Breaks needed.  The use of a balloon was helpful to calm.  Developed a mental checklist of factors to help trouble shoot moments of difficulty during daily speaking tasks.    Counseling and Education:  Asked many questions about the nature of her symptoms, and I " answered all of these thoroughly.   [place] concepts of post-operative voice use and care, to optimize healing.  A revised regimen for home practice was instructed.    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 Ms. Stockton demonstrated good learning of therapeutic activites    PLAN: I will see Ms. Stockton in 2 weeks, at which point we will continue therapy.   For practice goals see AVSLoida     Next Clinic Appt: 4/7    TOTAL SERVICE TIME:   Call Initiated at: 1 PM  Call Ended at: 2pm           CPT Billing Codes:   TREATMENT OF SPEECH, LANGUAGE, VOICE, COMMUNICATION, and/or AUDITORY PROCESSING DISORDER (11836)    YOLANDA Chávez, M.M., CCC-SLP  Speech-Language Pathologist  [facility] Trained Vocologist  [facility]  [email or web address]  Pronouns: she/her      *this report was created in part through the use of computerized dictation software, and though reviewed following completion, some typographic errors may persist.  If there is confusion regarding any of this notes contents, please contact me for clarification

## 2025-03-26 NOTE — PATIENT INSTRUCTIONS
"Daily practice:     - \"Free to be you and me\" - Sing along 1-2x    - Pick one new song and practice daily.     - Read aloud while practicing an elevated pitch.      CROW Chávez.RANDY MLoidaMLoida (voice), [facility]-SLP  Speech-Language Pathologist & Voice SLP Supervisor  SAMANTHAI Trained Vocologist  [facility]  [email or web address]  she/her  [email or web address]  MyChart is best communication    "

## 2025-03-28 ENCOUNTER — TELEPHONE (OUTPATIENT)
Dept: FAMILY MEDICINE | Facility: CLINIC | Age: 65
End: 2025-03-28
Payer: COMMERCIAL

## 2025-03-28 DIAGNOSIS — N63.0 MASS OF BREAST, UNSPECIFIED LATERALITY: Primary | ICD-10-CM

## 2025-03-28 NOTE — TELEPHONE ENCOUNTER
"[facility] phone call message - order or referral request for patient:     Order or referral being requested: Diagnostic Mammogram    Additional Comments: Pt was scheduling a screening mammogram, asked about \"pain, lumps, or breast problems\" to which the pt said \"yes\". Was told by  to call PCP and get referral changed to a diagnostic mammogram so there can be a more in depth screening due to symptoms.    OK to leave a message on voice mail? Yes    Primary language: English      needed? No    Call taken on March 28, 2025 at 2:22 PM by Mart Foote   "

## 2025-03-31 NOTE — TELEPHONE ENCOUNTER
3/31/25 Forwarding message to  for possible referral change.      Marion Brantley  Lead Care Coordinator  [facility]  [phone]

## 2025-04-01 ENCOUNTER — ANCILLARY ORDERS (OUTPATIENT)
Dept: MAMMOGRAPHY | Facility: CLINIC | Age: 65
End: 2025-04-01
Payer: COMMERCIAL

## 2025-04-01 DIAGNOSIS — N63.0 MASS OF BREAST, UNSPECIFIED LATERALITY: Primary | ICD-10-CM

## 2025-04-01 NOTE — TELEPHONE ENCOUNTER
"4/1/25 Care Coordinator attempted to reach out to the patient, informing her that  did update the order to state \"diagnostic mammogram\". CC did leave this message on patient voicemail, ok per patient message.      Marion Brantley  Lead Care Coordinator  [facility]  [phone]   "

## 2025-04-07 ENCOUNTER — VIRTUAL VISIT (OUTPATIENT)
Dept: OTOLARYNGOLOGY | Facility: CLINIC | Age: 65
End: 2025-04-07
Payer: COMMERCIAL

## 2025-04-07 DIAGNOSIS — F64.0 GENDER DYSPHORIA IN ADULT: ICD-10-CM

## 2025-04-07 DIAGNOSIS — R49.0 DYSPHONIA: Primary | ICD-10-CM

## 2025-04-07 DIAGNOSIS — R49.9 VOICE AND RESONANCE DISORDER: ICD-10-CM

## 2025-04-07 PROCEDURE — 92507 TX SP LANG VOICE COMM INDIV: CPT | Mod: GN | Performed by: SPEECH-LANGUAGE PATHOLOGIST

## 2025-04-07 NOTE — PROGRESS NOTES
"Mahin is a 64 year old adult who is being cared for via a billable virtual visit.        The patient/client has been notified and verbally consented to the following statements:   This video visit will be conducted between you and your provider.  If during the course of the call the provider feels a video visit is not appropriate, you will not be charged for this service.    Provider has received verbal consent for billable virtual visit from the patient? Yes    Preferred method for receiving information: Yangaroohart     Call initiated at: 3 PM  Platform used to conduct today's virtual appointment: AM Elías Video  Location of provider: Residence  Location of patient: Residence. State: [place]  # of Visits: 10  # of Therapy sessions: 10     Benefit & Certification period: n/a      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    Remarkable findings included:  Perceptual evaluation demonstrated:   Roughness: Mild Intermittent  Breathiness: WNL  Strain: WNL  Pitch:  F0/Habitual/Conversational speech: low  Laryngeal exam demonstrated:   Not warranted  Primary complaint of patient today included:   Dysphonia and voice and resonance disorder in the context of gender dysphoria.     Therefore, I recommended a course of speech therapy to address these concerns.       SUBJECTIVE:  Since the last appointment, Ms. Stockton reports the following:   Overall she reports that symptoms are stable    OBJECTIVE:  Ms. Stockton presents today with the following:  Voice quality:  Roughness: Mild Intermittent  Breathiness: WNL  Strain: " "WNL  Pitch:  F0/Habitual/Conversational speech: low  Resonance:   Conversational speech:  backward focus of resonance      PATIENT REPORTED MEASURES:  Patient Supplied Answers To SLP QOL Questionnaire       No data to display              Patient Supplied Answers To VHI Questionnaire      3/19/2025     1:00 PM   Voice Handicap Index (VHI-10)   My voice makes it difficult for people to hear me 4   People have difficulty understanding me in a noisy room 4   My voice difficulties restrict my personal and social life.  4   I feel left out of conversations because of my voice 4   My voice problem causes me to lose income 4   I feel as though I have to strain to produce voice 4   The clarity of my voice is unpredictable 4   My voice problem upsets me 4   My voice makes me feel handicapped 4   People ask, \"What's wrong with your voice?\" 4   VHI-10 40        Patient-reported     Patient Supplied Answers To CSI Questionnaire       No data to display                THERAPEUTIC ACTIVITIES  Demonstrated previous exercises.  demonstrated improved technique  appropriate redirection provided  instruction provided for increased level of complexity/difficulty    Instructed in techniques to improve length of utterance with reduced effort for optimal carryover.  Instructed with songs and lyrics to optimize singing and speaking voice quality  Demonstrated improved pitch and resonance today.  Developed a mental checklist of factors to help trouble shoot moments of difficulty during daily speaking tasks.    Counseling and Education:  Asked questions about the nature of her symptoms, and I answered all of these thoroughly   [place] concepts of post-operative voice use and care, to optimize healing.  A revised regimen for home practice was instructed, which included the songs and lyrics that she brought for practice today.  It was also noted that they are in a great vocal range for her speaking and singing voice - around a mezzo " .    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 Ms. Stockton demonstrated good learning of therapeutic activities to optimize her voice quality today.  We will continue to work on activities that optimize speaking and singing voice quality.     PLAN: I will see Ms. Stockton in 2 weeks, at which point we will continue therapy.   For practice goals see AVS.     Next Clinic Appt: 4/23    TOTAL SERVICE TIME:   Call Initiated at: 3 PM  Call Ended at: 4 pm           CPT Billing Codes:   TREATMENT OF SPEECH, LANGUAGE, VOICE, COMMUNICATION, and/or AUDITORY PROCESSING DISORDER (94454)    YOLANDA Chávez, M.M., CCC-SLP  Speech-Language Pathologist  [facility] Trained Vocologist  [facility]  [email or web address]  Pronouns: she/her      *this report was created in part through the use of computerized dictation software, and though reviewed following completion, some typographic errors may persist.  If there is confusion regarding any of this notes contents, please contact me for clarification

## 2025-04-07 NOTE — LETTER
"4/7/2025       RE: Chris Stockton  [address]  [facility] [address]     Dear Colleague,    Thank you for referring your patient, Chris Stockton, to the [facility] VOICE CLINIC [place] [facility]. Please see a copy of my visit note below.    Mahin is a 64 year old adult who is being cared for via a billable virtual visit.        The patient/client has been notified and verbally consented to the following statements:   This video visit will be conducted between you and your provider.  If during the course of the call the provider feels a video visit is not appropriate, you will not be charged for this service.    Provider has received verbal consent for billable virtual visit from the patient? Yes    Preferred method for receiving information: TwitJumphart     Call initiated at: 3 PM  Platform used to conduct today's virtual appointment: [employer] Video  Location of provider: Residence  Location of patient: Residence. 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    Remarkable findings included:  Perceptual evaluation demonstrated:   Roughness: Mild Intermittent  Breathiness: WNL  Strain: WNL  Pitch:  F0/Habitual/Conversational speech: low  Laryngeal exam demonstrated:   Not warranted  Primary complaint of patient today included:   Dysphonia and voice and resonance disorder in the context of gender dysphoria.     Therefore, I recommended a course of " "speech therapy to address these concerns.       SUBJECTIVE:  Since the last appointment, Ms. Stockton reports the following:   Overall she reports that symptoms are stable    OBJECTIVE:  Ms. Stockton presents today with the following:  Voice quality:  Roughness: Mild Intermittent  Breathiness: WNL  Strain: WNL  Pitch:  F0/Habitual/Conversational speech: low  Resonance:   Conversational speech:  backward focus of resonance      PATIENT REPORTED MEASURES:  Patient Supplied Answers To SLP QOL Questionnaire       No data to display              Patient Supplied Answers To VHI Questionnaire      3/19/2025     1:00 PM   Voice Handicap Index (VHI-10)   My voice makes it difficult for people to hear me 4   People have difficulty understanding me in a noisy room 4   My voice difficulties restrict my personal and social life.  4   I feel left out of conversations because of my voice 4   My voice problem causes me to lose income 4   I feel as though I have to strain to produce voice 4   The clarity of my voice is unpredictable 4   My voice problem upsets me 4   My voice makes me feel handicapped 4   People ask, \"What's wrong with your voice?\" 4   VHI-10 40        Patient-reported     Patient Supplied Answers To CSI Questionnaire       No data to display                THERAPEUTIC ACTIVITIES  Demonstrated previous exercises.  demonstrated improved technique  appropriate redirection provided  instruction provided for increased level of complexity/difficulty    Instructed in techniques to improve length of utterance with reduced effort for optimal carryover.  Instructed with songs and lyrics to optimize singing and speaking voice quality  Demonstrated improved pitch and resonance today.  Developed a mental checklist of factors to help trouble shoot moments of difficulty during daily speaking tasks.    Counseling and Education:  Asked questions about the nature of her symptoms, and I answered all of these thoroughly   [place] concepts " of post-operative voice use and care, to optimize healing.  A revised regimen for home practice was instructed, which included the songs and lyrics that she brought for practice today.  It was also noted that they are in a great vocal range for her speaking and singing voice - around a mezzo soprano.    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 Ms. Stockton demonstrated good learning of therapeutic activities to optimize her voice quality today.  We will continue to work on activities that optimize speaking and singing voice quality.     PLAN: I will see Ms. Stockton in 2 weeks, at which point we will continue therapy.   For practice goals see AVS.     Next Clinic Appt: 4/23    TOTAL SERVICE TIME:   Call Initiated at: 3 PM  Call Ended at: 4 pm           CPT Billing Codes:   TREATMENT OF SPEECH, LANGUAGE, VOICE, COMMUNICATION, and/or AUDITORY PROCESSING DISORDER (68903)    CROW [email or web address], M.M., CCC-SLP  Speech-Language Pathologist  [facility] Trained Vocologist  [facility]  [email or web address]  Pronouns: she/her      *this report was created in part through the use of computerized dictation software, and though reviewed following completion, some typographic errors may persist.  If there is confusion regarding any of this notes contents, please contact me for clarification            Again, thank you for allowing me to participate in the care of your patient.      Sincerely,    Yessenia Jimenez SLP

## 2025-04-07 NOTE — PATIENT INSTRUCTIONS
Corine Frances?  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    Do-Be-Do  https://music.[employer].com/watch?v=0uXxM-EL6ZQ    Yes  https://www.[employer].com/watch?v=WdduPx6V3T0    Full Voice: Water Voice Exercises   (example of some of her teaching - I have not used these - thoughts?)  https://www.[employer].com/watch?v=YF-cAovbcbg    Do you know Micheline Cho's music?    Love Grows One By One  https://www.[employer].com/watch?v=7UBOSZ11_ck    Waltzing with Bears  https://www.[employer].Gourmant/watch?v=i-Wa5gdy1KQ    How about Shameka Wright  [place]  & Marcia Grover ([facility]) ?    Plant Me A Garden- Shameka Wright & Marcia Grover  https://www.[employer].com/watch?v=bdQYlUAcPf4    We WIll Build This House- Shameka Wright & Marcia Grover  https://www.[employer].com/watch?v=8h3lQ8nEc07    Little known fact: I won lunch with Shameka Wright at a fundraising auction for the [facility] sometime in the 1980's :-)    thank you for your care,  Liz, doing surprisingly well*, Mahin      [email or web address]-[email or web address]    Louann Wright  (To Kathleen Wright, [id number])  CHORUS:  Oh, Kathleen, won t you go with me? We ll be  in style.  And we ll cross [facility], so blue and so wild.  We ll cross over [facility]  til we come to the shore  And our orchards will blossom for our babies as they re born.  Oh, yes, I will go with you, leave [place] behind,  Though my parents think little of my life on a farm.  Oh, to leave the [place] city life, to be buried on a farm!  But I ll watch the orchards shea in Spring, spend the cavazos warm in your arms.  CHORUS  Three children he gave her: Rishabh, Karine and Homa,  And their fourth child, swetha Simpson, came ten years after these.  She raised them with a loving hand and with firmness of mind,  And she raised them through  troubled times, Kathleen strong-willed and kind.  RAQUEL  Now three score years have gone and passed like the fruit on the trees,  And her children have children with babes on their knees.  They all join in the [place]ert[place]  To lynsey belkys Wang, now [age] years and four score.  CHORUS  * * * * * * *  Songs Are Made For Singing by Shameka Wright  2021  My song means nothing without you  No ear to give it purpose  No listener to listen  Without you, my song is homeless  Songs are made for singing  By many more than one  Songs are made for singing  By as many as can come  Songs are made for singing  By you and you and you  Songs are made for singing  That s how we get through  Without your kind and loving heart  The song cannot be shared  It stays right here inside of me  Not floating on the air.  CHORUS  All Earth s creatures have their songs  Each sings in their own way  Some sing softly, some sing loud  But all have things to say.  CHORUS  Your song can heal the deepest wound  Or help someone to pray  Your song can celebrate new life  It needs your voice to say   CHORUS  Now, when I m gone you ll have some songs  To sing with one another  You take your voice where e re you go  It s lighter than a feather!  CHORUS  * * * * * * * *  In The Name Of All Our Children by Shameka Wright  We will sing with each other  as the world turns round  We ll sing with each other  as the day grows long  We will sing with each other  growing wise & strong  In the name of all our children  We ll remember the young ones  as the world turns round  We ll remember the young ones  as the day grows long  We ll remember the young ones  growing wise & strong  In the name of all our children  We will cherish our elders  as the world turns round  We will cherish our elders  as the day grows long  We will cherish our elders  growing wise & strong  In the name of all our children  We will care for the Earth  as the world turns  round  We will care for the Earth  as the day grows long  We will care for the Earth  growing wise & strong  In the name of all our children  We will break bread together  as the world turns round  We will break bread together  as the day grows long  We will break bread together  growing wise & strong  In the name of all our children  We will love one another  as the world turns round  We will love one another  as the day grows long  We will love one another  growing wise & strong  In the name of all our children  * * * * * * * *  Love Call Me Home by Norma Lim  2001  When the neil are deep,  Friends carry me over  When I cry in my sleep  Love call me home.  CHORUS: Time, ferry me down the river,  Friends carry me safely over  Life, tend me on my journey  Love call me home.  When the neil are cold  Friends carry me over  When I'm losing my hold  Love call me home. (chorus)  When I'm weary and cannot swim  Friends carry me over  Open your arms and take me in  Love call me home. (chorus)  Take the gift I bring  Friends carry me over  Deep within me life is singing  Love call me home. (chorus)  Life offers a chance  For friends to carry us over  Time can stop or dance forever  Love call me home. (chorus)  * * * * * * * *  One I Love by Chencho Francisco  All of my friends fell out with me  Because I kept your company  But let them say whatever they will  I love my love with a free good will  One I love, two she loves  And three she's true to me  They tell me he's poor, they tell me he's young  I tell them all to hold their tongue  If they could part the sand from the sea  They never could part my love from me  One I love, two she loves  Three she's true to me  Over the mountain he must go  Because his fortune is so low  With an aching heart and a troubled mind  For leaving his love so far behind.  One I love, two she loves  Three she's true to me  When the fire to ice doth turn  And when the icy seas do burn  And  when the rocks do melt in the sun  My love for you has just begun  One I love, two she loves  Three she's true to me  When I'm awake, I find no rest  Until your head lies on my breast  When I'm asleep I'm dreaming of  My one, my dear, my absent true love  One I love, two she loves  Three she's true to me  * * * * * * * *  Gonna Plant Me a Garden by Shameka Wright  1993  Gonna plant me a garden  Gonna make it grow  Gonna water it well  Gonna weed and hoe  Gonna plant me a garden   Neath the sun above  And the seeds I plant  Are the seeds of love.  Gonna plant it in the yard  for my friends to see  Gonna plant it for you,  gonna plant it for me  With a little bit of care  they'll grow to be  Flowering rainbows  across the sea  And when they've grown  from shore to shore  Our sisters and brothers  all around the world  Will harvest fruits  brought by the dove  And in those fruits  are the seeds of love  Crossing the Bar by Karin Watson/Dixie  [place] and evening star  And one clear call for me  May there be no moaning of the bar  When I put out to sea  When I put out to sea  When I put out to sea  May there be no moaning of the bar  When I put out to sea.  For such a tide as moving seems asleep  To full for sound or foam  When that which pa from out the boundless deep  Turns again home.  (Refrain patterned as above, after each verse)  Twilight and evening bell  And then at last the dark.  May there be no sadness of farewell  When I embark.  For  though from out our bourne of time and place  The flood may bear me far,  I hope to meet my  face to face  When I have crossed the bar.  * * * * * * * * *  [place] OF THE SUN by Shameka Wright  1982  Babies are born in the [place] of the Sun  [place] of the Sun on their birthing day (repeat)  Clouds to the North, clouds to the South  Wind and rain to the East and the West  Babies are born in the [place] of the sun  [place] of the sun on their birthing day.  Children take their  first step in a [place] of the sun,  [place] of the sun on their walking day. (2x)  Clouds to the North, Clouds to the South,  Wind and rain to the East and the West.  But children take their first step in the [place] of the sun,  [place] of the sun on their walking day .  Children speak their first word in a [place] of the sun,  [place] of the sun on their walking day. (2x)  Clouds to the North, Clouds to the South,  Wind and rain to the East and the West.  But children speak their first word in the [place] of the sun,  [place] of the sun on their talking day.  I hope to be  in a [place] of the sun,  [place] of the sun on my wedding day. (2x)  Clouds to the North, Clouds to the South,  Wind and rain to the East and the West.  I hope to be  in a [place] of the sun,  [place] of the sun on my wedding day.  I hope to die in a [place] of the sun,  [place] of the sun on my dying day.  And spread my ashes in a [place] of the sun,  [place] of the sun on my dying day.  Clouds to the North, Clouds to the South,  Wind and rain to the East and the West.  I hope to die in a [place] of the sun  [place] of the sun on my dying day.  * * * * * * * *  Now Is The Time  * * * * * * * *  P is for Peace by Shameka Wright  1991  P  is for peace, goodwill towards those  Who live on this Earth, the cows and the crows,  The birds in the lion and the fish in the sea,  Peace for you and me.   E  is for everyone I love to see  My gramma, my grampa, my family,  My friends and my neighbors, the people in town,  And people the whole world 'round.  Peace on earth (pax in terra)  Spring s re-birth (pax in terra)  Love and mirth (pax in terra)  Peace on earth!   A  is for all of the people on Earth,  All colors, all creeds, all have the same worth.  The love in our hearts is all that we need  To conquer all hatred and greed.   C  is for cherish this planet we share,  From oceans to mountains, from forests to air.  The earth is our mother, our cradle, our  home  She's with us wherever we roam.  Peace on earth (pax in terra)  Joyous birth (pax in terra)  Love and mirth (pax in terra)  Peace on earth!  * * * * * * * *  Parveen Arroyo (VIOLA Arroyo/ROBERT Lo)  * * * * * * * *  We Will Build This House by Shameka Wright  2003  We will build this house with the strength of our arms  With the love of our neighbors we will build this house  We will build this house  As a shelter from all harm  With the open door of justice  We will build this house.  A house of warmth  A house just right  A house to keep away the darkest night  A house of hope  A house of peace  A house where all within may safely sleep  CHORUS  A house that s built to remind us all  Of how we are connected  Whether great or small  A house of dignity  A house without shame  A house with walls bearing all our names  CHORUS  It seems so simple  It seems so sweet  A roof above you, enough food to eat  In this richest nation on this world of care  How much do we need to have enough to share.  CHORUS  * * * * * * * *  No Time to Feel Hopeless by Shameka Wright  2017  You read a book  One word at a time, one word at a time  You read a book one word at a time  Until your book is through.  You learn to swim  One stroke at a time, one stroke at a time.  You learn to swim one stroke at a time  And then you can cross the pool.  There s no time to feel hopeless  There s no time to feel hopeless  There s no time to feel hopeless  When there s so much work to do.  You walk ten miles  One step at a time, one step at a time.  You walk ten miles one step at a time  Until your journey s through.  You build a house  One brick at a time, one brick at a time  You build a house one brick at a time  And then you enjoy the view.  CHORUS  BREAK (on verse)  We live our lives  One breath at a time, one breath at a time  We live our lives one breath at a time  Until our time is through.  We ll fix this world  One mess at a time, one mess at a  time  We ll fix this world one mess at a time  Together we ll all pull through!  There s no time to feel hopeless  There s no time to feel hopeless  There s no time to feel hopeless  When there s so much work to do.  * * * * * * * *  KAVYA [place]  Prayer  Translation: in this sinful world (Alleluia) Yohan' blood is shed for peace  U-ku-thu-la (peace) (Aleluia)  Ku-lom-hla-ba we-zo-no,  i-ga-zi li-ka Je-tao soheila yen yez'  U-ku-thu-la  * * * * * * * *  One Foot/Lead with Love by Michelle Milian put one foot front of the other  and lead with love . . . Put  one foot front of the other  and lead with love.  * * * * * * * *  In The Shelter Of Each Other by Paul Lubin@  In the shelter of each other,  In the shelter of our lives;  We are open, We are dreaming,  We are hopeful, We are wise.  * * * * * * * * * *  On Solid Ground  We will not rest till the storm is over  We will not lay this burden down  We will keep each other strong  We will love and carry on  Till we stand all together on solid ground  * * * * * * * * *  Simba Wong by Freddy Mcgrath  CHORUS: L'di baker brenden  P'nei Shabbat, P'nei Shabbat n'kabela  * * * * * * *  [facility] by Freddy Cavanaugh-Ne-chandana-louis  te-farhana-lel Yah!  Unj-hf-fi-tico!  All that has breath shall praise G-d.  * * * * * * * *  Soon Ah Will Be Done Graph  SOPRANO Soon Etc. 2x,  I want to meet my mother 2x,  Soon etc. (once),  No more weepin  3x SLOW AT END.  ALTO Soon ETC. 2x, I want to meet my mother 2x,  Soon etc. (once),  No more weepin  (once),  Soon etc. 2x SLOW AT END  TENOR 1 & 2 Soon etc. 2x,  I want to meet my mother (once),  Soon etc. 2x  No more weepin  (once),  Soon etc. 2x SLOW AT END  BASS Soon etc. 2x (2nd x don t end with  Soon ah will be ),  I want to meet my mother (once),  Soon etc. 2x (2nd x don t end with  Soon ah will be )  No more weepin  (once), Soon etc. 2x SLOW AT END  * * * * * * *  BASS: Soon ah will be done a  wid  the  troubles of the world,   The troubles of the world, The troubles of the SOON a  will be   Soon ah will be done a  wid  the troubles of the world,   Dariusz  home to live with God. SOON a  well be  Soon ah will be done a  wid  the troubles of the world,  The troubles of the world, The troubles of the SOON a  will be  Soon ah will be done a  wid  the troubles of the world,  Dariusz  home to live with God.  I want to meet my mother, I want to meet my mother,  I want to meet my mother, Dariusz  home to live with God.  SOON a  well be  Soon ah will be done a  wid  the troubles of the world,  The troubles of the world, The troubles of the SOON a  will be  Soon ah will be done a  wid  the troubles of the world,  Dariusz  home to live with God. SOON a  well be  Soon ah will be done a  wid  the troubles of the world,  The troubles of the world, The troubles of the SOON a  will be  Soon ah will be done a  wid  the troubles of the world,  Dariusz  home to live with God.  No more weepin  an  a  wailin , No more weepin  an  a  wailin ,  No more weepin  an  a  wailin , Dariusz  home to live with God.  SOON a  well be  Soon ah will be done a  wid  the troubles of the world,  The troubles of the world, The troubles of the SOON a  will be  Soon ah will be done a  wid  the troubles of the world,  Dariusz  home to live with God. SOON a  well be  Soon ah will be done a  wid  The troubles of the world,  The troubles of the world, The troubles of the SOON a  will be  Soon ah will be done a  wid  the troubles of the world,  Dariusz  home - - - - - - to live - - with - - God - - - - -  * * * * * * * *  TENOR (NOTE Women & Men TENORS sing the same words, but different  pitches):  Soon ah will be done a  wid  The troubles of the  Ah wid  the troubles of the, a wid  the troubles of the  Ah well ah soon ah will be done a  wid  The troubles of the  Ah dariusz  home to live with God,  Oh Soon ah will be done a  wid  The troubles of the  Ah wid  the troubles of the, a wid   the troubles of the  Ah well ah soon ah will be done a  wid  The troubles of the  Ah dariusz  home to live with God.  I want to meet my mother, I want to meet my mother,  I want to meet my mother, Dariusz  home to live with God, Oh  Oh Soon ah will be done a  wid  The troubles of the  Ah wid  the troubles of the, a wid  the troubles of the  Ah well ah soon ah will be done a  wid  The troubles of the  Ah dariusz  home to live with God, Oh  Oh Soon ah will be done a  wid  The troubles of the  Ah wid  the troubles of the, a wid  the troubles of the  Ah well ah soon ah will be done a  wid  The troubles of the  Ah dariusz  home to live with God.  No more weepin  an  a  wailin , No more weepin  an  a  wailin ,  No more weepin  an  a  wailin , Dariusz  home to live with God. Oh  Soon ah will be done a  wid  The troubles of the  Ah wid  the troubles of the, a wid  the troubles of the  Ah well ah soon ah will be done a  wid  The troubles of the  Ah dariusz  home to live with God, Oh  Soon ah will be done a  wid  The troubles of the  Ah wid  the troubles of the, a wid  the troubles of the  Ah well ah soon ah will be done a  wid  The troubles of the  Dariusz  home - - - - - - to live - - with - - God - - - - -  * * * * * * *  ALTOfor Soon Ah Will Be Done: (NOTE: If the alto part is too high for you,  sing the women s TENOR part)  Soon ah will be done a  wid  de troubles of de  world,  De troubles of de  world, de troubles of de  world.  Soon ah will be done a  wid  de troubles of de  world,  Dariusz  home to live with God. (2x)  I want to meet my mother, I want to meet my mother,  I want to meet my mother, Dariusz  home to live with God. (2x)  Soon ah will be done a  wid  de troubles of de  world,  De troubles of de  world, de troubles of de  world.  Soon ah will be done a  wid  de troubles of de  world,  Dariusz  home to live with God.  No more weepin  an  a  wailin , No more weepin  an  a  wailin ,  No more weepin  an  a  wailin , Dariusz  home to live with  God.  Soon ah will be done a  wid  de troubles of de  world,  De troubles of de  world, de troubles of de  world.  Soon ah will be done a  wid  de troubles of de  world,  Dariusz  home to live with God.  Soon ah will be done a  wid  de troubles of de  world,  De troubles of de  world, de troubles of de  world.  Soon ah will be done a  wid  de troubles of de  world,  Dariusz  home - - - - - - to live - - with - - God - - - - -  * * * * * * * *  SOPRANOfor Soon Ah Will Be Done:  Soon ah will be done a  wid  de troubles of de  world,  De troubles of de  world, de troubles of de  world.  Soon ah will be done a  wid  de troubles of de  world,  Dariusz  home to live with God. (2x)  I want to meet my mother, I want to meet my mother,  I want to meet my mother, Dariusz  home to live with God. (2x)  Soon ah will be done a  wid  de troubles of de  world,  De troubles of de  world, de troubles of de  world.  Soon ah will be done a  wid  de troubles of de  world,  Dariusz  home to live with God.  No more weepin  an  a  wailin , No more weepin  an  a  wailin ,  No more weepin  an  a  wailin , Dariusz  home to live with God. (2x)  No more weepin  an  a  wailin , No more weepin  an  a  wailin ,  No more weepin  an  a  wailin ,  Dariusz  home - - - - - - to live - - with - - God - - - - -

## 2025-04-09 ENCOUNTER — HOSPITAL ENCOUNTER (OUTPATIENT)
Dept: MAMMOGRAPHY | Facility: CLINIC | Age: 65
Discharge: HOME | End: 2025-04-09
Attending: FAMILY MEDICINE
Payer: COMMERCIAL

## 2025-04-09 DIAGNOSIS — N63.0 MASS OF BREAST, UNSPECIFIED LATERALITY: ICD-10-CM

## 2025-04-09 PROCEDURE — 77066 DX MAMMO INCL CAD BI: CPT

## 2025-04-09 PROCEDURE — 76642 ULTRASOUND BREAST LIMITED: CPT | Mod: 50

## 2025-04-14 ENCOUNTER — TELEPHONE (OUTPATIENT)
Dept: OTOLARYNGOLOGY | Facility: CLINIC | Age: 65
End: 2025-04-14
Payer: COMMERCIAL

## 2025-04-14 NOTE — TELEPHONE ENCOUNTER
FUTURE VISIT INFORMATION      FUTURE VISIT INFORMATION:  Date: 5/23/25  Time: 8:00  Location: [facility] [place]  REFERRAL INFORMATION:  Referring provider:    Referring providers clinic:    Reason for visit/diagnosis  Granulation tx     RECORDS REQUESTED FROM:       Clinic name Comments Records Status Imaging Status   No records to collect  Internal                                     Normal rate, regular rhythm.  Heart sounds S1, S2.

## 2025-04-14 NOTE — TELEPHONE ENCOUNTER
LVM to schedule for 4 virtual appointments (2-3 weeks apart), followed by one appointment a month later. Following her April scheduled appt. With Yessenia Barbara    Appointments: Fridays may be best.  Please ensure she is also on my wait list.  30 min notice, will also work.       Gave ENT number

## 2025-04-21 ENCOUNTER — OFFICE VISIT (OUTPATIENT)
Dept: FAMILY MEDICINE | Facility: CLINIC | Age: 65
End: 2025-04-21
Payer: COMMERCIAL

## 2025-04-21 VITALS
DIASTOLIC BLOOD PRESSURE: 81 MMHG | BODY MASS INDEX: 30.63 KG/M2 | RESPIRATION RATE: 20 BRPM | HEIGHT: 68 IN | TEMPERATURE: 97.8 F | WEIGHT: 202.1 LBS | SYSTOLIC BLOOD PRESSURE: 129 MMHG | OXYGEN SATURATION: 100 % | HEART RATE: 55 BPM

## 2025-04-21 DIAGNOSIS — R45.851 SUICIDAL IDEATIONS: ICD-10-CM

## 2025-04-21 DIAGNOSIS — N64.4 BREAST TENDERNESS: ICD-10-CM

## 2025-04-21 DIAGNOSIS — N93.9 VAGINAL BLEEDING: ICD-10-CM

## 2025-04-21 DIAGNOSIS — K92.1 BLOOD IN STOOL: ICD-10-CM

## 2025-04-21 DIAGNOSIS — F64.0 GENDER DYSPHORIA IN ADULT: Primary | ICD-10-CM

## 2025-04-21 ASSESSMENT — PATIENT HEALTH QUESTIONNAIRE - PHQ9
10. IF YOU CHECKED OFF ANY PROBLEMS, HOW DIFFICULT HAVE THESE PROBLEMS MADE IT FOR YOU TO DO YOUR WORK, TAKE CARE OF THINGS AT HOME, OR GET ALONG WITH OTHER PEOPLE: SOMEWHAT DIFFICULT
SUM OF ALL RESPONSES TO PHQ QUESTIONS 1-9: 8
SUM OF ALL RESPONSES TO PHQ QUESTIONS 1-9: 8

## 2025-04-21 NOTE — PROGRESS NOTES
"  Assessment & Plan     Gender dysphoria in adult  Did not draw her estradiol levels today since inconsistent use. Will return for these. Otherwise vulva is healing (much better than when I saw her this fall) but does have residual granulomatous tissue that I treated today. Will monitor to see extent to which bleeding decreases, to help determine if the bleeding is vaginal. Has appointment with the surgeons in a month.      Vaginal bleeding  As above.     Blood in stool  May be internal hemmorhoids but will do colonoscopy to make sure. She will reach out to when she is ready to do this - lots on her plate.      Breasts  I am reassuring that her tenderness and her diffuse lumpiness is hormonally related, particularly when her dosing is inconsistent.     Suicidality   She is doing much better post TMS and continues in the cares of psychiatry.  She is working on her many stressors.       I spent a total of 31 minutes on the day of the visit.   Time spent by me today doing chart review, history and exam, documentation and further activities per the note  The longitudinal plan of care for the diagnosis(es)/condition(s) as documented were addressed during this visit. Due to the added complexity in care, I will continue to support Liz in the subsequent management and with ongoing continuity of care.    BMI  Estimated body mass index is 30.73 kg/m  as calculated from the following:    Height as of this encounter: 1.727 m (5' 8\").    Weight as of this encounter: 91.7 kg (202 lb 1.6 oz).   Weight management plan: is working on life stressors right now    Depression Screening Follow Up        4/21/2025     9:10 AM   PHQ   PHQ-9 Total Score 8    Q9: Thoughts of better off dead/self-harm past 2 weeks Several days    F/U: Thoughts of suicide or self-harm Yes    F/U: Self harm-plan No    F/U: Self-harm action No    F/U: Safety concerns No        Proxy-reported               Subjective   Liz is a 64 year old, presenting for the " "following health issues:  RECHECK and Results      4/21/2025     9:08 AM   Additional Questions   Roomed by Ohn   Accompanied by Self         4/21/2025    Information    services provided? No     HPI      Post bottom surgery 9/3/2025: Dilating twice a day. Having ongoing vaginal bleeding that she thinks is both from granulation tissue and from vaginal tissue.  Minipads get streaked with blood and become uncomfortable and changes the pad several times.    Patches are irratic. Did drop total dose by not wearing multiple patches at a time. Rx says 3 patches alternating with the 4 per week.  Is using 7 patches a week and does them in a staggered fashion with a 0, 1 and 2 day.        Work: back to full time work in March.     Blood in the stool - sure not is vaginal. Notes with stools.  Embedded in the stool.  Had this for two days and not since.   Colonoscopy in 2011 showed internal hemmorhoids. Cologuard since then normal.     Mammogram - was normal after concern for some breast lumps.  Had both mammogram and ultrasound. Has tenderness of her breasts when she bumps them.    Rx issues: has struggled with being on top of her meds due to all that she has gone through.     Her spouse (who she is in the process of ) was in the hospital for 5 months for bowel obstruction and finally is home with a gastric vent and on TPN.  Thought she was going to die several times due to sepsis etc.  Liz has an autistic son who was at his mother's side the whole time and he \"cracked\" and so Liz had to move in with her son to help him stabilize.  Have discovered as well that Liz's partner now has colon cancer recurrence.  Liz had to leave once her future ex was back home.      Due to all the stress, has gained 15 pounds.      Was off synthroid for 2 weeks during the issues reported above.     Mental Health  Did suprisingly well with all this - much better than would have pre-TMS.        " ":[id number]}                Objective    /81   Pulse 55   Temp 97.8  F (36.6  C) (Temporal)   Resp 20   Ht 1.727 m (5' 8\")   Wt 91.7 kg (202 lb 1.6 oz)   SpO2 100%   BMI 30.73 kg/m    Body mass index is 30.73 kg/m .  Physical Exam   Breasts: bilaterally dense breasts with multiple areas of denser tissue, consistent with developing breasts.   Vulva: at the posterior forchette, area of granulation tissue and spot that is not fully healed.  Area treated with silver nitrate with resolution of bleeding. Did not explore vagina.               Signed Electronically by: Yanna Santizo MD    Answers submitted by the patient for this visit:  Patient Health Questionnaire (Submitted on 4/21/2025)  If you checked off any problems, how difficult have these problems made it for you to do your work, take care of things at home, or get along with other people?: Somewhat difficult  PHQ9 TOTAL SCORE: 8    "

## 2025-04-22 PROBLEM — F84.0 AUTISM SPECTRUM DISORDER: Status: RESOLVED | Noted: 2023-01-31 | Resolved: 2025-04-22

## 2025-04-23 ENCOUNTER — VIRTUAL VISIT (OUTPATIENT)
Dept: OTOLARYNGOLOGY | Facility: CLINIC | Age: 65
End: 2025-04-23
Payer: COMMERCIAL

## 2025-04-23 DIAGNOSIS — R49.0 DYSPHONIA: Primary | ICD-10-CM

## 2025-04-23 DIAGNOSIS — R49.9 VOICE AND RESONANCE DISORDER: ICD-10-CM

## 2025-04-23 DIAGNOSIS — F64.0 GENDER DYSPHORIA IN ADULT: ICD-10-CM

## 2025-04-23 PROCEDURE — 92507 TX SP LANG VOICE COMM INDIV: CPT | Mod: GN | Performed by: SPEECH-LANGUAGE PATHOLOGIST

## 2025-04-23 NOTE — LETTER
"4/23/2025       RE: Chris Stockton  [address]  [facility] [address]     Dear Colleague,    Thank you for referring your patient, Chris Stockton, to the [facility] VOICE CLINIC [place] at [facility]. Please see a copy of my visit note below.    Mahin is a 64 year old adult who is being cared for via a billable virtual visit.        The patient/client has been notified and verbally consented to the following statements:   This video visit will be conducted between you and your provider.  If during the course of the call the provider feels a video visit is not appropriate, you will not be charged for this service.    Provider has received verbal consent for billable virtual visit from the patient? Yes    Preferred method for receiving information: [employer]     Call initiated at: 1 pm  Platform used to conduct today's virtual appointment: AM Well Video  Location of provider: Residence  Location of patient: [facility]. 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    Remarkable findings included:  Perceptual evaluation demonstrated:   Roughness: Mild Intermittent  Breathiness: WNL  Strain: WNL  Pitch:  F0/Habitual/Conversational speech: low  Laryngeal exam demonstrated:   Not warranted  Primary complaint of patient today included:   Dysphonia and voice and resonance disorder in the context of gender dysphoria.     Therefore, I recommended a course " "of speech therapy to address these concerns.    SUBJECTIVE:  Since the last appointment, Ms. Stockton reports the following:   Overall she reports that symptoms are stable    OBJECTIVE:  Ms. Stockton presents today with the following:  Voice quality:  Roughness: Mild Intermittent  Breathiness: WNL  Strain: WNL  Pitch:  F0/Habitual/Conversational speech: low  Resonance:   Conversational speech:  backward focus of resonance         PATIENT REPORTED MEASURES:  Patient Supplied Answers To SLP QOL Questionnaire       No data to display              Patient Supplied Answers To VHI Questionnaire      3/19/2025     1:00 PM   Voice Handicap Index (VHI-10)   My voice makes it difficult for people to hear me 4   People have difficulty understanding me in a noisy room 4   My voice difficulties restrict my personal and social life.  4   I feel left out of conversations because of my voice 4   My voice problem causes me to lose income 4   I feel as though I have to strain to produce voice 4   The clarity of my voice is unpredictable 4   My voice problem upsets me 4   My voice makes me feel handicapped 4   People ask, \"What's wrong with your voice?\" 4   VHI-10 40        Patient-reported     Patient Supplied Answers To CSI Questionnaire       No data to display                THERAPEUTIC ACTIVITIES  Demonstrated previous exercises.  demonstrated improved technique  appropriate redirection provided  instruction provided for increased level of complexity/difficulty    Instructed in techniques to improve length of utterance with reduced effort for optimal carryover.  Instructed with songs and lyrics to optimize singing and speaking voice quality  Demonstrated improved pitch and resonance today.  Developed a mental checklist of factors to help trouble shoot moments of difficulty during daily speaking tasks.     Counseling and Education:  Asked questions about the nature of her symptoms, and I answered all of these thoroughly      [place] " concepts of post-operative voice use and care, to optimize healing.  A revised regimen for home practice was instructed, which included the songs and lyrics that she brought for practice today.  It was also noted that they are in a great vocal range for her speaking and singing voice - around a mezzo soprano.     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 Ms. Stockton demonstrated good learning of therapeutic activities to optimize her voice quality today.  We will continue to work on activities that optimize speaking and singing voice quality.      PLAN: I will see Ms. Stockton in 2 weeks, at which point we will continue therapy.   For practice goals see AVS.      Next Clinic Appt: TAMIKOD     TOTAL SERVICE TIME:   Call Initiated at: 1 PM  Call Ended at: 2 pm            CPT Billing Codes:   TREATMENT OF SPEECH, LANGUAGE, VOICE, COMMUNICATION, and/or AUDITORY PROCESSING DISORDER (32097)     CROW [email or web address], M.M., CCC-SLP  Speech-Language Pathologist  [facility] Trained Vocologist  [facility] Voice [facility]  [email or web address]  Pronouns: she/her        *this report was created in part through the use of computerized dictation software, and though reviewed following completion, some typographic errors may persist.  If there is confusion regarding any of this notes contents, please contact me for clarification            Again, thank you for allowing me to participate in the care of your patient.      Sincerely,    Yessenia Jimenez SLP

## 2025-04-24 DIAGNOSIS — S49.91XS RIGHT SHOULDER INJURY, SEQUELA: Primary | ICD-10-CM

## 2025-04-24 PROBLEM — M25.521 RIGHT ELBOW PAIN: Status: ACTIVE | Noted: 2025-04-24

## 2025-04-24 PROBLEM — M25.531 RIGHT WRIST PAIN: Status: ACTIVE | Noted: 2025-04-24

## 2025-04-29 ENCOUNTER — TELEPHONE (OUTPATIENT)
Dept: OTOLARYNGOLOGY | Facility: CLINIC | Age: 65
End: 2025-04-29
Payer: COMMERCIAL

## 2025-04-29 NOTE — TELEPHONE ENCOUNTER
LVM to schedule      4 virtual appts, 2-3 weeks apart with one Dysphonia SLP       Gave ENT number

## 2025-04-30 NOTE — PROGRESS NOTES
"Mahin is a 64 year old adult who is being cared for via a billable virtual visit.        The patient/client has been notified and verbally consented to the following statements:   This video visit will be conducted between you and your provider.  If during the course of the call the provider feels a video visit is not appropriate, you will not be charged for this service.    Provider has received verbal consent for billable virtual visit from the patient? Yes    Preferred method for receiving information: [employer]     Call initiated at: 1 pm  Platform used to conduct today's virtual appointment: AM Elías Video  Location of provider: Residence  Location of patient: Residence. 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    Remarkable findings included:  Perceptual evaluation demonstrated:   Roughness: Mild Intermittent  Breathiness: WNL  Strain: WNL  Pitch:  F0/Habitual/Conversational speech: low  Laryngeal exam demonstrated:   Not warranted  Primary complaint of patient today included:   Dysphonia and voice and resonance disorder in the context of gender dysphoria.     Therefore, I recommended a course of speech therapy to address these concerns.    SUBJECTIVE:  Since the last appointment, Ms. Stockton reports the following:   Overall she reports that symptoms are stable    OBJECTIVE:  Ms. Stockton presents today with the following:  Voice quality:  Roughness: Mild Intermittent  Breathiness: WNL  Strain: " "WNL  Pitch:  F0/Habitual/Conversational speech: low  Resonance:   Conversational speech:  backward focus of resonance         PATIENT REPORTED MEASURES:  Patient Supplied Answers To SLP QOL Questionnaire       No data to display              Patient Supplied Answers To VHI Questionnaire      3/19/2025     1:00 PM   Voice Handicap Index (VHI-10)   My voice makes it difficult for people to hear me 4   People have difficulty understanding me in a noisy room 4   My voice difficulties restrict my personal and social life.  4   I feel left out of conversations because of my voice 4   My voice problem causes me to lose income 4   I feel as though I have to strain to produce voice 4   The clarity of my voice is unpredictable 4   My voice problem upsets me 4   My voice makes me feel handicapped 4   People ask, \"What's wrong with your voice?\" 4   VHI-10 40        Patient-reported     Patient Supplied Answers To CSI Questionnaire       No data to display                THERAPEUTIC ACTIVITIES  Demonstrated previous exercises.  demonstrated improved technique  appropriate redirection provided  instruction provided for increased level of complexity/difficulty    Instructed in techniques to improve length of utterance with reduced effort for optimal carryover.  Instructed with songs and lyrics to optimize singing and speaking voice quality  Demonstrated improved pitch and resonance today.  Developed a mental checklist of factors to help trouble shoot moments of difficulty during daily speaking tasks.     Counseling and Education:  Asked questions about the nature of her symptoms, and I answered all of these thoroughly      [place] concepts of post-operative voice use and care, to optimize healing.  A revised regimen for home practice was instructed, which included the songs and lyrics that she brought for practice today.  It was also noted that they are in a great vocal range for her speaking and singing voice - around a mezzo " .     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 Ms. Stockton demonstrated good learning of therapeutic activities to optimize her voice quality today.  We will continue to work on activities that optimize speaking and singing voice quality.      PLAN: I will see Ms. Stockton in 2 weeks, at which point we will continue therapy.   For practice goals see AVS.      Next Clinic Appt: TBD     TOTAL SERVICE TIME:   Call Initiated at: 1 PM  Call Ended at: 2 pm            CPT Billing Codes:   TREATMENT OF SPEECH, LANGUAGE, VOICE, COMMUNICATION, and/or AUDITORY PROCESSING DISORDER (75582)     YOLANDA Chávez, M.M., CCC-SLP  Speech-Language Pathologist  [facility] Trained Vocologist  [facility]  [email or web address]  Pronouns: she/her        *this report was created in part through the use of computerized dictation software, and though reviewed following completion, some typographic errors may persist.  If there is confusion regarding any of this notes contents, please contact me for clarification

## 2025-05-05 NOTE — PROGRESS NOTES
"SUBJECTIVE:  Liz is a 64 year old who underwent FD vaginoplasty in [place] on 9/3/2024.  Last seen in person on [facility] on 02/28/2025 to check in on healing and treat granulation tissue. She returned to work full time in early March. She was scheduled to RTC for repeat assessment and treatment of granulation tissue in early April, but was rescheduled due to ill provider. She is being seen today with reports of increased bleeding. She had external granulation tissue treated by her PCP on 4/21/2025, but internal exam was not done at that time.    She has continued to have many life stressors, which has been challenging, but she continues to access her resources and supports. She has been sitting a lot for work, which she thinks is contributing to the increased bleeding she has been noticing. She continues to dilate 2 x daily and has maintained depth. She reports ongoing drainage which tends to be more blood tinged by end of day. She associates this bleeding with internal granulation tissue and extended sitting for work.     She has been working with her surgeon in  to get second stage surgery/labiaplasty approved and scheduled. She is looking forward to completing this second stage.      OBJECTIVE:  /77 (BP Location: Left arm, Patient Position: Chair, Cuff Size: Adult Large)   Pulse 57   Ht 1.727 m (5' 8\")   Wt 91.6 kg (202 lb)   SpO2 98%   BMI 30.71 kg/m     General: NAD  Respiratory: respirations unlabored  Vulva: Vulvar incisions well healed. Area of granulation tissue at inferior aspect of vulva still present, but much smaller than previous visit. Internal speculum exam performed and two areas of granulation tissue still present. Small area at apex of canal and patch along posterior canal that extends to introitus both treated with silver nitrate.    ASSESSMENT/PLAN:  Hx of FD vaginoplasty in [place]  Continues to heal and make improvements    Granulation tissue - Internal granulation " tissue treated with silver nitrate    RTC in one month for reassessment of granulation tissue/bleeding      Yeimi Brown, APRN, CNP    A total of 25 minutes was spent today with patient, reviewing records, completing charting, and other tasks as detailed above.

## 2025-05-06 ENCOUNTER — OFFICE VISIT (OUTPATIENT)
Dept: PLASTIC SURGERY | Facility: CLINIC | Age: 65
End: 2025-05-06
Payer: COMMERCIAL

## 2025-05-06 VITALS
WEIGHT: 202 LBS | DIASTOLIC BLOOD PRESSURE: 77 MMHG | HEART RATE: 57 BPM | BODY MASS INDEX: 30.62 KG/M2 | OXYGEN SATURATION: 98 % | HEIGHT: 68 IN | SYSTOLIC BLOOD PRESSURE: 112 MMHG

## 2025-05-06 DIAGNOSIS — Z98.890 H/O VAGINOPLASTY: Primary | ICD-10-CM

## 2025-05-06 DIAGNOSIS — L92.9 GRANULATION TISSUE: ICD-10-CM

## 2025-05-06 ASSESSMENT — PAIN SCALES - GENERAL: PAINLEVEL_OUTOF10: MILD PAIN (3)

## 2025-05-06 NOTE — NURSING NOTE
"Chief Complaint   Patient presents with    [place], is being seen today for a follow up.       Vitals:    05/06/25 0838   BP: 112/77   BP Location: Left arm   Patient Position: Chair   Cuff Size: Adult Large   Pulse: 57   SpO2: 98%   Weight: 91.6 kg (202 lb)   Height: 1.727 m (5' 8\")       Body mass index is 30.71 kg/m .      Gemma Iqbal LPN    "

## 2025-05-06 NOTE — LETTER
"5/6/2025       RE: Chris Stockton  [address]  [place] [address]     Dear Colleague,    Thank you for referring your patient, Chris Stockton, to the [facility] PLASTIC AND RECONSTRUCTIVE SURGERY CLINIC [place] at [facility]. Please see a copy of my visit note below.    SUBJECTIVE:  Liz is a 64 year old who underwent FD vaginoplasty in [place] on 9/3/2024.  Last seen in person on virtually on 02/28/2025 to check in on healing and treat granulation tissue. She returned to work full time in early March. She was scheduled to RTC for repeat assessment and treatment of granulation tissue in early April, but was rescheduled due to ill provider. She is being seen today with reports of increased bleeding. She had external granulation tissue treated by her PCP on 4/21/2025, but internal exam was not done at that time.    She has continued to have many life stressors, which has been challenging, but she continues to access her resources and supports. She has been sitting a lot for work, which she thinks is contributing to the increased bleeding she has been noticing. She continues to dilate 2 x daily and has maintained depth. She reports ongoing drainage which tends to be more blood tinged by end of day. She associates this bleeding with internal granulation tissue and extended sitting for work.     She has been working with her surgeon in  to get second stage surgery/labiaplasty approved and scheduled. She is looking forward to completing this second stage.      OBJECTIVE:  /77 (BP Location: Left arm, Patient Position: Chair, Cuff Size: Adult Large)   Pulse 57   Ht 1.727 m (5' 8\")   Wt 91.6 kg (202 lb)   SpO2 98%   BMI 30.71 kg/m     General: NAD  Respiratory: respirations unlabored  Vulva: Vulvar incisions well healed. Area of granulation tissue at inferior aspect of vulva still present, but much smaller than previous visit. Internal " speculum exam performed and two areas of granulation tissue still present. Small area at apex of canal and patch along posterior canal that extends to introitus both treated with silver nitrate.    ASSESSMENT/PLAN:  Hx of FD vaginoplasty in [place]  Continues to heal and make improvements    Granulation tissue - Internal granulation tissue treated with silver nitrate    RTC in one month for reassessment of granulation tissue/bleeding      SAMMIE Razo, CNP    A total of 25 minutes was spent today with patient, reviewing records, completing charting, and other tasks as detailed above.       Again, thank you for allowing me to participate in the care of your patient.      Sincerely,    SAMMIE Calderon CNP

## 2025-05-14 ENCOUNTER — VIRTUAL VISIT (OUTPATIENT)
Dept: PSYCHIATRY | Facility: CLINIC | Age: 65
End: 2025-05-14
Attending: NURSE PRACTITIONER
Payer: COMMERCIAL

## 2025-05-14 DIAGNOSIS — F84.0 AUTISM: ICD-10-CM

## 2025-05-14 DIAGNOSIS — F41.9 ANXIETY: Primary | ICD-10-CM

## 2025-05-14 DIAGNOSIS — F32.A DEPRESSION, UNSPECIFIED DEPRESSION TYPE: ICD-10-CM
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
## 2025-05-14 NOTE — PATIENT INSTRUCTIONS
-Continue on current medication regimen.    Therapist for your son    Sandy Conrad [email or web address](looks like she only provides telehealth)  Sultana Garcia [email or web address] (is [place] too far?)  Surekha [email or web address] [email or web address]  Soniya Sadler [email or web address]    Your next appointment is scheduled on 7/9/2025 (Wed) at 8am.      **For crisis resources, please see the information at the end of this document**   Patient Education    Thank you for coming to the [facility] [place] [facility].     Lab Testing:  If you had lab testing today and your results are reassuring or normal they will be mailed to you or sent through Live[employer] within 7 days. If the lab tests need quick action we will call you with the results. The phone number we will call with results is # [phone]. If this is not the best number please call our clinic and change the number.     Medication Refills:  If you need any refills please call your pharmacy and they will contact us. Our fax number for refills is [phone].   Three business days of notice are needed for general medication refill requests.   Five business days of notice are needed for controlled substance refill requests.   If you need to change to a different pharmacy, please contact the new pharmacy directly. The new pharmacy will help you get your medications transferred.     Contact Us:  Please call [phone] during business hours (8-5:00 M-F).   If you have medication related questions after clinic hours, or on the weekend, please call [phone].     Financial Assistance [phone]   Medical Records [phone]       MENTAL HEALTH CRISIS RESOURCES:  For a emergency help, please call 911 or go to the nearest Emergency Department.     Emergency Walk-In Options:   EmPATH Unit @  [facility] ([place]): [phone] - Specialized mental health emergency area designed to be calming  [facility] ([place]): [phone]  [facility] ([place]): [phone]  [facility] ([place]): [phone]    [facility] Crisis Information:   [place]: [phone]  [place]: [phone]  [place] ([place]) - Adult: [phone]     Child: [phone]  [place] - Adult: [phone]     Child: [phone]  [place]: [phone]  List of all [facility] resources:   [email or web address]/[email or web address]/people-[email or web address]-[email or web address]/[email or web address]-contacts.[email or web address]    National Crisis Information:   Crisis Text Line: Text  [place]  to 657652  Suicide & Crisis Lifeline: 988  National Suicide Prevention Lifeline: 2-032-623-TALK (1-325.209.5510)       For online chat options, visit https://suicidepreventionlifeline.org/chat/  Poison Control Center: 1-781.478.1137  Trans Lifeline: 1-985.896.3831 - Hotline for transgender people of all ages  The [employer] Project: 3-482-969-0512 - Hotline for LGBT youth     For Non-Emergency Support:   Fast Tracker: Mental Health & Substance Use Disorder Resources -   https://www.Workfacen.org/

## 2025-05-14 NOTE — NURSING NOTE
Current patient location: [address] [place] [id number]    Is the patient currently in the state of [place]? YES    Visit mode: VIDEO    If the visit is dropped, the patient can be reconnected by:VIDEO VISIT: Send to e-mail at: [email or web address]    Will anyone else be joining the visit? NO  (If patient encounters technical issues they should call [phone]    Are changes needed to the allergy or medication list? No    Are refills needed on medications prescribed by this physician? Discuss with provider    Rooming Documentation:  Questionnaire(s) completed    Reason for visit: [place] VERDUGOF

## 2025-05-15 ENCOUNTER — THERAPY VISIT (OUTPATIENT)
Dept: PHYSICAL THERAPY | Facility: CLINIC | Age: 65
End: 2025-05-15
Payer: COMMERCIAL

## 2025-05-15 DIAGNOSIS — S49.91XS RIGHT SHOULDER INJURY, SEQUELA: Primary | ICD-10-CM

## 2025-05-19 NOTE — PROGRESS NOTES
OCCUPATIONAL THERAPY EVALUATION  Type of Visit: Evaluation       Fall Risk Screen:  Have you fallen 2 or more times in the past year?: No  Have you fallen and had an injury in the past year?: No    Subjective        Presenting condition or subjective complaint: right shoulder elbow wrist reinjured  Date of onset: 04/24/25    Relevant medical history:     Dates & types of surgery: 2021 FFS, 2024 GCS,    Prior diagnostic imaging/testing results:       Prior therapy history for the same diagnosis, illness or injury: Yes more than 20 years ago, limited care    Living Environment  Social support: Alone   Type of home: House   Stairs to enter the home: Yes 3 Is there a railing: Yes     Ramp: No   Stairs inside the home: Yes 10 Is there a railing: Yes     Help at home: None  Equipment owned:       Employment: Yes  (IT)  Hobbies/Interests: dance, sand castle/sculpting, biking again, art...    Patient goals for therapy: full use, strength, wo pain    Pain assessment: See objective evaluation for additional pain details     Objective   Right hand dominant  Patient reports symptoms of pain, numbness, and tingling, weakness    Pain Level (Scale 0-10)   5/22/25   At Rest 0-3/10   With Use 5-6/10     Pain Description  Date 5/22/25   Location R elbow, into forearm and ulnar hand   Pain Quality Numbness/tingling   Frequency intermittent, constant, or daily     Pain is worst  daytime or nighttime   Exacerbated by Dancing/being pulled on by arm, typing/mousing, sleeping position/pressure on shoulder (cannot sleep on right side), lifting and carrying   Relieved by Shoulder PT, positional changes, avoiding activities that hurt   Progression Gradually improving     Sensation   Patient endorses intermittent numbness/tingling through entire finger up to medial elbow, most prominent in small finger    Special Tests   - none    + mild    ++ moderate    +++ severe       5/22/25   Sam Test NT   Costoclavicular Test NT    Elbow Flexion Test + 15 sec   Tinels Cubital Tunnel +   Tinels [place] -   Median Nerve Compression at Pronator    Mccall test for lumbrical incursion  (fist x 30 secs) NT   Tinels at Carpal Tunnel + very mild   Phalens + mild in LF after 45 sec   Radial nerve compression at PIN site discomfort   Tinels DRSN + mild     ROM  Pain Report: - none  + mild    ++ moderate    +++ severe   Elbow 5/22/25 5/22/25   AROM (PROM) Left Right   Extension -5 -10   Flexion 145 140   Supination 85 85   Pronation 85 85     ROM  Pain Report: - none  + mild    ++ moderate    +++ severe   Wrist 5/22/25 5/22/25   AROM (PROM) Left Right   Extension 70 67   Flexion 85 80   RD 15 20   UD 40 40     Strength   (Measured in pounds)  Pain Report: - none  + mild    ++ moderate    +++ severe    5/22/25 5/22/25   Trials Left Right   1  2  3 51 59-   Average 51 59     Lat Pinch 5/22/25 5/22/25   Trials Left Right   1  2  3 16 16   Average 16 16     3 Pt Pinch 5/22/25 5/22/25   Trials Left Right   1  2  3 11 11-   Average 11 11     Assessment & Plan   CLINICAL IMPRESSIONS  Medical Diagnosis: R Elbow Wrist and Hand pain    Treatment Diagnosis: R Elbow Wrist and Hand pain    Impression/Assessment: Pt is a 64 year old adult presenting to Occupational Therapy due to right elbow and wrist pain.  The following significant findings have been identified: Impaired sensation, Impaired strength, and Pain.  These identified deficits interfere with their ability to perform self care tasks, work tasks, recreational activities, household chores, meal planning and preparation, and sleeping as compared to previous level of function.     Clinical Decision Making (Complexity):  Assessment of Occupational Performance: 5 or more Performance Deficits  Occupational Performance Limitations: home establishment and management, meal preparation and cleanup, sleep, work, and leisure activities  Clinical Decision Making (Complexity): Low complexity    PLAN OF  CARE  Treatment Interventions:  Therapeutic Exercise:  PROM, Isotonics, Isometrics, and Stabilization  Neuromuscular re-education:  Nerve Gliding, Kinesiotaping, Isometrics, and Stabilization  Manual Techniques:  Myofascial release  Orthotic Fabrication:  PRN  Self Care:  Self Care Tasks, Ergonomic Considerations, and Work Tasks    Long Term Goals   OT Goal 1  Goal Identifier: Household Chores  Goal Description: Patient will be able to lift and carry a heavy shopping bag in right arm without difficulty or pain.  Rationale: In order to maximize safety and independence with ADL/IADLs  Target Date: 07/22/25      Frequency of Treatment: 2 x Month  Duration of Treatment: 3 Months     Education Assessment: Learner/Method: Patient     Risks and benefits of evaluation/treatment have been explained.   Patient/Family/caregiver agrees with Plan of Care.     Evaluation Time:    OT Eval, Low Complexity Minutes (36991): 20    Signing Clinician: Kristina Bermudez OT

## 2025-05-22 ENCOUNTER — THERAPY VISIT (OUTPATIENT)
Dept: OCCUPATIONAL THERAPY | Facility: CLINIC | Age: 65
End: 2025-05-22
Payer: COMMERCIAL

## 2025-05-22 DIAGNOSIS — M25.531 RIGHT WRIST PAIN: ICD-10-CM

## 2025-05-22 DIAGNOSIS — M25.521 RIGHT ELBOW PAIN: Primary | ICD-10-CM

## 2025-05-23 ENCOUNTER — PRE VISIT (OUTPATIENT)
Dept: PLASTIC SURGERY | Facility: CLINIC | Age: 65
End: 2025-05-23

## 2025-06-09 ENCOUNTER — OFFICE VISIT (OUTPATIENT)
Dept: PLASTIC SURGERY | Facility: CLINIC | Age: 65
End: 2025-06-09
Payer: COMMERCIAL

## 2025-06-09 ENCOUNTER — THERAPY VISIT (OUTPATIENT)
Dept: PHYSICAL THERAPY | Facility: CLINIC | Age: 65
End: 2025-06-09
Payer: COMMERCIAL

## 2025-06-09 VITALS
BODY MASS INDEX: 30.71 KG/M2 | HEART RATE: 51 BPM | SYSTOLIC BLOOD PRESSURE: 127 MMHG | OXYGEN SATURATION: 98 % | HEIGHT: 68 IN | DIASTOLIC BLOOD PRESSURE: 77 MMHG

## 2025-06-09 DIAGNOSIS — Z98.890 H/O VAGINOPLASTY: Primary | ICD-10-CM

## 2025-06-09 DIAGNOSIS — S49.91XS RIGHT SHOULDER INJURY, SEQUELA: Primary | ICD-10-CM

## 2025-06-09 DIAGNOSIS — L92.9 GRANULATION TISSUE: ICD-10-CM

## 2025-06-09 PROCEDURE — 97110 THERAPEUTIC EXERCISES: CPT | Mod: GP

## 2025-06-09 PROCEDURE — 97140 MANUAL THERAPY 1/> REGIONS: CPT | Mod: GP

## 2025-06-09 ASSESSMENT — PAIN SCALES - GENERAL: PAINLEVEL_OUTOF10: MILD PAIN (2)

## 2025-06-09 NOTE — LETTER
"6/9/2025       RE: Chris Stockton  [address]  [facility] [address]     Dear Colleague,    Thank you for referring your patient, Chris Stockton, to the [facility] [place] at [facility]. Please see a copy of my visit note below.    SUBJECTIVE:  Liz is a 64 year old who underwent FD vaginoplasty in [place] on 9/3/2024.  Last seen in person on 04/06/2025 to check in on healing and treat granulation tissue. She returned to work full time in early March and felt like increased sitting for work was partially contributing to increased bleeding. She is being seen today for ongoing follow-up and treatment of remaining granulation tissue.    She continues to have many life stressors, which remains challenging, but she is accessing her resources and supports as much as possible. She continues to dilate twice daily and maintains her depth. She reported almost a week of gray discharge without any real bleeding after the last granulation tissue treatment, but noticed return of blood stained drainage after a week. She continues to notice increased blood stained drainage in the afternoons, which coincides with having sat a long time for work.     She has her second stage surgery/labiaplasty scheduled for 10/31/2025 in [place]. Plan is to take photos today of external and internal exam findings so her surgeon can see progress and current state of healing/granulation tissue. Her surgeon plans to assess any remaining granulation tissue at time of next surgery if necessary.      OBJECTIVE:  /77 (BP Location: Left arm, Patient Position: Sitting, Cuff Size: Adult Large)   Pulse 51   Ht 1.727 m (5' 8\")   SpO2 98%   BMI 30.71 kg/m     General: NAD  Respiratory: respirations unlabored  Vulva: Vulvar incisions well healed. External vulva healed without residual granulation tissue. Areas of white and pink tissue " in areas of previous wounds/granulation tissue, but no proud red or friable granulation tissue externally. Internal speculum exam performed and one area of granulation tissue still present. Moderate sized area at apex of canal treated with silver nitrate.    ASSESSMENT/PLAN:  Hx of FD vaginoplasty in [place]  Continues to heal and make improvements  External and internal photos captured today during exam    Granulation tissue - Internal granulation tissue treated with silver nitrate. No external granulation tissue today which is major improvement from previous several months.    RTC in one month for reassessment of granulation tissue/bleeding      SAMMIE Razo, CNP    A total of 25 minutes was spent today with patient, reviewing records, completing charting, and other tasks as detailed above.     Again, thank you for allowing me to participate in the care of your patient.      Sincerely,    SAMMIE Calderon CNP

## 2025-06-09 NOTE — PROGRESS NOTES
"SUBJECTIVE:  Liz is a 64 year old who underwent FD vaginoplasty in [place] on 9/3/2024.  Last seen in person on 04/06/2025 to check in on healing and treat granulation tissue. She returned to work full time in early March and felt like increased sitting for work was partially contributing to increased bleeding. She is being seen today for ongoing follow-up and treatment of remaining granulation tissue.    She continues to have many life stressors, which remains challenging, but she is accessing her resources and supports as much as possible. She continues to dilate twice daily and maintains her depth. She reported almost a week of gray discharge without any real bleeding after the last granulation tissue treatment, but noticed return of blood stained drainage after a week. She continues to notice increased blood stained drainage in the afternoons, which coincides with having sat a long time for work.     She has her second stage surgery/labiaplasty scheduled for 10/31/2025 in [place]. Plan is to take photos today of external and internal exam findings so her surgeon can see progress and current state of healing/granulation tissue. Her surgeon plans to assess any remaining granulation tissue at time of next surgery if necessary.      OBJECTIVE:  /77 (BP Location: Left arm, Patient Position: Sitting, Cuff Size: Adult Large)   Pulse 51   Ht 1.727 m (5' 8\")   SpO2 98%   BMI 30.71 kg/m     General: NAD  Respiratory: respirations unlabored  Vulva: Vulvar incisions well healed. External vulva healed without residual granulation tissue. Areas of white and pink tissue in areas of previous wounds/granulation tissue, but no proud red or friable granulation tissue externally. Internal speculum exam performed and one area of granulation tissue still present. Moderate sized area at apex of canal treated with silver nitrate.    ASSESSMENT/PLAN:  Hx of FD vaginoplasty in [place]  Continues to heal and " make improvements  External and internal photos captured today during exam    Granulation tissue - Internal granulation tissue treated with silver nitrate. No external granulation tissue today which is major improvement from previous several months.    RTC in one month for reassessment of granulation tissue/bleeding      SAMMIE Razo, CNP    A total of 25 minutes was spent today with patient, reviewing records, completing charting, and other tasks as detailed above.

## 2025-06-09 NOTE — NURSING NOTE
"Chief Complaint   Patient presents with    RECHECK     Granulation tissue treatment.       Vitals:    06/09/25 0831   BP: 127/77   BP Location: Left arm   Patient Position: Sitting   Cuff Size: Adult Large   Pulse: 51   SpO2: 98%   Height: 1.727 m (5' 8\")       Body mass index is 30.71 kg/m .    Patient reports mild surgical site pain (2/10).    Cooper Lockwood, EMT    "

## 2025-06-23 ENCOUNTER — THERAPY VISIT (OUTPATIENT)
Dept: PHYSICAL THERAPY | Facility: CLINIC | Age: 65
End: 2025-06-23
Payer: COMMERCIAL

## 2025-06-23 DIAGNOSIS — S49.91XS RIGHT SHOULDER INJURY, SEQUELA: Primary | ICD-10-CM

## 2025-06-23 PROCEDURE — 97110 THERAPEUTIC EXERCISES: CPT | Mod: GP

## 2025-06-23 PROCEDURE — 97140 MANUAL THERAPY 1/> REGIONS: CPT | Mod: GP

## 2025-07-05 DIAGNOSIS — F64.0 GENDER DYSPHORIA IN ADULT: ICD-10-CM

## 2025-07-07 RX ORDER — ESTRADIOL 0.1 MG/D
FILM, EXTENDED RELEASE TRANSDERMAL
Qty: 72 PATCH | Refills: 1 | Status: SHIPPED | OUTPATIENT
Start: 2025-07-07

## 2025-07-07 NOTE — TELEPHONE ENCOUNTER
"Request for medication refill:    Medication Name: estradiol (VIVELLE-DOT) 0.1 MG/24HR bi-weekly patch     Providers if patient needs an appointment and you are willing to give a one month supply please refill for one month and  send a MyChart using \".SMILLIMITEDREFILL\" .Or route chart to \"P [facility] \" . And use the phrase \" SMIRXFOLLOWUP\"To call patient and inform of limited refill and providers request to make an appointment. (Giving one month refill in non controlled medications is strongly recommended before denial)    If refill has been denied, meaning absolutely no refills without visit, please complete the smart phrase \".SMIRXREFUSE\" and route it to the \"P [facility] MED REFILLS\"  pool to inform the patient and the pharmacy.    Ronak Herrera, CMA     "

## 2025-07-09 ENCOUNTER — VIRTUAL VISIT (OUTPATIENT)
Dept: PSYCHIATRY | Facility: CLINIC | Age: 65
End: 2025-07-09
Attending: NURSE PRACTITIONER
Payer: COMMERCIAL

## 2025-07-09 DIAGNOSIS — F90.2 ATTENTION DEFICIT HYPERACTIVITY DISORDER (ADHD), COMBINED TYPE: ICD-10-CM

## 2025-07-09 DIAGNOSIS — F41.9 ANXIETY: Primary | ICD-10-CM

## 2025-07-09 DIAGNOSIS — F32.A DEPRESSION, UNSPECIFIED DEPRESSION TYPE: ICD-10-CM

## 2025-07-09 RX ORDER — BUPROPION HYDROCHLORIDE 100 MG/1
100 TABLET, EXTENDED RELEASE ORAL DAILY
Qty: 90 TABLET | Refills: 1 | Status: SHIPPED | OUTPATIENT
Start: 2025-07-09

## 2025-07-09 ASSESSMENT — ANXIETY QUESTIONNAIRES
8. IF YOU CHECKED OFF ANY PROBLEMS, HOW DIFFICULT HAVE THESE MADE IT FOR YOU TO DO YOUR WORK, TAKE CARE OF THINGS AT HOME, OR GET ALONG WITH OTHER PEOPLE?: SOMEWHAT DIFFICULT
2. NOT BEING ABLE TO STOP OR CONTROL WORRYING: SEVERAL DAYS
5. BEING SO RESTLESS THAT IT IS HARD TO SIT STILL: NOT AT ALL
7. FEELING AFRAID AS IF SOMETHING AWFUL MIGHT HAPPEN: MORE THAN HALF THE DAYS
GAD7 TOTAL SCORE: 9
3. WORRYING TOO MUCH ABOUT DIFFERENT THINGS: MORE THAN HALF THE DAYS
IF YOU CHECKED OFF ANY PROBLEMS ON THIS QUESTIONNAIRE, HOW DIFFICULT HAVE THESE PROBLEMS MADE IT FOR YOU TO DO YOUR WORK, TAKE CARE OF THINGS AT HOME, OR GET ALONG WITH OTHER PEOPLE: SOMEWHAT DIFFICULT
GAD7 TOTAL SCORE: 9
7. FEELING AFRAID AS IF SOMETHING AWFUL MIGHT HAPPEN: MORE THAN HALF THE DAYS
GAD7 TOTAL SCORE: 9
6. BECOMING EASILY ANNOYED OR IRRITABLE: SEVERAL DAYS
1. FEELING NERVOUS, ANXIOUS, OR ON EDGE: MORE THAN HALF THE DAYS
4. TROUBLE RELAXING: SEVERAL DAYS

## 2025-07-09 NOTE — PATIENT INSTRUCTIONS
-Continue on current medication regimen.    Your next appointment is scheduled on 9/17/2025 (Wed) at 8am.      **For crisis resources, please see the information at the end of this document**   Patient Education    Thank you for coming to the [facility] [place] [facility].     Lab Testing:  If you had lab testing today and your results are reassuring or normal they will be mailed to you or sent through [employer] within 7 days. If the lab tests need quick action we will call you with the results. The phone number we will call with results is # [phone]. If this is not the best number please call our clinic and change the number.     Medication Refills:  If you need any refills please call your pharmacy and they will contact us. Our fax number for refills is [phone].   Three business days of notice are needed for general medication refill requests.   Five business days of notice are needed for controlled substance refill requests.   If you need to change to a different pharmacy, please contact the new pharmacy directly. The new pharmacy will help you get your medications transferred.     Contact Us:  Please call [phone] during business hours (8-5:00 M-F).   If you have medication related questions after clinic hours, or on the weekend, please call [phone].     Financial Assistance [phone]   Medical Records [phone]       MENTAL HEALTH CRISIS RESOURCES:  For a emergency help, please call 911 or go to the nearest Emergency Department.     Emergency Walk-In Options:   EmPATH Unit @ [facility] ([place]): [phone] - Specialized mental health emergency area designed to be calming  [facility] ([place]): [phone]  [facility] ([place]): [phone]  [facility]): [phone]    [facility] Crisis Information:   [place]: [phone]  [place]: [phone]  [place] ([place]) - Adult:  [phone]     Child: [phone]  [place] - Adult: [phone]     Child: [phone]  [place]: [phone]  List of all [facility] resources:   https://[place].gov/dhs/people-we-serve/adults/health-care/mental-health/resources/crisis-contacts.jsp    National Crisis Information:   Crisis Text Line: Text  [place]  to 719558  Suicide & Crisis Lifeline: 988  National Suicide Prevention Lifeline: 5-147-295-TALK (1-433.388.7412)       For online chat options, visit https://suicidepreventionlifeline.org/chat/  Poison Control Center: 1-455.711.9796  Trans Lifeline: 1-213.359.1609 - Hotline for transgender people of all ages  The [employer]: 2-284-590-2053 - Hotline for LGBT youth     For Non-Emergency Support:   Fast Tracker: Mental Health & Substance Use Disorder Resources -   https://www.[employer].org/
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
## 2025-07-09 NOTE — NURSING NOTE
Current patient location: [address] [place] [id number]    Is the patient currently in the state of [place]? YES    Visit mode: VIDEO    If the visit is dropped, the patient can be reconnected by:VIDEO VISIT: Send to e-mail at: [email or web address]    Will anyone else be joining the visit? NO  (If patient encounters technical issues they should call [phone]    Are changes needed to the allergy or medication list? No    Are refills needed on medications prescribed by this physician? Discuss with provider    Rooming Documentation:  Questionnaire(s) completed    Reason for visit: [place] VERDUGOF

## 2025-07-14 ENCOUNTER — OFFICE VISIT (OUTPATIENT)
Dept: PLASTIC SURGERY | Facility: CLINIC | Age: 65
End: 2025-07-14
Payer: COMMERCIAL

## 2025-07-14 VITALS
HEART RATE: 45 BPM | BODY MASS INDEX: 30.71 KG/M2 | DIASTOLIC BLOOD PRESSURE: 73 MMHG | OXYGEN SATURATION: 99 % | SYSTOLIC BLOOD PRESSURE: 115 MMHG | HEIGHT: 68 IN

## 2025-07-14 DIAGNOSIS — L92.9 GRANULATION TISSUE: ICD-10-CM

## 2025-07-14 DIAGNOSIS — Z98.890 H/O VAGINOPLASTY: Primary | ICD-10-CM

## 2025-07-14 ASSESSMENT — PAIN SCALES - GENERAL: PAINLEVEL_OUTOF10: MILD PAIN (2)

## 2025-07-14 NOTE — PROGRESS NOTES
"SUBJECTIVE:  Liz is a 64 year old who underwent FD vaginoplasty in [place] on 9/3/2024.  Last seen in person on 2025 to check in on healing and treat granulation tissue. She is being seen today for ongoing follow-up and treatment of remaining granulation tissue.    She continues to have many life stressors, which remains challenging, but she is accessing her resources and supports as much as possible. She continues to dilate twice daily and maintains her depth. She has been celebrating both her  birthday and transition/rebirthday. Will be moving into her new place by end of July/early August and is looking forward to that. She continues to notice increased blood stained drainage in the afternoons, which coincides with having sat a long time for work.     She has her second stage surgery/labiaplasty scheduled for 10/31/2025 in [place]. Her surgeon plans to assess any remaining granulation tissue at time of next surgery if necessary.      OBJECTIVE:  /73 (BP Location: Left arm, Patient Position: Sitting, Cuff Size: Adult Large)   Pulse (!) 45   Ht 1.727 m (5' 8\")   SpO2 99%   BMI 30.71 kg/m     General: NAD  Respiratory: respirations unlabored  Vulva: Vulvar incisions well healed. External vulva healed without residual granulation tissue. Areas of white and pink tissue in areas of previous wounds/granulation tissue, but no proud red or friable granulation tissue externally. Internal speculum exam performed and one area of granulation tissue still present at apex of canal, as well as area on posterior wall of canal closer to introitus.     ASSESSMENT/PLAN:  Hx of FD vaginoplasty in [place]  Continues to heal and make improvements  Granulation tissue - Internal granulation tissue treated with silver nitrate. No external granulation tissue today which is major improvement from previous several months.    RTC in one month for reassessment of granulation tissue/bleeding      Yeimi RYDER" SAMMIE Brown, CNP    A total of 30 minutes was spent today with patient, reviewing records, completing charting, and other tasks as detailed above.

## 2025-07-14 NOTE — NURSING NOTE
"Chief Complaint   Patient presents with    RECHECK     Granulation tissue treatment.       Vitals:    07/14/25 0831   BP: 115/73   BP Location: Left arm   Patient Position: Sitting   Cuff Size: Adult Large   Pulse: (!) 45   SpO2: 99%   Height: 1.727 m (5' 8\")       Body mass index is 30.71 kg/m .    Patient reports mild surgical site pain (2/10).    Cooper Lockwood, EMT    "

## 2025-07-14 NOTE — LETTER
"2025       RE: Chris Stockton  [address]  [facility] [id number]     Dear Colleague,    Thank you for referring your patient, Chris Stockton, to the [facility] [place] [facility]. Please see a copy of my visit note below.    SUBJECTIVE:  Liz is a 64 year old who underwent FD vaginoplasty in [place] on 9/3/2024.  Last seen in person on 2025 to check in on healing and treat granulation tissue. She is being seen today for ongoing follow-up and treatment of remaining granulation tissue.    She continues to have many life stressors, which remains challenging, but she is accessing her resources and supports as much as possible. She continues to dilate twice daily and maintains her depth. She has been celebrating both her garett birthday and transition/rebirthday. Will be moving into her new place by end of July/early August and is looking forward to that. She continues to notice increased blood stained drainage in the afternoons, which coincides with having sat a long time for work.     She has her second stage surgery/labiaplasty scheduled for 10/31/2025 in [place]. Her surgeon plans to assess any remaining granulation tissue at time of next surgery if necessary.      OBJECTIVE:  /73 (BP Location: Left arm, Patient Position: Sitting, Cuff Size: Adult Large)   Pulse (!) 45   Ht 1.727 m (5' 8\")   SpO2 99%   BMI 30.71 kg/m     General: NAD  Respiratory: respirations unlabored  Vulva: Vulvar incisions well healed. External vulva healed without residual granulation tissue. Areas of white and pink tissue in areas of previous wounds/granulation tissue, but no proud red or friable granulation tissue externally. Internal speculum exam performed and one area of granulation tissue still present at apex of canal, as well as area on posterior wall of canal closer to introitus. "     ASSESSMENT/PLAN:  Hx of FD vaginoplasty in [place]  Continues to heal and make improvements  Granulation tissue - Internal granulation tissue treated with silver nitrate. No external granulation tissue today which is major improvement from previous several months.    RTC in one month for reassessment of granulation tissue/bleeding      SAMMIE Razo, CNP    A total of 30 minutes was spent today with patient, reviewing records, completing charting, and other tasks as detailed above.     Again, thank you for allowing me to participate in the care of your patient.      Sincerely,    SAMMIE Calderon CNP

## 2025-08-12 ENCOUNTER — OFFICE VISIT (OUTPATIENT)
Dept: PLASTIC SURGERY | Facility: CLINIC | Age: 65
End: 2025-08-12
Payer: COMMERCIAL

## 2025-08-12 VITALS
WEIGHT: 201.7 LBS | BODY MASS INDEX: 30.57 KG/M2 | OXYGEN SATURATION: 99 % | HEART RATE: 50 BPM | DIASTOLIC BLOOD PRESSURE: 77 MMHG | HEIGHT: 68 IN | SYSTOLIC BLOOD PRESSURE: 118 MMHG

## 2025-08-12 DIAGNOSIS — Z98.890 H/O VAGINOPLASTY: Primary | ICD-10-CM

## 2025-08-12 DIAGNOSIS — L92.9 GRANULATION TISSUE: ICD-10-CM

## 2025-08-12 ASSESSMENT — PAIN SCALES - GENERAL: PAINLEVEL_OUTOF10: MILD PAIN (1)

## 2025-08-18 ENCOUNTER — OFFICE VISIT (OUTPATIENT)
Dept: FAMILY MEDICINE | Facility: CLINIC | Age: 65
End: 2025-08-18
Payer: COMMERCIAL

## 2025-08-18 VITALS
HEART RATE: 51 BPM | SYSTOLIC BLOOD PRESSURE: 116 MMHG | DIASTOLIC BLOOD PRESSURE: 73 MMHG | BODY MASS INDEX: 30.66 KG/M2 | OXYGEN SATURATION: 99 % | WEIGHT: 202.3 LBS | TEMPERATURE: 98.2 F | HEIGHT: 68 IN | RESPIRATION RATE: 17 BRPM

## 2025-08-18 DIAGNOSIS — E78.5 HYPERLIPIDEMIA LDL GOAL <100: ICD-10-CM

## 2025-08-18 DIAGNOSIS — Z00.00 ROUTINE GENERAL MEDICAL EXAMINATION AT A HEALTH CARE FACILITY: Primary | ICD-10-CM

## 2025-08-18 DIAGNOSIS — Z13.6 SCREENING FOR CARDIOVASCULAR CONDITION: ICD-10-CM

## 2025-08-18 DIAGNOSIS — L53.9 FACIAL ERYTHEMA: ICD-10-CM

## 2025-08-18 DIAGNOSIS — E03.9 ACQUIRED HYPOTHYROIDISM: ICD-10-CM

## 2025-08-18 DIAGNOSIS — R68.82 DECREASED LIBIDO: ICD-10-CM

## 2025-08-18 DIAGNOSIS — F64.0 GENDER DYSPHORIA IN ADULT: ICD-10-CM

## 2025-08-18 LAB
ALBUMIN SERPL BCG-MCNC: 4.6 G/DL (ref 3.5–5.2)
ALP SERPL-CCNC: 54 U/L (ref 40–150)
ALT SERPL W P-5'-P-CCNC: 11 U/L (ref 0–70)
ANION GAP SERPL CALCULATED.3IONS-SCNC: 13 MMOL/L (ref 7–15)
AST SERPL W P-5'-P-CCNC: 16 U/L (ref 0–45)
BILIRUB SERPL-MCNC: 0.4 MG/DL
BUN SERPL-MCNC: 19.3 MG/DL (ref 8–23)
CALCIUM SERPL-MCNC: 9.5 MG/DL (ref 8.8–10.4)
CHLORIDE SERPL-SCNC: 108 MMOL/L (ref 98–107)
CHOLEST SERPL-MCNC: 142 MG/DL
CREAT SERPL-MCNC: 0.96 MG/DL (ref 0.51–1.17)
EGFRCR SERPLBLD CKD-EPI 2021: 87 ML/MIN/1.73M2
ESTRADIOL SERPL-MCNC: 273 PG/ML
FASTING STATUS PATIENT QL REPORTED: YES
FASTING STATUS PATIENT QL REPORTED: YES
GLUCOSE SERPL-MCNC: 103 MG/DL (ref 70–99)
HCO3 SERPL-SCNC: 22 MMOL/L (ref 22–29)
HDLC SERPL-MCNC: 54 MG/DL
LDLC SERPL CALC-MCNC: 75 MG/DL
NONHDLC SERPL-MCNC: 88 MG/DL
POTASSIUM SERPL-SCNC: 4.8 MMOL/L (ref 3.4–5.3)
PROT SERPL-MCNC: 7.3 G/DL (ref 6.4–8.3)
SODIUM SERPL-SCNC: 143 MMOL/L (ref 135–145)
TRIGL SERPL-MCNC: 65 MG/DL
TSH SERPL DL<=0.005 MIU/L-ACNC: 3.09 UIU/ML (ref 0.3–4.2)

## 2025-08-18 RX ORDER — METRONIDAZOLE TOPICAL 7.5 MG/G
GEL TOPICAL 2 TIMES DAILY
Qty: 45 G | Refills: 0 | Status: SHIPPED | OUTPATIENT
Start: 2025-08-18

## 2025-08-18 SDOH — HEALTH STABILITY: PHYSICAL HEALTH: ON AVERAGE, HOW MANY DAYS PER WEEK DO YOU ENGAGE IN MODERATE TO STRENUOUS EXERCISE (LIKE A BRISK WALK)?: 2 DAYS

## 2025-08-18 ASSESSMENT — PATIENT HEALTH QUESTIONNAIRE - PHQ9
SUM OF ALL RESPONSES TO PHQ QUESTIONS 1-9: 7
SUM OF ALL RESPONSES TO PHQ QUESTIONS 1-9: 7
10. IF YOU CHECKED OFF ANY PROBLEMS, HOW DIFFICULT HAVE THESE PROBLEMS MADE IT FOR YOU TO DO YOUR WORK, TAKE CARE OF THINGS AT HOME, OR GET ALONG WITH OTHER PEOPLE: SOMEWHAT DIFFICULT

## 2025-08-18 ASSESSMENT — SOCIAL DETERMINANTS OF HEALTH (SDOH): HOW OFTEN DO YOU GET TOGETHER WITH FRIENDS OR RELATIVES?: THREE TIMES A WEEK

## 2025-08-18 ASSESSMENT — PAIN SCALES - GENERAL: PAINLEVEL_OUTOF10: NO PAIN (0)

## 2025-08-19 ENCOUNTER — PATIENT OUTREACH (OUTPATIENT)
Dept: CARE COORDINATION | Facility: CLINIC | Age: 65
End: 2025-08-19
Payer: COMMERCIAL

## 2025-08-20 LAB — TESTOST SERPL-MCNC: 13 NG/DL (ref 8–950)

## 2025-08-21 ENCOUNTER — PATIENT OUTREACH (OUTPATIENT)
Dept: CARE COORDINATION | Facility: CLINIC | Age: 65
End: 2025-08-21
Payer: COMMERCIAL